# Patient Record
Sex: MALE | Race: OTHER | NOT HISPANIC OR LATINO | URBAN - METROPOLITAN AREA
[De-identification: names, ages, dates, MRNs, and addresses within clinical notes are randomized per-mention and may not be internally consistent; named-entity substitution may affect disease eponyms.]

---

## 2022-09-06 ENCOUNTER — INPATIENT (INPATIENT)
Facility: HOSPITAL | Age: 22
LOS: 295 days | Discharge: NURSING, REHAB OR LONG-TERM CARE | DRG: 753 | End: 2023-06-29
Attending: HOSPITALIST | Admitting: HOSPITALIST
Payer: MEDICAID

## 2022-09-06 VITALS
RESPIRATION RATE: 18 BRPM | OXYGEN SATURATION: 99 % | HEART RATE: 115 BPM | DIASTOLIC BLOOD PRESSURE: 78 MMHG | TEMPERATURE: 98 F | WEIGHT: 160.06 LBS | SYSTOLIC BLOOD PRESSURE: 142 MMHG | HEIGHT: 68 IN

## 2022-09-06 LAB
ANION GAP SERPL CALC-SCNC: 11 MMOL/L — SIGNIFICANT CHANGE UP (ref 7–14)
APAP SERPL-MCNC: <5 UG/ML — LOW (ref 10–30)
BASOPHILS # BLD AUTO: 0.03 K/UL — SIGNIFICANT CHANGE UP (ref 0–0.2)
BASOPHILS NFR BLD AUTO: 0.3 % — SIGNIFICANT CHANGE UP (ref 0–1)
BUN SERPL-MCNC: 19 MG/DL — SIGNIFICANT CHANGE UP (ref 10–20)
CALCIUM SERPL-MCNC: 9.8 MG/DL — SIGNIFICANT CHANGE UP (ref 8.5–10.1)
CHLORIDE SERPL-SCNC: 104 MMOL/L — SIGNIFICANT CHANGE UP (ref 98–110)
CO2 SERPL-SCNC: 25 MMOL/L — SIGNIFICANT CHANGE UP (ref 17–32)
CREAT SERPL-MCNC: 0.9 MG/DL — SIGNIFICANT CHANGE UP (ref 0.3–1)
EGFR: 126 ML/MIN/1.73M2 — SIGNIFICANT CHANGE UP
EOSINOPHIL # BLD AUTO: 0.05 K/UL — SIGNIFICANT CHANGE UP (ref 0–0.7)
EOSINOPHIL NFR BLD AUTO: 0.5 % — SIGNIFICANT CHANGE UP (ref 0–8)
ETHANOL SERPL-MCNC: <10 MG/DL — SIGNIFICANT CHANGE UP
GLUCOSE SERPL-MCNC: 116 MG/DL — HIGH (ref 70–99)
HCT VFR BLD CALC: 40.7 % — LOW (ref 42–52)
HGB BLD-MCNC: 14 G/DL — SIGNIFICANT CHANGE UP (ref 14–18)
IMM GRANULOCYTES NFR BLD AUTO: 0.2 % — SIGNIFICANT CHANGE UP (ref 0.1–0.3)
LITHIUM SERPL-MCNC: 0.3 MMOL/L — LOW (ref 0.6–1.2)
LYMPHOCYTES # BLD AUTO: 1.37 K/UL — SIGNIFICANT CHANGE UP (ref 1.2–3.4)
LYMPHOCYTES # BLD AUTO: 14.3 % — LOW (ref 20.5–51.1)
MCHC RBC-ENTMCNC: 29.1 PG — SIGNIFICANT CHANGE UP (ref 27–31)
MCHC RBC-ENTMCNC: 34.4 G/DL — SIGNIFICANT CHANGE UP (ref 32–37)
MCV RBC AUTO: 84.6 FL — SIGNIFICANT CHANGE UP (ref 80–94)
MONOCYTES # BLD AUTO: 1.09 K/UL — HIGH (ref 0.1–0.6)
MONOCYTES NFR BLD AUTO: 11.4 % — HIGH (ref 1.7–9.3)
NEUTROPHILS # BLD AUTO: 7 K/UL — HIGH (ref 1.4–6.5)
NEUTROPHILS NFR BLD AUTO: 73.3 % — SIGNIFICANT CHANGE UP (ref 42.2–75.2)
NRBC # BLD: 0 /100 WBCS — SIGNIFICANT CHANGE UP (ref 0–0)
PLATELET # BLD AUTO: 260 K/UL — SIGNIFICANT CHANGE UP (ref 130–400)
POTASSIUM SERPL-MCNC: 4.1 MMOL/L — SIGNIFICANT CHANGE UP (ref 3.5–5)
POTASSIUM SERPL-SCNC: 4.1 MMOL/L — SIGNIFICANT CHANGE UP (ref 3.5–5)
RBC # BLD: 4.81 M/UL — SIGNIFICANT CHANGE UP (ref 4.7–6.1)
RBC # FLD: 11.6 % — SIGNIFICANT CHANGE UP (ref 11.5–14.5)
SALICYLATES SERPL-MCNC: <0.3 MG/DL — LOW (ref 4–30)
SODIUM SERPL-SCNC: 140 MMOL/L — SIGNIFICANT CHANGE UP (ref 135–146)
VALPROATE SERPL-MCNC: 36 UG/ML — LOW (ref 50–100)
WBC # BLD: 9.56 K/UL — SIGNIFICANT CHANGE UP (ref 4.8–10.8)
WBC # FLD AUTO: 9.56 K/UL — SIGNIFICANT CHANGE UP (ref 4.8–10.8)

## 2022-09-06 PROCEDURE — 80178 ASSAY OF LITHIUM: CPT

## 2022-09-06 PROCEDURE — 83735 ASSAY OF MAGNESIUM: CPT

## 2022-09-06 PROCEDURE — 99283 EMERGENCY DEPT VISIT LOW MDM: CPT

## 2022-09-06 PROCEDURE — 80354 DRUG SCREENING FENTANYL: CPT

## 2022-09-06 PROCEDURE — 80053 COMPREHEN METABOLIC PANEL: CPT

## 2022-09-06 PROCEDURE — 36415 COLL VENOUS BLD VENIPUNCTURE: CPT

## 2022-09-06 PROCEDURE — 93010 ELECTROCARDIOGRAM REPORT: CPT

## 2022-09-06 PROCEDURE — 84100 ASSAY OF PHOSPHORUS: CPT

## 2022-09-06 PROCEDURE — 85027 COMPLETE CBC AUTOMATED: CPT

## 2022-09-06 PROCEDURE — U0003: CPT

## 2022-09-06 PROCEDURE — 81003 URINALYSIS AUTO W/O SCOPE: CPT

## 2022-09-06 PROCEDURE — 87635 SARS-COV-2 COVID-19 AMP PRB: CPT

## 2022-09-06 PROCEDURE — 80048 BASIC METABOLIC PNL TOTAL CA: CPT

## 2022-09-06 PROCEDURE — 99285 EMERGENCY DEPT VISIT HI MDM: CPT

## 2022-09-06 PROCEDURE — 80164 ASSAY DIPROPYLACETIC ACD TOT: CPT

## 2022-09-06 PROCEDURE — U0005: CPT

## 2022-09-06 PROCEDURE — 80307 DRUG TEST PRSMV CHEM ANLYZR: CPT

## 2022-09-06 PROCEDURE — 93005 ELECTROCARDIOGRAM TRACING: CPT

## 2022-09-06 PROCEDURE — 85025 COMPLETE CBC W/AUTO DIFF WBC: CPT

## 2022-09-07 DIAGNOSIS — F31.9 BIPOLAR DISORDER, UNSPECIFIED: ICD-10-CM

## 2022-09-07 DIAGNOSIS — Z87.898 PERSONAL HISTORY OF OTHER SPECIFIED CONDITIONS: ICD-10-CM

## 2022-09-07 DIAGNOSIS — F39 UNSPECIFIED MOOD [AFFECTIVE] DISORDER: ICD-10-CM

## 2022-09-07 LAB
AMPHET UR-MCNC: NEGATIVE — SIGNIFICANT CHANGE UP
APPEARANCE UR: CLEAR — SIGNIFICANT CHANGE UP
BARBITURATES UR SCN-MCNC: NEGATIVE — SIGNIFICANT CHANGE UP
BENZODIAZ UR-MCNC: NEGATIVE — SIGNIFICANT CHANGE UP
BILIRUB UR-MCNC: NEGATIVE — SIGNIFICANT CHANGE UP
COCAINE METAB.OTHER UR-MCNC: NEGATIVE — SIGNIFICANT CHANGE UP
COLOR SPEC: YELLOW — SIGNIFICANT CHANGE UP
DIFF PNL FLD: NEGATIVE — SIGNIFICANT CHANGE UP
DRUG SCREEN 1, URINE RESULT: SIGNIFICANT CHANGE UP
FENTANYL UR QL: NEGATIVE — SIGNIFICANT CHANGE UP
GLUCOSE UR QL: NEGATIVE MG/DL — SIGNIFICANT CHANGE UP
KETONES UR-MCNC: NEGATIVE — SIGNIFICANT CHANGE UP
LEUKOCYTE ESTERASE UR-ACNC: NEGATIVE — SIGNIFICANT CHANGE UP
METHADONE UR-MCNC: NEGATIVE — SIGNIFICANT CHANGE UP
NITRITE UR-MCNC: NEGATIVE — SIGNIFICANT CHANGE UP
OPIATES UR-MCNC: NEGATIVE — SIGNIFICANT CHANGE UP
OXYCODONE UR-MCNC: NEGATIVE — SIGNIFICANT CHANGE UP
PCP UR-MCNC: NEGATIVE — SIGNIFICANT CHANGE UP
PH UR: 7 — SIGNIFICANT CHANGE UP (ref 5–8)
PROPOXYPHENE QUALITATIVE URINE RESULT: NEGATIVE — SIGNIFICANT CHANGE UP
PROT UR-MCNC: NEGATIVE MG/DL — SIGNIFICANT CHANGE UP
SARS-COV-2 RNA SPEC QL NAA+PROBE: SIGNIFICANT CHANGE UP
SP GR SPEC: 1.02 — SIGNIFICANT CHANGE UP (ref 1.01–1.03)
THC UR QL: NEGATIVE — SIGNIFICANT CHANGE UP
UROBILINOGEN FLD QL: 2 MG/DL

## 2022-09-07 RX ORDER — DIVALPROEX SODIUM 500 MG/1
0 TABLET, DELAYED RELEASE ORAL
Qty: 0 | Refills: 0 | DISCHARGE

## 2022-09-07 RX ORDER — LITHIUM CARBONATE 300 MG/1
450 TABLET, EXTENDED RELEASE ORAL
Refills: 0 | Status: DISCONTINUED | OUTPATIENT
Start: 2022-09-07 | End: 2023-06-29

## 2022-09-07 RX ORDER — DIVALPROEX SODIUM 500 MG/1
500 TABLET, DELAYED RELEASE ORAL DAILY
Refills: 0 | Status: DISCONTINUED | OUTPATIENT
Start: 2022-09-07 | End: 2023-06-29

## 2022-09-07 RX ORDER — DIVALPROEX SODIUM 500 MG/1
500 TABLET, DELAYED RELEASE ORAL ONCE
Refills: 0 | Status: COMPLETED | OUTPATIENT
Start: 2022-09-07 | End: 2022-09-07

## 2022-09-07 RX ORDER — DIVALPROEX SODIUM 500 MG/1
250 TABLET, DELAYED RELEASE ORAL DAILY
Refills: 0 | Status: DISCONTINUED | OUTPATIENT
Start: 2022-09-07 | End: 2023-06-29

## 2022-09-07 RX ORDER — DIVALPROEX SODIUM 500 MG/1
250 TABLET, DELAYED RELEASE ORAL ONCE
Refills: 0 | Status: COMPLETED | OUTPATIENT
Start: 2022-09-07 | End: 2022-09-07

## 2022-09-07 RX ORDER — LITHIUM CARBONATE 300 MG/1
450 TABLET, EXTENDED RELEASE ORAL ONCE
Refills: 0 | Status: COMPLETED | OUTPATIENT
Start: 2022-09-07 | End: 2022-09-07

## 2022-09-07 RX ORDER — DIVALPROEX SODIUM 500 MG/1
250 TABLET, DELAYED RELEASE ORAL AT BEDTIME
Refills: 0 | Status: DISCONTINUED | OUTPATIENT
Start: 2022-09-07 | End: 2023-06-29

## 2022-09-07 RX ORDER — DIVALPROEX SODIUM 500 MG/1
500 TABLET, DELAYED RELEASE ORAL AT BEDTIME
Refills: 0 | Status: DISCONTINUED | OUTPATIENT
Start: 2022-09-07 | End: 2023-06-29

## 2022-09-07 RX ORDER — RISPERIDONE 4 MG/1
2 TABLET ORAL ONCE
Refills: 0 | Status: COMPLETED | OUTPATIENT
Start: 2022-09-07 | End: 2022-09-07

## 2022-09-07 RX ORDER — RISPERIDONE 4 MG/1
2 TABLET ORAL
Refills: 0 | Status: DISCONTINUED | OUTPATIENT
Start: 2022-09-07 | End: 2023-06-29

## 2022-09-07 RX ORDER — LITHIUM CARBONATE 300 MG/1
0 TABLET, EXTENDED RELEASE ORAL
Qty: 0 | Refills: 0 | DISCHARGE

## 2022-09-07 RX ADMIN — DIVALPROEX SODIUM 250 MILLIGRAM(S): 500 TABLET, DELAYED RELEASE ORAL at 22:34

## 2022-09-07 RX ADMIN — LITHIUM CARBONATE 450 MILLIGRAM(S): 300 TABLET, EXTENDED RELEASE ORAL at 18:39

## 2022-09-07 RX ADMIN — DIVALPROEX SODIUM 500 MILLIGRAM(S): 500 TABLET, DELAYED RELEASE ORAL at 08:28

## 2022-09-07 RX ADMIN — RISPERIDONE 2 MILLIGRAM(S): 4 TABLET ORAL at 18:39

## 2022-09-07 RX ADMIN — RISPERIDONE 2 MILLIGRAM(S): 4 TABLET ORAL at 08:28

## 2022-09-07 RX ADMIN — LITHIUM CARBONATE 450 MILLIGRAM(S): 300 TABLET, EXTENDED RELEASE ORAL at 08:28

## 2022-09-07 RX ADMIN — DIVALPROEX SODIUM 250 MILLIGRAM(S): 500 TABLET, DELAYED RELEASE ORAL at 08:28

## 2022-09-07 RX ADMIN — DIVALPROEX SODIUM 500 MILLIGRAM(S): 500 TABLET, DELAYED RELEASE ORAL at 22:34

## 2022-09-07 NOTE — ED PROVIDER NOTE - CLINICAL SUMMARY MEDICAL DECISION MAKING FREE TEXT BOX
Patient presented with aggressive and violent behavior from home as documented. On arrival patient afebrile, HD stable, neurovascularly intact. Medically cleared in ED. Consulted psych who evaluated patient - they do not recommend admission at this time to psych and patient ultimately cleared from their standpoint. However, family is refusing to take patient home as patient is deemed a threat to them and their other children. Consulted case management - unclear when placement could be achieved in group home or other facility and therefore will require admission to medicine for placement.
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
## 2022-09-07 NOTE — ED PROVIDER NOTE - PROGRESS NOTE DETAILS
HANNAH: Case discussed with  Aiyana - psych is clearing patient from their standpoint and they do not see indication for psychiatric admission. Aiyana has discussed case at length with patient's family who is refusing to take patient home as they consider patient a threat to them and their other children. Per Aiyana, patient will need placement at group home or other facility but it is unclear currently how long this will take. Spoke with Dr. Meza (hospitalist) who is refusing admission to medicine pending placement. Spoke directly with Dr. Palacios - in conjunction with case management - will continue attempt currently to find patient placement/find more definitive timeline for placement in order to determine final disposition. HANNAH: Case management spoke with Dr. Meza - patient to be admitted to medicine until placement can be achieved.

## 2022-09-07 NOTE — H&P ADULT - ASSESSMENT
This is a 21 year old male past medical history of bipolar and developmental delay comes to emergency room for alleged aggressive and violent behavior as per step father.  Evaluated by Psych Tele in ED - cleared no need for inpatient evaluation.  Step Father left stating needs placement refusing to take child home.

## 2022-09-07 NOTE — H&P ADULT - NSHPSOCIALHISTORY_GEN_ALL_CORE
Living Condition: lives at home with Step Dad and Biological Mother  Ambulation Status:  independently  Tobacco use:  Denies  EtOH use:  Denies  Illicit drug use: Denies  Marital Status: Denies

## 2022-09-07 NOTE — ED BEHAVIORAL HEALTH ASSESSMENT NOTE - NSSUICPROTFACT_PSY_ALL_CORE
Responsibility to children, family, or others/Identifies reasons for living/Fear of death or the actual act of killing self

## 2022-09-07 NOTE — ED ADULT NURSE REASSESSMENT NOTE - NS ED NURSE REASSESS COMMENT FT1
pt in no acute distress. pt seen by case management, awaiting dispo. pt on 1:1 observation, area kept free from harm. vital signs stable. safety maintained

## 2022-09-07 NOTE — PATIENT PROFILE ADULT - TRANSPORTATION
discharge vital signs obtained. RN woke patient up for oral temperature, patient had 15 second seizure, resolved without intervention. Mother states this happens every time patient is woken up from sleep or nap. patient immediately returned to baseline behavior. no breathing pattern changes, color change, SpO2 remained >100%. Post ichtal /83. Dr Armenta at bedside to assess patient. patient remains cleared for discharge home. PIV removed as per MD, dressing applied, hemostasis achieved. Patient awake, alert, interactive with family, stating "I want to go home".
handoff report received from Zac Chandra RN for change of shift. patient resting comfortably on stretcher, no acute distress noted, family at bedside. PIV intact and patent. additional urine sample obtained and sent to lab. awaiting results and disposition. Will continue to monitor.
no

## 2022-09-07 NOTE — H&P ADULT - NSHPLABSRESULTS_GEN_ALL_CORE
14.0   9.56  )-----------( 260      ( 06 Sep 2022 23:20 )             40.7       09-06    140  |  104  |  19  ----------------------------<  116<H>  4.1   |  25  |  0.9    Ca    9.8      06 Sep 2022 23:20                Urinalysis Basic - ( 07 Sep 2022 11:50 )    Color: Yellow / Appearance: Clear / S.020 / pH: x  Gluc: x / Ketone: Negative  / Bili: Negative / Urobili: 2.0 mg/dL   Blood: x / Protein: Negative mg/dL / Nitrite: Negative   Leuk Esterase: Negative / RBC: x / WBC x   Sq Epi: x / Non Sq Epi: x / Bacteria: x            Lactate Trend            CAPILLARY BLOOD GLUCOSE

## 2022-09-07 NOTE — H&P ADULT - HISTORY OF PRESENT ILLNESS
This is a 21 year old male past medical history of bipolar and developmental delay comes to emergency room for alleged aggressive and violent behavior as per step father. Patient is compliant with meds and as per father has been more aggressive and has become violent which prompted emergency room visit. patient denies complaints denies chest pain, shortness of breath, ab pain and SI/HI.  Patient at bedside is calm and interacting appropriately.

## 2022-09-07 NOTE — ED BEHAVIORAL HEALTH ASSESSMENT NOTE - REFERRAL / APPOINTMENT DETAILS
Please provide pt with resources for OPWDD services/outpatient therapy referrals that will be faxed over

## 2022-09-07 NOTE — ED BEHAVIORAL HEALTH ASSESSMENT NOTE - DESCRIPTION
See HPI None Patient has been in good behavioral control since arriving to the ED. Please see separate SW note for details

## 2022-09-07 NOTE — H&P ADULT - NS ATTEND AMEND GEN_ALL_CORE FT
PHYSICAL EXAM:      Constitutional: NAD    Neck: supple    Respiratory: CTA b/l    Cardiovascular: [id number]    Gastrointestinal: soft, NT, ND    Extremities: no edema    Neurological: AAOx3, NFD    Psychiatric: no suicidal ideations

## 2022-09-07 NOTE — ED BEHAVIORAL HEALTH ASSESSMENT NOTE - SUMMARY
Patient is a 20 yo M, domiciled with parents and 3 younger siblings (8 yo, 13 yo, 17yo) in [place], enrolled as a student in [place] [address] in the [facility] ED, with a psychiatric history of ?Bipolar disorder and likely developmental disorder, no previous psychiatric hospitalizations, no known suicide attempts, +SIB by biting self, +h/o physical aggression toward others, no substance use or legal issues, no significant PMH, in outpatient treatment with a psychiatrist, Dr. Ruby Muir, whom pt has been seeing since he was a young child, on Depakote  mg BID, lithium  mg BID, and Risperidone 2 mg BID, but non-adherent, who was BIB EMS a/b parents for agitation at home.    Patient presents with a longstanding history of poor impulse control, low frustration tolerance, and poor coping skills, likely in the setting of a developmental disorder +/- intellectual disability, with recurrent episodes of agitation at home in response to not having his expectations met. Since arriving to the ED, the patient has denied SI/HI/AH/VH or manic symptoms, has been in good behavioral control, and has expressed remorse for his actions earlier this evening. Given his history, the pt's behavioral outburst tonight is consistent with his baseline and not a result of an acute mood/psychotic episode that would benefit from inpatient hospitalization. He has multiple risk factors that elevate his chronic risk of harm, including previous psychiatric diagnoses, h/o aggression, poor impulse control/low frustration tolerance, and med non-adherence. To mitigate his risk of harm while in the ED, the patient completed a safety plan and he will be provided doses of his home meds. To reduce his risk of future harm, the pt and his family were provided psychoeducation on the importance of the pt taking his meds on a regular basis to target his behavioral dysregulation and this writer recommended that the patient be supervised while taking his meds at home. In addition, the team provided resources on OPWDD services (ie [facility]) so that the pt could be set up with extra support at home, as well as outpatient referrals for therapy services. This writer recommended the father contact the patient's psychiatrist to schedule an appt for the pt this week, which the father agreed to do, but he refused to let the patient come back home at this time given his recent agitation. Given the father's refusal to accept the pt back home, this writer informed the father that the patient will be held in the ED for a SW consult in the AM to discuss dispo options further and provided the father with the phone number to the ED so that he could reach out to SW in the AM. At this time, there is no acute contraindication to discharge from a psychiatric standpoint.     Plan:  -Recommend holding pt in the ED for a SW Consult in the AM to discuss dispo and recommend that they reach out to patient's family ([phone]) to discuss.  -While in the ED, can provide pt with doses of his home meds: Depakote  mg, lithium  mg, and Risperidone 2 mg   -At this time, there is no acute contraindication to discharge from a psychiatric standpoint. Patient is a 22 yo M, domiciled with parents and 3 younger siblings (10 yo, 11 yo, 15yo) in [place], enrolled as a student in [place] [address] in the [facility] ED, with a psychiatric history of ?Bipolar disorder and likely developmental disorder, no previous psychiatric hospitalizations, no known suicide attempts, +SIB by biting self, +h/o physical aggression toward others, no substance use or legal issues, no significant PMH, in outpatient treatment with a psychiatrist, Dr. Ruby Muir, whom pt has been seeing since he was a young child, on Depakote  mg BID, lithium  mg BID, and Risperidone 2 mg BID, but non-adherent, who was BIB EMS a/b parents for agitation at home.    Patient presents with a longstanding history of poor impulse control, low frustration tolerance, and poor coping skills, likely in the setting of a developmental disorder +/- intellectual disability, with recurrent episodes of agitation at home in response to not having his expectations met. Since arriving to the ED, the patient has denied SI/HI/AH/VH or manic symptoms, has been in good behavioral control, and has expressed remorse for his actions earlier this evening. Given his history, the pt's behavioral outburst tonight is consistent with his baseline and not a result of an acute mood/psychotic episode that would benefit from inpatient hospitalization. He has multiple risk factors that elevate his chronic risk of harm, including previous psychiatric diagnoses, h/o aggression, poor impulse control/low frustration tolerance, and med non-adherence. To mitigate his risk of harm while in the ED, the patient completed a safety plan and he will be provided doses of his home meds. To reduce his risk of future harm, the pt and his family were provided psychoeducation on the importance of the pt taking his meds on a regular basis to target his behavioral dysregulation and this writer recommended that the patient be supervised while taking his meds at home. In addition, the team provided resources on OPWDD services (ie [facility]) so that the pt could be set up with extra support at home, as well as outpatient referrals for therapy services. This writer recommended the father contact the patient's psychiatrist to schedule an appt for the pt this week, which the father agreed to do, but he refused to let the patient come back home at this time given his recent agitation. Given the father's refusal to accept the pt back home, this writer informed the father that the patient will be held in the ED for a SW consult in the AM to discuss dispo options further and provided the father with the phone number to the ED so that he could reach out to SW in the AM. At this time, there is no acute contraindication to discharge from a psychiatric standpoint.     Plan:  -Recommend holding pt in the ED for a SW Consult in the AM to discuss dispo options and recommend that they reach out to patient's family ([phone]) to discuss.  -Maintain on 1:1 for elopement precautions  -While in the ED, can provide pt with doses of his home meds: Depakote  mg, lithium  mg, and Risperidone 2 mg   -At this time, there is no acute contraindication to discharge from a psychiatric standpoint. Patient is a 20 yo M, domiciled with parents and 3 younger siblings (8 yo, 11 yo, 15yo) in [place], enrolled as a student in [place] [address] in the hospitals ED, with a psychiatric history of ?Bipolar disorder and likely developmental disorder, no previous psychiatric hospitalizations, no known suicide attempts, +SIB by biting self, +h/o physical aggression toward others, no substance use or legal issues, no significant PMH, in outpatient treatment with a psychiatrist, Dr. Ruby Muir, whom pt has been seeing since he was a young child, on Depakote  mg BID, lithium  mg BID, and Risperidone 2 mg BID, but non-adherent, who was BIB EMS a/b parents for agitation at home.    Patient presents with a longstanding history of poor impulse control, low frustration tolerance, and inadequate coping skills, likely in the setting of a developmental disorder +/- intellectual disability, with recurrent behavioral outbursts in response to not having his expectations met. This evening, he became physically agitated once he learned that he could not be immediately enrolled in specific program and was brought to the ED for an evaluation. Since arriving to the ED, the patient has denied SI/HI/AH/VH/PI or manic symptoms, he has remained in good behavioral control, and has expressed remorse for his earlier actions, which he acknowledges to be an ongoing issue. On MSE, he does not exhibit acute depressive, manic, psychotic, or anxiety symptoms and he was able to contract for safety. Given his current presentation and history, the pt's behavioral outburst tonight seems to be a result of his poor impulse control and problem solving skills(which is consistent with his baseline), rather than an acute mood/psychotic disorder that would benefit from inpatient hospitalization.     The patient has multiple risk factors that elevate his chronic risk of harm, including previous psychiatric diagnoses, h/o aggression, poor impulse control/low frustration tolerance, and medication non-adherence. His risk is mitigated by multiple protective factors, including future-orientation, ability to contract for safety, no previous suicide attempts, in treatment, and no e/o an acute mood or psychotic episode. To mitigate his risk of harm while in the ED, the patient completed a safety plan and he will be provided doses of his home meds. To reduce his risk of future harm, the pt and his family were provided psychoeducation on the importance of taking his meds regularly to target his behavioral dysregulation and advised to supervise pt when he takes his meds to ensure he is adherent. In addition, the team provided resources on OPWDD services (ie [facility]) so that the pt could be set up with extra support at home, as well as outpatient referrals for therapy services. This writer recommended the father contact the patient's psychiatrist to schedule an appt for the pt this week, which the father agreed to do, but he refused to let the patient come back home at this time given his recent agitation. Given the father's refusal to accept the pt back home, this writer informed the father that the patient will be held in the ED for a SW consult in the AM to discuss dispo options and the father was provided with the phone number to the ED so that he could reach out to SW in the AM. At this time, there is no acute contraindication to discharge from a psychiatric standpoint.     Plan:  -Recommend holding pt in the ED for a SW Consult in the AM to discuss dispo options and recommend that they reach out to patient's family ([phone]) to discuss.  -Maintain on 1:1 for elopement precautions  -While in the ED, can provide pt with doses of his home meds: Depakote  mg, lithium  mg, and Risperidone 2 mg   -At this time, there is no acute contraindication to discharge from a psychiatric standpoint.

## 2022-09-07 NOTE — H&P ADULT - PROBLEM SELECTOR PLAN 1
- patient cleared by psych team in ED  - patient calm and interacting appropriately in ED  - Case management eval  - Social Service consult  - Monitor vital signs shift  - regular diet  - supportive care

## 2022-09-07 NOTE — ED PROVIDER NOTE - ATTENDING APP SHARED VISIT CONTRIBUTION OF CARE
I reviewed and verified the documentation and  and independently performed the documented    Patient is a 20 yo M, bi father with a psychiatric history of ?Bipolar disorder  per father and likely developmental  pw aggressive behavior at home . h/o physical aggression toward others. pt no si hi .noncompliant with  meds at home (depakote, lithium, risperidone)   medically cleared for psych eval. evaluated by psych 0 0  cleared from psych perspective  however they   holding pt in the ED for a SW Consult in the AM to discuss dispo and recommend that they reach out to patient's family ([phone]) to discuss.

## 2022-09-07 NOTE — ED PROVIDER NOTE - OBJECTIVE STATEMENT
21 year old male past medical history of bipolar and developmental delay comes to emergency room for aggressive and violent behavior. patient is complaint with meds and as per father has been more aggressive and has become violent which prompted emergency room visit. patient denies complaints denies chest pain, shortness of breath, ab pain and SI/HI.

## 2022-09-07 NOTE — ED BEHAVIORAL HEALTH ASSESSMENT NOTE - DETAILS
See HPI Pt's father informed about plan Patient reviewed coping strategies, people he can reach out for help, and emergency services he can contact for help

## 2022-09-07 NOTE — PATIENT PROFILE ADULT - FALL HARM RISK - UNIVERSAL INTERVENTIONS
Bed in lowest position, wheels locked, appropriate side rails in place/Call bell, personal items and telephone in reach/Instruct patient to call for assistance before getting out of bed or chair/Non-slip footwear when patient is out of bed/[place] to call system/Physically safe environment - no spills, clutter or unnecessary equipment/Purposeful Proactive Rounding/Room/bathroom lighting operational, light cord in reach

## 2022-09-07 NOTE — ED ADULT NURSE REASSESSMENT NOTE - NS ED NURSE REASSESS COMMENT FT1
received pt in no acute distress. pt in er for aggressive behavior. pt on 1:1 observation for safety. pt in hospital gown, belongings secured. frequent rounding on pt to ensure safety. pt awaiting dispo. safety maintained. received pt in no acute distress. pt in er for aggressive behavior. pt on 1:1 observation for safety. pt in hospital gown, belongings secured. frequent rounding on pt to ensure safety. pt denies SI/HI/AVH. pt awaiting dispo. safety maintained.

## 2022-09-08 ENCOUNTER — TRANSCRIPTION ENCOUNTER (OUTPATIENT)
Age: 22
End: 2022-09-08

## 2022-09-08 PROCEDURE — 99232 SBSQ HOSP IP/OBS MODERATE 35: CPT

## 2022-09-08 RX ORDER — INSULIN LISPRO 100/ML
4 VIAL (ML) SUBCUTANEOUS ONCE
Refills: 0 | Status: DISCONTINUED | OUTPATIENT
Start: 2022-09-08 | End: 2022-09-08

## 2022-09-08 RX ADMIN — DIVALPROEX SODIUM 250 MILLIGRAM(S): 500 TABLET, DELAYED RELEASE ORAL at 22:41

## 2022-09-08 RX ADMIN — RISPERIDONE 2 MILLIGRAM(S): 4 TABLET ORAL at 06:00

## 2022-09-08 RX ADMIN — DIVALPROEX SODIUM 500 MILLIGRAM(S): 500 TABLET, DELAYED RELEASE ORAL at 11:53

## 2022-09-08 RX ADMIN — DIVALPROEX SODIUM 250 MILLIGRAM(S): 500 TABLET, DELAYED RELEASE ORAL at 11:52

## 2022-09-08 RX ADMIN — LITHIUM CARBONATE 450 MILLIGRAM(S): 300 TABLET, EXTENDED RELEASE ORAL at 17:34

## 2022-09-08 RX ADMIN — LITHIUM CARBONATE 450 MILLIGRAM(S): 300 TABLET, EXTENDED RELEASE ORAL at 06:00

## 2022-09-08 RX ADMIN — DIVALPROEX SODIUM 500 MILLIGRAM(S): 500 TABLET, DELAYED RELEASE ORAL at 22:41

## 2022-09-08 RX ADMIN — RISPERIDONE 2 MILLIGRAM(S): 4 TABLET ORAL at 17:34

## 2022-09-08 NOTE — PROGRESS NOTE ADULT - ASSESSMENT
This is a 21 year old male past medical history of bipolar and developmental delay comes to emergency room for alleged aggressive and violent behavior as per step father.  Evaluated by Psych Tele in ED - cleared no need for inpatient evaluation.  Step Father left stating needs placement refusing to take child home.   Case management consulted.     Bipolar disorder and likely developmental disorder, CONT CURRENT MEDS, discussed with his psychiatrist.

## 2022-09-08 NOTE — DISCHARGE NOTE PROVIDER - NSDCMRMEDTOKEN_GEN_ALL_CORE_FT
divalproex sodium 250 mg oral tablet, extended release: orally 2 times a day  divalproex sodium 500 mg oral tablet, extended release: orally 2 times a day  lithium carbonate extended release: 450 milligram(s) orally 2 times a day  risperiDONE 2 mg oral tablet: 1 tab(s) orally 2 times a day   divalproex sodium 250 mg oral tablet, extended release: 1 tab(s) orally once a day  divalproex sodium 250 mg oral tablet, extended release: 1 tab(s) orally once a day (at bedtime)  divalproex sodium 500 mg oral tablet, extended release: 1 tab(s) orally once a day (at bedtime)  divalproex sodium 500 mg oral tablet, extended release: 1 tab(s) orally once a day  lithium 450 mg oral tablet, extended release: 1 tab(s) orally 2 times a day  melatonin 3 mg oral tablet: 1 tab(s) orally once a day (at bedtime)  risperiDONE 2 mg oral tablet: 1 tab(s) orally 2 times a day  traZODone 50 mg oral tablet: 25 milligram(s) orally once a day MEDS ORDERED BY PHONE TO PHARMACY  traZODone 50 mg oral tablet: 25 milligram(s) orally once a day (at bedtime) MEDS ORDERED VIA PHONE TO THE PHARMACY

## 2022-09-08 NOTE — DISCHARGE NOTE PROVIDER - HOSPITAL COURSE
This is a 21 year old male past medical history of bipolar and developmental delay comes to emergency room for alleged aggressive and violent behavior as per step father.  Evaluated by Psych Tele in ED - cleared no need for inpatient evaluation.  Step Father left stating needs placement refusing to take child home.      Problem/Plan - 1:  ·  Problem: History of developmental delay.   ·  Plan: - patient cleared by psych team in ED  - patient calm and interacting appropriately in ED  - Case management eval  - Social Service consult  - Monitor vital signs shift  - regular diet  - supportive care  -pt clinically improved and dc to a group home     Problem/Plan - 2:  ·  Problem: Bipolar disorder.   ·  Plan: - continue home medications.   22 years old male past medical history of bipolar and developmental delay came to emergency room for alleged aggressive and violent behavior as per step father. Evaluated by Psych; Step Father left stated pt needs placement refusing to take child home. Patient was admitted for Bipolar disorder and likely developmental disorder- stable. He was continued on his medications: Depakote, risperidone, and lithium. Pt has been stable without aggressive behaviour in the hospital. Family wasn't able to take care of the patient and therefore patient is being placed to a group home. At this time patient is medically stable for a hospital discharge to group home.

## 2022-09-08 NOTE — PROGRESS NOTE ADULT - SUBJECTIVE AND OBJECTIVE BOX
no new complaints    Vital Signs Last 24 Hrs  T(C): 36.1 (08 Sep 2022 13:27), Max: 36.4 (07 Sep 2022 21:00)  T(F): 96.9 (08 Sep 2022 13:27), Max: 97.6 (07 Sep 2022 21:00)  HR: 75 (08 Sep 2022 13:27) (75 - 94)  BP: 121/68 (08 Sep 2022 13:27) (108/56 - 125/59)  BP(mean): --  RR: 16 (08 Sep 2022 13:27) (16 - 18)  SpO2: --      GENERAL: NAD  HEAD:  Atraumatic, Normocephalic  EYES: EOMI, PERRLA, conjunctiva and sclera clear  NECK: Supple, No JVD, no cervical lymphadenopathy, non-tender  CHEST/LUNG: Clear to auscultation bilaterally; No wheeze, rhonchi, or rales  HEART: Regular rate and rhythm; S1&S2  ABDOMEN: Soft, Nontender, Nondistended x 4 quadrants; Bowel sounds present  EXTREMITIES:   Peripheral Pulses Present, No clubbing, no cyanosis, or no edema, no calf tenderness  PSYCH: AAOx3, cooperative, appropriate  NEUROLOGY: WNL                          14.0   9.56  )-----------( 260      ( 06 Sep 2022 23:20 )             40.7   09-06    140  |  104  |  19  ----------------------------<  116<H>  4.1   |  25  |  0.9    Ca    9.8      06 Sep 2022 23:20        MEDICATIONS  (STANDING):  diVALproex  milliGRAM(s) Oral daily  diVALproex  milliGRAM(s) Oral daily  diVALproex  milliGRAM(s) Oral at bedtime  diVALproex  milliGRAM(s) Oral at bedtime  lithium CR (ESKALITH-CR) 450 milliGRAM(s) Oral two times a day  risperiDONE   Tablet 2 milliGRAM(s) Oral two times a day    MEDICATIONS  (PRN):

## 2022-09-08 NOTE — DISCHARGE NOTE PROVIDER - NSDCCPCAREPLAN_GEN_ALL_CORE_FT
PRINCIPAL DISCHARGE DIAGNOSIS  Diagnosis: Encounter for social work intervention  Assessment and Plan of Treatment: group home care.      SECONDARY DISCHARGE DIAGNOSES  Diagnosis: Bipolar disorder  Assessment and Plan of Treatment: Continue medications     PRINCIPAL DISCHARGE DIAGNOSIS  Diagnosis: Encounter for social work intervention  Assessment and Plan of Treatment: 22 years old male past medical history of bipolar and developmental delay came to emergency room for alleged aggressive and violent behavior as per step father. Evaluated by Psych; Step Father left stated pt needs placement refusing to take child home. Patient was admitted for Bipolar disorder and likely developmental disorder- stable. He was continued on his medications: Depakote, risperidone, and lithium. Pt has been stable without aggressive behaviour in the hospital. Family wasn't able to take care of the patient and therefore patient is being placed to a group home. At this time patient is medically stable for a hospital discharge to group home.      SECONDARY DISCHARGE DIAGNOSES  Diagnosis: Bipolar disorder  Assessment and Plan of Treatment: Continue medications

## 2022-09-08 NOTE — DISCHARGE NOTE PROVIDER - CARE PROVIDER_API CALL
primary care doctor,   Phone: (   )    -  Fax: (   )    -  Follow Up Time: 1 week   primary care doctor,   Phone: (   )    -  Fax: (   )    -  Follow Up Time: 1 week    [facility]  [address] [place] [id number]  Phone: [phone]  Fax: [phone]  Follow Up Time: 1 month

## 2022-09-08 NOTE — DISCHARGE NOTE PROVIDER - PROVIDER TOKENS
FREE:[LAST:[primary care doctor],PHONE:[(   )    -],FAX:[(   )    -],FOLLOWUP:[1 week]] FREE:[LAST:[primary care doctor],PHONE:[(   )    -],FAX:[(   )    -],FOLLOWUP:[1 week]],PROVIDER:[TOKEN:[[id number]],FOLLOWUP:[1 month]]

## 2022-09-09 PROCEDURE — 99231 SBSQ HOSP IP/OBS SF/LOW 25: CPT

## 2022-09-09 RX ADMIN — DIVALPROEX SODIUM 500 MILLIGRAM(S): 500 TABLET, DELAYED RELEASE ORAL at 12:23

## 2022-09-09 RX ADMIN — DIVALPROEX SODIUM 500 MILLIGRAM(S): 500 TABLET, DELAYED RELEASE ORAL at 21:28

## 2022-09-09 RX ADMIN — RISPERIDONE 2 MILLIGRAM(S): 4 TABLET ORAL at 17:32

## 2022-09-09 RX ADMIN — LITHIUM CARBONATE 450 MILLIGRAM(S): 300 TABLET, EXTENDED RELEASE ORAL at 05:27

## 2022-09-09 RX ADMIN — LITHIUM CARBONATE 450 MILLIGRAM(S): 300 TABLET, EXTENDED RELEASE ORAL at 17:32

## 2022-09-09 RX ADMIN — DIVALPROEX SODIUM 250 MILLIGRAM(S): 500 TABLET, DELAYED RELEASE ORAL at 21:28

## 2022-09-09 RX ADMIN — RISPERIDONE 2 MILLIGRAM(S): 4 TABLET ORAL at 05:27

## 2022-09-09 RX ADMIN — DIVALPROEX SODIUM 250 MILLIGRAM(S): 500 TABLET, DELAYED RELEASE ORAL at 12:23

## 2022-09-09 NOTE — PROGRESS NOTE ADULT - SUBJECTIVE AND OBJECTIVE BOX
no new complaints    Vital Signs Last 24 Hrs  T(C): 36 (09 Sep 2022 13:02), Max: 36.6 (08 Sep 2022 21:00)  T(F): 96.8 (09 Sep 2022 13:02), Max: 97.8 (08 Sep 2022 21:00)  HR: 79 (09 Sep 2022 13:02) (67 - 79)  BP: 110/62 (09 Sep 2022 13:02) (110/62 - 128/65)  BP(mean): --  RR: 16 (09 Sep 2022 13:02) (16 - 16)  SpO2: --        GENERAL: NAD  HEAD:  Atraumatic, Normocephalic  EYES: EOMI, PERRLA, conjunctiva and sclera clear  NECK: Supple, No JVD  CHEST/LUNG: Clear to auscultation bilaterally; No wheeze  HEART: Regular rate and rhythm; S1&S2  ABDOMEN: Soft, Nontender, Nondistended x 4 quadrants; Bowel sounds present  EXTREMITIES:  no edema  PSYCH: AAOx3, cooperative, appropriate  NEUROLOGY: WNL                          14.0   9.56  )-----------( 260      ( 06 Sep 2022 23:20 )             40.7   09-06    140  |  104  |  19  ----------------------------<  116<H>  4.1   |  25  |  0.9    Ca    9.8      06 Sep 2022 23:20        MEDICATIONS  (STANDING):  diVALproex  milliGRAM(s) Oral daily  diVALproex  milliGRAM(s) Oral daily  diVALproex  milliGRAM(s) Oral at bedtime  diVALproex  milliGRAM(s) Oral at bedtime  lithium CR (ESKALITH-CR) 450 milliGRAM(s) Oral two times a day  risperiDONE   Tablet 2 milliGRAM(s) Oral two times a day    MEDICATIONS  (PRN):

## 2022-09-09 NOTE — PROGRESS NOTE ADULT - ASSESSMENT
This is a 21 year old male past medical history of bipolar and developmental delay comes to emergency room for alleged aggressive and violent behavior as per step father.  Evaluated by Psych Tele in ED - cleared no need for inpatient evaluation.  Step Father left stating needs placement refusing to take child home.   Case management consulted for placement.    Bipolar disorder and likely developmental disorder, CONT CURRENT MEDS, discussed with his psychiatrist.

## 2022-09-10 PROCEDURE — 99231 SBSQ HOSP IP/OBS SF/LOW 25: CPT

## 2022-09-10 RX ORDER — HYDROXYZINE HCL 10 MG
25 TABLET ORAL ONCE
Refills: 0 | Status: COMPLETED | OUTPATIENT
Start: 2022-09-10 | End: 2022-09-10

## 2022-09-10 RX ADMIN — Medication 25 MILLIGRAM(S): at 03:33

## 2022-09-10 RX ADMIN — RISPERIDONE 2 MILLIGRAM(S): 4 TABLET ORAL at 17:22

## 2022-09-10 RX ADMIN — LITHIUM CARBONATE 450 MILLIGRAM(S): 300 TABLET, EXTENDED RELEASE ORAL at 17:22

## 2022-09-10 RX ADMIN — DIVALPROEX SODIUM 500 MILLIGRAM(S): 500 TABLET, DELAYED RELEASE ORAL at 11:10

## 2022-09-10 RX ADMIN — DIVALPROEX SODIUM 500 MILLIGRAM(S): 500 TABLET, DELAYED RELEASE ORAL at 21:22

## 2022-09-10 RX ADMIN — LITHIUM CARBONATE 450 MILLIGRAM(S): 300 TABLET, EXTENDED RELEASE ORAL at 05:46

## 2022-09-10 RX ADMIN — DIVALPROEX SODIUM 250 MILLIGRAM(S): 500 TABLET, DELAYED RELEASE ORAL at 21:22

## 2022-09-10 RX ADMIN — DIVALPROEX SODIUM 250 MILLIGRAM(S): 500 TABLET, DELAYED RELEASE ORAL at 11:09

## 2022-09-10 RX ADMIN — RISPERIDONE 2 MILLIGRAM(S): 4 TABLET ORAL at 05:46

## 2022-09-10 NOTE — PROGRESS NOTE ADULT - SUBJECTIVE AND OBJECTIVE BOX
no new complaints    Vital Signs Last 24 Hrs  T(C): 36.2 (10 Sep 2022 05:02), Max: 36.2 (09 Sep 2022 20:06)  T(F): 97.2 (10 Sep 2022 05:02), Max: 97.2 (09 Sep 2022 20:06)  HR: 65 (10 Sep 2022 05:02) (65 - 92)  BP: 116/61 (10 Sep 2022 05:02) (110/62 - 116/61)  BP(mean): --  RR: 16 (10 Sep 2022 05:02) (16 - 16)  SpO2: --        GENERAL: NAD  HEAD:  Atraumatic, Normocephalic  EYES: EOMI, PERRLA, conjunctiva and sclera clear  NECK: Supple, No JVD  CHEST/LUNG: Clear to auscultation bilaterally; No wheeze  HEART: Regular rate and rhythm; S1&S2  ABDOMEN: Soft, Nontender, Nondistended x 4 quadrants; Bowel sounds present  EXTREMITIES:  no edema  PSYCH: AAOx3, cooperative, appropriate  NEUROLOGY: WNL               MEDICATIONS  (STANDING):  diVALproex  milliGRAM(s) Oral daily  diVALproex  milliGRAM(s) Oral daily  diVALproex  milliGRAM(s) Oral at bedtime  diVALproex  milliGRAM(s) Oral at bedtime  lithium CR (ESKALITH-CR) 450 milliGRAM(s) Oral two times a day  risperiDONE   Tablet 2 milliGRAM(s) Oral two times a day    MEDICATIONS  (PRN):

## 2022-09-11 PROCEDURE — 99231 SBSQ HOSP IP/OBS SF/LOW 25: CPT

## 2022-09-11 RX ORDER — HYDROXYZINE HCL 10 MG
25 TABLET ORAL ONCE
Refills: 0 | Status: COMPLETED | OUTPATIENT
Start: 2022-09-11 | End: 2022-09-11

## 2022-09-11 RX ADMIN — RISPERIDONE 2 MILLIGRAM(S): 4 TABLET ORAL at 17:19

## 2022-09-11 RX ADMIN — LITHIUM CARBONATE 450 MILLIGRAM(S): 300 TABLET, EXTENDED RELEASE ORAL at 17:20

## 2022-09-11 RX ADMIN — DIVALPROEX SODIUM 250 MILLIGRAM(S): 500 TABLET, DELAYED RELEASE ORAL at 21:21

## 2022-09-11 RX ADMIN — DIVALPROEX SODIUM 500 MILLIGRAM(S): 500 TABLET, DELAYED RELEASE ORAL at 13:20

## 2022-09-11 RX ADMIN — LITHIUM CARBONATE 450 MILLIGRAM(S): 300 TABLET, EXTENDED RELEASE ORAL at 05:51

## 2022-09-11 RX ADMIN — RISPERIDONE 2 MILLIGRAM(S): 4 TABLET ORAL at 05:51

## 2022-09-11 RX ADMIN — DIVALPROEX SODIUM 500 MILLIGRAM(S): 500 TABLET, DELAYED RELEASE ORAL at 21:21

## 2022-09-11 RX ADMIN — DIVALPROEX SODIUM 250 MILLIGRAM(S): 500 TABLET, DELAYED RELEASE ORAL at 13:20

## 2022-09-11 RX ADMIN — Medication 25 MILLIGRAM(S): at 22:50

## 2022-09-11 NOTE — PROGRESS NOTE ADULT - SUBJECTIVE AND OBJECTIVE BOX
no new complaints    Vital Signs Last 24 Hrs  T(C): 36.3 (11 Sep 2022 04:58), Max: 36.6 (10 Sep 2022 12:14)  T(F): 97.4 (11 Sep 2022 04:58), Max: 97.8 (10 Sep 2022 12:14)  HR: 62 (11 Sep 2022 04:58) (62 - 81)  BP: 103/65 (11 Sep 2022 04:58) (103/65 - 127/65)  BP(mean): --  RR: 16 (11 Sep 2022 04:58) (16 - 16)  SpO2: --          GENERAL: NAD  HEAD:  Atraumatic, Normocephalic  EYES: EOMI, PERRLA, conjunctiva and sclera clear  NECK: Supple, No JVD  CHEST/LUNG: Clear to auscultation bilaterally; No wheeze  HEART: Regular rate and rhythm; S1&S2  ABDOMEN: Soft, Nontender, Nondistended x 4 quadrants; Bowel sounds present  EXTREMITIES:  no edema  PSYCH: AAOx3, cooperative, appropriate  NEUROLOGY: WNL               MEDICATIONS  (STANDING):  diVALproex  milliGRAM(s) Oral daily  diVALproex  milliGRAM(s) Oral daily  diVALproex  milliGRAM(s) Oral at bedtime  diVALproex  milliGRAM(s) Oral at bedtime  lithium CR (ESKALITH-CR) 450 milliGRAM(s) Oral two times a day  risperiDONE   Tablet 2 milliGRAM(s) Oral two times a day    MEDICATIONS  (PRN):

## 2022-09-12 LAB
ANION GAP SERPL CALC-SCNC: 11 MMOL/L — SIGNIFICANT CHANGE UP (ref 7–14)
BUN SERPL-MCNC: 13 MG/DL — SIGNIFICANT CHANGE UP (ref 10–20)
CALCIUM SERPL-MCNC: 9.9 MG/DL — SIGNIFICANT CHANGE UP (ref 8.4–10.5)
CHLORIDE SERPL-SCNC: 99 MMOL/L — SIGNIFICANT CHANGE UP (ref 98–110)
CO2 SERPL-SCNC: 26 MMOL/L — SIGNIFICANT CHANGE UP (ref 17–32)
CREAT SERPL-MCNC: 0.8 MG/DL — SIGNIFICANT CHANGE UP (ref 0.3–1)
EGFR: 131 ML/MIN/1.73M2 — SIGNIFICANT CHANGE UP
GLUCOSE SERPL-MCNC: 81 MG/DL — SIGNIFICANT CHANGE UP (ref 70–99)
HCT VFR BLD CALC: 41.6 % — LOW (ref 42–52)
HGB BLD-MCNC: 14.4 G/DL — SIGNIFICANT CHANGE UP (ref 14–18)
MCHC RBC-ENTMCNC: 29.4 PG — SIGNIFICANT CHANGE UP (ref 27–31)
MCHC RBC-ENTMCNC: 34.6 G/DL — SIGNIFICANT CHANGE UP (ref 32–37)
MCV RBC AUTO: 84.9 FL — SIGNIFICANT CHANGE UP (ref 80–94)
NRBC # BLD: 0 /100 WBCS — SIGNIFICANT CHANGE UP (ref 0–0)
PLATELET # BLD AUTO: 245 K/UL — SIGNIFICANT CHANGE UP (ref 130–400)
POTASSIUM SERPL-MCNC: 4.3 MMOL/L — SIGNIFICANT CHANGE UP (ref 3.5–5)
POTASSIUM SERPL-SCNC: 4.3 MMOL/L — SIGNIFICANT CHANGE UP (ref 3.5–5)
RBC # BLD: 4.9 M/UL — SIGNIFICANT CHANGE UP (ref 4.7–6.1)
RBC # FLD: 11.5 % — SIGNIFICANT CHANGE UP (ref 11.5–14.5)
SARS-COV-2 RNA SPEC QL NAA+PROBE: SIGNIFICANT CHANGE UP
SODIUM SERPL-SCNC: 136 MMOL/L — SIGNIFICANT CHANGE UP (ref 135–146)
WBC # BLD: 7.37 K/UL — SIGNIFICANT CHANGE UP (ref 4.8–10.8)
WBC # FLD AUTO: 7.37 K/UL — SIGNIFICANT CHANGE UP (ref 4.8–10.8)

## 2022-09-12 PROCEDURE — 99231 SBSQ HOSP IP/OBS SF/LOW 25: CPT

## 2022-09-12 RX ADMIN — DIVALPROEX SODIUM 250 MILLIGRAM(S): 500 TABLET, DELAYED RELEASE ORAL at 21:48

## 2022-09-12 RX ADMIN — DIVALPROEX SODIUM 500 MILLIGRAM(S): 500 TABLET, DELAYED RELEASE ORAL at 21:48

## 2022-09-12 RX ADMIN — DIVALPROEX SODIUM 500 MILLIGRAM(S): 500 TABLET, DELAYED RELEASE ORAL at 11:44

## 2022-09-12 RX ADMIN — LITHIUM CARBONATE 450 MILLIGRAM(S): 300 TABLET, EXTENDED RELEASE ORAL at 05:20

## 2022-09-12 RX ADMIN — DIVALPROEX SODIUM 250 MILLIGRAM(S): 500 TABLET, DELAYED RELEASE ORAL at 11:43

## 2022-09-12 RX ADMIN — RISPERIDONE 2 MILLIGRAM(S): 4 TABLET ORAL at 05:19

## 2022-09-12 RX ADMIN — LITHIUM CARBONATE 450 MILLIGRAM(S): 300 TABLET, EXTENDED RELEASE ORAL at 17:41

## 2022-09-12 RX ADMIN — RISPERIDONE 2 MILLIGRAM(S): 4 TABLET ORAL at 17:41

## 2022-09-12 NOTE — PROGRESS NOTE ADULT - SUBJECTIVE AND OBJECTIVE BOX
no new complaints    Vital Signs Last 24 Hrs  T(C): 36.4 (12 Sep 2022 04:42), Max: 36.8 (11 Sep 2022 20:42)  T(F): 97.6 (12 Sep 2022 04:42), Max: 98.3 (11 Sep 2022 20:42)  HR: 81 (12 Sep 2022 04:42) (81 - 101)  BP: 113/62 (12 Sep 2022 04:42) (113/62 - 129/71)  BP(mean): --  RR: 20 (11 Sep 2022 14:06) (20 - 20)  SpO2: --        GENERAL: NAD  HEAD:  Atraumatic, Normocephalic  EYES: EOMI, PERRLA, conjunctiva and sclera clear  NECK: Supple, No JVD  CHEST/LUNG: Clear to auscultation bilaterally; No wheeze  HEART: Regular rate and rhythm; S1&S2  ABDOMEN: Soft, Nontender, Nondistended x 4 quadrants; Bowel sounds present  EXTREMITIES:  no edema  PSYCH: AAOx3, cooperative, appropriate  NEUROLOGY: WNL               MEDICATIONS  (STANDING):  diVALproex  milliGRAM(s) Oral daily  diVALproex  milliGRAM(s) Oral daily  diVALproex  milliGRAM(s) Oral at bedtime  diVALproex  milliGRAM(s) Oral at bedtime  lithium CR (ESKALITH-CR) 450 milliGRAM(s) Oral two times a day  risperiDONE   Tablet 2 milliGRAM(s) Oral two times a day    MEDICATIONS  (PRN):                            14.4   7.37  )-----------( 245      ( 12 Sep 2022 06:27 )             41.6   09-12    136  |  99  |  13  ----------------------------<  81  4.3   |  26  |  0.8    Ca    9.9      12 Sep 2022 06:27

## 2022-09-13 LAB — SARS-COV-2 RNA SPEC QL NAA+PROBE: SIGNIFICANT CHANGE UP

## 2022-09-13 PROCEDURE — 99231 SBSQ HOSP IP/OBS SF/LOW 25: CPT

## 2022-09-13 RX ADMIN — DIVALPROEX SODIUM 500 MILLIGRAM(S): 500 TABLET, DELAYED RELEASE ORAL at 21:16

## 2022-09-13 RX ADMIN — DIVALPROEX SODIUM 250 MILLIGRAM(S): 500 TABLET, DELAYED RELEASE ORAL at 13:16

## 2022-09-13 RX ADMIN — DIVALPROEX SODIUM 500 MILLIGRAM(S): 500 TABLET, DELAYED RELEASE ORAL at 13:16

## 2022-09-13 RX ADMIN — LITHIUM CARBONATE 450 MILLIGRAM(S): 300 TABLET, EXTENDED RELEASE ORAL at 17:31

## 2022-09-13 RX ADMIN — LITHIUM CARBONATE 450 MILLIGRAM(S): 300 TABLET, EXTENDED RELEASE ORAL at 05:22

## 2022-09-13 RX ADMIN — RISPERIDONE 2 MILLIGRAM(S): 4 TABLET ORAL at 17:32

## 2022-09-13 RX ADMIN — DIVALPROEX SODIUM 250 MILLIGRAM(S): 500 TABLET, DELAYED RELEASE ORAL at 21:16

## 2022-09-13 RX ADMIN — RISPERIDONE 2 MILLIGRAM(S): 4 TABLET ORAL at 05:22

## 2022-09-13 NOTE — PROGRESS NOTE ADULT - SUBJECTIVE AND OBJECTIVE BOX
no new complaints    Vital Signs Last 24 Hrs  T(C): 36.7 (13 Sep 2022 04:54), Max: 36.7 (13 Sep 2022 04:54)  T(F): 98.1 (13 Sep 2022 04:54), Max: 98.1 (13 Sep 2022 04:54)  HR: 73 (13 Sep 2022 04:54) (73 - 78)  BP: 111/62 (13 Sep 2022 04:54) (111/62 - 118/64)  BP(mean): --  RR: 16 (12 Sep 2022 21:45) (16 - 16)  SpO2: --    Parameters below as of 12 Sep 2022 14:05  Patient On (Oxygen Delivery Method): room air        GENERAL: NAD  HEAD:  Atraumatic, Normocephalic  EYES: EOMI, PERRLA, conjunctiva and sclera clear  NECK: Supple, No JVD  CHEST/LUNG: Clear to auscultation bilaterally; No wheeze  HEART: Regular rate and rhythm; S1&S2  ABDOMEN: Soft, Nontender, Nondistended x 4 quadrants; Bowel sounds present  EXTREMITIES:  no edema  PSYCH: AAOx3, cooperative, appropriate  NEUROLOGY: WNL               MEDICATIONS  (STANDING):  diVALproex  milliGRAM(s) Oral daily  diVALproex  milliGRAM(s) Oral daily  diVALproex  milliGRAM(s) Oral at bedtime  diVALproex  milliGRAM(s) Oral at bedtime  lithium CR (ESKALITH-CR) 450 milliGRAM(s) Oral two times a day  risperiDONE   Tablet 2 milliGRAM(s) Oral two times a day    MEDICATIONS  (PRN):                            14.4   7.37  )-----------( 245      ( 12 Sep 2022 06:27 )             41.6   09-12    136  |  99  |  13  ----------------------------<  81  4.3   |  26  |  0.8    Ca    9.9      12 Sep 2022 06:27

## 2022-09-13 NOTE — PROGRESS NOTE ADULT - PROBLEM SELECTOR PROBLEM 1
History of developmental delay

## 2022-09-14 PROCEDURE — 99231 SBSQ HOSP IP/OBS SF/LOW 25: CPT

## 2022-09-14 RX ADMIN — DIVALPROEX SODIUM 500 MILLIGRAM(S): 500 TABLET, DELAYED RELEASE ORAL at 11:15

## 2022-09-14 RX ADMIN — RISPERIDONE 2 MILLIGRAM(S): 4 TABLET ORAL at 18:50

## 2022-09-14 RX ADMIN — DIVALPROEX SODIUM 250 MILLIGRAM(S): 500 TABLET, DELAYED RELEASE ORAL at 11:15

## 2022-09-14 RX ADMIN — LITHIUM CARBONATE 450 MILLIGRAM(S): 300 TABLET, EXTENDED RELEASE ORAL at 06:08

## 2022-09-14 RX ADMIN — DIVALPROEX SODIUM 250 MILLIGRAM(S): 500 TABLET, DELAYED RELEASE ORAL at 21:21

## 2022-09-14 RX ADMIN — DIVALPROEX SODIUM 500 MILLIGRAM(S): 500 TABLET, DELAYED RELEASE ORAL at 21:21

## 2022-09-14 RX ADMIN — RISPERIDONE 2 MILLIGRAM(S): 4 TABLET ORAL at 06:09

## 2022-09-14 RX ADMIN — LITHIUM CARBONATE 450 MILLIGRAM(S): 300 TABLET, EXTENDED RELEASE ORAL at 18:50

## 2022-09-14 NOTE — PROGRESS NOTE ADULT - SUBJECTIVE AND OBJECTIVE BOX
PHILIP MCCLOUD  21y, Male  Allergy: No Known Allergies    Hospital Day: 7d    Patient seen and examined earlier today.    pt is awake, in no distress. doing well, seen with his RN Kinza    PMH/PSH:  PAST MEDICAL & SURGICAL HISTORY:  History of developmental delay      Bipolar disorder      No significant past surgical history          LAST 24-Hr EVENTS:    VITALS:  T(F): 97.8 (09-14-22 @ 04:59), Max: 97.8 (09-13-22 @ 13:00)  HR: 71 (09-14-22 @ 04:59)  BP: 130/72 (09-14-22 @ 04:59) (109/62 - 130/72)  RR: 16 (09-13-22 @ 21:37)  SpO2: --          TESTS & MEASUREMENTS:  Weight/BMI  94.8 (09-13-22 @ 09:01)  33.7 (09-13-22 @ 09:01)                                  COVID-19 PCR: NotDetec (09-13-22 @ 11:52)  COVID-19 PCR: NotDetec (09-11-22 @ 17:00)                RADIOLOGY, ECG, & ADDITIONAL TESTS:  12 Lead ECG:   Ventricular Rate 105 BPM    Atrial Rate 105 BPM    P-R Interval 166 ms    QRS Duration 94 ms    Q-T Interval 314 ms    QTC Calculation(Bazett) 415 ms    P Axis 68 degrees    R Axis 67 degrees    T Axis 67 degrees    Diagnosis Line Sinus tachycardia  Otherwise normal ECG    Confirmed by MICHAEL DUPREE MD ([id number]) on 9/7/2022 11:36:58 AM (09-06-22 @ 22:36)      RECENT DIAGNOSTIC ORDERS:      MEDICATIONS:  MEDICATIONS  (STANDING):  diVALproex  milliGRAM(s) Oral daily  diVALproex  milliGRAM(s) Oral daily  diVALproex  milliGRAM(s) Oral at bedtime  diVALproex  milliGRAM(s) Oral at bedtime  lithium CR (ESKALITH-CR) 450 milliGRAM(s) Oral two times a day  risperiDONE   Tablet 2 milliGRAM(s) Oral two times a day    MEDICATIONS  (PRN):      HOME MEDICATIONS:  divalproex sodium 250 mg oral tablet, extended release (09-07)  divalproex sodium 500 mg oral tablet, extended release (09-07)  lithium carbonate extended release (09-07)  risperiDONE 2 mg oral tablet (09-07)      PHYSICAL EXAM:  Gen: nad  HEAD:  Atraumatic, Normocephalic  NECK:  No JVD  CHEST/LUNG: symmetrical expansion  HEART: Regular rate and rhythm; S1&S2  ABDOMEN: Soft, Nontender   EXTREMITIES:  no edema  PSYCH: george gitation no aggression

## 2022-09-14 NOTE — PROGRESS NOTE ADULT - ASSESSMENT
This is a 21 year old male past medical history of bipolar and developmental delay comes to emergency room for alleged aggressive and violent behavior as per step father.    Evaluated by Psych Tele in ED   - cleared no need for inpatient evaluation.    Step Father left stating needs placement refusing to take child home.       Bipolar disorder and likely developmental disorder  -stable  -continue meds    Problem/Plan - 1:  ·  Problem: History of developmental delay.     Problem/Plan - 2:  ·  Problem: Bipolar disorder.     Disp  awaiting placement

## 2022-09-15 PROCEDURE — 99231 SBSQ HOSP IP/OBS SF/LOW 25: CPT

## 2022-09-15 RX ORDER — HYDROXYZINE HCL 10 MG
25 TABLET ORAL ONCE
Refills: 0 | Status: COMPLETED | OUTPATIENT
Start: 2022-09-15 | End: 2022-09-15

## 2022-09-15 RX ADMIN — LITHIUM CARBONATE 450 MILLIGRAM(S): 300 TABLET, EXTENDED RELEASE ORAL at 17:35

## 2022-09-15 RX ADMIN — LITHIUM CARBONATE 450 MILLIGRAM(S): 300 TABLET, EXTENDED RELEASE ORAL at 06:04

## 2022-09-15 RX ADMIN — RISPERIDONE 2 MILLIGRAM(S): 4 TABLET ORAL at 17:34

## 2022-09-15 RX ADMIN — DIVALPROEX SODIUM 500 MILLIGRAM(S): 500 TABLET, DELAYED RELEASE ORAL at 11:12

## 2022-09-15 RX ADMIN — DIVALPROEX SODIUM 250 MILLIGRAM(S): 500 TABLET, DELAYED RELEASE ORAL at 11:12

## 2022-09-15 RX ADMIN — RISPERIDONE 2 MILLIGRAM(S): 4 TABLET ORAL at 06:04

## 2022-09-15 RX ADMIN — Medication 25 MILLIGRAM(S): at 01:03

## 2022-09-15 NOTE — PROGRESS NOTE ADULT - SUBJECTIVE AND OBJECTIVE BOX
AMEPHILIP  21y, Male  Allergy: No Known Allergies    Hospital Day: 8d    Patient seen and examined earlier today.   pt is resting in bed, no distress, no issues reported     PMH/PSH:  PAST MEDICAL & SURGICAL HISTORY:  History of developmental delay      Bipolar disorder      No significant past surgical history          LAST 24-Hr EVENTS:    VITALS:  T(F): 97.1 (09-15-22 @ 05:26), Max: 98.3 (09-14-22 @ 14:00)  HR: 73 (09-15-22 @ 05:26)  BP: 108/64 (09-15-22 @ 05:26) (108/64 - 119/64)  RR: 16 (09-15-22 @ 05:26)  SpO2: 97% (09-14-22 @ 21:12)          TESTS & MEASUREMENTS:  Weight/BMI  94.8 (09-13-22 @ 09:01)  33.7 (09-13-22 @ 09:01)                                  COVID-19 PCR: NotDetec (09-13-22 @ 11:52)  COVID-19 PCR: NotDetec (09-11-22 @ 17:00)                RADIOLOGY, ECG, & ADDITIONAL TESTS:  12 Lead ECG:   Ventricular Rate 105 BPM    Atrial Rate 105 BPM    P-R Interval 166 ms    QRS Duration 94 ms    Q-T Interval 314 ms    QTC Calculation(Bazett) 415 ms    P Axis 68 degrees    R Axis 67 degrees    T Axis 67 degrees    Diagnosis Line Sinus tachycardia  Otherwise normal ECG    Confirmed by MICHAEL DUPREE MD ([id number]) on 9/7/2022 11:36:58 AM (09-06-22 @ 22:36)      RECENT DIAGNOSTIC ORDERS:      MEDICATIONS:  MEDICATIONS  (STANDING):  diVALproex  milliGRAM(s) Oral daily  diVALproex  milliGRAM(s) Oral daily  diVALproex  milliGRAM(s) Oral at bedtime  diVALproex  milliGRAM(s) Oral at bedtime  lithium CR (ESKALITH-CR) 450 milliGRAM(s) Oral two times a day  risperiDONE   Tablet 2 milliGRAM(s) Oral two times a day    MEDICATIONS  (PRN):      HOME MEDICATIONS:  divalproex sodium 250 mg oral tablet, extended release (09-07)  divalproex sodium 500 mg oral tablet, extended release (09-07)  lithium carbonate extended release (09-07)  risperiDONE 2 mg oral tablet (09-07)      PHYSICAL EXAM:  HEAD:  Atraumatic, Normocephalic  NECK:  No JVD  CHEST/LUNG: symmetrical expansion, no labored breathing  ABDOMEN: Soft, Nontender   PSYCH: no agitation no aggression

## 2022-09-16 PROCEDURE — 99231 SBSQ HOSP IP/OBS SF/LOW 25: CPT

## 2022-09-16 RX ORDER — HYDROXYZINE HCL 10 MG
25 TABLET ORAL ONCE
Refills: 0 | Status: COMPLETED | OUTPATIENT
Start: 2022-09-16 | End: 2022-09-16

## 2022-09-16 RX ADMIN — LITHIUM CARBONATE 450 MILLIGRAM(S): 300 TABLET, EXTENDED RELEASE ORAL at 17:23

## 2022-09-16 RX ADMIN — RISPERIDONE 2 MILLIGRAM(S): 4 TABLET ORAL at 05:55

## 2022-09-16 RX ADMIN — DIVALPROEX SODIUM 500 MILLIGRAM(S): 500 TABLET, DELAYED RELEASE ORAL at 21:19

## 2022-09-16 RX ADMIN — Medication 25 MILLIGRAM(S): at 00:44

## 2022-09-16 RX ADMIN — DIVALPROEX SODIUM 250 MILLIGRAM(S): 500 TABLET, DELAYED RELEASE ORAL at 21:19

## 2022-09-16 RX ADMIN — DIVALPROEX SODIUM 500 MILLIGRAM(S): 500 TABLET, DELAYED RELEASE ORAL at 11:13

## 2022-09-16 RX ADMIN — LITHIUM CARBONATE 450 MILLIGRAM(S): 300 TABLET, EXTENDED RELEASE ORAL at 05:56

## 2022-09-16 RX ADMIN — DIVALPROEX SODIUM 500 MILLIGRAM(S): 500 TABLET, DELAYED RELEASE ORAL at 19:24

## 2022-09-16 RX ADMIN — DIVALPROEX SODIUM 250 MILLIGRAM(S): 500 TABLET, DELAYED RELEASE ORAL at 19:24

## 2022-09-16 RX ADMIN — RISPERIDONE 2 MILLIGRAM(S): 4 TABLET ORAL at 17:23

## 2022-09-16 RX ADMIN — DIVALPROEX SODIUM 250 MILLIGRAM(S): 500 TABLET, DELAYED RELEASE ORAL at 11:13

## 2022-09-16 NOTE — PROGRESS NOTE ADULT - SUBJECTIVE AND OBJECTIVE BOX
AMEPHILIP  21y, Male  Allergy: No Known Allergies    Hospital Day: 9d    Patient seen and examined earlier today.   pt is awake, alert, no acute distress noted, pleasantly confused, no agitation    PMH/PSH:  PAST MEDICAL & SURGICAL HISTORY:  History of developmental delay      Bipolar disorder      No significant past surgical history          LAST 24-Hr EVENTS:    VITALS:  T(F): 97.1 (09-16-22 @ 05:02), Max: 97.4 (09-15-22 @ 13:39)  HR: 68 (09-16-22 @ 05:02)  BP: 108/57 (09-16-22 @ 05:02) (99/56 - 110/66)  RR: 17 (09-16-22 @ 05:02)  SpO2: --          TESTS & MEASUREMENTS:  Weight/BMI  94.8 (09-13-22 @ 09:01)  33.7 (09-13-22 @ 09:01)         COVID-19 PCR: NotDetec (09-13-22 @ 11:52)  COVID-19 PCR: NotDetec (09-11-22 @ 17:00)       RADIOLOGY, ECG, & ADDITIONAL TESTS:  12 Lead ECG:   Ventricular Rate 105 BPM    Atrial Rate 105 BPM    P-R Interval 166 ms    QRS Duration 94 ms    Q-T Interval 314 ms    QTC Calculation(Bazett) 415 ms    P Axis 68 degrees    R Axis 67 degrees    T Axis 67 degrees    Diagnosis Line Sinus tachycardia  Otherwise normal ECG    Confirmed by MICHAEL DUPREE MD ([id number]) on 9/7/2022 11:36:58 AM (09-06-22 @ 22:36)      RECENT DIAGNOSTIC ORDERS:      MEDICATIONS:  MEDICATIONS  (STANDING):  diVALproex  milliGRAM(s) Oral daily  diVALproex  milliGRAM(s) Oral daily  diVALproex  milliGRAM(s) Oral at bedtime  diVALproex  milliGRAM(s) Oral at bedtime  lithium CR (ESKALITH-CR) 450 milliGRAM(s) Oral two times a day  risperiDONE   Tablet 2 milliGRAM(s) Oral two times a day    MEDICATIONS  (PRN):      HOME MEDICATIONS:  divalproex sodium 250 mg oral tablet, extended release (09-07)  divalproex sodium 500 mg oral tablet, extended release (09-07)  lithium carbonate extended release (09-07)  risperiDONE 2 mg oral tablet (09-07)      PHYSICAL EXAM:  HEAD:  Atraumatic, Normocephalic  NECK:  No JVD  CHEST/LUNG: symmetrical expansion, no labored breathing  ABDOMEN: Soft, Nontender   PSYCH: no agitation no aggression

## 2022-09-17 PROCEDURE — 99231 SBSQ HOSP IP/OBS SF/LOW 25: CPT

## 2022-09-17 RX ADMIN — DIVALPROEX SODIUM 500 MILLIGRAM(S): 500 TABLET, DELAYED RELEASE ORAL at 22:32

## 2022-09-17 RX ADMIN — DIVALPROEX SODIUM 250 MILLIGRAM(S): 500 TABLET, DELAYED RELEASE ORAL at 11:57

## 2022-09-17 RX ADMIN — RISPERIDONE 2 MILLIGRAM(S): 4 TABLET ORAL at 05:16

## 2022-09-17 RX ADMIN — LITHIUM CARBONATE 450 MILLIGRAM(S): 300 TABLET, EXTENDED RELEASE ORAL at 05:16

## 2022-09-17 RX ADMIN — DIVALPROEX SODIUM 500 MILLIGRAM(S): 500 TABLET, DELAYED RELEASE ORAL at 11:57

## 2022-09-17 RX ADMIN — DIVALPROEX SODIUM 250 MILLIGRAM(S): 500 TABLET, DELAYED RELEASE ORAL at 22:44

## 2022-09-17 NOTE — PROGRESS NOTE ADULT - ASSESSMENT
This is a 21 year old male past medical history of bipolar and developmental delay comes to emergency room for alleged aggressive and violent behavior as per step father.    - Evaluated by Psych Tele in ED   - cleared no need for inpatient evaluation.  - Step Father left stating needs placement refusing to take child home.       Bipolar disorder and likely developmental disorder  -stable  -continue meds ( on depakote, risperidone, and lithium)     Problem/Plan - 1:  ·  Problem: History of developmental delay.     Problem/Plan - 2:  ·  Problem: Bipolar disorder.     Disp  awaiting placement, no distress noted

## 2022-09-17 NOTE — PROGRESS NOTE ADULT - SUBJECTIVE AND OBJECTIVE BOX
AMEMYRIAMO  21y, Male  Allergy: No Known Allergies    Hospital Day: 10d    Patient seen and examined earlier today.     pt is awake, alert, pleasant. no distress, offers no new c/o, nog aggressive or agitated     PMH/PSH:  PAST MEDICAL & SURGICAL HISTORY:  History of developmental delay      Bipolar disorder      No significant past surgical history          LAST 24-Hr EVENTS:    VITALS:  T(F): 96.8 (09-17-22 @ 04:40), Max: 97.4 (09-16-22 @ 21:52)  HR: 60 (09-17-22 @ 04:40)  BP: 113/58 (09-17-22 @ 04:40) (113/58 - 119/66)  RR: 16 (09-17-22 @ 04:40)  SpO2: --      TESTS & MEASUREMENTS:  Weight/BMI  94.8 (09-13-22 @ 09:01)  33.7 (09-13-22 @ 09:01)    COVID-19 PCR: NotDetec (09-13-22 @ 11:52)  COVID-19 PCR: NotDetec (09-11-22 @ 17:00)      RADIOLOGY, ECG, & ADDITIONAL TESTS:  12 Lead ECG:   Ventricular Rate 105 BPM    Atrial Rate 105 BPM    P-R Interval 166 ms    QRS Duration 94 ms    Q-T Interval 314 ms    QTC Calculation(Bazett) 415 ms    P Axis 68 degrees    R Axis 67 degrees    T Axis 67 degrees    Diagnosis Line Sinus tachycardia  Otherwise normal ECG    Confirmed by MICHAEL DUPREE MD ([id number]) on 9/7/2022 11:36:58 AM (09-06-22 @ 22:36)      RECENT DIAGNOSTIC ORDERS:      MEDICATIONS:  MEDICATIONS  (STANDING):  diVALproex  milliGRAM(s) Oral daily  diVALproex  milliGRAM(s) Oral daily  diVALproex  milliGRAM(s) Oral at bedtime  diVALproex  milliGRAM(s) Oral at bedtime  lithium CR (ESKALITH-CR) 450 milliGRAM(s) Oral two times a day  risperiDONE   Tablet 2 milliGRAM(s) Oral two times a day    MEDICATIONS  (PRN):      HOME MEDICATIONS:  divalproex sodium 250 mg oral tablet, extended release (09-07)  divalproex sodium 500 mg oral tablet, extended release (09-07)  lithium carbonate extended release (09-07)  risperiDONE 2 mg oral tablet (09-07)      PHYSICAL EXAM:  GENERAL: awake, alert, no acute distress  CHEST/LUNG: symmetrical expansion  HEART: rrr s1s2  ABDOMEN: soft non tender  EXTREMITIES:  no cyanosis no edema  SKIN: normal temp

## 2022-09-18 PROCEDURE — 99231 SBSQ HOSP IP/OBS SF/LOW 25: CPT

## 2022-09-18 RX ORDER — LANOLIN ALCOHOL/MO/W.PET/CERES
3 CREAM (GRAM) TOPICAL ONCE
Refills: 0 | Status: COMPLETED | OUTPATIENT
Start: 2022-09-18 | End: 2022-09-18

## 2022-09-18 RX ADMIN — LITHIUM CARBONATE 450 MILLIGRAM(S): 300 TABLET, EXTENDED RELEASE ORAL at 06:03

## 2022-09-18 RX ADMIN — DIVALPROEX SODIUM 500 MILLIGRAM(S): 500 TABLET, DELAYED RELEASE ORAL at 12:08

## 2022-09-18 RX ADMIN — RISPERIDONE 2 MILLIGRAM(S): 4 TABLET ORAL at 06:03

## 2022-09-18 RX ADMIN — RISPERIDONE 2 MILLIGRAM(S): 4 TABLET ORAL at 17:04

## 2022-09-18 RX ADMIN — DIVALPROEX SODIUM 500 MILLIGRAM(S): 500 TABLET, DELAYED RELEASE ORAL at 21:49

## 2022-09-18 RX ADMIN — LITHIUM CARBONATE 450 MILLIGRAM(S): 300 TABLET, EXTENDED RELEASE ORAL at 17:04

## 2022-09-18 RX ADMIN — DIVALPROEX SODIUM 250 MILLIGRAM(S): 500 TABLET, DELAYED RELEASE ORAL at 21:49

## 2022-09-18 RX ADMIN — Medication 3 MILLIGRAM(S): at 00:35

## 2022-09-18 RX ADMIN — DIVALPROEX SODIUM 250 MILLIGRAM(S): 500 TABLET, DELAYED RELEASE ORAL at 12:08

## 2022-09-18 NOTE — PROGRESS NOTE ADULT - ASSESSMENT
This is a 21 year old male past medical history of bipolar and developmental delay comes to emergency room for alleged aggressive and violent behavior as per step father.  - Evaluated by Psych Tele in ED   - cleared no need for inpatient evaluation.  - Step Father left stating needs placement refusing to take child home.     Bipolar disorder and likely developmental disorder  -stable  -continue meds  (on depakote, risperidone, and lithium)     Problem/Plan - 1:  ·  Problem: History of developmental delay.     Problem/Plan - 2:  ·  Problem: Bipolar disorder.     Disp  awaiting placement, no distress noted , he was on facetime with someone today learning a lession. no distress

## 2022-09-18 NOTE — PROGRESS NOTE ADULT - SUBJECTIVE AND OBJECTIVE BOX
AMEPHILIP  21y, Male  Allergy: No Known Allergies    Hospital Day: 11d    Patient seen and examined earlier today.   no new issues reported    PMH/PSH:  PAST MEDICAL & SURGICAL HISTORY:  History of developmental delay      Bipolar disorder      No significant past surgical history          LAST 24-Hr EVENTS:    VITALS:  T(F): 97.8 (09-18-22 @ 05:00), Max: 97.9 (09-17-22 @ 20:46)  HR: 83 (09-18-22 @ 05:00)  BP: 115/72 (09-18-22 @ 05:00) (115/66 - 115/72)  RR: 18 (09-18-22 @ 05:00)  SpO2: --          TESTS & MEASUREMENTS:  Weight/BMI         COVID-19 PCR: NotDetec (09-13-22 @ 11:52)  COVID-19 PCR: NotDetec (09-11-22 @ 17:00)         RADIOLOGY, ECG, & ADDITIONAL TESTS:  12 Lead ECG:   Ventricular Rate 105 BPM    Atrial Rate 105 BPM    P-R Interval 166 ms    QRS Duration 94 ms    Q-T Interval 314 ms    QTC Calculation(Bazett) 415 ms    P Axis 68 degrees    R Axis 67 degrees    T Axis 67 degrees    Diagnosis Line Sinus tachycardia  Otherwise normal ECG    Confirmed by MICHAEL DUPREE MD ([id number]) on 9/7/2022 11:36:58 AM (09-06-22 @ 22:36)      RECENT DIAGNOSTIC ORDERS:      MEDICATIONS:  MEDICATIONS  (STANDING):  diVALproex  milliGRAM(s) Oral daily  diVALproex  milliGRAM(s) Oral daily  diVALproex  milliGRAM(s) Oral at bedtime  diVALproex  milliGRAM(s) Oral at bedtime  lithium CR (ESKALITH-CR) 450 milliGRAM(s) Oral two times a day  risperiDONE   Tablet 2 milliGRAM(s) Oral two times a day    MEDICATIONS  (PRN):      HOME MEDICATIONS:  divalproex sodium 250 mg oral tablet, extended release (09-07)  divalproex sodium 500 mg oral tablet, extended release (09-07)  lithium carbonate extended release (09-07)  risperiDONE 2 mg oral tablet (09-07)      PHYSICAL EXAM:  GENERAL: awake, no distress  CHEST/LUNG: symmetrical expansion  HEART: rrr s1s2  ABDOMEN: soft non distended  EXTREMITIES:  no cyanosis no edema  SKIN: normal temp

## 2022-09-19 LAB — SARS-COV-2 RNA SPEC QL NAA+PROBE: SIGNIFICANT CHANGE UP

## 2022-09-19 PROCEDURE — 99231 SBSQ HOSP IP/OBS SF/LOW 25: CPT

## 2022-09-19 RX ADMIN — DIVALPROEX SODIUM 500 MILLIGRAM(S): 500 TABLET, DELAYED RELEASE ORAL at 11:55

## 2022-09-19 RX ADMIN — LITHIUM CARBONATE 450 MILLIGRAM(S): 300 TABLET, EXTENDED RELEASE ORAL at 17:01

## 2022-09-19 RX ADMIN — LITHIUM CARBONATE 450 MILLIGRAM(S): 300 TABLET, EXTENDED RELEASE ORAL at 06:04

## 2022-09-19 RX ADMIN — RISPERIDONE 2 MILLIGRAM(S): 4 TABLET ORAL at 17:01

## 2022-09-19 RX ADMIN — DIVALPROEX SODIUM 500 MILLIGRAM(S): 500 TABLET, DELAYED RELEASE ORAL at 21:16

## 2022-09-19 RX ADMIN — DIVALPROEX SODIUM 250 MILLIGRAM(S): 500 TABLET, DELAYED RELEASE ORAL at 21:16

## 2022-09-19 RX ADMIN — RISPERIDONE 2 MILLIGRAM(S): 4 TABLET ORAL at 06:04

## 2022-09-19 RX ADMIN — DIVALPROEX SODIUM 250 MILLIGRAM(S): 500 TABLET, DELAYED RELEASE ORAL at 11:54

## 2022-09-19 NOTE — PROGRESS NOTE ADULT - ASSESSMENT
This is a 21 year old male past medical history of bipolar and developmental delay comes to emergency room for alleged aggressive and violent behavior as per step father.  - Evaluated by Psych Tele in ED   - cleared no need for inpatient evaluation.  - Step Father left stating needs placement refusing to take child home.     Bipolar disorder and likely developmental disorder  -stable  -continue meds  (on depakote, risperidone, and lithium)     Problem/Plan - 1:  ·  Problem: History of developmental delay.     Problem/Plan - 2:  ·  Problem: Bipolar disorder.     Disp  awaiting placement, no distress noted , continue current meds

## 2022-09-19 NOTE — PROGRESS NOTE ADULT - SUBJECTIVE AND OBJECTIVE BOX
AMEPHILIP  21y, Male  Allergy: No Known Allergies    Hospital Day: 12d    Patient seen and examined earlier today.   pt is awake, alert, no acute distress noted     PMH/PSH:  PAST MEDICAL & SURGICAL HISTORY:  History of developmental delay      Bipolar disorder      No significant past surgical history          LAST 24-Hr EVENTS:    VITALS:  T(F): 96.2 (09-19-22 @ 04:37), Max: 96.4 (09-18-22 @ 22:05)  HR: 74 (09-19-22 @ 04:37)  BP: 121/76 (09-19-22 @ 04:37) (115/71 - 122/74)  RR: 18 (09-19-22 @ 04:37)  SpO2: --          TESTS & MEASUREMENTS:  Weight/BMI                                    COVID-19 PCR: NotDetec (09-19-22 @ 07:30)  COVID-19 PCR: NotDetec (09-13-22 @ 11:52)                RADIOLOGY, ECG, & ADDITIONAL TESTS:  12 Lead ECG:   Ventricular Rate 105 BPM    Atrial Rate 105 BPM    P-R Interval 166 ms    QRS Duration 94 ms    Q-T Interval 314 ms    QTC Calculation(Bazett) 415 ms    P Axis 68 degrees    R Axis 67 degrees    T Axis 67 degrees    Diagnosis Line Sinus tachycardia  Otherwise normal ECG    Confirmed by MICHAEL DUPREE MD ([id number]) on 9/7/2022 11:36:58 AM (09-06-22 @ 22:36)      RECENT DIAGNOSTIC ORDERS:      MEDICATIONS:  MEDICATIONS  (STANDING):  diVALproex  milliGRAM(s) Oral daily  diVALproex  milliGRAM(s) Oral daily  diVALproex  milliGRAM(s) Oral at bedtime  diVALproex  milliGRAM(s) Oral at bedtime  lithium CR (ESKALITH-CR) 450 milliGRAM(s) Oral two times a day  risperiDONE   Tablet 2 milliGRAM(s) Oral two times a day    MEDICATIONS  (PRN):      HOME MEDICATIONS:  divalproex sodium 250 mg oral tablet, extended release (09-07)  divalproex sodium 500 mg oral tablet, extended release (09-07)  lithium carbonate extended release (09-07)  risperiDONE 2 mg oral tablet (09-07)      PHYSICAL EXAM:  GENERAL: awake, no distress  CHEST/LUNG: symmetrical expansion  HEART: rrr s1s2  ABDOMEN: soft non distended  EXTREMITIES:  no cyanosis no edema  SKIN: normal temp

## 2022-09-20 PROCEDURE — 99231 SBSQ HOSP IP/OBS SF/LOW 25: CPT

## 2022-09-20 RX ADMIN — DIVALPROEX SODIUM 500 MILLIGRAM(S): 500 TABLET, DELAYED RELEASE ORAL at 11:12

## 2022-09-20 RX ADMIN — DIVALPROEX SODIUM 250 MILLIGRAM(S): 500 TABLET, DELAYED RELEASE ORAL at 11:13

## 2022-09-20 RX ADMIN — RISPERIDONE 2 MILLIGRAM(S): 4 TABLET ORAL at 05:51

## 2022-09-20 RX ADMIN — DIVALPROEX SODIUM 500 MILLIGRAM(S): 500 TABLET, DELAYED RELEASE ORAL at 21:49

## 2022-09-20 RX ADMIN — LITHIUM CARBONATE 450 MILLIGRAM(S): 300 TABLET, EXTENDED RELEASE ORAL at 18:17

## 2022-09-20 RX ADMIN — RISPERIDONE 2 MILLIGRAM(S): 4 TABLET ORAL at 18:17

## 2022-09-20 RX ADMIN — LITHIUM CARBONATE 450 MILLIGRAM(S): 300 TABLET, EXTENDED RELEASE ORAL at 05:51

## 2022-09-20 RX ADMIN — DIVALPROEX SODIUM 250 MILLIGRAM(S): 500 TABLET, DELAYED RELEASE ORAL at 21:49

## 2022-09-20 NOTE — PROGRESS NOTE ADULT - ASSESSMENT
This is a 21 year old male past medical history of bipolar and developmental delay comes to emergency room for alleged aggressive and violent behavior as per step father.  - Evaluated by Psych Tele in ED   - cleared no need for inpatient evaluation.  - Step Father left stating needs placement refusing to take child home.     Bipolar disorder and likely developmental disorder  -stable  -continue meds  (on depakote, risperidone, and lithium)     Problem/Plan - 1:  ·  Problem: History of developmental delay.     Problem/Plan - 2:  ·  Problem: Bipolar disorder.     Disp  awaiting placement

## 2022-09-20 NOTE — PROGRESS NOTE ADULT - SUBJECTIVE AND OBJECTIVE BOX
AMEPHILIP  22y, Male  Allergy: No Known Allergies    Hospital Day: 13d    Patient seen and examined earlier today. pt is in no distress, no issues reported     PMH/PSH:  PAST MEDICAL & SURGICAL HISTORY:  History of developmental delay      Bipolar disorder      No significant past surgical history          LAST 24-Hr EVENTS:    VITALS:  T(F): 96.9 (09-20-22 @ 05:00), Max: 98.2 (09-19-22 @ 14:03)  HR: 65 (09-20-22 @ 05:00)  BP: 91/65 (09-20-22 @ 05:00) (91/65 - 112/66)  RR: 16 (09-20-22 @ 05:00)  SpO2: 97% (09-20-22 @ 05:00)          TESTS & MEASUREMENTS:  Weight/BMI                                    COVID-19 PCR: NotDetec (09-19-22 @ 07:30)                RADIOLOGY, ECG, & ADDITIONAL TESTS:  12 Lead ECG:   Ventricular Rate 105 BPM    Atrial Rate 105 BPM    P-R Interval 166 ms    QRS Duration 94 ms    Q-T Interval 314 ms    QTC Calculation(Bazett) 415 ms    P Axis 68 degrees    R Axis 67 degrees    T Axis 67 degrees    Diagnosis Line Sinus tachycardia  Otherwise normal ECG    Confirmed by MICHAEL DUPREE MD ([id number]) on 9/7/2022 11:36:58 AM (09-06-22 @ 22:36)      RECENT DIAGNOSTIC ORDERS:      MEDICATIONS:  MEDICATIONS  (STANDING):  diVALproex  milliGRAM(s) Oral daily  diVALproex  milliGRAM(s) Oral daily  diVALproex  milliGRAM(s) Oral at bedtime  diVALproex  milliGRAM(s) Oral at bedtime  lithium CR (ESKALITH-CR) 450 milliGRAM(s) Oral two times a day  risperiDONE   Tablet 2 milliGRAM(s) Oral two times a day    MEDICATIONS  (PRN):      HOME MEDICATIONS:  divalproex sodium 250 mg oral tablet, extended release (09-07)  divalproex sodium 500 mg oral tablet, extended release (09-07)  lithium carbonate extended release (09-07)  risperiDONE 2 mg oral tablet (09-07)      PHYSICAL EXAM:  GENERAL: awake, no distress  CHEST/LUNG: symmetrical expansion  HEART: rrr s1s2  ABDOMEN: soft non distended  EXTREMITIES:  no cyanosis no edema  SKIN: normal temp

## 2022-09-21 PROCEDURE — 99232 SBSQ HOSP IP/OBS MODERATE 35: CPT

## 2022-09-21 RX ORDER — HYDROXYZINE HCL 10 MG
25 TABLET ORAL AT BEDTIME
Refills: 0 | Status: DISCONTINUED | OUTPATIENT
Start: 2022-09-21 | End: 2023-03-17

## 2022-09-21 RX ADMIN — Medication 25 MILLIGRAM(S): at 01:00

## 2022-09-21 RX ADMIN — DIVALPROEX SODIUM 500 MILLIGRAM(S): 500 TABLET, DELAYED RELEASE ORAL at 21:05

## 2022-09-21 RX ADMIN — DIVALPROEX SODIUM 250 MILLIGRAM(S): 500 TABLET, DELAYED RELEASE ORAL at 11:20

## 2022-09-21 RX ADMIN — LITHIUM CARBONATE 450 MILLIGRAM(S): 300 TABLET, EXTENDED RELEASE ORAL at 05:32

## 2022-09-21 RX ADMIN — LITHIUM CARBONATE 450 MILLIGRAM(S): 300 TABLET, EXTENDED RELEASE ORAL at 17:24

## 2022-09-21 RX ADMIN — RISPERIDONE 2 MILLIGRAM(S): 4 TABLET ORAL at 17:23

## 2022-09-21 RX ADMIN — RISPERIDONE 2 MILLIGRAM(S): 4 TABLET ORAL at 05:32

## 2022-09-21 RX ADMIN — DIVALPROEX SODIUM 250 MILLIGRAM(S): 500 TABLET, DELAYED RELEASE ORAL at 21:05

## 2022-09-21 RX ADMIN — DIVALPROEX SODIUM 500 MILLIGRAM(S): 500 TABLET, DELAYED RELEASE ORAL at 11:20

## 2022-09-21 NOTE — PROGRESS NOTE ADULT - ASSESSMENT
This is a 21 year old male past medical history of bipolar and developmental delay comes to emergency room for alleged aggressive and violent behavior as per step father.  - Evaluated by Psych   - Step Father left stating needs placement refusing to take child home.   Bipolar disorder and likely developmental disorder  -stable  -continue meds  (on depakote, risperidone, and lithium)   ·  Problem: History of developmental delay.   ·  Problem: Bipolar disorder.   awaiting placement   d/c planning

## 2022-09-21 NOTE — PROGRESS NOTE ADULT - SUBJECTIVE AND OBJECTIVE BOX
Patient is a 22y old  Male who presents with a chief complaint of Placement (20 Sep 2022 12:45)      INTERVAL HPI/OVERNIGHT EVENTS:    MEDICATIONS  (STANDING):  diVALproex  milliGRAM(s) Oral at bedtime  diVALproex  milliGRAM(s) Oral at bedtime  diVALproex  milliGRAM(s) Oral daily  diVALproex  milliGRAM(s) Oral daily  lithium CR (ESKALITH-CR) 450 milliGRAM(s) Oral two times a day  risperiDONE   Tablet 2 milliGRAM(s) Oral two times a day    MEDICATIONS  (PRN):  hydrOXYzine hydrochloride 25 milliGRAM(s) Oral at bedtime PRN insomnia      Allergies    No Known Allergies    Intolerances        REVIEW OF SYSTEMS:  CONSTITUTIONAL: No fever, weight loss, or fatigue  EYES: No eye pain, visual disturbances, or discharge  NECK: No pain or stiffness  RESPIRATORY: No cough, wheezing, chills or hemoptysis; No shortness of breath  CARDIOVASCULAR: No chest pain, palpitations, dizziness, or leg swelling  GASTROINTESTINAL: No abdominal or epigastric pain. No nausea, vomiting, or hematemesis; No diarrhea or constipation. No melena or hematochezia.    Vital Signs Last 24 Hrs  T(C): 36 (21 Sep 2022 05:20), Max: 36.7 (20 Sep 2022 21:00)  T(F): 96.8 (21 Sep 2022 05:20), Max: 98 (20 Sep 2022 21:00)  HR: 75 (21 Sep 2022 05:20) (75 - 104)  BP: 107/64 (21 Sep 2022 05:20) (107/64 - 119/72)  BP(mean): --  RR: 16 (21 Sep 2022 05:20) (16 - 16)  SpO2: --        PHYSICAL EXAM:  GENERAL: NAD, well-groomed, well-developed  HEAD:  Atraumatic, Normocephalic  ENMT: No tonsillar erythema, exudates, or enlargement; Moist mucous membranes, Good dentition, No lesions  NECK: Supple, No JVD, Normal thyroid  NERVOUS SYSTEM:  Alert & Oriented X3, Good concentration; Motor Strength 5/5 B/L upper and lower extremities; DTRs 2+ intact and symmetric  CHEST/LUNG: Clear to percussion bilaterally; No rales, rhonchi, wheezing, or rubs  HEART: Regular rate and rhythm; No murmurs, rubs, or gallops  ABDOMEN: Soft, Nontender, Nondistended; Bowel sounds present  EXTREMITIES:  2+ Peripheral Pulses, No clubbing, cyanosis, or edema    SKIN: No rashes or lesions    LABS:              CAPILLARY BLOOD GLUCOSE          RADIOLOGY & ADDITIONAL TESTS:    Imaging Personally Reviewed:  [ ] YES  [ ] NO    Consultant(s) Notes Reviewed:  [ ] YES  [ ] NO    Care Discussed with Consultants/Other Providers [ ] YES  [ ] NO

## 2022-09-22 PROCEDURE — 99232 SBSQ HOSP IP/OBS MODERATE 35: CPT

## 2022-09-22 RX ADMIN — LITHIUM CARBONATE 450 MILLIGRAM(S): 300 TABLET, EXTENDED RELEASE ORAL at 05:15

## 2022-09-22 RX ADMIN — DIVALPROEX SODIUM 250 MILLIGRAM(S): 500 TABLET, DELAYED RELEASE ORAL at 11:29

## 2022-09-22 RX ADMIN — DIVALPROEX SODIUM 500 MILLIGRAM(S): 500 TABLET, DELAYED RELEASE ORAL at 21:56

## 2022-09-22 RX ADMIN — DIVALPROEX SODIUM 250 MILLIGRAM(S): 500 TABLET, DELAYED RELEASE ORAL at 21:56

## 2022-09-22 RX ADMIN — Medication 25 MILLIGRAM(S): at 00:29

## 2022-09-22 RX ADMIN — RISPERIDONE 2 MILLIGRAM(S): 4 TABLET ORAL at 17:50

## 2022-09-22 RX ADMIN — RISPERIDONE 2 MILLIGRAM(S): 4 TABLET ORAL at 05:15

## 2022-09-22 RX ADMIN — DIVALPROEX SODIUM 500 MILLIGRAM(S): 500 TABLET, DELAYED RELEASE ORAL at 11:29

## 2022-09-22 RX ADMIN — LITHIUM CARBONATE 450 MILLIGRAM(S): 300 TABLET, EXTENDED RELEASE ORAL at 17:49

## 2022-09-22 NOTE — PROGRESS NOTE ADULT - SUBJECTIVE AND OBJECTIVE BOX
Patient is a 22y old  Male who presents with a chief complaint of Placement (21 Sep 2022 12:40)      INTERVAL HPI/OVERNIGHT EVENTS:    MEDICATIONS  (STANDING):  diVALproex  milliGRAM(s) Oral at bedtime  diVALproex  milliGRAM(s) Oral at bedtime  diVALproex  milliGRAM(s) Oral daily  diVALproex  milliGRAM(s) Oral daily  lithium CR (ESKALITH-CR) 450 milliGRAM(s) Oral two times a day  risperiDONE   Tablet 2 milliGRAM(s) Oral two times a day    MEDICATIONS  (PRN):  hydrOXYzine hydrochloride 25 milliGRAM(s) Oral at bedtime PRN insomnia      Allergies    No Known Allergies    Intolerances        REVIEW OF SYSTEMS:  CONSTITUTIONAL: No fever, weight loss, or fatigue  EYES: No eye pain, visual disturbances, or discharge  ENMT:  No difficulty hearing, tinnitus, vertigo;  NECK: No pain or stiffness    RESPIRATORY: No cough, wheezing, chills or hemoptysis; No shortness of breath  CARDIOVASCULAR: No chest pain, palpitations, dizziness, or leg swelling  GASTROINTESTINAL: No abdominal or epigastric pain. No nausea, vomiting, or hematemesis; No diarrhea or constipation. No melena or hematochezia.  NEUROLOGICAL: No headaches, memory loss, loss of strength, numbness, or tremors  SKIN: No itching, burning, rashes, or lesions     Vital Signs Last 24 Hrs  T(C): 36.4 (22 Sep 2022 05:08), Max: 36.6 (21 Sep 2022 21:07)  T(F): 97.6 (22 Sep 2022 05:08), Max: 97.9 (21 Sep 2022 21:07)  HR: 71 (22 Sep 2022 05:08) (64 - 94)  BP: 108/60 (22 Sep 2022 05:08) (108/60 - 122/69)  BP(mean): --  RR: 16 (22 Sep 2022 05:08) (16 - 16)  SpO2: --        PHYSICAL EXAM:  GENERAL: NAD, well-groomed, well-developed  HEAD:  Atraumatic, Normocephalic  EYES: EOMI, PERRLA, conjunctiva and sclera clear  ENMT: No tonsillar erythema, exudates, or enlargement; Moist mucous membranes, Good dentition, No lesions  NECK: Supple, No JVD, Normal thyroid  NERVOUS SYSTEM:  Alert & Oriented X3, Good concentration; Motor Strength 5/5 B/L upper and lower extremities; DTRs 2+ intact and symmetric  CHEST/LUNG: Clear to percussion bilaterally; No rales, rhonchi, wheezing, or rubs  HEART: Regular rate and rhythm; No murmurs, rubs, or gallops  ABDOMEN: Soft, Nontender, Nondistended; Bowel sounds present  LABS:              CAPILLARY BLOOD GLUCOSE          RADIOLOGY & ADDITIONAL TESTS:    Imaging Personally Reviewed:  [ ] YES  [ ] NO    Consultant(s) Notes Reviewed:  [ ] YES  [ ] NO    Care Discussed with Consultants/Other Providers [ ] YES  [ ] NO

## 2022-09-23 PROCEDURE — 99232 SBSQ HOSP IP/OBS MODERATE 35: CPT

## 2022-09-23 RX ADMIN — LITHIUM CARBONATE 450 MILLIGRAM(S): 300 TABLET, EXTENDED RELEASE ORAL at 05:35

## 2022-09-23 RX ADMIN — Medication 25 MILLIGRAM(S): at 00:19

## 2022-09-23 RX ADMIN — RISPERIDONE 2 MILLIGRAM(S): 4 TABLET ORAL at 17:08

## 2022-09-23 RX ADMIN — DIVALPROEX SODIUM 500 MILLIGRAM(S): 500 TABLET, DELAYED RELEASE ORAL at 22:28

## 2022-09-23 RX ADMIN — DIVALPROEX SODIUM 250 MILLIGRAM(S): 500 TABLET, DELAYED RELEASE ORAL at 22:27

## 2022-09-23 RX ADMIN — LITHIUM CARBONATE 450 MILLIGRAM(S): 300 TABLET, EXTENDED RELEASE ORAL at 17:08

## 2022-09-23 RX ADMIN — RISPERIDONE 2 MILLIGRAM(S): 4 TABLET ORAL at 05:34

## 2022-09-23 RX ADMIN — DIVALPROEX SODIUM 500 MILLIGRAM(S): 500 TABLET, DELAYED RELEASE ORAL at 11:37

## 2022-09-23 RX ADMIN — DIVALPROEX SODIUM 250 MILLIGRAM(S): 500 TABLET, DELAYED RELEASE ORAL at 11:38

## 2022-09-23 NOTE — PROGRESS NOTE ADULT - SUBJECTIVE AND OBJECTIVE BOX
Patient is a 22y old  Male who presents with a chief complaint of Placement (22 Sep 2022 13:33)      INTERVAL HPI/OVERNIGHT EVENTS:    MEDICATIONS  (STANDING):  diVALproex  milliGRAM(s) Oral at bedtime  diVALproex  milliGRAM(s) Oral at bedtime  diVALproex  milliGRAM(s) Oral daily  diVALproex  milliGRAM(s) Oral daily  lithium CR (ESKALITH-CR) 450 milliGRAM(s) Oral two times a day  risperiDONE   Tablet 2 milliGRAM(s) Oral two times a day    MEDICATIONS  (PRN):  hydrOXYzine hydrochloride 25 milliGRAM(s) Oral at bedtime PRN insomnia      Allergies    No Known Allergies    Intolerances        REVIEW OF SYSTEMS:  ALLERGY AND IMMUNOLOGIC: No hives or eczema    Vital Signs Last 24 Hrs  T(C): 35.6 (23 Sep 2022 13:00), Max: 36.1 (23 Sep 2022 05:08)  T(F): 96 (23 Sep 2022 13:00), Max: 96.9 (23 Sep 2022 05:08)  HR: 73 (23 Sep 2022 13:00) (73 - 109)  BP: 112/64 (23 Sep 2022 13:00) (112/64 - 124/76)  BP(mean): --  RR: 16 (23 Sep 2022 13:00) (16 - 16)  SpO2: --        PHYSICAL EXAM:  GENERAL: NAD, well-groomed, well-developed  SKIN: No rashes or lesions    LABS:              CAPILLARY BLOOD GLUCOSE          RADIOLOGY & ADDITIONAL TESTS:    Imaging Personally Reviewed:  [ ] YES  [ ] NO    Consultant(s) Notes Reviewed:  [ ] YES  [ ] NO    Care Discussed with Consultants/Other Providers [ ] YES  [ ] NO

## 2022-09-24 PROCEDURE — 99232 SBSQ HOSP IP/OBS MODERATE 35: CPT

## 2022-09-24 RX ADMIN — LITHIUM CARBONATE 450 MILLIGRAM(S): 300 TABLET, EXTENDED RELEASE ORAL at 17:47

## 2022-09-24 RX ADMIN — DIVALPROEX SODIUM 250 MILLIGRAM(S): 500 TABLET, DELAYED RELEASE ORAL at 21:36

## 2022-09-24 RX ADMIN — DIVALPROEX SODIUM 250 MILLIGRAM(S): 500 TABLET, DELAYED RELEASE ORAL at 11:24

## 2022-09-24 RX ADMIN — LITHIUM CARBONATE 450 MILLIGRAM(S): 300 TABLET, EXTENDED RELEASE ORAL at 05:20

## 2022-09-24 RX ADMIN — DIVALPROEX SODIUM 500 MILLIGRAM(S): 500 TABLET, DELAYED RELEASE ORAL at 21:36

## 2022-09-24 RX ADMIN — DIVALPROEX SODIUM 500 MILLIGRAM(S): 500 TABLET, DELAYED RELEASE ORAL at 11:24

## 2022-09-24 RX ADMIN — RISPERIDONE 2 MILLIGRAM(S): 4 TABLET ORAL at 17:47

## 2022-09-24 RX ADMIN — RISPERIDONE 2 MILLIGRAM(S): 4 TABLET ORAL at 05:20

## 2022-09-24 NOTE — PROGRESS NOTE ADULT - SUBJECTIVE AND OBJECTIVE BOX
Patient is a 22y old  Male who presents with a chief complaint of Placement (23 Sep 2022 15:28)      INTERVAL HPI/OVERNIGHT EVENTS:    MEDICATIONS  (STANDING):  diVALproex  milliGRAM(s) Oral daily  diVALproex  milliGRAM(s) Oral daily  diVALproex  milliGRAM(s) Oral at bedtime  diVALproex  milliGRAM(s) Oral at bedtime  lithium CR (ESKALITH-CR) 450 milliGRAM(s) Oral two times a day  risperiDONE   Tablet 2 milliGRAM(s) Oral two times a day    MEDICATIONS  (PRN):  hydrOXYzine hydrochloride 25 milliGRAM(s) Oral at bedtime PRN insomnia      Allergies    No Known Allergies    Intolerances        REVIEW OF SYSTEMS:  CONSTITUTIONAL: No fever, opal, back, or extremity pain  ALLERGY AND IMMUNOLOGIC: No hives or eczema    Vital Signs Last 24 Hrs  T(C): 36.7 (23 Sep 2022 21:03), Max: 36.7 (23 Sep 2022 21:03)  T(F): 98.1 (23 Sep 2022 21:03), Max: 98.1 (23 Sep 2022 21:03)  HR: 76 (23 Sep 2022 21:03) (76 - 76)  BP: 120/63 (23 Sep 2022 21:03) (120/63 - 120/63)  BP(mean): --  RR: 16 (23 Sep 2022 21:03) (16 - 16)  SpO2: --        PHYSICAL EXAM:  GENERAL: NAD, HEAD:  Atraumatic, Normocephalic  EYES: EOMI,  conjunctiva and sclera clear  NECK: Supple, No JVD, Normal thyroid              CAPILLARY BLOOD GLUCOSE          RADIOLOGY & ADDITIONAL TESTS:    Imaging Personally Reviewed:  [ ] YES  [ ] NO    Consultant(s) Notes Reviewed:  [ ] YES  [ ] NO    Care Discussed with Consultants/Other Providers [ ] YES  [ ] NO

## 2022-09-25 PROCEDURE — 99232 SBSQ HOSP IP/OBS MODERATE 35: CPT

## 2022-09-25 RX ADMIN — DIVALPROEX SODIUM 250 MILLIGRAM(S): 500 TABLET, DELAYED RELEASE ORAL at 21:25

## 2022-09-25 RX ADMIN — LITHIUM CARBONATE 450 MILLIGRAM(S): 300 TABLET, EXTENDED RELEASE ORAL at 05:51

## 2022-09-25 RX ADMIN — DIVALPROEX SODIUM 250 MILLIGRAM(S): 500 TABLET, DELAYED RELEASE ORAL at 12:10

## 2022-09-25 RX ADMIN — DIVALPROEX SODIUM 500 MILLIGRAM(S): 500 TABLET, DELAYED RELEASE ORAL at 12:10

## 2022-09-25 RX ADMIN — DIVALPROEX SODIUM 500 MILLIGRAM(S): 500 TABLET, DELAYED RELEASE ORAL at 21:25

## 2022-09-25 RX ADMIN — LITHIUM CARBONATE 450 MILLIGRAM(S): 300 TABLET, EXTENDED RELEASE ORAL at 17:39

## 2022-09-25 RX ADMIN — Medication 25 MILLIGRAM(S): at 00:59

## 2022-09-25 RX ADMIN — RISPERIDONE 2 MILLIGRAM(S): 4 TABLET ORAL at 05:51

## 2022-09-25 RX ADMIN — RISPERIDONE 2 MILLIGRAM(S): 4 TABLET ORAL at 17:39

## 2022-09-25 NOTE — PROGRESS NOTE ADULT - SUBJECTIVE AND OBJECTIVE BOX
Patient is a 22y old  Male who presents with a chief complaint of Placement (24 Sep 2022 13:12)      INTERVAL HPI/OVERNIGHT EVENTS:    MEDICATIONS  (STANDING):  diVALproex  milliGRAM(s) Oral daily  diVALproex  milliGRAM(s) Oral daily  diVALproex  milliGRAM(s) Oral at bedtime  diVALproex  milliGRAM(s) Oral at bedtime  lithium CR (ESKALITH-CR) 450 milliGRAM(s) Oral two times a day  risperiDONE   Tablet 2 milliGRAM(s) Oral two times a day    MEDICATIONS  (PRN):  hydrOXYzine hydrochloride 25 milliGRAM(s) Oral at bedtime PRN insomnia      Allergies    No Known Allergies    Intolerances        REVIEW OF SYSTEMS:  CONSTITUTIONAL: No fever,EYES: No eye pain, visual disturbances, or discharge  NECK: No pain or stiffness  RESPIRATORY: No cough, wheezing, chills or hemoptysis; No shortness of breath  CARDIOVASCULAR: No chest pain, palpitations, dizziness, or leg swelling  GASTROINTESTINAL: No abdominal or epigastric pain. No nausea, vomiting, or hematemesis; No diarrhea or constipation. No melena or hematochezia.  ALLERGY AND IMMUNOLOGIC: No hives or eczema    Vital Signs Last 24 Hrs  T(C): 36.4 (25 Sep 2022 13:13), Max: 36.6 (24 Sep 2022 20:05)  T(F): 97.5 (25 Sep 2022 13:13), Max: 97.8 (24 Sep 2022 20:05)  HR: 82 (25 Sep 2022 13:13) (78 - 112)  BP: 108/67 (25 Sep 2022 13:13) (100/63 - 132/75)  BP(mean): --  RR: 18 (25 Sep 2022 13:13) (18 - 18)  SpO2: --        PHYSICAL EXAM:  GENERAL: NAD,  HEAD:  Atraumatic, Normocephalic  EYES: EOMI, PERRLA, conjunctiva and sclera clear  NECK: Supple, No JVD, Normal thyroid  NERVOUS SYSTEM:  Alert & Oriented X3,  CHEST/LUNG: Clear to percussion bilaterally; No rales, rhonchi, wheezing, or rubs  HEART: Regular rate and rhythm; No murmurs, rubs, or gallops  ABDOMEN: Soft, Nontender, Nondistended; Bowel sounds present    LABS:              CAPILLARY BLOOD GLUCOSE          RADIOLOGY & ADDITIONAL TESTS:    Imaging Personally Reviewed:  [ ] YES  [ ] NO    Consultant(s) Notes Reviewed:  [ ] YES  [ ] NO    Care Discussed with Consultants/Other Providers [ ] YES  [ ] NO

## 2022-09-26 RX ADMIN — DIVALPROEX SODIUM 500 MILLIGRAM(S): 500 TABLET, DELAYED RELEASE ORAL at 22:07

## 2022-09-26 RX ADMIN — DIVALPROEX SODIUM 250 MILLIGRAM(S): 500 TABLET, DELAYED RELEASE ORAL at 22:07

## 2022-09-26 RX ADMIN — Medication 25 MILLIGRAM(S): at 00:34

## 2022-09-26 RX ADMIN — LITHIUM CARBONATE 450 MILLIGRAM(S): 300 TABLET, EXTENDED RELEASE ORAL at 16:53

## 2022-09-26 RX ADMIN — LITHIUM CARBONATE 450 MILLIGRAM(S): 300 TABLET, EXTENDED RELEASE ORAL at 06:20

## 2022-09-26 RX ADMIN — RISPERIDONE 2 MILLIGRAM(S): 4 TABLET ORAL at 05:23

## 2022-09-26 RX ADMIN — DIVALPROEX SODIUM 500 MILLIGRAM(S): 500 TABLET, DELAYED RELEASE ORAL at 11:39

## 2022-09-26 RX ADMIN — RISPERIDONE 2 MILLIGRAM(S): 4 TABLET ORAL at 16:53

## 2022-09-26 RX ADMIN — DIVALPROEX SODIUM 250 MILLIGRAM(S): 500 TABLET, DELAYED RELEASE ORAL at 11:39

## 2022-09-27 PROCEDURE — 99232 SBSQ HOSP IP/OBS MODERATE 35: CPT

## 2022-09-27 RX ADMIN — DIVALPROEX SODIUM 250 MILLIGRAM(S): 500 TABLET, DELAYED RELEASE ORAL at 11:22

## 2022-09-27 RX ADMIN — DIVALPROEX SODIUM 500 MILLIGRAM(S): 500 TABLET, DELAYED RELEASE ORAL at 11:22

## 2022-09-27 RX ADMIN — Medication 25 MILLIGRAM(S): at 00:29

## 2022-09-27 RX ADMIN — RISPERIDONE 2 MILLIGRAM(S): 4 TABLET ORAL at 17:03

## 2022-09-27 RX ADMIN — LITHIUM CARBONATE 450 MILLIGRAM(S): 300 TABLET, EXTENDED RELEASE ORAL at 05:20

## 2022-09-27 RX ADMIN — DIVALPROEX SODIUM 250 MILLIGRAM(S): 500 TABLET, DELAYED RELEASE ORAL at 21:54

## 2022-09-27 RX ADMIN — RISPERIDONE 2 MILLIGRAM(S): 4 TABLET ORAL at 05:23

## 2022-09-27 RX ADMIN — LITHIUM CARBONATE 450 MILLIGRAM(S): 300 TABLET, EXTENDED RELEASE ORAL at 17:03

## 2022-09-27 RX ADMIN — DIVALPROEX SODIUM 500 MILLIGRAM(S): 500 TABLET, DELAYED RELEASE ORAL at 21:59

## 2022-09-27 NOTE — PROGRESS NOTE ADULT - SUBJECTIVE AND OBJECTIVE BOX
Patient is a 22y old  Male who presents with a chief complaint of Placement (25 Sep 2022 13:49)      INTERVAL HPI/OVERNIGHT EVENTS:    MEDICATIONS  (STANDING):  diVALproex  milliGRAM(s) Oral daily  diVALproex  milliGRAM(s) Oral daily  diVALproex  milliGRAM(s) Oral at bedtime  diVALproex  milliGRAM(s) Oral at bedtime  lithium CR (ESKALITH-CR) 450 milliGRAM(s) Oral two times a day  risperiDONE   Tablet 2 milliGRAM(s) Oral two times a day    MEDICATIONS  (PRN):  hydrOXYzine hydrochloride 25 milliGRAM(s) Oral at bedtime PRN insomnia      Allergies    No Known Allergies    Intolerances        REVIEW OF SYSTEMS:  CONSTITUTIONAL: No fever,  Vital Signs Last 24 Hrs  T(C): 35.7 (27 Sep 2022 05:39), Max: 35.7 (27 Sep 2022 05:39)  T(F): 96.2 (27 Sep 2022 05:39), Max: 96.2 (27 Sep 2022 05:39)  HR: 77 (27 Sep 2022 05:39) (77 - 80)  BP: 102/62 (27 Sep 2022 05:39) (102/62 - 119/73)  BP(mean): --  RR: 18 (27 Sep 2022 05:39) (18 - 18)  SpO2: --    Parameters below as of 27 Sep 2022 05:39  Patient On (Oxygen Delivery Method): room air        PHYSICAL EXAM:  GENERAL: NAD, HEAD:  Atraumatic, Normocephalic  EYES: EOMI,conjunctiva and sclera clear    NECK: Supple, No JVD, Normal thyroid  SKIN: No rashes or lesions    LABS:              CAPILLARY BLOOD GLUCOSE          RADIOLOGY & ADDITIONAL TESTS:    Imaging Personally Reviewed:  [ ] YES  [ ] NO    Consultant(s) Notes Reviewed:  [ ] YES  [ ] NO    Care Discussed with Consultants/Other Providers [ ] YES  [ ] NO

## 2022-09-28 PROCEDURE — 99231 SBSQ HOSP IP/OBS SF/LOW 25: CPT

## 2022-09-28 RX ADMIN — RISPERIDONE 2 MILLIGRAM(S): 4 TABLET ORAL at 17:52

## 2022-09-28 RX ADMIN — RISPERIDONE 2 MILLIGRAM(S): 4 TABLET ORAL at 05:44

## 2022-09-28 RX ADMIN — LITHIUM CARBONATE 450 MILLIGRAM(S): 300 TABLET, EXTENDED RELEASE ORAL at 05:45

## 2022-09-28 RX ADMIN — DIVALPROEX SODIUM 250 MILLIGRAM(S): 500 TABLET, DELAYED RELEASE ORAL at 11:16

## 2022-09-28 RX ADMIN — DIVALPROEX SODIUM 500 MILLIGRAM(S): 500 TABLET, DELAYED RELEASE ORAL at 21:11

## 2022-09-28 RX ADMIN — LITHIUM CARBONATE 450 MILLIGRAM(S): 300 TABLET, EXTENDED RELEASE ORAL at 17:52

## 2022-09-28 RX ADMIN — DIVALPROEX SODIUM 500 MILLIGRAM(S): 500 TABLET, DELAYED RELEASE ORAL at 11:18

## 2022-09-28 RX ADMIN — DIVALPROEX SODIUM 250 MILLIGRAM(S): 500 TABLET, DELAYED RELEASE ORAL at 21:13

## 2022-09-28 NOTE — PROGRESS NOTE ADULT - SUBJECTIVE AND OBJECTIVE BOX
PHILIP MCCLOUD  22y, Male  Allergy: No Known Allergies    Hospital Day: 21d    Patient seen and examined earlier today.   pt is awake, offers no complaints today    PMH/PSH:  PAST MEDICAL & SURGICAL HISTORY:  History of developmental delay      Bipolar disorder      No significant past surgical history          LAST 24-Hr EVENTS:    VITALS:  T(F): 98.7 (09-28-22 @ 05:17), Max: 98.7 (09-28-22 @ 05:17)  HR: 76 (09-28-22 @ 05:17)  BP: 108/53 (09-28-22 @ 05:17) (108/53 - 133/58)  RR: 18 (09-28-22 @ 05:17)  SpO2: --      TESTS & MEASUREMENTS:  Weight/BMI    RADIOLOGY, ECG, & ADDITIONAL TESTS:  12 Lead ECG:   Ventricular Rate 105 BPM    Atrial Rate 105 BPM    P-R Interval 166 ms    QRS Duration 94 ms    Q-T Interval 314 ms    QTC Calculation(Bazett) 415 ms    P Axis 68 degrees    R Axis 67 degrees    T Axis 67 degrees    Diagnosis Line Sinus tachycardia  Otherwise normal ECG    Confirmed by MICHAEL DUPREE MD ([id number]) on 9/7/2022 11:36:58 AM (09-06-22 @ 22:36)      RECENT DIAGNOSTIC ORDERS:      MEDICATIONS:  MEDICATIONS  (STANDING):  diVALproex  milliGRAM(s) Oral daily  diVALproex  milliGRAM(s) Oral daily  diVALproex  milliGRAM(s) Oral at bedtime  diVALproex  milliGRAM(s) Oral at bedtime  lithium CR (ESKALITH-CR) 450 milliGRAM(s) Oral two times a day  risperiDONE   Tablet 2 milliGRAM(s) Oral two times a day    MEDICATIONS  (PRN):  hydrOXYzine hydrochloride 25 milliGRAM(s) Oral at bedtime PRN insomnia      HOME MEDICATIONS:  divalproex sodium 250 mg oral tablet, extended release (09-07)  divalproex sodium 500 mg oral tablet, extended release (09-07)  lithium carbonate extended release (09-07)  risperiDONE 2 mg oral tablet (09-07)      PHYSICAL EXAM:  GENERAL: awake, alert, no acute distress  CHEST/LUNG: symmetrical expansion no labored breathing  ABDOMEN: soft non distended  EXTREMITIES:  no edema no cyanosis

## 2022-09-28 NOTE — PROGRESS NOTE ADULT - ASSESSMENT
This is a 21 year old male past medical history of bipolar and developmental delay comes to emergency room for alleged aggressive and violent behavior as per step father.  - Evaluated by Psych   - Step Father left stating needs placement refusing to take child home.     Bipolar disorder and likely developmental disorder  -stable  -continue meds  (on depakote, risperidone, and lithium)   ·  Problem: History of developmental delay.   ·  Problem: Bipolar disorder.     awaiting placement , no issues reported

## 2022-09-29 PROCEDURE — 99231 SBSQ HOSP IP/OBS SF/LOW 25: CPT

## 2022-09-29 RX ADMIN — Medication 25 MILLIGRAM(S): at 01:01

## 2022-09-29 RX ADMIN — RISPERIDONE 2 MILLIGRAM(S): 4 TABLET ORAL at 06:01

## 2022-09-29 RX ADMIN — DIVALPROEX SODIUM 250 MILLIGRAM(S): 500 TABLET, DELAYED RELEASE ORAL at 11:08

## 2022-09-29 RX ADMIN — DIVALPROEX SODIUM 250 MILLIGRAM(S): 500 TABLET, DELAYED RELEASE ORAL at 21:40

## 2022-09-29 RX ADMIN — DIVALPROEX SODIUM 500 MILLIGRAM(S): 500 TABLET, DELAYED RELEASE ORAL at 11:08

## 2022-09-29 RX ADMIN — LITHIUM CARBONATE 450 MILLIGRAM(S): 300 TABLET, EXTENDED RELEASE ORAL at 18:06

## 2022-09-29 RX ADMIN — DIVALPROEX SODIUM 500 MILLIGRAM(S): 500 TABLET, DELAYED RELEASE ORAL at 21:40

## 2022-09-29 RX ADMIN — RISPERIDONE 2 MILLIGRAM(S): 4 TABLET ORAL at 18:06

## 2022-09-29 RX ADMIN — LITHIUM CARBONATE 450 MILLIGRAM(S): 300 TABLET, EXTENDED RELEASE ORAL at 06:01

## 2022-09-29 NOTE — PROGRESS NOTE ADULT - ASSESSMENT
This is a 21 year old male past medical history of bipolar and developmental delay came to emergency room for alleged aggressive and violent behavior as per step father.  - Evaluated by Psych   - Step Father left stated pt needs placement refusing to take child home.     Bipolar disorder and likely developmental disorder  -stable  - continue meds  (on depakote, risperidone, and lithium)   - pt has been stable without aggressive behaviour in the hospital     awaiting placement , no issues reported

## 2022-09-29 NOTE — PROGRESS NOTE ADULT - SUBJECTIVE AND OBJECTIVE BOX
AME PHILIP  22y, Male  Allergy: No Known Allergies    Hospital Day: 22d    Patient seen and examined earlier today.   no complaints, resting comfortably     PMH/PSH:  PAST MEDICAL & SURGICAL HISTORY:  History of developmental delay      Bipolar disorder      No significant past surgical history          LAST 24-Hr EVENTS:    VITALS:  T(F): 97.2 (09-29-22 @ 06:10), Max: 97.2 (09-29-22 @ 06:10)  HR: 62 (09-29-22 @ 06:10)  BP: 113/68 (09-29-22 @ 06:10) (113/68 - 122/67)  RR: 18 (09-29-22 @ 06:10)  SpO2: --          TESTS & MEASUREMENTS:  Weight/BMI     RADIOLOGY, ECG, & ADDITIONAL TESTS:  12 Lead ECG:   Ventricular Rate 105 BPM    Atrial Rate 105 BPM    P-R Interval 166 ms    QRS Duration 94 ms    Q-T Interval 314 ms    QTC Calculation(Bazett) 415 ms    P Axis 68 degrees    R Axis 67 degrees    T Axis 67 degrees    Diagnosis Line Sinus tachycardia  Otherwise normal ECG    Confirmed by MICHAEL DUPREE MD ([id number]) on 9/7/2022 11:36:58 AM (09-06-22 @ 22:36)      RECENT DIAGNOSTIC ORDERS:      MEDICATIONS:  MEDICATIONS  (STANDING):  diVALproex  milliGRAM(s) Oral daily  diVALproex  milliGRAM(s) Oral daily  diVALproex  milliGRAM(s) Oral at bedtime  diVALproex  milliGRAM(s) Oral at bedtime  lithium CR (ESKALITH-CR) 450 milliGRAM(s) Oral two times a day  risperiDONE   Tablet 2 milliGRAM(s) Oral two times a day    MEDICATIONS  (PRN):  hydrOXYzine hydrochloride 25 milliGRAM(s) Oral at bedtime PRN insomnia      HOME MEDICATIONS:  divalproex sodium 250 mg oral tablet, extended release (09-07)  divalproex sodium 500 mg oral tablet, extended release (09-07)  lithium carbonate extended release (09-07)  risperiDONE 2 mg oral tablet (09-07)      PHYSICAL EXAM:  GENERAL: awake, alert, no acute distress  CHEST/LUNG: symmetrical expansion no labored breathing  ABDOMEN: soft non distended  EXTREMITIES:  no edema no cyanosis    SKIN: normal temp noted

## 2022-09-30 PROCEDURE — 99231 SBSQ HOSP IP/OBS SF/LOW 25: CPT

## 2022-09-30 RX ADMIN — DIVALPROEX SODIUM 250 MILLIGRAM(S): 500 TABLET, DELAYED RELEASE ORAL at 22:25

## 2022-09-30 RX ADMIN — DIVALPROEX SODIUM 250 MILLIGRAM(S): 500 TABLET, DELAYED RELEASE ORAL at 11:38

## 2022-09-30 RX ADMIN — RISPERIDONE 2 MILLIGRAM(S): 4 TABLET ORAL at 17:09

## 2022-09-30 RX ADMIN — RISPERIDONE 2 MILLIGRAM(S): 4 TABLET ORAL at 05:30

## 2022-09-30 RX ADMIN — Medication 25 MILLIGRAM(S): at 01:04

## 2022-09-30 RX ADMIN — DIVALPROEX SODIUM 500 MILLIGRAM(S): 500 TABLET, DELAYED RELEASE ORAL at 11:39

## 2022-09-30 RX ADMIN — DIVALPROEX SODIUM 500 MILLIGRAM(S): 500 TABLET, DELAYED RELEASE ORAL at 22:25

## 2022-09-30 RX ADMIN — LITHIUM CARBONATE 450 MILLIGRAM(S): 300 TABLET, EXTENDED RELEASE ORAL at 17:09

## 2022-09-30 RX ADMIN — LITHIUM CARBONATE 450 MILLIGRAM(S): 300 TABLET, EXTENDED RELEASE ORAL at 05:30

## 2022-09-30 NOTE — PROGRESS NOTE ADULT - SUBJECTIVE AND OBJECTIVE BOX
AMEPHILIP  22y, Male  Allergy: No Known Allergies    Hospital Day: 23d    Patient seen and examined earlier today.     PMH/PSH:  PAST MEDICAL & SURGICAL HISTORY:  History of developmental delay      Bipolar disorder      No significant past surgical history          LAST 24-Hr EVENTS:    VITALS:  T(F): 97.3 (09-30-22 @ 05:12), Max: 97.3 (09-29-22 @ 13:41)  HR: 71 (09-30-22 @ 05:12)  BP: 121/70 (09-30-22 @ 05:12) (120/76 - 122/69)  RR: 16 (09-30-22 @ 05:12)  SpO2: --          TESTS & MEASUREMENTS:  Weight/BMI       RADIOLOGY, ECG, & ADDITIONAL TESTS:  12 Lead ECG:   Ventricular Rate 105 BPM    Atrial Rate 105 BPM    P-R Interval 166 ms    QRS Duration 94 ms    Q-T Interval 314 ms    QTC Calculation(Bazett) 415 ms    P Axis 68 degrees    R Axis 67 degrees    T Axis 67 degrees    Diagnosis Line Sinus tachycardia  Otherwise normal ECG    Confirmed by MICHAEL DUPREE MD ([id number]) on 9/7/2022 11:36:58 AM (09-06-22 @ 22:36)      RECENT DIAGNOSTIC ORDERS:      MEDICATIONS:  MEDICATIONS  (STANDING):  diVALproex  milliGRAM(s) Oral daily  diVALproex  milliGRAM(s) Oral daily  diVALproex  milliGRAM(s) Oral at bedtime  diVALproex  milliGRAM(s) Oral at bedtime  lithium CR (ESKALITH-CR) 450 milliGRAM(s) Oral two times a day  risperiDONE   Tablet 2 milliGRAM(s) Oral two times a day    MEDICATIONS  (PRN):  hydrOXYzine hydrochloride 25 milliGRAM(s) Oral at bedtime PRN insomnia      HOME MEDICATIONS:  divalproex sodium 250 mg oral tablet, extended release (09-07)  divalproex sodium 500 mg oral tablet, extended release (09-07)  lithium carbonate extended release (09-07)  risperiDONE 2 mg oral tablet (09-07)      PHYSICAL EXAM:  GENERAL: awake, alert, no acute distress  CHEST/LUNG: symmetrical expansion no labored breathing  EXTREMITIES:  no edema no cyanosis    SKIN: normal temp noted

## 2022-10-01 LAB — SARS-COV-2 RNA SPEC QL NAA+PROBE: SIGNIFICANT CHANGE UP

## 2022-10-01 PROCEDURE — 99231 SBSQ HOSP IP/OBS SF/LOW 25: CPT

## 2022-10-01 RX ADMIN — LITHIUM CARBONATE 450 MILLIGRAM(S): 300 TABLET, EXTENDED RELEASE ORAL at 05:56

## 2022-10-01 RX ADMIN — DIVALPROEX SODIUM 250 MILLIGRAM(S): 500 TABLET, DELAYED RELEASE ORAL at 22:15

## 2022-10-01 RX ADMIN — DIVALPROEX SODIUM 500 MILLIGRAM(S): 500 TABLET, DELAYED RELEASE ORAL at 22:16

## 2022-10-01 RX ADMIN — LITHIUM CARBONATE 450 MILLIGRAM(S): 300 TABLET, EXTENDED RELEASE ORAL at 17:24

## 2022-10-01 RX ADMIN — Medication 25 MILLIGRAM(S): at 01:10

## 2022-10-01 RX ADMIN — RISPERIDONE 2 MILLIGRAM(S): 4 TABLET ORAL at 17:24

## 2022-10-01 RX ADMIN — DIVALPROEX SODIUM 500 MILLIGRAM(S): 500 TABLET, DELAYED RELEASE ORAL at 11:58

## 2022-10-01 RX ADMIN — RISPERIDONE 2 MILLIGRAM(S): 4 TABLET ORAL at 05:56

## 2022-10-01 RX ADMIN — DIVALPROEX SODIUM 250 MILLIGRAM(S): 500 TABLET, DELAYED RELEASE ORAL at 11:57

## 2022-10-01 NOTE — PROGRESS NOTE ADULT - ASSESSMENT
This is a 21 year old male past medical history of bipolar and developmental delay came to emergency room for alleged aggressive and violent behavior as per step father.  - Evaluated by Psych   - Step Father left stated pt needs placement refusing to take child home.     Bipolar disorder and likely developmental disorder  -stable  - continue meds (on depakote, risperidone, and lithium)   - pt has been stable without aggressive behaviour in the hospital     Disp  awaiting placement , no issues reported

## 2022-10-01 NOTE — PROGRESS NOTE ADULT - SUBJECTIVE AND OBJECTIVE BOX
PHILIP MCCLOUD  22y, Male  Allergy: No Known Allergies    Hospital Day: 24d  no issues reported     PMH/PSH:  PAST MEDICAL & SURGICAL HISTORY:  History of developmental delay      Bipolar disorder      No significant past surgical history          LAST 24-Hr EVENTS:    VITALS:  T(F): 97.2 (10-01-22 @ 13:30), Max: 98.1 (09-30-22 @ 21:20)  HR: 81 (10-01-22 @ 13:30)  BP: 116/68 (10-01-22 @ 13:30) (108/66 - 117/68)  RR: 16 (10-01-22 @ 13:30)  SpO2: --       TESTS & MEASUREMENTS:  Weight/BMI       RADIOLOGY, ECG, & ADDITIONAL TESTS:  12 Lead ECG:   Ventricular Rate 105 BPM    Atrial Rate 105 BPM    P-R Interval 166 ms    QRS Duration 94 ms    Q-T Interval 314 ms    QTC Calculation(Bazett) 415 ms    P Axis 68 degrees    R Axis 67 degrees    T Axis 67 degrees    Diagnosis Line Sinus tachycardia  Otherwise normal ECG    Confirmed by LEIA GARG, MICHAEL ([id number]) on 9/7/2022 11:36:58 AM (09-06-22 @ 22:36)      RECENT DIAGNOSTIC ORDERS:  COVID-19 PCR: AM Sched. Collection: 01-Oct-2022 04:30  Specimen Source: Nasopharyngeal (09-30-22 @ 22:19)      MEDICATIONS:  MEDICATIONS  (STANDING):  diVALproex  milliGRAM(s) Oral daily  diVALproex  milliGRAM(s) Oral daily  diVALproex  milliGRAM(s) Oral at bedtime  diVALproex  milliGRAM(s) Oral at bedtime  lithium CR (ESKALITH-CR) 450 milliGRAM(s) Oral two times a day  risperiDONE   Tablet 2 milliGRAM(s) Oral two times a day    MEDICATIONS  (PRN):  hydrOXYzine hydrochloride 25 milliGRAM(s) Oral at bedtime PRN insomnia      HOME MEDICATIONS:  divalproex sodium 250 mg oral tablet, extended release (09-07)  divalproex sodium 500 mg oral tablet, extended release (09-07)  lithium carbonate extended release (09-07)  risperiDONE 2 mg oral tablet (09-07)      PHYSICAL EXAM:  GENERAL no acute distress  CHEST/LUNG: symmetrical expansion no labored breathing  SKIN: normal temp noted

## 2022-10-02 PROCEDURE — 99231 SBSQ HOSP IP/OBS SF/LOW 25: CPT

## 2022-10-02 RX ADMIN — DIVALPROEX SODIUM 250 MILLIGRAM(S): 500 TABLET, DELAYED RELEASE ORAL at 21:07

## 2022-10-02 RX ADMIN — RISPERIDONE 2 MILLIGRAM(S): 4 TABLET ORAL at 17:50

## 2022-10-02 RX ADMIN — LITHIUM CARBONATE 450 MILLIGRAM(S): 300 TABLET, EXTENDED RELEASE ORAL at 06:12

## 2022-10-02 RX ADMIN — RISPERIDONE 2 MILLIGRAM(S): 4 TABLET ORAL at 06:12

## 2022-10-02 RX ADMIN — DIVALPROEX SODIUM 500 MILLIGRAM(S): 500 TABLET, DELAYED RELEASE ORAL at 11:59

## 2022-10-02 RX ADMIN — LITHIUM CARBONATE 450 MILLIGRAM(S): 300 TABLET, EXTENDED RELEASE ORAL at 17:51

## 2022-10-02 RX ADMIN — DIVALPROEX SODIUM 250 MILLIGRAM(S): 500 TABLET, DELAYED RELEASE ORAL at 11:54

## 2022-10-02 RX ADMIN — DIVALPROEX SODIUM 500 MILLIGRAM(S): 500 TABLET, DELAYED RELEASE ORAL at 21:07

## 2022-10-02 RX ADMIN — Medication 25 MILLIGRAM(S): at 01:30

## 2022-10-02 NOTE — PROGRESS NOTE ADULT - SUBJECTIVE AND OBJECTIVE BOX
AMEMYRIAMO  22y, Male  Allergy: No Known Allergies    Hospital Day: 25d    Patient seen and examined earlier today.   pt is in no distress, on the phone    PMH/PSH:  PAST MEDICAL & SURGICAL HISTORY:  History of developmental delay      Bipolar disorder      No significant past surgical history          LAST 24-Hr EVENTS:    VITALS:  T(F): 96.9 (10-02-22 @ 05:50), Max: 96.9 (10-02-22 @ 05:50)  HR: 64 (10-02-22 @ 05:50)  BP: 106/55 (10-02-22 @ 05:50) (106/55 - 116/58)  RR: 16 (10-01-22 @ 21:36)  SpO2: --         TESTS & MEASUREMENTS:  Weight/BMI        COVID-19 PCR: NotDetec (10-01-22 @ 12:45)       RADIOLOGY, ECG, & ADDITIONAL TESTS:  12 Lead ECG:   Ventricular Rate 105 BPM    Atrial Rate 105 BPM    P-R Interval 166 ms    QRS Duration 94 ms    Q-T Interval 314 ms    QTC Calculation(Bazett) 415 ms    P Axis 68 degrees    R Axis 67 degrees    T Axis 67 degrees    Diagnosis Line Sinus tachycardia  Otherwise normal ECG    Confirmed by MICHAEL DUPREE MD ([id number]) on 9/7/2022 11:36:58 AM (09-06-22 @ 22:36)      RECENT DIAGNOSTIC ORDERS:      MEDICATIONS:  MEDICATIONS  (STANDING):  diVALproex  milliGRAM(s) Oral daily  diVALproex  milliGRAM(s) Oral daily  diVALproex  milliGRAM(s) Oral at bedtime  diVALproex  milliGRAM(s) Oral at bedtime  lithium CR (ESKALITH-CR) 450 milliGRAM(s) Oral two times a day  risperiDONE   Tablet 2 milliGRAM(s) Oral two times a day    MEDICATIONS  (PRN):  hydrOXYzine hydrochloride 25 milliGRAM(s) Oral at bedtime PRN insomnia      HOME MEDICATIONS:  divalproex sodium 250 mg oral tablet, extended release (09-07)  divalproex sodium 500 mg oral tablet, extended release (09-07)  lithium carbonate extended release (09-07)  risperiDONE 2 mg oral tablet (09-07)      PHYSICAL EXAM:  GENERAL no acute distress  CHEST/LUNG: symmetrical expansion no labored breathing  SKIN: normal temp noted

## 2022-10-02 NOTE — PROGRESS NOTE ADULT - ASSESSMENT
This is a 21 year old male past medical history of bipolar and developmental delay came to emergency room for alleged aggressive and violent behavior as per step father.  - Evaluated by Psych   - Step Father left stated pt needs placement refusing to take child home.     Bipolar disorder and likely developmental disorder  -stable  - continue meds (on depakote, risperidone, and lithium)   - pt has been stable without aggressive behaviour in the hospital     Disp  awaiting placement, no issues reported

## 2022-10-03 PROCEDURE — 99231 SBSQ HOSP IP/OBS SF/LOW 25: CPT

## 2022-10-03 RX ADMIN — DIVALPROEX SODIUM 500 MILLIGRAM(S): 500 TABLET, DELAYED RELEASE ORAL at 22:40

## 2022-10-03 RX ADMIN — DIVALPROEX SODIUM 500 MILLIGRAM(S): 500 TABLET, DELAYED RELEASE ORAL at 11:48

## 2022-10-03 RX ADMIN — DIVALPROEX SODIUM 250 MILLIGRAM(S): 500 TABLET, DELAYED RELEASE ORAL at 11:47

## 2022-10-03 RX ADMIN — LITHIUM CARBONATE 450 MILLIGRAM(S): 300 TABLET, EXTENDED RELEASE ORAL at 17:35

## 2022-10-03 RX ADMIN — RISPERIDONE 2 MILLIGRAM(S): 4 TABLET ORAL at 05:56

## 2022-10-03 RX ADMIN — Medication 25 MILLIGRAM(S): at 03:43

## 2022-10-03 RX ADMIN — RISPERIDONE 2 MILLIGRAM(S): 4 TABLET ORAL at 17:35

## 2022-10-03 RX ADMIN — DIVALPROEX SODIUM 250 MILLIGRAM(S): 500 TABLET, DELAYED RELEASE ORAL at 22:40

## 2022-10-03 RX ADMIN — LITHIUM CARBONATE 450 MILLIGRAM(S): 300 TABLET, EXTENDED RELEASE ORAL at 05:56

## 2022-10-03 NOTE — PROGRESS NOTE ADULT - SUBJECTIVE AND OBJECTIVE BOX
AMEPHILIP  22y, Male  Allergy: No Known Allergies    Hospital Day: 26d    Patient seen and examined earlier today.   pt is in no distress. no medical complaints    PMH/PSH:  PAST MEDICAL & SURGICAL HISTORY:  History of developmental delay      Bipolar disorder      No significant past surgical history          LAST 24-Hr EVENTS:    VITALS:  T(F): 96.9 (10-03-22 @ 13:05), Max: 97.2 (10-02-22 @ 14:39)  HR: 80 (10-03-22 @ 13:05)  BP: 105/81 (10-03-22 @ 13:05) (101/56 - 143/67)  RR: 18 (10-03-22 @ 13:05)  SpO2: --          TESTS & MEASUREMENTS:  Weight/BMI                                    COVID-19 PCR: NotDetec (10-01-22 @ 12:45)                RADIOLOGY, ECG, & ADDITIONAL TESTS:  12 Lead ECG:   Ventricular Rate 105 BPM    Atrial Rate 105 BPM    P-R Interval 166 ms    QRS Duration 94 ms    Q-T Interval 314 ms    QTC Calculation(Bazett) 415 ms    P Axis 68 degrees    R Axis 67 degrees    T Axis 67 degrees    Diagnosis Line Sinus tachycardia  Otherwise normal ECG    Confirmed by MICHAEL DUPREE MD ([id number]) on 9/7/2022 11:36:58 AM (09-06-22 @ 22:36)      RECENT DIAGNOSTIC ORDERS:      MEDICATIONS:  MEDICATIONS  (STANDING):  diVALproex  milliGRAM(s) Oral daily  diVALproex  milliGRAM(s) Oral at bedtime  diVALproex  milliGRAM(s) Oral at bedtime  diVALproex  milliGRAM(s) Oral daily  lithium CR (ESKALITH-CR) 450 milliGRAM(s) Oral two times a day  risperiDONE   Tablet 2 milliGRAM(s) Oral two times a day    MEDICATIONS  (PRN):  hydrOXYzine hydrochloride 25 milliGRAM(s) Oral at bedtime PRN insomnia      HOME MEDICATIONS:  divalproex sodium 250 mg oral tablet, extended release (09-07)  divalproex sodium 500 mg oral tablet, extended release (09-07)  lithium carbonate extended release (09-07)  risperiDONE 2 mg oral tablet (09-07)      PHYSICAL EXAM:  GENERAL no acute distress  CHEST/LUNG: symmetrical expansion no labored breathing  SKIN: normal temp noted

## 2022-10-03 NOTE — PROGRESS NOTE ADULT - ASSESSMENT
This is a 21 year old male past medical history of bipolar and developmental delay came to emergency room for alleged aggressive and violent behavior as per step father.  - Evaluated by Psych   - Step Father left stated pt needs placement refusing to take child home.     Bipolar disorder and likely developmental disorder  -stable  - continue meds (on depakote, risperidone, and lithium)   - pt has been stable without aggressive behaviour in the hospital     Disp  awaiting placement

## 2022-10-04 PROCEDURE — 99231 SBSQ HOSP IP/OBS SF/LOW 25: CPT

## 2022-10-04 RX ADMIN — Medication 25 MILLIGRAM(S): at 01:18

## 2022-10-04 RX ADMIN — DIVALPROEX SODIUM 500 MILLIGRAM(S): 500 TABLET, DELAYED RELEASE ORAL at 23:07

## 2022-10-04 RX ADMIN — RISPERIDONE 2 MILLIGRAM(S): 4 TABLET ORAL at 05:53

## 2022-10-04 RX ADMIN — LITHIUM CARBONATE 450 MILLIGRAM(S): 300 TABLET, EXTENDED RELEASE ORAL at 18:27

## 2022-10-04 RX ADMIN — DIVALPROEX SODIUM 500 MILLIGRAM(S): 500 TABLET, DELAYED RELEASE ORAL at 11:17

## 2022-10-04 RX ADMIN — LITHIUM CARBONATE 450 MILLIGRAM(S): 300 TABLET, EXTENDED RELEASE ORAL at 05:52

## 2022-10-04 RX ADMIN — DIVALPROEX SODIUM 250 MILLIGRAM(S): 500 TABLET, DELAYED RELEASE ORAL at 23:07

## 2022-10-04 RX ADMIN — RISPERIDONE 2 MILLIGRAM(S): 4 TABLET ORAL at 18:27

## 2022-10-04 RX ADMIN — DIVALPROEX SODIUM 250 MILLIGRAM(S): 500 TABLET, DELAYED RELEASE ORAL at 11:17

## 2022-10-04 NOTE — PROGRESS NOTE ADULT - SUBJECTIVE AND OBJECTIVE BOX
MAEMYRIAMO  22y, Male  Allergy: No Known Allergies    Hospital Day: 27d    Patient seen and examined earlier today.   pt is awake, no acute distress noted    PMH/PSH:  PAST MEDICAL & SURGICAL HISTORY:  History of developmental delay      Bipolar disorder      No significant past surgical history          LAST 24-Hr EVENTS:    VITALS:  T(F): 96.9 (10-04-22 @ 05:00), Max: 97.5 (10-03-22 @ 20:29)  HR: 75 (10-04-22 @ 05:00)  BP: 101/63 (10-04-22 @ 05:00) (101/63 - 118/65)  RR: 16 (10-04-22 @ 05:00)  SpO2: --          TESTS & MEASUREMENTS:  Weight/BMI                   COVID-19 PCR: NotDetec (10-01-22 @ 12:45)                RADIOLOGY, ECG, & ADDITIONAL TESTS:  12 Lead ECG:   Ventricular Rate 105 BPM    Atrial Rate 105 BPM    P-R Interval 166 ms    QRS Duration 94 ms    Q-T Interval 314 ms    QTC Calculation(Bazett) 415 ms    P Axis 68 degrees    R Axis 67 degrees    T Axis 67 degrees    Diagnosis Line Sinus tachycardia  Otherwise normal ECG    Confirmed by MICHAEL DUPREE MD ([id number]) on 9/7/2022 11:36:58 AM (09-06-22 @ 22:36)      RECENT DIAGNOSTIC ORDERS:      MEDICATIONS:  MEDICATIONS  (STANDING):  diVALproex  milliGRAM(s) Oral daily  diVALproex  milliGRAM(s) Oral daily  diVALproex  milliGRAM(s) Oral at bedtime  diVALproex  milliGRAM(s) Oral at bedtime  lithium CR (ESKALITH-CR) 450 milliGRAM(s) Oral two times a day  risperiDONE   Tablet 2 milliGRAM(s) Oral two times a day    MEDICATIONS  (PRN):  hydrOXYzine hydrochloride 25 milliGRAM(s) Oral at bedtime PRN insomnia      HOME MEDICATIONS:  divalproex sodium 250 mg oral tablet, extended release (09-07)  divalproex sodium 500 mg oral tablet, extended release (09-07)  lithium carbonate extended release (09-07)  risperiDONE 2 mg oral tablet (09-07)      PHYSICAL EXAM:  GENERAL no acute distress  CHEST/LUNG: symmetrical expansion no labored breathing  SKIN: normal temp noted   Psych: no agitation no aggression, calm  Neuro: moves all ext. non focal

## 2022-10-04 NOTE — PROGRESS NOTE ADULT - ASSESSMENT
This is a 21 year old male past medical history of bipolar and developmental delay came to emergency room for alleged aggressive and violent behavior as per step father.  - Evaluated by Psych   - Step Father left stated pt needs placement refusing to take child home.     Bipolar disorder and likely developmental disorder  - stable  - continue current meds (on depakote, risperidone, and lithium)   - pt has been stable without aggressive behaviour in the hospital     Disp  awaiting placement

## 2022-10-05 PROCEDURE — 99231 SBSQ HOSP IP/OBS SF/LOW 25: CPT

## 2022-10-05 RX ADMIN — Medication 25 MILLIGRAM(S): at 22:57

## 2022-10-05 RX ADMIN — LITHIUM CARBONATE 450 MILLIGRAM(S): 300 TABLET, EXTENDED RELEASE ORAL at 18:28

## 2022-10-05 RX ADMIN — DIVALPROEX SODIUM 250 MILLIGRAM(S): 500 TABLET, DELAYED RELEASE ORAL at 22:56

## 2022-10-05 RX ADMIN — LITHIUM CARBONATE 450 MILLIGRAM(S): 300 TABLET, EXTENDED RELEASE ORAL at 06:13

## 2022-10-05 RX ADMIN — DIVALPROEX SODIUM 500 MILLIGRAM(S): 500 TABLET, DELAYED RELEASE ORAL at 22:56

## 2022-10-05 RX ADMIN — Medication 25 MILLIGRAM(S): at 01:50

## 2022-10-05 RX ADMIN — DIVALPROEX SODIUM 500 MILLIGRAM(S): 500 TABLET, DELAYED RELEASE ORAL at 14:02

## 2022-10-05 RX ADMIN — DIVALPROEX SODIUM 250 MILLIGRAM(S): 500 TABLET, DELAYED RELEASE ORAL at 14:02

## 2022-10-05 RX ADMIN — RISPERIDONE 2 MILLIGRAM(S): 4 TABLET ORAL at 18:28

## 2022-10-05 RX ADMIN — RISPERIDONE 2 MILLIGRAM(S): 4 TABLET ORAL at 06:13

## 2022-10-05 NOTE — PROGRESS NOTE ADULT - SUBJECTIVE AND OBJECTIVE BOX
SUBJECTIVE:    Patient is a 22y old Male who presents with a chief complaint of Placement (04 Oct 2022 11:06)    Currently admitted to medicine with the primary diagnosis of Encounter for social work intervention       Today is hospital day 28d. This morning he is resting comfortably in bed and reports no new issues or overnight events.     ROS:   CONSTITUTIONAL: No weakness, fevers or chills   EYES/ENT: No visual changes; No vertigo or throat pain   NECK: No pain or stiffness   RESPIRATORY: No cough, wheezing, hemoptysis; No shortness of breath   CARDIOVASCULAR: No chest pain or palpitations   GASTROINTESTINAL: No abdominal or epigastric pain. No nausea, vomiting, or hematemesis; No diarrhea or constipation. No melena or hematochezia.  GENITOURINARY: No dysuria, frequency or hematuria  NEUROLOGICAL: No numbness or weakness  SKIN: No itching, rashes      PAST MEDICAL & SURGICAL HISTORY  History of developmental delay    Bipolar disorder    No significant past surgical history      SOCIAL HISTORY:    ALLERGIES:  No Known Allergies    MEDICATIONS:  STANDING MEDICATIONS  diVALproex  milliGRAM(s) Oral daily  diVALproex  milliGRAM(s) Oral daily  diVALproex  milliGRAM(s) Oral at bedtime  diVALproex  milliGRAM(s) Oral at bedtime  lithium CR (ESKALITH-CR) 450 milliGRAM(s) Oral two times a day  risperiDONE   Tablet 2 milliGRAM(s) Oral two times a day    PRN MEDICATIONS  hydrOXYzine hydrochloride 25 milliGRAM(s) Oral at bedtime PRN    VITALS:   T(F): 96.5  HR: 79  BP: 117/68  RR: 18  SpO2: --    LABS:  Negative for smoking/alcohol/drug use.                         RADIOLOGY:    PHYSICAL EXAM:  GEN: No acute distress  HEENT: normocephalic, atraumatic, aniceteric  LUNGS: Clear to auscultation bilaterally, no rales/wheezing/ rhonchi  HEART: S1/S2 present. RRR, no murmurs  ABD: Soft, non-tender, non-distended. Bowel sounds present  EXT: NC/NC/NE/2+PP/DAMIAN  NEURO: AAOX3, normal affect      ASSESSMENT AND PLAN:    21 year old male past medical history of bipolar and developmental delay came to emergency room for alleged aggressive and violent behavior as per step father.  - Evaluated by Psych   - Step Father left stated pt needs placement refusing to take child home.     Bipolar disorder and likely developmental disorder  - stable  - continue current meds (on depakote, risperidone, and lithium)   - pt has been stable without aggressive behaviour in the hospital     Disp  awaiting placement

## 2022-10-06 PROCEDURE — 99231 SBSQ HOSP IP/OBS SF/LOW 25: CPT

## 2022-10-06 RX ADMIN — LITHIUM CARBONATE 450 MILLIGRAM(S): 300 TABLET, EXTENDED RELEASE ORAL at 17:34

## 2022-10-06 RX ADMIN — DIVALPROEX SODIUM 250 MILLIGRAM(S): 500 TABLET, DELAYED RELEASE ORAL at 21:34

## 2022-10-06 RX ADMIN — DIVALPROEX SODIUM 500 MILLIGRAM(S): 500 TABLET, DELAYED RELEASE ORAL at 21:35

## 2022-10-06 RX ADMIN — DIVALPROEX SODIUM 250 MILLIGRAM(S): 500 TABLET, DELAYED RELEASE ORAL at 11:15

## 2022-10-06 RX ADMIN — LITHIUM CARBONATE 450 MILLIGRAM(S): 300 TABLET, EXTENDED RELEASE ORAL at 05:40

## 2022-10-06 RX ADMIN — DIVALPROEX SODIUM 500 MILLIGRAM(S): 500 TABLET, DELAYED RELEASE ORAL at 11:16

## 2022-10-06 RX ADMIN — RISPERIDONE 2 MILLIGRAM(S): 4 TABLET ORAL at 17:34

## 2022-10-06 RX ADMIN — RISPERIDONE 2 MILLIGRAM(S): 4 TABLET ORAL at 05:40

## 2022-10-06 NOTE — PROGRESS NOTE ADULT - SUBJECTIVE AND OBJECTIVE BOX
PHILIP MCCLOUD  22y  Male      Subjective:     no acute overnight events. Denies any chest pain, sob, or n/v     REVIEW OF SYSTEMS:  All 12 ROS negative except ones mentioned in HPI     T(C): 36.7 (10-06-22 @ 05:03), Max: 36.7 (10-05-22 @ 22:00)  HR: 64 (10-06-22 @ 05:03) (64 - 107)  BP: 96/51 (10-06-22 @ 05:03) (96/51 - 138/87)  RR: 20 (10-06-22 @ 05:03) (18 - 20)  SpO2: --    PHYSICAL EXAM:  GENERAL: NAD  HEAD:  Atraumatic, Normocephalic  CHEST/LUNG: Clear to percussion bilaterally; No rales, rhonchi, wheezing, or rubs  HEART: Regular rate and rhythm; No murmurs, rubs, or gallops  ABDOMEN: Soft, Nontender, Nondistended; Bowel sounds present  EXTREMITIES:   No clubbing, cyanosis, or edema  NERVOUS SYSTEM:  Alert & Oriented X3      Consultant(s) Notes Reviewed:  [x ] YES  [ ] NO  Care Discussed with Consultants/Other Providers [ x] YES  [ ] NO    LAB:                      Drug Dosing Weight  Height (cm): 167.6 (13 Sep 2022 09:01)  Weight (kg): 94.8 (13 Sep 2022 09:01)  BMI (kg/m2): 33.7 (13 Sep 2022 09:01)  BSA (m2): 2.04 (13 Sep 2022 09:01)    CAPILLARY BLOOD GLUCOSE        I&O's Summary        RADIOLOGY & ADDITIONAL TESTS:  Imaging Personally Reviewed:  [x] YES  [ ] NO    MEDS:  diVALproex  milliGRAM(s) Oral daily  diVALproex  milliGRAM(s) Oral daily  diVALproex  milliGRAM(s) Oral at bedtime  diVALproex  milliGRAM(s) Oral at bedtime  hydrOXYzine hydrochloride 25 milliGRAM(s) Oral at bedtime PRN  lithium CR (ESKALITH-CR) 450 milliGRAM(s) Oral two times a day  risperiDONE   Tablet 2 milliGRAM(s) Oral two times a day

## 2022-10-06 NOTE — PROGRESS NOTE ADULT - ASSESSMENT
21 year old male past medical history of bipolar and developmental delay came to emergency room for alleged aggressive and violent behavior as per step father.  - Evaluated by Psych   - Step Father left stated pt needs placement refusing to take child home.     Bipolar disorder and likely developmental disorder  - stable  - continue current meds (on depakote, risperidone, and lithium)   - pt has been stable without aggressive behaviour in the hospital     Disp  awaiting placement     independent

## 2022-10-07 LAB — SARS-COV-2 RNA SPEC QL NAA+PROBE: SIGNIFICANT CHANGE UP

## 2022-10-07 PROCEDURE — 99231 SBSQ HOSP IP/OBS SF/LOW 25: CPT

## 2022-10-07 RX ADMIN — RISPERIDONE 2 MILLIGRAM(S): 4 TABLET ORAL at 17:18

## 2022-10-07 RX ADMIN — LITHIUM CARBONATE 450 MILLIGRAM(S): 300 TABLET, EXTENDED RELEASE ORAL at 17:19

## 2022-10-07 RX ADMIN — DIVALPROEX SODIUM 250 MILLIGRAM(S): 500 TABLET, DELAYED RELEASE ORAL at 21:43

## 2022-10-07 RX ADMIN — Medication 25 MILLIGRAM(S): at 01:27

## 2022-10-07 RX ADMIN — RISPERIDONE 2 MILLIGRAM(S): 4 TABLET ORAL at 05:54

## 2022-10-07 RX ADMIN — LITHIUM CARBONATE 450 MILLIGRAM(S): 300 TABLET, EXTENDED RELEASE ORAL at 05:54

## 2022-10-07 RX ADMIN — DIVALPROEX SODIUM 500 MILLIGRAM(S): 500 TABLET, DELAYED RELEASE ORAL at 21:44

## 2022-10-07 RX ADMIN — Medication 25 MILLIGRAM(S): at 23:35

## 2022-10-07 RX ADMIN — DIVALPROEX SODIUM 500 MILLIGRAM(S): 500 TABLET, DELAYED RELEASE ORAL at 12:18

## 2022-10-07 RX ADMIN — DIVALPROEX SODIUM 250 MILLIGRAM(S): 500 TABLET, DELAYED RELEASE ORAL at 12:18

## 2022-10-07 NOTE — PROGRESS NOTE ADULT - ASSESSMENT
21 year old male past medical history of bipolar and developmental delay came to emergency room for alleged aggressive and violent behavior as per step father.  - Evaluated by Psych   - Step Father left stated pt needs placement refusing to take child home.     Bipolar disorder and likely developmental disorder  - stable  - continue current meds (on depakote, risperidone, and lithium)   - pt has been stable without aggressive behaviour in the hospital     Disp  awaiting placement

## 2022-10-08 PROCEDURE — 99231 SBSQ HOSP IP/OBS SF/LOW 25: CPT

## 2022-10-08 RX ADMIN — RISPERIDONE 2 MILLIGRAM(S): 4 TABLET ORAL at 05:28

## 2022-10-08 RX ADMIN — LITHIUM CARBONATE 450 MILLIGRAM(S): 300 TABLET, EXTENDED RELEASE ORAL at 05:28

## 2022-10-08 RX ADMIN — RISPERIDONE 2 MILLIGRAM(S): 4 TABLET ORAL at 17:18

## 2022-10-08 RX ADMIN — DIVALPROEX SODIUM 250 MILLIGRAM(S): 500 TABLET, DELAYED RELEASE ORAL at 11:12

## 2022-10-08 RX ADMIN — DIVALPROEX SODIUM 250 MILLIGRAM(S): 500 TABLET, DELAYED RELEASE ORAL at 21:22

## 2022-10-08 RX ADMIN — LITHIUM CARBONATE 450 MILLIGRAM(S): 300 TABLET, EXTENDED RELEASE ORAL at 17:18

## 2022-10-08 RX ADMIN — DIVALPROEX SODIUM 500 MILLIGRAM(S): 500 TABLET, DELAYED RELEASE ORAL at 11:12

## 2022-10-08 RX ADMIN — DIVALPROEX SODIUM 500 MILLIGRAM(S): 500 TABLET, DELAYED RELEASE ORAL at 21:25

## 2022-10-09 PROCEDURE — 99231 SBSQ HOSP IP/OBS SF/LOW 25: CPT

## 2022-10-09 RX ADMIN — RISPERIDONE 2 MILLIGRAM(S): 4 TABLET ORAL at 17:23

## 2022-10-09 RX ADMIN — RISPERIDONE 2 MILLIGRAM(S): 4 TABLET ORAL at 05:25

## 2022-10-09 RX ADMIN — DIVALPROEX SODIUM 250 MILLIGRAM(S): 500 TABLET, DELAYED RELEASE ORAL at 21:03

## 2022-10-09 RX ADMIN — DIVALPROEX SODIUM 500 MILLIGRAM(S): 500 TABLET, DELAYED RELEASE ORAL at 21:03

## 2022-10-09 RX ADMIN — LITHIUM CARBONATE 450 MILLIGRAM(S): 300 TABLET, EXTENDED RELEASE ORAL at 17:23

## 2022-10-09 RX ADMIN — DIVALPROEX SODIUM 500 MILLIGRAM(S): 500 TABLET, DELAYED RELEASE ORAL at 12:47

## 2022-10-09 RX ADMIN — DIVALPROEX SODIUM 250 MILLIGRAM(S): 500 TABLET, DELAYED RELEASE ORAL at 12:47

## 2022-10-09 RX ADMIN — Medication 25 MILLIGRAM(S): at 21:03

## 2022-10-09 RX ADMIN — LITHIUM CARBONATE 450 MILLIGRAM(S): 300 TABLET, EXTENDED RELEASE ORAL at 05:24

## 2022-10-09 NOTE — PROGRESS NOTE ADULT - SUBJECTIVE AND OBJECTIVE BOX
PHILIP MCCLOUD  22y  Male      Subjective:     no acute overnight events. Denies any chest pain, sob, or n/v     REVIEW OF SYSTEMS:  All 12 ROS negative except ones mentioned in HPI     Vital Signs Last 24 Hrs  T(C): 36.3 (09 Oct 2022 13:29), Max: 36.8 (08 Oct 2022 20:09)  T(F): 97.4 (09 Oct 2022 13:29), Max: 98.2 (08 Oct 2022 20:09)  HR: 73 (09 Oct 2022 13:29) (69 - 82)  BP: 121/76 (09 Oct 2022 13:29) (110/65 - 121/76)  BP(mean): --  RR: 20 (09 Oct 2022 13:29) (18 - 20)  SpO2: --        PHYSICAL EXAM:  GENERAL: NAD  HEAD:  Atraumatic, Normocephalic  CHEST/LUNG: Clear to percussion bilaterally; No rales, rhonchi, wheezing, or rubs  HEART: Regular rate and rhythm; No murmurs, rubs, or gallops  ABDOMEN: Soft, Nontender, Nondistended; Bowel sounds present  EXTREMITIES:   No clubbing, cyanosis, or edema  NERVOUS SYSTEM:  Alert & Oriented X3      Consultant(s) Notes Reviewed:  [x ] YES  [ ] NO  Care Discussed with Consultants/Other Providers [ x] YES  [ ] NO    LAB:                      Drug Dosing Weight  Height (cm): 167.6 (13 Sep 2022 09:01)  Weight (kg): 94.8 (13 Sep 2022 09:01)  BMI (kg/m2): 33.7 (13 Sep 2022 09:01)  BSA (m2): 2.04 (13 Sep 2022 09:01)    CAPILLARY BLOOD GLUCOSE        I&O's Summary        RADIOLOGY & ADDITIONAL TESTS:  Imaging Personally Reviewed:  [x] YES  [ ] NO    MEDS:  diVALproex  milliGRAM(s) Oral daily  diVALproex  milliGRAM(s) Oral daily  diVALproex  milliGRAM(s) Oral at bedtime  diVALproex  milliGRAM(s) Oral at bedtime  hydrOXYzine hydrochloride 25 milliGRAM(s) Oral at bedtime PRN  lithium CR (ESKALITH-CR) 450 milliGRAM(s) Oral two times a day  risperiDONE   Tablet 2 milliGRAM(s) Oral two times a day

## 2022-10-10 PROCEDURE — 99231 SBSQ HOSP IP/OBS SF/LOW 25: CPT

## 2022-10-10 RX ADMIN — DIVALPROEX SODIUM 250 MILLIGRAM(S): 500 TABLET, DELAYED RELEASE ORAL at 21:31

## 2022-10-10 RX ADMIN — Medication 25 MILLIGRAM(S): at 22:38

## 2022-10-10 RX ADMIN — LITHIUM CARBONATE 450 MILLIGRAM(S): 300 TABLET, EXTENDED RELEASE ORAL at 17:59

## 2022-10-10 RX ADMIN — RISPERIDONE 2 MILLIGRAM(S): 4 TABLET ORAL at 05:42

## 2022-10-10 RX ADMIN — DIVALPROEX SODIUM 500 MILLIGRAM(S): 500 TABLET, DELAYED RELEASE ORAL at 21:31

## 2022-10-10 RX ADMIN — DIVALPROEX SODIUM 500 MILLIGRAM(S): 500 TABLET, DELAYED RELEASE ORAL at 11:29

## 2022-10-10 RX ADMIN — DIVALPROEX SODIUM 250 MILLIGRAM(S): 500 TABLET, DELAYED RELEASE ORAL at 11:28

## 2022-10-10 RX ADMIN — LITHIUM CARBONATE 450 MILLIGRAM(S): 300 TABLET, EXTENDED RELEASE ORAL at 05:42

## 2022-10-10 RX ADMIN — RISPERIDONE 2 MILLIGRAM(S): 4 TABLET ORAL at 17:59

## 2022-10-11 PROCEDURE — 99231 SBSQ HOSP IP/OBS SF/LOW 25: CPT

## 2022-10-11 RX ADMIN — LITHIUM CARBONATE 450 MILLIGRAM(S): 300 TABLET, EXTENDED RELEASE ORAL at 05:24

## 2022-10-11 RX ADMIN — Medication 25 MILLIGRAM(S): at 21:47

## 2022-10-11 RX ADMIN — DIVALPROEX SODIUM 250 MILLIGRAM(S): 500 TABLET, DELAYED RELEASE ORAL at 11:37

## 2022-10-11 RX ADMIN — RISPERIDONE 2 MILLIGRAM(S): 4 TABLET ORAL at 05:24

## 2022-10-11 RX ADMIN — LITHIUM CARBONATE 450 MILLIGRAM(S): 300 TABLET, EXTENDED RELEASE ORAL at 17:05

## 2022-10-11 RX ADMIN — DIVALPROEX SODIUM 500 MILLIGRAM(S): 500 TABLET, DELAYED RELEASE ORAL at 11:38

## 2022-10-11 RX ADMIN — DIVALPROEX SODIUM 250 MILLIGRAM(S): 500 TABLET, DELAYED RELEASE ORAL at 21:21

## 2022-10-11 RX ADMIN — RISPERIDONE 2 MILLIGRAM(S): 4 TABLET ORAL at 17:05

## 2022-10-11 RX ADMIN — DIVALPROEX SODIUM 500 MILLIGRAM(S): 500 TABLET, DELAYED RELEASE ORAL at 21:21

## 2022-10-12 PROCEDURE — 99231 SBSQ HOSP IP/OBS SF/LOW 25: CPT

## 2022-10-12 RX ADMIN — DIVALPROEX SODIUM 500 MILLIGRAM(S): 500 TABLET, DELAYED RELEASE ORAL at 22:04

## 2022-10-12 RX ADMIN — DIVALPROEX SODIUM 250 MILLIGRAM(S): 500 TABLET, DELAYED RELEASE ORAL at 22:01

## 2022-10-12 RX ADMIN — Medication 25 MILLIGRAM(S): at 23:47

## 2022-10-12 RX ADMIN — DIVALPROEX SODIUM 500 MILLIGRAM(S): 500 TABLET, DELAYED RELEASE ORAL at 11:04

## 2022-10-12 RX ADMIN — LITHIUM CARBONATE 450 MILLIGRAM(S): 300 TABLET, EXTENDED RELEASE ORAL at 17:23

## 2022-10-12 RX ADMIN — RISPERIDONE 2 MILLIGRAM(S): 4 TABLET ORAL at 17:23

## 2022-10-12 RX ADMIN — DIVALPROEX SODIUM 250 MILLIGRAM(S): 500 TABLET, DELAYED RELEASE ORAL at 11:04

## 2022-10-12 RX ADMIN — LITHIUM CARBONATE 450 MILLIGRAM(S): 300 TABLET, EXTENDED RELEASE ORAL at 05:20

## 2022-10-12 RX ADMIN — RISPERIDONE 2 MILLIGRAM(S): 4 TABLET ORAL at 05:20

## 2022-10-12 NOTE — PROGRESS NOTE ADULT - SUBJECTIVE AND OBJECTIVE BOX
PHILIP [id number]    HPI    Subjective/Interval History       ROS    PHYSICAL EXAM  Vital Signs Last 24 Hrs  T(C): 36.7 (12 Oct 2022 13:03), Max: 37.1 (11 Oct 2022 20:10)  T(F): 98.1 (12 Oct 2022 13:03), Max: 98.8 (11 Oct 2022 20:10)  HR: 108 (12 Oct 2022 13:03) (74 - 109)  BP: 123/80 (12 Oct 2022 13:03) (111/55 - 148/40)  BP(mean): --  RR: 16 (12 Oct 2022 13:03) (16 - 16)  SpO2: --      GA : AAOX3, NAD   HEENT: PERRLA, EOMI  NECK: no JVD, no thyromegaly   CVS: S1 S2 no murmur no rubs no gallop  RESP: CTAB no wheeze, no rhonchi no rales  ABD: Soft, NT, ND, tympanic, no rebound or guarding   : No Prince, No CVA tenderness   EXT; no pedal edema, no cyanosis  MSK: No ML spinal tenderness, normal ROM   NEURO: AAOX3, no new focal deficits     MEDICATIONS  (STANDING):  diVALproex  milliGRAM(s) Oral daily  diVALproex  milliGRAM(s) Oral daily  diVALproex  milliGRAM(s) Oral at bedtime  diVALproex  milliGRAM(s) Oral at bedtime  lithium CR (ESKALITH-CR) 450 milliGRAM(s) Oral two times a day  risperiDONE   Tablet 2 milliGRAM(s) Oral two times a day    MEDICATIONS  (PRN):  hydrOXYzine hydrochloride 25 milliGRAM(s) Oral at bedtime PRN insomnia      LABS/ IMAGING              CAPILLARY BLOOD GLUCOSE                ASSESSMENT AND PLAN   PRINCIPAL PROBLEM    -    ACTIVE PROBLEMS    -     MEDICATIONS  (STANDING):  diVALproex  milliGRAM(s) Oral daily  diVALproex  milliGRAM(s) Oral daily  diVALproex  milliGRAM(s) Oral at bedtime  diVALproex  milliGRAM(s) Oral at bedtime  lithium CR (ESKALITH-CR) 450 milliGRAM(s) Oral two times a day  risperiDONE   Tablet 2 milliGRAM(s) Oral two times a day    MEDICATIONS  (PRN):  hydrOXYzine hydrochloride 25 milliGRAM(s) Oral at bedtime PRN insomnia      CHRONIC PROBLEMS  PAST MEDICAL & SURGICAL HISTORY:  History of developmental delay      Bipolar disorder      No significant past surgical history          DVT/GI px    FULL CODE        PHILIP [id number]        Subjective/Interval History   calm in bed, no hallucinations     ROS  No fever, chest pain     PHYSICAL EXAM  Vital Signs Last 24 Hrs  T(C): 36.7 (12 Oct 2022 13:03), Max: 37.1 (11 Oct 2022 20:10)  T(F): 98.1 (12 Oct 2022 13:03), Max: 98.8 (11 Oct 2022 20:10)  HR: 108 (12 Oct 2022 13:03) (74 - 109)  BP: 123/80 (12 Oct 2022 13:03) (111/55 - 148/40)  BP(mean): --  RR: 16 (12 Oct 2022 13:03) (16 - 16)  SpO2: --      GA : AAOX3, NAD   HEENT: PERRLA, EOMI  NECK: no JVD, no thyromegaly   CVS: S1 S2 no murmur no rubs no gallop  RESP: CTAB no wheeze, no rhonchi no rales  ABD: Soft, NT, ND, tympanic, no rebound or guarding   : No Prince, No CVA tenderness   EXT; no pedal edema, no cyanosis  MSK: No ML spinal tenderness, normal ROM   NEURO: AAOX3, no new focal deficits     MEDICATIONS  (STANDING):  diVALproex  milliGRAM(s) Oral daily  diVALproex  milliGRAM(s) Oral daily  diVALproex  milliGRAM(s) Oral at bedtime  diVALproex  milliGRAM(s) Oral at bedtime  lithium CR (ESKALITH-CR) 450 milliGRAM(s) Oral two times a day  risperiDONE   Tablet 2 milliGRAM(s) Oral two times a day    MEDICATIONS  (PRN):  hydrOXYzine hydrochloride 25 milliGRAM(s) Oral at bedtime PRN insomnia      LABS/ IMAGING              CAPILLARY BLOOD GLUCOSE

## 2022-10-12 NOTE — PROGRESS NOTE ADULT - ASSESSMENT
21 year old male past medical history of bipolar and developmental delay came to emergency room for alleged aggressive and violent behavior as per step father.  - Evaluated by Psych   - Step Father left stated pt needs placement refusing to take child home.     Bipolar disorder and likely developmental disorder  - stable  - continue current meds (on depakote, risperidone, and lithium)   - pt has been stable without aggressive behaviour in the hospital     Disp  awaiting placement    DVT px      21 year old male past medical history of bipolar and developmental delay came to emergency room for alleged aggressive and violent behavior as per step father.  - Evaluated by Psych   - Step Father left stated pt needs placement refusing to take child home.     Bipolar disorder and likely developmental disorder  - stable  - continue current meds (on depakote, risperidone, and lithium)   - pt has been stable without aggressive behaviour in the hospital  - paperwork sent to [employer]   - father states he cant take care of pt     Obesity   - wt loss per PCP     DVT px   - pt ambulatory     DISPO: pending placement

## 2022-10-13 LAB — SARS-COV-2 RNA SPEC QL NAA+PROBE: SIGNIFICANT CHANGE UP

## 2022-10-13 PROCEDURE — 99231 SBSQ HOSP IP/OBS SF/LOW 25: CPT

## 2022-10-13 RX ADMIN — Medication 25 MILLIGRAM(S): at 21:26

## 2022-10-13 RX ADMIN — LITHIUM CARBONATE 450 MILLIGRAM(S): 300 TABLET, EXTENDED RELEASE ORAL at 17:39

## 2022-10-13 RX ADMIN — DIVALPROEX SODIUM 500 MILLIGRAM(S): 500 TABLET, DELAYED RELEASE ORAL at 11:36

## 2022-10-13 RX ADMIN — LITHIUM CARBONATE 450 MILLIGRAM(S): 300 TABLET, EXTENDED RELEASE ORAL at 05:55

## 2022-10-13 RX ADMIN — DIVALPROEX SODIUM 250 MILLIGRAM(S): 500 TABLET, DELAYED RELEASE ORAL at 21:25

## 2022-10-13 RX ADMIN — RISPERIDONE 2 MILLIGRAM(S): 4 TABLET ORAL at 17:39

## 2022-10-13 RX ADMIN — DIVALPROEX SODIUM 250 MILLIGRAM(S): 500 TABLET, DELAYED RELEASE ORAL at 11:36

## 2022-10-13 RX ADMIN — DIVALPROEX SODIUM 500 MILLIGRAM(S): 500 TABLET, DELAYED RELEASE ORAL at 21:25

## 2022-10-13 RX ADMIN — RISPERIDONE 2 MILLIGRAM(S): 4 TABLET ORAL at 05:54

## 2022-10-13 NOTE — PROGRESS NOTE ADULT - SUBJECTIVE AND OBJECTIVE BOX
PHILIP [id number]    Subjective/Interval History       ROS    PHYSICAL EXAM  Vital Signs Last 24 Hrs  T(C): 36.3 (13 Oct 2022 12:24), Max: 36.3 (13 Oct 2022 12:24)  T(F): 97.3 (13 Oct 2022 12:24), Max: 97.3 (13 Oct 2022 12:24)  HR: 71 (13 Oct 2022 12:24) (71 - 87)  BP: 120/68 (13 Oct 2022 12:24) (93/55 - 158/87)  BP(mean): --  RR: 18 (13 Oct 2022 12:24) (16 - 18)  SpO2: --      GA : AAOX3, NAD   HEENT: PERRLA, EOMI  NECK: no JVD, no thyromegaly   CVS: S1 S2 no murmur no rubs no gallop  RESP: CTAB no wheeze, no rhonchi no rales  ABD: Soft, NT, ND, tympanic, no rebound or guarding   : No Prince, No CVA tenderness   EXT; no pedal edema, no cyanosis  MSK: No ML spinal tenderness, normal ROM   NEURO: AAOX3, no new focal deficits     MEDICATIONS  (STANDING):  diVALproex  milliGRAM(s) Oral daily  diVALproex  milliGRAM(s) Oral daily  diVALproex  milliGRAM(s) Oral at bedtime  diVALproex  milliGRAM(s) Oral at bedtime  lithium CR (ESKALITH-CR) 450 milliGRAM(s) Oral two times a day  risperiDONE   Tablet 2 milliGRAM(s) Oral two times a day    MEDICATIONS  (PRN):  hydrOXYzine hydrochloride 25 milliGRAM(s) Oral at bedtime PRN insomnia      LABS/ IMAGING              CAPILLARY BLOOD GLUCOSE

## 2022-10-13 NOTE — PROGRESS NOTE ADULT - ASSESSMENT
21 year old male past medical history of bipolar and developmental delay came to emergency room for alleged aggressive and violent behavior as per step father.  - Evaluated by Psych   - Step Father left stated pt needs placement refusing to take child home.     Bipolar disorder and likely developmental disorder  - stable  - continue current meds (on depakote, risperidone, and lithium)   - pt has been stable without aggressive behaviour in the hospital  - paperwork sent to [employer]   - father states he cant take care of pt     Obesity   - wt loss per PCP     DVT px   - pt ambulatory     DISPO: pending placement

## 2022-10-14 PROCEDURE — 99231 SBSQ HOSP IP/OBS SF/LOW 25: CPT

## 2022-10-14 RX ADMIN — Medication 25 MILLIGRAM(S): at 21:20

## 2022-10-14 RX ADMIN — RISPERIDONE 2 MILLIGRAM(S): 4 TABLET ORAL at 06:11

## 2022-10-14 RX ADMIN — LITHIUM CARBONATE 450 MILLIGRAM(S): 300 TABLET, EXTENDED RELEASE ORAL at 17:31

## 2022-10-14 RX ADMIN — RISPERIDONE 2 MILLIGRAM(S): 4 TABLET ORAL at 17:31

## 2022-10-14 RX ADMIN — DIVALPROEX SODIUM 500 MILLIGRAM(S): 500 TABLET, DELAYED RELEASE ORAL at 21:15

## 2022-10-14 RX ADMIN — LITHIUM CARBONATE 450 MILLIGRAM(S): 300 TABLET, EXTENDED RELEASE ORAL at 06:11

## 2022-10-14 RX ADMIN — DIVALPROEX SODIUM 500 MILLIGRAM(S): 500 TABLET, DELAYED RELEASE ORAL at 11:46

## 2022-10-14 RX ADMIN — DIVALPROEX SODIUM 250 MILLIGRAM(S): 500 TABLET, DELAYED RELEASE ORAL at 11:46

## 2022-10-14 RX ADMIN — DIVALPROEX SODIUM 250 MILLIGRAM(S): 500 TABLET, DELAYED RELEASE ORAL at 21:15

## 2022-10-14 NOTE — PROGRESS NOTE ADULT - ASSESSMENT
21 year old male past medical history of bipolar and developmental delay came to emergency room for alleged aggressive and violent behavior as per step father.  - Evaluated by Psych   - Step Father left stated pt needs placement refusing to take child home.     Bipolar disorder and likely developmental disorder  - stable  - continue current meds (on depakote, risperidone, and lithium)   - pt has been stable without aggressive behaviour in the hospital  - paperwork sent to [employer]   - father states he cant take care of pt     Obesity   - wt loss per PCP     DVT px   - pt ambulatory     DISPO: pending placement

## 2022-10-14 NOTE — PROGRESS NOTE ADULT - SUBJECTIVE AND OBJECTIVE BOX
VITALS:   T(C): 36.4 (10-14-22 @ 14:11), Max: 36.7 (10-13-22 @ 20:50)  HR: 76 (10-14-22 @ 14:11) (76 - 110)  BP: 116/72 (10-14-22 @ 14:11) (108/62 - 135/71)  RR: 18 (10-14-22 @ 14:11) (18 - 18)  SpO2: --    GENERAL: AAOx3   HEAD:  Atraumatic, Normocephalic  EYES: EOMI, PERRLA, no pallor   ENT:  normal oropharynx , no thyromegaly   NECK: Supple, No JVD  CHEST/LUNG: Clear to auscultation bilaterally; No rales, rhonchi, wheezing, or rubs.  Symmetric expansion   HEART:  S1 S2 RRR ; No murmurs or rubs   ABDOMEN: Soft, nontender, nondistended, BS wnl   EXTREMITIES:  No  cyanosis, or edema  NERVOUS SYSTEM:  A&Ox3, no focal deficits    PHILIP [id number]    Subjective/Interval History   No new complaints    ROS  No chest pain, no SOB, no fever     PHYSICAL EXAM  Vital Signs Last 24 Hrs  T(C): 36.4 (14 Oct 2022 14:11), Max: 36.7 (13 Oct 2022 20:50)  T(F): 97.6 (14 Oct 2022 14:11), Max: 98.1 (13 Oct 2022 20:50)  HR: 76 (14 Oct 2022 14:11) (76 - 110)  BP: 116/72 (14 Oct 2022 14:11) (108/62 - 135/71)  BP(mean): --  RR: 18 (14 Oct 2022 14:11) (18 - 18)  SpO2: --      GA : AAOX3, NAD, obese   HEENT: PERRLA, EOMI  NECK: no JVD, no thyromegaly   CVS: S1 S2 no murmur no rubs no gallop  RESP: CTAB no wheeze, no rhonchi no rales  ABD: Soft, NT, ND, tympanic, no rebound or guarding   : No Prince, No CVA tenderness   EXT; no pedal edema, no cyanosis  MSK: No ML spinal tenderness, normal ROM   NEURO: AAOX3, no new focal deficits     MEDICATIONS  (STANDING):  diVALproex  milliGRAM(s) Oral daily  diVALproex  milliGRAM(s) Oral daily  diVALproex  milliGRAM(s) Oral at bedtime  diVALproex  milliGRAM(s) Oral at bedtime  lithium CR (ESKALITH-CR) 450 milliGRAM(s) Oral two times a day  risperiDONE   Tablet 2 milliGRAM(s) Oral two times a day    MEDICATIONS  (PRN):  hydrOXYzine hydrochloride 25 milliGRAM(s) Oral at bedtime PRN insomnia      LABS/ IMAGING              CAPILLARY BLOOD GLUCOSE

## 2022-10-15 PROCEDURE — 99231 SBSQ HOSP IP/OBS SF/LOW 25: CPT

## 2022-10-15 RX ADMIN — DIVALPROEX SODIUM 500 MILLIGRAM(S): 500 TABLET, DELAYED RELEASE ORAL at 11:33

## 2022-10-15 RX ADMIN — RISPERIDONE 2 MILLIGRAM(S): 4 TABLET ORAL at 18:10

## 2022-10-15 RX ADMIN — DIVALPROEX SODIUM 250 MILLIGRAM(S): 500 TABLET, DELAYED RELEASE ORAL at 11:33

## 2022-10-15 RX ADMIN — RISPERIDONE 2 MILLIGRAM(S): 4 TABLET ORAL at 05:19

## 2022-10-15 RX ADMIN — DIVALPROEX SODIUM 250 MILLIGRAM(S): 500 TABLET, DELAYED RELEASE ORAL at 21:44

## 2022-10-15 RX ADMIN — LITHIUM CARBONATE 450 MILLIGRAM(S): 300 TABLET, EXTENDED RELEASE ORAL at 05:19

## 2022-10-15 RX ADMIN — LITHIUM CARBONATE 450 MILLIGRAM(S): 300 TABLET, EXTENDED RELEASE ORAL at 18:10

## 2022-10-15 RX ADMIN — DIVALPROEX SODIUM 500 MILLIGRAM(S): 500 TABLET, DELAYED RELEASE ORAL at 21:42

## 2022-10-15 NOTE — PROGRESS NOTE ADULT - SUBJECTIVE AND OBJECTIVE BOX
PHILIP [id number]    Subjective/Interval History   Patient calmly lying in bed, excited to go to [facility]  no chest pain or fever     PHYSICAL EXAM  Vital Signs Last 24 Hrs  T(C): 36.4 (15 Oct 2022 13:00), Max: 36.8 (15 Oct 2022 05:17)  T(F): 97.5 (15 Oct 2022 13:00), Max: 98.3 (15 Oct 2022 05:17)  HR: 102 (15 Oct 2022 13:00) (76 - 102)  BP: 112/69 (15 Oct 2022 13:00) (102/58 - 127/74)  BP(mean): --  RR: 18 (15 Oct 2022 13:00) (18 - 18)  SpO2: --      GA : AAOX3, NAD   HEENT: PERRLA, EOMI  NECK: no JVD, no thyromegaly   CVS: S1 S2 no murmur no rubs no gallop  RESP: CTAB no wheeze, no rhonchi no rales  ABD: Soft, NT, ND, tympanic, no rebound or guarding   : No Prince, No CVA tenderness   EXT; no pedal edema, no cyanosis  MSK: No ML spinal tenderness, normal ROM   NEURO: AAOX3, no new focal deficits     MEDICATIONS  (STANDING):  diVALproex  milliGRAM(s) Oral daily  diVALproex  milliGRAM(s) Oral daily  diVALproex  milliGRAM(s) Oral at bedtime  diVALproex  milliGRAM(s) Oral at bedtime  lithium CR (ESKALITH-CR) 450 milliGRAM(s) Oral two times a day  risperiDONE   Tablet 2 milliGRAM(s) Oral two times a day    MEDICATIONS  (PRN):  hydrOXYzine hydrochloride 25 milliGRAM(s) Oral at bedtime PRN insomnia      LABS/ IMAGING              CAPILLARY BLOOD GLUCOSE

## 2022-10-15 NOTE — PROGRESS NOTE ADULT - ASSESSMENT
21 year old male past medical history of bipolar and developmental delay came to emergency room for alleged aggressive and violent behavior as per step father.  - Evaluated by Psych   - Step Father left stated pt needs placement refusing to take child home.     Bipolar disorder and likely developmental disorder  - stable  - continue current meds (on depakote, risperidone, and lithium)   - pt has been stable without aggressive behaviour in the hospital  - paperwork sent to [employer]   - father states he cant take care of pt     Obesity   - wt loss per PCP     DVT px   - pt ambulatory     DISPO: pending placement

## 2022-10-16 PROCEDURE — 99231 SBSQ HOSP IP/OBS SF/LOW 25: CPT

## 2022-10-16 RX ADMIN — RISPERIDONE 2 MILLIGRAM(S): 4 TABLET ORAL at 06:38

## 2022-10-16 RX ADMIN — Medication 25 MILLIGRAM(S): at 23:55

## 2022-10-16 RX ADMIN — RISPERIDONE 2 MILLIGRAM(S): 4 TABLET ORAL at 17:32

## 2022-10-16 RX ADMIN — LITHIUM CARBONATE 450 MILLIGRAM(S): 300 TABLET, EXTENDED RELEASE ORAL at 17:32

## 2022-10-16 RX ADMIN — DIVALPROEX SODIUM 250 MILLIGRAM(S): 500 TABLET, DELAYED RELEASE ORAL at 21:55

## 2022-10-16 RX ADMIN — DIVALPROEX SODIUM 500 MILLIGRAM(S): 500 TABLET, DELAYED RELEASE ORAL at 11:59

## 2022-10-16 RX ADMIN — DIVALPROEX SODIUM 250 MILLIGRAM(S): 500 TABLET, DELAYED RELEASE ORAL at 11:58

## 2022-10-16 RX ADMIN — DIVALPROEX SODIUM 500 MILLIGRAM(S): 500 TABLET, DELAYED RELEASE ORAL at 21:48

## 2022-10-16 RX ADMIN — LITHIUM CARBONATE 450 MILLIGRAM(S): 300 TABLET, EXTENDED RELEASE ORAL at 06:38

## 2022-10-16 NOTE — PROGRESS NOTE ADULT - SUBJECTIVE AND OBJECTIVE BOX
PHILIP [id number]    Subjective/Interval History   Patient calmly lying in bed, excited to go to [facility]  no chest pain or fever     PHYSICAL EXAM  Vital Signs Last 24 Hrs  T(C): 36.4 (15 Oct 2022 13:00), Max: 36.8 (15 Oct 2022 05:17)  T(F): 97.5 (15 Oct 2022 13:00), Max: 98.3 (15 Oct 2022 05:17)  HR: 102 (15 Oct 2022 13:00) (76 - 102)  BP: 112/69 (15 Oct 2022 13:00) (102/58 - 127/74)  BP(mean): --  RR: 18 (15 Oct 2022 13:00) (18 - 18)  SpO2: --      GA : AAOX3, NAD   HEENT: PERRLA, EOMI  NECK: no JVD, no thyromegaly   CVS: S1 S2 no murmur no rubs no gallop  RESP: CTAB no wheeze, no rhonchi no rales  ABD: Soft, NT, ND, tympanic, no rebound or guarding   : No Prince, No CVA tenderness   EXT; no pedal edema, no cyanosis  MSK: No ML spinal tenderness, normal ROM   NEURO: AAOX3, no new focal deficits     MEDICATIONS  (STANDING):  diVALproex  milliGRAM(s) Oral daily  diVALproex  milliGRAM(s) Oral daily  diVALproex  milliGRAM(s) Oral at bedtime  diVALproex  milliGRAM(s) Oral at bedtime  lithium CR (ESKALITH-CR) 450 milliGRAM(s) Oral two times a day  risperiDONE   Tablet 2 milliGRAM(s) Oral two times a day    MEDICATIONS  (PRN):  hydrOXYzine hydrochloride 25 milliGRAM(s) Oral at bedtime PRN insomnia      LABS/ IMAGING              CAPILLARY BLOOD GLUCOSE

## 2022-10-16 NOTE — PROGRESS NOTE ADULT - ASSESSMENT
21 year old male past medical history of bipolar and developmental delay came to emergency room for alleged aggressive and violent behavior as per step father.  - Evaluated by Psych   - Step Father left stated pt needs placement refusing to take child home.     Bipolar disorder and likely developmental disorder  - stable  - continue current meds (on depakote, risperidone, and lithium)   - pt has been stable without aggressive behaviour in the hospital  - paperwork sent to [employer]   - father states he cant take care of pt     Obesity   - wt loss per PCP     DVT px   - pt ambulatory     DISPO: pending placement

## 2022-10-17 PROCEDURE — 99231 SBSQ HOSP IP/OBS SF/LOW 25: CPT

## 2022-10-17 RX ADMIN — LITHIUM CARBONATE 450 MILLIGRAM(S): 300 TABLET, EXTENDED RELEASE ORAL at 06:22

## 2022-10-17 RX ADMIN — RISPERIDONE 2 MILLIGRAM(S): 4 TABLET ORAL at 17:10

## 2022-10-17 RX ADMIN — DIVALPROEX SODIUM 250 MILLIGRAM(S): 500 TABLET, DELAYED RELEASE ORAL at 11:20

## 2022-10-17 RX ADMIN — DIVALPROEX SODIUM 500 MILLIGRAM(S): 500 TABLET, DELAYED RELEASE ORAL at 11:20

## 2022-10-17 RX ADMIN — RISPERIDONE 2 MILLIGRAM(S): 4 TABLET ORAL at 06:22

## 2022-10-17 RX ADMIN — DIVALPROEX SODIUM 500 MILLIGRAM(S): 500 TABLET, DELAYED RELEASE ORAL at 21:49

## 2022-10-17 RX ADMIN — LITHIUM CARBONATE 450 MILLIGRAM(S): 300 TABLET, EXTENDED RELEASE ORAL at 17:10

## 2022-10-17 RX ADMIN — DIVALPROEX SODIUM 250 MILLIGRAM(S): 500 TABLET, DELAYED RELEASE ORAL at 21:49

## 2022-10-17 NOTE — PROGRESS NOTE ADULT - SUBJECTIVE AND OBJECTIVE BOX
PHILIP [id number]    Subjective/Interval History   No complaints, not agitated   " I want to get discharged soon"     ROS  NO CHest pain, no flight of ideas.     PHYSICAL EXAM  Vital Signs Last 24 Hrs  T(C): 36.6 (17 Oct 2022 14:26), Max: 36.6 (17 Oct 2022 14:26)  T(F): 97.8 (17 Oct 2022 14:26), Max: 97.8 (17 Oct 2022 14:26)  HR: 80 (17 Oct 2022 14:26) (80 - 101)  BP: 127/87 (17 Oct 2022 14:26) (101/61 - 127/87)  BP(mean): --  RR: 20 (17 Oct 2022 14:26) (18 - 20)  SpO2: --      GA : AAOX3, NAD   HEENT: PERRLA, EOMI  NECK: no JVD, no thyromegaly   CVS: S1 S2 no murmur no rubs no gallop  RESP: CTAB no wheeze, no rhonchi no rales  ABD: Soft, NT, ND, tympanic, no rebound or guarding   : No Prince, No CVA tenderness   EXT; no pedal edema, no cyanosis  MSK: No ML spinal tenderness, normal ROM   NEURO: AAOX3, no new focal deficits     MEDICATIONS  (STANDING):  diVALproex  milliGRAM(s) Oral daily  diVALproex  milliGRAM(s) Oral daily  diVALproex  milliGRAM(s) Oral at bedtime  diVALproex  milliGRAM(s) Oral at bedtime  lithium CR (ESKALITH-CR) 450 milliGRAM(s) Oral two times a day  risperiDONE   Tablet 2 milliGRAM(s) Oral two times a day    MEDICATIONS  (PRN):  hydrOXYzine hydrochloride 25 milliGRAM(s) Oral at bedtime PRN insomnia      LABS/ IMAGING              CAPILLARY BLOOD GLUCOSE

## 2022-10-17 NOTE — PROGRESS NOTE ADULT - ASSESSMENT
21 year old male past medical history of bipolar and developmental delay came to emergency room for alleged aggressive and violent behavior as per step father.  - Evaluated by Psych   - Step Father left stated pt needs placement refusing to take child home.     Bipolar disorder and likely developmental disorder  - stable  - continue current meds (on depakote, risperidone, and lithium)   - pt has been stable without aggressive behaviour in the hospital  - Valente is requestesting JOSS to be sent to [facility] in the [place].  - JOSS completed and sent  - father states he cant take care of pt     Obesity   - wt loss per PCP     DVT px   - pt ambulatory     DISPO: pending placement

## 2022-10-18 PROCEDURE — 99231 SBSQ HOSP IP/OBS SF/LOW 25: CPT

## 2022-10-18 RX ADMIN — RISPERIDONE 2 MILLIGRAM(S): 4 TABLET ORAL at 17:24

## 2022-10-18 RX ADMIN — DIVALPROEX SODIUM 250 MILLIGRAM(S): 500 TABLET, DELAYED RELEASE ORAL at 21:44

## 2022-10-18 RX ADMIN — DIVALPROEX SODIUM 500 MILLIGRAM(S): 500 TABLET, DELAYED RELEASE ORAL at 12:09

## 2022-10-18 RX ADMIN — LITHIUM CARBONATE 450 MILLIGRAM(S): 300 TABLET, EXTENDED RELEASE ORAL at 17:24

## 2022-10-18 RX ADMIN — Medication 25 MILLIGRAM(S): at 23:29

## 2022-10-18 RX ADMIN — Medication 25 MILLIGRAM(S): at 00:12

## 2022-10-18 RX ADMIN — DIVALPROEX SODIUM 250 MILLIGRAM(S): 500 TABLET, DELAYED RELEASE ORAL at 12:09

## 2022-10-18 RX ADMIN — LITHIUM CARBONATE 450 MILLIGRAM(S): 300 TABLET, EXTENDED RELEASE ORAL at 05:29

## 2022-10-18 RX ADMIN — DIVALPROEX SODIUM 500 MILLIGRAM(S): 500 TABLET, DELAYED RELEASE ORAL at 21:43

## 2022-10-18 RX ADMIN — RISPERIDONE 2 MILLIGRAM(S): 4 TABLET ORAL at 05:29

## 2022-10-18 NOTE — PROGRESS NOTE ADULT - SUBJECTIVE AND OBJECTIVE BOX
PHILIP [id number]    Subjective/Interval History   calm "doctor can I be discharged next week"    ROS  No chest pain, fever, or chills     PHYSICAL EXAM  Vital Signs Last 24 Hrs  T(C): 36.9 (18 Oct 2022 13:49), Max: 36.9 (18 Oct 2022 13:49)  T(F): 98.4 (18 Oct 2022 13:49), Max: 98.4 (18 Oct 2022 13:49)  HR: 81 (18 Oct 2022 13:49) (80 - 87)  BP: 117/66 (18 Oct 2022 13:49) (117/66 - 130/60)  BP(mean): --  RR: 18 (18 Oct 2022 13:49) (18 - 20)  SpO2: --      GA : AAOX3, NAD   HEENT: PERRLA, EOMI  NECK: no JVD, no thyromegaly   CVS: S1 S2 no murmur no rubs no gallop  RESP: CTAB no wheeze, no rhonchi no rales  ABD: Soft, NT, ND, tympanic, no rebound or guarding   : No Prince, No CVA tenderness   EXT; no pedal edema, no cyanosis  MSK: No ML spinal tenderness, normal ROM   NEURO: AAOX3, no new focal deficits     MEDICATIONS  (STANDING):  diVALproex  milliGRAM(s) Oral daily  diVALproex  milliGRAM(s) Oral daily  diVALproex  milliGRAM(s) Oral at bedtime  diVALproex  milliGRAM(s) Oral at bedtime  lithium CR (ESKALITH-CR) 450 milliGRAM(s) Oral two times a day  risperiDONE   Tablet 2 milliGRAM(s) Oral two times a day    MEDICATIONS  (PRN):  hydrOXYzine hydrochloride 25 milliGRAM(s) Oral at bedtime PRN insomnia      LABS/ IMAGING              CAPILLARY BLOOD GLUCOSE

## 2022-10-18 NOTE — PROGRESS NOTE ADULT - ASSESSMENT
21 year old male past medical history of bipolar and developmental delay came to emergency room for alleged aggressive and violent behavior as per step father.  - Evaluated by Psych   - Step Father left stated pt needs placement refusing to take child home.     Bipolar disorder and likely developmental disorder  - stable  - continue current meds (on depakote, risperidone, and lithium)   - pt has been stable without aggressive behaviour in the hospital  - Valente is requestesting JOSS to be sent to [facility] in the [place].  - JOSS completed and sent  - father states he cant take care of pt     Obesity   - wt loss per PCP     DVT px   - pt ambulatory     DISPO: pending placement

## 2022-10-19 LAB
SARS-COV-2 RNA SPEC QL NAA+PROBE: SIGNIFICANT CHANGE UP
SARS-COV-2 RNA SPEC QL NAA+PROBE: SIGNIFICANT CHANGE UP

## 2022-10-19 PROCEDURE — 99231 SBSQ HOSP IP/OBS SF/LOW 25: CPT

## 2022-10-19 RX ADMIN — LITHIUM CARBONATE 450 MILLIGRAM(S): 300 TABLET, EXTENDED RELEASE ORAL at 17:34

## 2022-10-19 RX ADMIN — LITHIUM CARBONATE 450 MILLIGRAM(S): 300 TABLET, EXTENDED RELEASE ORAL at 05:28

## 2022-10-19 RX ADMIN — DIVALPROEX SODIUM 250 MILLIGRAM(S): 500 TABLET, DELAYED RELEASE ORAL at 12:32

## 2022-10-19 RX ADMIN — RISPERIDONE 2 MILLIGRAM(S): 4 TABLET ORAL at 05:27

## 2022-10-19 RX ADMIN — RISPERIDONE 2 MILLIGRAM(S): 4 TABLET ORAL at 17:35

## 2022-10-19 RX ADMIN — Medication 25 MILLIGRAM(S): at 23:40

## 2022-10-19 RX ADMIN — DIVALPROEX SODIUM 250 MILLIGRAM(S): 500 TABLET, DELAYED RELEASE ORAL at 21:35

## 2022-10-19 RX ADMIN — DIVALPROEX SODIUM 500 MILLIGRAM(S): 500 TABLET, DELAYED RELEASE ORAL at 12:32

## 2022-10-19 RX ADMIN — DIVALPROEX SODIUM 500 MILLIGRAM(S): 500 TABLET, DELAYED RELEASE ORAL at 21:35

## 2022-10-19 NOTE — PROGRESS NOTE ADULT - SUBJECTIVE AND OBJECTIVE BOX
PHILIP [id number]    Subjective/Interval History   Patient seen and examined at bedside; he has no complaints. He asks "are you the new doctor?" Denies F/C, CP, palpitations, SOB.    ROS  No chest pain, fever, or chills     PHYSICAL EXAM  Vital Signs Last 24 Hrs  T(C): 36.1 (19 Oct 2022 04:18), Max: 36.9 (18 Oct 2022 13:49)  T(F): 97 (19 Oct 2022 04:18), Max: 98.4 (18 Oct 2022 13:49)  HR: 66 (19 Oct 2022 04:18) (66 - 81)  BP: 104/58 (19 Oct 2022 04:18) (104/58 - 120/70)  BP(mean): --  RR: 18 (19 Oct 2022 04:18) (18 - 18)  SpO2: --    Gen: NAD, comfortable  HEENT: PERRLA, EOMI  NECK: no JVD, no thyromegaly   CVS: S1 S2 no murmur no rubs no gallop  RESP: CTAB no wheeze, no rhonchi no rales  ABD: Soft, NT, ND, tympanic, no rebound or guarding   : No Prince, No CVA tenderness   EXT; no pedal edema, no cyanosis  MSK: No ML spinal tenderness, normal ROM   NEURO: AAOX3, no new focal deficits     MEDICATIONS  (STANDING):  diVALproex  milliGRAM(s) Oral daily  diVALproex  milliGRAM(s) Oral daily  diVALproex  milliGRAM(s) Oral at bedtime  diVALproex  milliGRAM(s) Oral at bedtime  lithium CR (ESKALITH-CR) 450 milliGRAM(s) Oral two times a day  risperiDONE   Tablet 2 milliGRAM(s) Oral two times a day    MEDICATIONS  (PRN):  hydrOXYzine hydrochloride 25 milliGRAM(s) Oral at bedtime PRN insomnia        LABS/ IMAGING              CAPILLARY BLOOD GLUCOSE

## 2022-10-19 NOTE — PROGRESS NOTE ADULT - ASSESSMENT
21 year old male past medical history of bipolar and developmental delay came to emergency room for alleged aggressive and violent behavior as per step father. Evaluated by Psych; Step Father left stated pt needs placement refusing to take child home.     # Bipolar disorder and likely developmental disorder  - stable  - continue current meds (on depakote, risperidone, and lithium)   - pt has been stable without aggressive behaviour in the hospital  - Valente is requesting JOSS to be sent to [facility] in the [place].  - JOSS completed and sent  - father states he cant take care of pt    # Obesity   - wt loss per PCP     DVT px - pt ambulatory     DISPO: pending placement

## 2022-10-20 PROCEDURE — 99231 SBSQ HOSP IP/OBS SF/LOW 25: CPT

## 2022-10-20 RX ADMIN — LITHIUM CARBONATE 450 MILLIGRAM(S): 300 TABLET, EXTENDED RELEASE ORAL at 05:01

## 2022-10-20 RX ADMIN — DIVALPROEX SODIUM 500 MILLIGRAM(S): 500 TABLET, DELAYED RELEASE ORAL at 11:42

## 2022-10-20 RX ADMIN — LITHIUM CARBONATE 450 MILLIGRAM(S): 300 TABLET, EXTENDED RELEASE ORAL at 18:05

## 2022-10-20 RX ADMIN — RISPERIDONE 2 MILLIGRAM(S): 4 TABLET ORAL at 05:01

## 2022-10-20 RX ADMIN — DIVALPROEX SODIUM 250 MILLIGRAM(S): 500 TABLET, DELAYED RELEASE ORAL at 11:42

## 2022-10-20 RX ADMIN — RISPERIDONE 2 MILLIGRAM(S): 4 TABLET ORAL at 18:05

## 2022-10-20 RX ADMIN — DIVALPROEX SODIUM 250 MILLIGRAM(S): 500 TABLET, DELAYED RELEASE ORAL at 21:13

## 2022-10-20 RX ADMIN — DIVALPROEX SODIUM 500 MILLIGRAM(S): 500 TABLET, DELAYED RELEASE ORAL at 21:13

## 2022-10-20 NOTE — PROGRESS NOTE ADULT - ASSESSMENT
21 year old male past medical history of bipolar and developmental delay came to emergency room for alleged aggressive and violent behavior as per step father. Evaluated by Psych; Step Father left stated pt needs placement refusing to take child home.     # Bipolar disorder and likely developmental disorder- stable  - continue current meds (on depakote, risperidone, and lithium)   - pt has been stable without aggressive behaviour in the hospital  - Valente is requesting JOSS to be sent to [facility] in the [place].  - JOSS completed and sent  - father states he cant take care of pt    # Obesity   - wt loss per PCP     DVT px - pt ambulatory     DISPO: pending placement

## 2022-10-20 NOTE — PROGRESS NOTE ADULT - SUBJECTIVE AND OBJECTIVE BOX
PHILIP [id number]    Subjective/Interval History   Patient seen and examined at bedside; he has no complaints. Denies F/C, CP, palpitations, SOB. He was listening to music.    ROS  No chest pain, fever, or chills; all systems were reviewed and are negative    PHYSICAL EXAM  Vital Signs Last 24 Hrs  T(C): 36.8 (20 Oct 2022 05:00), Max: 36.8 (20 Oct 2022 05:00)  T(F): 98.3 (20 Oct 2022 05:00), Max: 98.3 (20 Oct 2022 05:00)  HR: 59 (20 Oct 2022 05:00) (59 - 88)  BP: 111/59 (20 Oct 2022 05:00) (106/55 - 112/64)  BP(mean): --  RR: 18 (20 Oct 2022 05:00) (18 - 18)  SpO2: --    Gen: NAD, comfortable  HEENT: PERRLA, EOMI  NECK: no JVD, no thyromegaly   CVS: S1 S2 no murmur no rubs no gallop  RESP: CTAB no wheeze, no rhonchi no rales  ABD: Soft, NT, ND, tympanic, no rebound or guarding   : No Prince, No CVA tenderness   EXT; no pedal edema, no cyanosis  MSK: No ML spinal tenderness, normal ROM   NEURO: AAOX3, no new focal deficits     MEDICATIONS  (STANDING):  diVALproex  milliGRAM(s) Oral daily  diVALproex  milliGRAM(s) Oral daily  diVALproex  milliGRAM(s) Oral at bedtime  diVALproex  milliGRAM(s) Oral at bedtime  lithium CR (ESKALITH-CR) 450 milliGRAM(s) Oral two times a day  risperiDONE   Tablet 2 milliGRAM(s) Oral two times a day    MEDICATIONS  (PRN):  hydrOXYzine hydrochloride 25 milliGRAM(s) Oral at bedtime PRN insomnia          LABS/ IMAGING              CAPILLARY BLOOD GLUCOSE

## 2022-10-21 PROCEDURE — 99231 SBSQ HOSP IP/OBS SF/LOW 25: CPT

## 2022-10-21 RX ADMIN — RISPERIDONE 2 MILLIGRAM(S): 4 TABLET ORAL at 05:16

## 2022-10-21 RX ADMIN — DIVALPROEX SODIUM 250 MILLIGRAM(S): 500 TABLET, DELAYED RELEASE ORAL at 11:32

## 2022-10-21 RX ADMIN — LITHIUM CARBONATE 450 MILLIGRAM(S): 300 TABLET, EXTENDED RELEASE ORAL at 17:45

## 2022-10-21 RX ADMIN — Medication 25 MILLIGRAM(S): at 00:34

## 2022-10-21 RX ADMIN — DIVALPROEX SODIUM 500 MILLIGRAM(S): 500 TABLET, DELAYED RELEASE ORAL at 11:33

## 2022-10-21 RX ADMIN — RISPERIDONE 2 MILLIGRAM(S): 4 TABLET ORAL at 17:45

## 2022-10-21 RX ADMIN — DIVALPROEX SODIUM 500 MILLIGRAM(S): 500 TABLET, DELAYED RELEASE ORAL at 22:03

## 2022-10-21 RX ADMIN — DIVALPROEX SODIUM 250 MILLIGRAM(S): 500 TABLET, DELAYED RELEASE ORAL at 22:03

## 2022-10-21 RX ADMIN — LITHIUM CARBONATE 450 MILLIGRAM(S): 300 TABLET, EXTENDED RELEASE ORAL at 05:16

## 2022-10-21 NOTE — PROGRESS NOTE ADULT - ASSESSMENT
21 year old male past medical history of bipolar and developmental delay came to emergency room for alleged aggressive and violent behavior as per step father. Evaluated by Psych; Step Father left stated pt needs placement refusing to take child home.     # Bipolar disorder and likely developmental disorder- stable  - continue current meds (on depakote, risperidone, and lithium)   - pt has been stable without aggressive behaviour in the hospital  - Valente is requesting JOSS to be sent to [facility] in the [place].  - JOSS completed and sent  - father states he cant take care of pt    # Obesity   - wt loss per PCP     DVT px - pt ambulatory     DISPO: pending placement

## 2022-10-21 NOTE — PROGRESS NOTE ADULT - SUBJECTIVE AND OBJECTIVE BOX
PHILIP [id number]    Subjective/Interval History   Patient seen and examined at bedside; he has no complaints. Denies F/C, CP, palpitations, SOB. He was resting comfortable.     ROS  No chest pain, fever, or chills; all systems were reviewed and are negative    PHYSICAL EXAM  Vital Signs Last 24 Hrs  T(C): 36.6 (21 Oct 2022 04:42), Max: 36.7 (20 Oct 2022 21:44)  T(F): 97.8 (21 Oct 2022 04:42), Max: 98.1 (20 Oct 2022 21:44)  HR: 71 (21 Oct 2022 04:42) (71 - 93)  BP: 97/56 (21 Oct 2022 04:42) (97/56 - 127/70)  BP(mean): --  RR: 18 (21 Oct 2022 04:42) (18 - 18)  SpO2: --      Gen: NAD, comfortable  HEENT: PERRLA, EOMI  NECK: no JVD, no thyromegaly   CVS: S1 S2 no murmur no rubs no gallop  RESP: CTAB no wheeze, no rhonchi no rales  ABD: Soft, NT, ND, tympanic, no rebound or guarding   : No Prince, No CVA tenderness   EXT; no pedal edema, no cyanosis  MSK: No ML spinal tenderness, normal ROM   NEURO: AAOX3, no new focal deficits     MEDICATIONS  (STANDING):  diVALproex  milliGRAM(s) Oral daily  diVALproex  milliGRAM(s) Oral daily  diVALproex  milliGRAM(s) Oral at bedtime  diVALproex  milliGRAM(s) Oral at bedtime  lithium CR (ESKALITH-CR) 450 milliGRAM(s) Oral two times a day  risperiDONE   Tablet 2 milliGRAM(s) Oral two times a day    MEDICATIONS  (PRN):  hydrOXYzine hydrochloride 25 milliGRAM(s) Oral at bedtime PRN insomnia        LABS/ IMAGING              CAPILLARY BLOOD GLUCOSE

## 2022-10-22 PROCEDURE — 99231 SBSQ HOSP IP/OBS SF/LOW 25: CPT

## 2022-10-22 RX ADMIN — LITHIUM CARBONATE 450 MILLIGRAM(S): 300 TABLET, EXTENDED RELEASE ORAL at 17:25

## 2022-10-22 RX ADMIN — DIVALPROEX SODIUM 250 MILLIGRAM(S): 500 TABLET, DELAYED RELEASE ORAL at 21:34

## 2022-10-22 RX ADMIN — DIVALPROEX SODIUM 500 MILLIGRAM(S): 500 TABLET, DELAYED RELEASE ORAL at 21:34

## 2022-10-22 RX ADMIN — DIVALPROEX SODIUM 250 MILLIGRAM(S): 500 TABLET, DELAYED RELEASE ORAL at 11:14

## 2022-10-22 RX ADMIN — DIVALPROEX SODIUM 500 MILLIGRAM(S): 500 TABLET, DELAYED RELEASE ORAL at 11:15

## 2022-10-22 RX ADMIN — LITHIUM CARBONATE 450 MILLIGRAM(S): 300 TABLET, EXTENDED RELEASE ORAL at 06:10

## 2022-10-22 RX ADMIN — Medication 25 MILLIGRAM(S): at 00:42

## 2022-10-22 RX ADMIN — RISPERIDONE 2 MILLIGRAM(S): 4 TABLET ORAL at 17:25

## 2022-10-22 RX ADMIN — RISPERIDONE 2 MILLIGRAM(S): 4 TABLET ORAL at 06:10

## 2022-10-22 NOTE — PROGRESS NOTE ADULT - ASSESSMENT
21 year old male past medical history of bipolar and developmental delay came to emergency room for alleged aggressive and violent behavior as per step father. Evaluated by Psych; Step Father left stated pt needs placement refusing to take child home.     # Bipolar disorder and likely developmental disorder- stable  - continue current meds (on depakote, risperidone, and lithium)   - pt has been stable without aggressive behaviour in the hospital  - Valente is requesting JOSS to be sent to [facility] in the [place].  - JOSS completed and sent  - father states he cant take care of pt    # Obesity   - wt loss per PCP     DVT px - pt ambulatory     DISPO: pending placement

## 2022-10-22 NOTE — PROGRESS NOTE ADULT - SUBJECTIVE AND OBJECTIVE BOX
PHILIP [id number]    Subjective/Interval History   Patient seen and examined at bedside; he has no complaints. Denies F/C, CP, palpitations, SOB. He was resting comfortable.     ROS  No chest pain, fever, or chills; all systems were reviewed and are negative    Vital Signs Last 24 Hrs  T(C): 35.8 (22 Oct 2022 05:06), Max: 36.5 (21 Oct 2022 13:08)  T(F): 96.4 (22 Oct 2022 05:06), Max: 97.7 (21 Oct 2022 13:08)  HR: 64 (22 Oct 2022 05:06) (64 - 107)  BP: 104/58 (22 Oct 2022 05:06) (104/58 - 140/84)  BP(mean): --  RR: 18 (22 Oct 2022 05:06) (18 - 18)  SpO2: --    Gen: NAD, comfortable  HEENT: PERRLA, EOMI  NECK: no JVD, no thyromegaly   CVS: S1 S2 no murmur no rubs no gallop  RESP: CTAB no wheeze, no rhonchi no rales  ABD: Soft, NT, ND, tympanic, no rebound or guarding   : No Prince, No CVA tenderness   EXT; no pedal edema, no cyanosis  MSK: No ML spinal tenderness, normal ROM   NEURO: AAOX3, no new focal deficits     MEDICATIONS  (STANDING):  diVALproex  milliGRAM(s) Oral daily  diVALproex  milliGRAM(s) Oral daily  diVALproex  milliGRAM(s) Oral at bedtime  diVALproex  milliGRAM(s) Oral at bedtime  lithium CR (ESKALITH-CR) 450 milliGRAM(s) Oral two times a day  risperiDONE   Tablet 2 milliGRAM(s) Oral two times a day    MEDICATIONS  (PRN):  hydrOXYzine hydrochloride 25 milliGRAM(s) Oral at bedtime PRN insomnia        LABS/ IMAGING              CAPILLARY BLOOD GLUCOSE

## 2022-10-23 PROCEDURE — 99231 SBSQ HOSP IP/OBS SF/LOW 25: CPT

## 2022-10-23 RX ADMIN — DIVALPROEX SODIUM 250 MILLIGRAM(S): 500 TABLET, DELAYED RELEASE ORAL at 21:51

## 2022-10-23 RX ADMIN — DIVALPROEX SODIUM 250 MILLIGRAM(S): 500 TABLET, DELAYED RELEASE ORAL at 12:25

## 2022-10-23 RX ADMIN — DIVALPROEX SODIUM 500 MILLIGRAM(S): 500 TABLET, DELAYED RELEASE ORAL at 12:26

## 2022-10-23 RX ADMIN — RISPERIDONE 2 MILLIGRAM(S): 4 TABLET ORAL at 17:01

## 2022-10-23 RX ADMIN — LITHIUM CARBONATE 450 MILLIGRAM(S): 300 TABLET, EXTENDED RELEASE ORAL at 17:01

## 2022-10-23 RX ADMIN — RISPERIDONE 2 MILLIGRAM(S): 4 TABLET ORAL at 05:22

## 2022-10-23 RX ADMIN — LITHIUM CARBONATE 450 MILLIGRAM(S): 300 TABLET, EXTENDED RELEASE ORAL at 05:21

## 2022-10-23 RX ADMIN — DIVALPROEX SODIUM 500 MILLIGRAM(S): 500 TABLET, DELAYED RELEASE ORAL at 21:50

## 2022-10-23 NOTE — PROGRESS NOTE ADULT - SUBJECTIVE AND OBJECTIVE BOX
PHILIP [id number]    Subjective/Interval History   Patient seen and examined at bedside; he has no complaints. Denies F/C, CP, palpitations, SOB. He was resting comfortable.     ROS  No chest pain, fever, or chills; all systems were reviewed and are negative    Vital Signs Last 24 Hrs  T(C): 35.9 (23 Oct 2022 05:00), Max: 36.3 (22 Oct 2022 20:13)  T(F): 96.6 (23 Oct 2022 05:00), Max: 97.3 (22 Oct 2022 20:13)  HR: 70 (23 Oct 2022 05:00) (70 - 86)  BP: 120/68 (23 Oct 2022 05:00) (109/65 - 120/68)  BP(mean): --  RR: 18 (23 Oct 2022 05:00) (18 - 18)  SpO2: --    Gen: NAD, comfortable  HEENT: PERRLA, EOMI  NECK: no JVD, no thyromegaly   CVS: S1 S2 no murmur no rubs no gallop  RESP: CTAB no wheeze, no rhonchi no rales  ABD: Soft, NT, ND, tympanic, no rebound or guarding   : No Prince, No CVA tenderness   EXT; no pedal edema, no cyanosis  MSK: No ML spinal tenderness, normal ROM   NEURO: AAOX3, no new focal deficits     MEDICATIONS  (STANDING):  diVALproex  milliGRAM(s) Oral at bedtime  diVALproex  milliGRAM(s) Oral at bedtime  diVALproex  milliGRAM(s) Oral daily  diVALproex  milliGRAM(s) Oral daily  lithium CR (ESKALITH-CR) 450 milliGRAM(s) Oral two times a day  risperiDONE   Tablet 2 milliGRAM(s) Oral two times a day    MEDICATIONS  (PRN):  hydrOXYzine hydrochloride 25 milliGRAM(s) Oral at bedtime PRN insomnia          LABS/ IMAGING              CAPILLARY BLOOD GLUCOSE

## 2022-10-23 NOTE — PROGRESS NOTE ADULT - ASSESSMENT
21 year old male past medical history of bipolar and developmental delay came to emergency room for alleged aggressive and violent behavior as per step father. Evaluated by Psych; Step Father left stated pt needs placement refusing to take child home.     # Bipolar disorder and likely developmental disorder- stable  - continue current meds (on depakote, risperidone, and lithium)   - pt has been stable without aggressive behaviour in the hospital  - Valente is requesting JOSS to be sent to [facility] in the [place].  - JOSS completed and sent  - father states he cant take care of pt    # Obesity   - wt loss per PCP     DVT px - pt ambulatory     DISPO: pending placement

## 2022-10-24 PROCEDURE — 99231 SBSQ HOSP IP/OBS SF/LOW 25: CPT

## 2022-10-24 RX ADMIN — LITHIUM CARBONATE 450 MILLIGRAM(S): 300 TABLET, EXTENDED RELEASE ORAL at 18:13

## 2022-10-24 RX ADMIN — RISPERIDONE 2 MILLIGRAM(S): 4 TABLET ORAL at 05:28

## 2022-10-24 RX ADMIN — Medication 25 MILLIGRAM(S): at 00:52

## 2022-10-24 RX ADMIN — DIVALPROEX SODIUM 250 MILLIGRAM(S): 500 TABLET, DELAYED RELEASE ORAL at 11:31

## 2022-10-24 RX ADMIN — DIVALPROEX SODIUM 500 MILLIGRAM(S): 500 TABLET, DELAYED RELEASE ORAL at 11:31

## 2022-10-24 RX ADMIN — LITHIUM CARBONATE 450 MILLIGRAM(S): 300 TABLET, EXTENDED RELEASE ORAL at 05:28

## 2022-10-24 RX ADMIN — RISPERIDONE 2 MILLIGRAM(S): 4 TABLET ORAL at 18:14

## 2022-10-24 RX ADMIN — DIVALPROEX SODIUM 250 MILLIGRAM(S): 500 TABLET, DELAYED RELEASE ORAL at 21:35

## 2022-10-24 RX ADMIN — DIVALPROEX SODIUM 500 MILLIGRAM(S): 500 TABLET, DELAYED RELEASE ORAL at 21:34

## 2022-10-24 NOTE — PROGRESS NOTE ADULT - SUBJECTIVE AND OBJECTIVE BOX
PHILIP [id number]    Subjective/Interval History   Patient seen and examined at bedside; he has no complaints. He was playing game on his iPAD.  Denies F/C, CP, palpitations, SOB. He was resting comfortable.     ROS  No chest pain, fever, or chills; all systems were reviewed and are negative    Vital Signs Last 24 Hrs  T(C): 36.2 (24 Oct 2022 04:03), Max: 36.4 (23 Oct 2022 14:54)  T(F): 97.1 (24 Oct 2022 04:03), Max: 97.5 (23 Oct 2022 14:54)  HR: 69 (24 Oct 2022 04:03) (69 - 70)  BP: 125/70 (24 Oct 2022 04:03) (110/65 - 132/70)  BP(mean): --  RR: 18 (24 Oct 2022 04:03) (18 - 18)  SpO2: --  Gen: NAD, comfortable  HEENT: PERRLA, EOMI  NECK: no JVD, no thyromegaly   CVS: S1 S2 no murmur no rubs no gallop  RESP: CTAB no wheeze, no rhonchi no rales  ABD: Soft, NT, ND, tympanic, no rebound or guarding   : No Prince, No CVA tenderness   EXT; no pedal edema, no cyanosis  MSK: No ML spinal tenderness, normal ROM   NEURO: AAOX3, no new focal deficits     MEDICATIONS  (STANDING):  diVALproex  milliGRAM(s) Oral at bedtime  diVALproex  milliGRAM(s) Oral at bedtime  diVALproex  milliGRAM(s) Oral daily  diVALproex  milliGRAM(s) Oral daily  lithium CR (ESKALITH-CR) 450 milliGRAM(s) Oral two times a day  risperiDONE   Tablet 2 milliGRAM(s) Oral two times a day    MEDICATIONS  (PRN):  hydrOXYzine hydrochloride 25 milliGRAM(s) Oral at bedtime PRN insomnia          LABS/ IMAGING              CAPILLARY BLOOD GLUCOSE

## 2022-10-24 NOTE — PROGRESS NOTE ADULT - ASSESSMENT
21 year old male past medical history of bipolar and developmental delay came to emergency room for alleged aggressive and violent behavior as per step father. Evaluated by Psych; Step Father left stated pt needs placement refusing to take child home.     # Bipolar disorder and likely developmental disorder- stable  - continue current meds (on depakote, risperidone, and lithium)   - pt has been stable without aggressive behaviour in the hospital  - Valente is requesting JOSS to be sent to [facility] in the [place].  - JOSS completed and sent  - father states he cant take care of pt    # Obesity   - wt loss per PCP     DVT px - pt ambulatory     DISPO: pending placement

## 2022-10-25 PROCEDURE — 99231 SBSQ HOSP IP/OBS SF/LOW 25: CPT

## 2022-10-25 RX ADMIN — RISPERIDONE 2 MILLIGRAM(S): 4 TABLET ORAL at 05:19

## 2022-10-25 RX ADMIN — DIVALPROEX SODIUM 500 MILLIGRAM(S): 500 TABLET, DELAYED RELEASE ORAL at 12:07

## 2022-10-25 RX ADMIN — RISPERIDONE 2 MILLIGRAM(S): 4 TABLET ORAL at 17:42

## 2022-10-25 RX ADMIN — DIVALPROEX SODIUM 250 MILLIGRAM(S): 500 TABLET, DELAYED RELEASE ORAL at 12:07

## 2022-10-25 RX ADMIN — DIVALPROEX SODIUM 500 MILLIGRAM(S): 500 TABLET, DELAYED RELEASE ORAL at 21:56

## 2022-10-25 RX ADMIN — Medication 25 MILLIGRAM(S): at 21:30

## 2022-10-25 RX ADMIN — DIVALPROEX SODIUM 250 MILLIGRAM(S): 500 TABLET, DELAYED RELEASE ORAL at 21:56

## 2022-10-25 RX ADMIN — LITHIUM CARBONATE 450 MILLIGRAM(S): 300 TABLET, EXTENDED RELEASE ORAL at 05:19

## 2022-10-25 RX ADMIN — LITHIUM CARBONATE 450 MILLIGRAM(S): 300 TABLET, EXTENDED RELEASE ORAL at 17:43

## 2022-10-25 NOTE — PROGRESS NOTE ADULT - SUBJECTIVE AND OBJECTIVE BOX
PHILIP [id number]    Subjective/Interval History   Patient seen and examined at bedside; he has no complaints. He was sitting up on the bed and playing video game on his iPAD.  Denies F/C, CP, palpitations, SOB. He was resting comfortable.     ROS  No chest pain, fever, or chills; all systems were reviewed and are negative    Vital Signs Last 24 Hrs  T(C): 36.2 (25 Oct 2022 05:00), Max: 36.2 (24 Oct 2022 20:35)  T(F): 97.1 (25 Oct 2022 05:00), Max: 97.2 (24 Oct 2022 20:35)  HR: 70 (25 Oct 2022 05:00) (70 - 95)  BP: 121/64 (25 Oct 2022 05:00) (121/64 - 134/77)  BP(mean): --  RR: 18 (25 Oct 2022 05:00) (18 - 18)  SpO2: 97% (24 Oct 2022 13:52) (97% - 97%)    Gen: NAD, comfortable  HEENT: PERRLA, EOMI  NECK: no JVD, no thyromegaly   CVS: S1 S2 no murmur no rubs no gallop  RESP: CTAB no wheeze, no rhonchi no rales  ABD: Soft, NT, ND, tympanic, no rebound or guarding   : No Prince, No CVA tenderness   EXT; no pedal edema, no cyanosis  MSK: No ML spinal tenderness, normal ROM   NEURO: AAOX3, no new focal deficits     MEDICATIONS  (STANDING):  diVALproex  milliGRAM(s) Oral at bedtime  diVALproex  milliGRAM(s) Oral at bedtime  diVALproex  milliGRAM(s) Oral daily  diVALproex  milliGRAM(s) Oral daily  lithium CR (ESKALITH-CR) 450 milliGRAM(s) Oral two times a day  risperiDONE   Tablet 2 milliGRAM(s) Oral two times a day    MEDICATIONS  (PRN):  hydrOXYzine hydrochloride 25 milliGRAM(s) Oral at bedtime PRN insomnia          LABS/ IMAGING              CAPILLARY BLOOD GLUCOSE

## 2022-10-25 NOTE — PROGRESS NOTE ADULT - ASSESSMENT
21 year old male past medical history of bipolar and developmental delay came to emergency room for alleged aggressive and violent behavior as per step father. Evaluated by Psych; Step Father left stated pt needs placement refusing to take child home.     # Bipolar disorder and likely developmental disorder- stable  - continue current meds (on depakote, risperidone, and lithium)   - pt has been stable without aggressive behaviour in the hospital  - Valente is requesting JOSS to be sent to [facility] in the [place].  - JOSS completed and sent  - father states he cant take care of pt    # Obesity   - wt loss per PCP     DVT px - pt ambulatory     DISPO: pending placement

## 2022-10-26 PROCEDURE — 99231 SBSQ HOSP IP/OBS SF/LOW 25: CPT

## 2022-10-26 RX ADMIN — LITHIUM CARBONATE 450 MILLIGRAM(S): 300 TABLET, EXTENDED RELEASE ORAL at 17:07

## 2022-10-26 RX ADMIN — DIVALPROEX SODIUM 250 MILLIGRAM(S): 500 TABLET, DELAYED RELEASE ORAL at 12:19

## 2022-10-26 RX ADMIN — DIVALPROEX SODIUM 500 MILLIGRAM(S): 500 TABLET, DELAYED RELEASE ORAL at 21:36

## 2022-10-26 RX ADMIN — RISPERIDONE 2 MILLIGRAM(S): 4 TABLET ORAL at 17:07

## 2022-10-26 RX ADMIN — DIVALPROEX SODIUM 250 MILLIGRAM(S): 500 TABLET, DELAYED RELEASE ORAL at 21:36

## 2022-10-26 RX ADMIN — DIVALPROEX SODIUM 500 MILLIGRAM(S): 500 TABLET, DELAYED RELEASE ORAL at 12:19

## 2022-10-26 RX ADMIN — RISPERIDONE 2 MILLIGRAM(S): 4 TABLET ORAL at 05:30

## 2022-10-26 RX ADMIN — LITHIUM CARBONATE 450 MILLIGRAM(S): 300 TABLET, EXTENDED RELEASE ORAL at 05:30

## 2022-10-26 NOTE — PROGRESS NOTE ADULT - ASSESSMENT
21 year old male past medical history of bipolar and developmental delay came to emergency room for   alleged aggressive and violent behavior as per step father. Evaluated by Psych; Step Father left stated pt needs placement refusing to take child home.     # Bipolar disorder and likely developmental disorder- stable  - continue current meds (on depakote, risperidone, and lithium)   - pt has been stable without aggressive behaviour in the hospital  - father states he cant take care of pt    # Obesity   - wt loss per PCP     DVT px - pt ambulatory     DISPO: pending placement

## 2022-10-26 NOTE — PROGRESS NOTE ADULT - SUBJECTIVE AND OBJECTIVE BOX
Patient is a 22y old  Male who presents with a chief complaint of Placement (25 Oct 2022 09:18)      OVERNIGHT EVENTS: remained stable,     SUBJECTIVE / INTERVAL HPI: Patient seen and examined at bedside.     VITAL SIGNS:  Vital Signs Last 24 Hrs  T(C): 35.3 (26 Oct 2022 14:00), Max: 36.2 (25 Oct 2022 21:16)  T(F): 95.6 (26 Oct 2022 14:00), Max: 97.2 (25 Oct 2022 21:16)  HR: 86 (26 Oct 2022 14:00) (59 - 86)  BP: 113/69 (26 Oct 2022 14:00) (112/64 - 115/70)  BP(mean): --  RR: 18 (26 Oct 2022 04:55) (18 - 18)  SpO2: --    Parameters below as of 26 Oct 2022 04:55  Patient On (Oxygen Delivery Method): room air    REVIEW OF SYSTEMS:    CONSTITUTIONAL: No weakness, fevers or chills  EYES/ENT: No visual changes;  No vertigo or throat pain   NECK: No pain or stiffness  RESPIRATORY: No cough, wheezing, hemoptysis; No shortness of breath  CARDIOVASCULAR: No chest pain or palpitations  GASTROINTESTINAL: No abdominal or epigastric pain. No nausea, vomiting, or hematemesis; No diarrhea or constipation. No melena or hematochezia.  GENITOURINARY: No dysuria, frequency or hematuria  NEUROLOGICAL: No numbness or weakness  SKIN: No itching, rashes      PHYSICAL EXAM:    General: WDWN  HEENT: NC/AT; PERRL, clear conjunctiva  Neck: supple  Cardiovascular: +S1/S2; RRR  Respiratory: CTA b/l; no W/R/R  Gastrointestinal: soft, NT/ND; +BSx4  Extremities: WWP; 2+ peripheral pulses; no edema   Neurological: AAOx3; no focal deficits    MEDICATIONS:  MEDICATIONS  (STANDING):  diVALproex  milliGRAM(s) Oral daily  diVALproex  milliGRAM(s) Oral daily  diVALproex  milliGRAM(s) Oral at bedtime  diVALproex  milliGRAM(s) Oral at bedtime  lithium CR (ESKALITH-CR) 450 milliGRAM(s) Oral two times a day  risperiDONE   Tablet 2 milliGRAM(s) Oral two times a day    MEDICATIONS  (PRN):  hydrOXYzine hydrochloride 25 milliGRAM(s) Oral at bedtime PRN insomnia      ALLERGIES:  Allergies    No Known Allergies    Intolerances        LABS:                        RADIOLOGY & ADDITIONAL TESTS: Reviewed.        PLAN:

## 2022-10-27 PROCEDURE — 99231 SBSQ HOSP IP/OBS SF/LOW 25: CPT

## 2022-10-27 RX ADMIN — RISPERIDONE 2 MILLIGRAM(S): 4 TABLET ORAL at 05:21

## 2022-10-27 RX ADMIN — Medication 25 MILLIGRAM(S): at 01:01

## 2022-10-27 RX ADMIN — DIVALPROEX SODIUM 500 MILLIGRAM(S): 500 TABLET, DELAYED RELEASE ORAL at 11:34

## 2022-10-27 RX ADMIN — DIVALPROEX SODIUM 250 MILLIGRAM(S): 500 TABLET, DELAYED RELEASE ORAL at 22:06

## 2022-10-27 RX ADMIN — LITHIUM CARBONATE 450 MILLIGRAM(S): 300 TABLET, EXTENDED RELEASE ORAL at 05:21

## 2022-10-27 RX ADMIN — DIVALPROEX SODIUM 250 MILLIGRAM(S): 500 TABLET, DELAYED RELEASE ORAL at 11:34

## 2022-10-27 RX ADMIN — LITHIUM CARBONATE 450 MILLIGRAM(S): 300 TABLET, EXTENDED RELEASE ORAL at 17:54

## 2022-10-27 RX ADMIN — DIVALPROEX SODIUM 500 MILLIGRAM(S): 500 TABLET, DELAYED RELEASE ORAL at 22:06

## 2022-10-27 RX ADMIN — RISPERIDONE 2 MILLIGRAM(S): 4 TABLET ORAL at 17:54

## 2022-10-27 NOTE — PROGRESS NOTE ADULT - SUBJECTIVE AND OBJECTIVE BOX
Patient is a 22y old  Male who presents with a chief complaint of Placement (26 Oct 2022 15:28)      OVERNIGHT EVENTS: no new complaints     SUBJECTIVE / INTERVAL HPI: Patient seen and examined at bedside.     VITAL SIGNS:  Vital Signs Last 24 Hrs  T(C): 36.6 (27 Oct 2022 14:31), Max: 36.6 (27 Oct 2022 14:31)  T(F): 97.8 (27 Oct 2022 14:31), Max: 97.8 (27 Oct 2022 14:31)  HR: 66 (27 Oct 2022 14:31) (61 - 84)  BP: 125/75 (27 Oct 2022 14:31) (112/68 - 125/75)  BP(mean): --  RR: 16 (27 Oct 2022 14:31) (16 - 18)  SpO2: 99% (27 Oct 2022 05:40) (99% - 99%)    Parameters below as of 27 Oct 2022 05:40  Patient On (Oxygen Delivery Method): room air    REVIEW OF SYSTEMS:    CONSTITUTIONAL: No weakness, fevers or chills  EYES/ENT: No visual changes;  No vertigo or throat pain   NECK: No pain or stiffness  RESPIRATORY: No cough, wheezing, hemoptysis; No shortness of breath  CARDIOVASCULAR: No chest pain or palpitations  GASTROINTESTINAL: No abdominal or epigastric pain. No nausea, vomiting, or hematemesis; No diarrhea or constipation. No melena or hematochezia.  GENITOURINARY: No dysuria, frequency or hematuria  NEUROLOGICAL: No numbness or weakness  SKIN: No itching, rashes      PHYSICAL EXAM:    General: WDWN  HEENT: NC/AT; PERRL, clear conjunctiva  Neck: supple  Cardiovascular: +S1/S2; RRR  Respiratory: CTA b/l; no W/R/R  Gastrointestinal: soft, NT/ND; +BSx4  Extremities: WWP; 2+ peripheral pulses; no edema   Neurological: AAOx3; no focal deficits    MEDICATIONS:  MEDICATIONS  (STANDING):  diVALproex  milliGRAM(s) Oral daily  diVALproex  milliGRAM(s) Oral daily  diVALproex  milliGRAM(s) Oral at bedtime  diVALproex  milliGRAM(s) Oral at bedtime  lithium CR (ESKALITH-CR) 450 milliGRAM(s) Oral two times a day  risperiDONE   Tablet 2 milliGRAM(s) Oral two times a day    MEDICATIONS  (PRN):  hydrOXYzine hydrochloride 25 milliGRAM(s) Oral at bedtime PRN insomnia      ALLERGIES:  Allergies    No Known Allergies    Intolerances        LABS:              CAPILLARY BLOOD GLUCOSE          RADIOLOGY & ADDITIONAL TESTS: Reviewed.

## 2022-10-28 PROCEDURE — 99231 SBSQ HOSP IP/OBS SF/LOW 25: CPT

## 2022-10-28 RX ADMIN — DIVALPROEX SODIUM 250 MILLIGRAM(S): 500 TABLET, DELAYED RELEASE ORAL at 12:21

## 2022-10-28 RX ADMIN — RISPERIDONE 2 MILLIGRAM(S): 4 TABLET ORAL at 06:10

## 2022-10-28 RX ADMIN — DIVALPROEX SODIUM 250 MILLIGRAM(S): 500 TABLET, DELAYED RELEASE ORAL at 21:17

## 2022-10-28 RX ADMIN — DIVALPROEX SODIUM 500 MILLIGRAM(S): 500 TABLET, DELAYED RELEASE ORAL at 12:21

## 2022-10-28 RX ADMIN — RISPERIDONE 2 MILLIGRAM(S): 4 TABLET ORAL at 18:07

## 2022-10-28 RX ADMIN — Medication 25 MILLIGRAM(S): at 23:43

## 2022-10-28 RX ADMIN — Medication 25 MILLIGRAM(S): at 00:08

## 2022-10-28 RX ADMIN — LITHIUM CARBONATE 450 MILLIGRAM(S): 300 TABLET, EXTENDED RELEASE ORAL at 06:10

## 2022-10-28 RX ADMIN — LITHIUM CARBONATE 450 MILLIGRAM(S): 300 TABLET, EXTENDED RELEASE ORAL at 18:08

## 2022-10-28 RX ADMIN — DIVALPROEX SODIUM 500 MILLIGRAM(S): 500 TABLET, DELAYED RELEASE ORAL at 21:17

## 2022-10-28 NOTE — PROGRESS NOTE ADULT - SUBJECTIVE AND OBJECTIVE BOX
Patient is a 22y old  Male who presents with a chief complaint of Placement (27 Oct 2022 16:14)      OVERNIGHT EVENTS: remained stable,    REVIEW OF SYSTEMS:    CONSTITUTIONAL: No weakness, fevers or chills  EYES/ENT: No visual changes;  No vertigo or throat pain   NECK: No pain or stiffness  RESPIRATORY: No cough, wheezing, hemoptysis; No shortness of breath  CARDIOVASCULAR: No chest pain or palpitations  GASTROINTESTINAL: No abdominal or epigastric pain. No nausea, vomiting, or hematemesis; No diarrhea or constipation. No melena or hematochezia.  GENITOURINARY: No dysuria, frequency or hematuria  NEUROLOGICAL: No numbness or weakness  SKIN: No itching, rashes      SUBJECTIVE / INTERVAL HPI: Patient seen and examined at bedside.     VITAL SIGNS:  Vital Signs Last 24 Hrs  T(C): 36.7 (28 Oct 2022 05:00), Max: 36.7 (28 Oct 2022 05:00)  T(F): 98.1 (28 Oct 2022 05:00), Max: 98.1 (28 Oct 2022 05:00)  HR: 70 (28 Oct 2022 05:00) (66 - 88)  BP: 99/58 (28 Oct 2022 05:00) (99/58 - 125/75)  BP(mean): --  RR: 18 (28 Oct 2022 05:00) (16 - 18)  SpO2: --        PHYSICAL EXAM:    General: WDWN  HEENT: NC/AT; PERRL, clear conjunctiva  Neck: supple  Cardiovascular: +S1/S2; RRR  Respiratory: CTA b/l; no W/R/R  Gastrointestinal: soft, NT/ND; +BSx4  Extremities: WWP; 2+ peripheral pulses; no edema   Neurological: AAOx3; no focal deficits    MEDICATIONS:  MEDICATIONS  (STANDING):  diVALproex  milliGRAM(s) Oral daily  diVALproex  milliGRAM(s) Oral daily  diVALproex  milliGRAM(s) Oral at bedtime  diVALproex  milliGRAM(s) Oral at bedtime  lithium CR (ESKALITH-CR) 450 milliGRAM(s) Oral two times a day  risperiDONE   Tablet 2 milliGRAM(s) Oral two times a day    MEDICATIONS  (PRN):  hydrOXYzine hydrochloride 25 milliGRAM(s) Oral at bedtime PRN insomnia      ALLERGIES:  Allergies    No Known Allergies    Intolerances        LABS:              CAPILLARY BLOOD GLUCOSE          RADIOLOGY & ADDITIONAL TESTS: Reviewed.

## 2022-10-29 PROCEDURE — 99231 SBSQ HOSP IP/OBS SF/LOW 25: CPT

## 2022-10-29 RX ADMIN — DIVALPROEX SODIUM 250 MILLIGRAM(S): 500 TABLET, DELAYED RELEASE ORAL at 21:34

## 2022-10-29 RX ADMIN — RISPERIDONE 2 MILLIGRAM(S): 4 TABLET ORAL at 05:32

## 2022-10-29 RX ADMIN — LITHIUM CARBONATE 450 MILLIGRAM(S): 300 TABLET, EXTENDED RELEASE ORAL at 05:32

## 2022-10-29 RX ADMIN — DIVALPROEX SODIUM 500 MILLIGRAM(S): 500 TABLET, DELAYED RELEASE ORAL at 11:42

## 2022-10-29 RX ADMIN — Medication 25 MILLIGRAM(S): at 23:33

## 2022-10-29 RX ADMIN — LITHIUM CARBONATE 450 MILLIGRAM(S): 300 TABLET, EXTENDED RELEASE ORAL at 17:04

## 2022-10-29 RX ADMIN — RISPERIDONE 2 MILLIGRAM(S): 4 TABLET ORAL at 17:03

## 2022-10-29 RX ADMIN — DIVALPROEX SODIUM 500 MILLIGRAM(S): 500 TABLET, DELAYED RELEASE ORAL at 21:34

## 2022-10-29 RX ADMIN — DIVALPROEX SODIUM 250 MILLIGRAM(S): 500 TABLET, DELAYED RELEASE ORAL at 11:41

## 2022-10-29 NOTE — PROGRESS NOTE ADULT - SUBJECTIVE AND OBJECTIVE BOX
21 year old male past medical history of bipolar and developmental delay came to emergency room for   alleged aggressive and violent behavior as per step father. Evaluated by Psych; Step Father left stated pt needs placement refusing to take child home.     Today:  Seen at bedside, no complaints.              REVIEW OF SYSTEMS:  Not a reliable historian    MEDICATIONS  (STANDING):  diVALproex  milliGRAM(s) Oral daily  diVALproex  milliGRAM(s) Oral daily  diVALproex  milliGRAM(s) Oral at bedtime  diVALproex  milliGRAM(s) Oral at bedtime  lithium CR (ESKALITH-CR) 450 milliGRAM(s) Oral two times a day  risperiDONE   Tablet 2 milliGRAM(s) Oral two times a day    MEDICATIONS  (PRN):  hydrOXYzine hydrochloride 25 milliGRAM(s) Oral at bedtime PRN insomnia      Allergies  No Known Allergies        Vital Signs Last 24 Hrs  T(C): 36.4 (29 Oct 2022 13:30), Max: 36.6 (29 Oct 2022 05:37)  T(F): 97.5 (29 Oct 2022 13:30), Max: 97.9 (29 Oct 2022 05:37)  HR: 80 (29 Oct 2022 13:30) (64 - 80)  BP: 121/79 (29 Oct 2022 13:30) (104/56 - 121/79)  RR: 18 (29 Oct 2022 13:30) (17 - 18)          PHYSICAL EXAM:  General: WDWN  HEENT: NC/AT; PERRL, clear conjunctiva  Neck: supple  Cardiovascular: +S1/S2; RRR  Respiratory: CTA b/l; no W/R/R  Gastrointestinal: soft, NT/ND; +BSx4  Extremities: WWP; 2+ peripheral pulses; no edema   Neurological: AAOx3; no focal deficits

## 2022-10-30 LAB — SARS-COV-2 RNA SPEC QL NAA+PROBE: SIGNIFICANT CHANGE UP

## 2022-10-30 PROCEDURE — 99231 SBSQ HOSP IP/OBS SF/LOW 25: CPT

## 2022-10-30 RX ADMIN — DIVALPROEX SODIUM 250 MILLIGRAM(S): 500 TABLET, DELAYED RELEASE ORAL at 11:36

## 2022-10-30 RX ADMIN — DIVALPROEX SODIUM 500 MILLIGRAM(S): 500 TABLET, DELAYED RELEASE ORAL at 21:21

## 2022-10-30 RX ADMIN — RISPERIDONE 2 MILLIGRAM(S): 4 TABLET ORAL at 17:06

## 2022-10-30 RX ADMIN — LITHIUM CARBONATE 450 MILLIGRAM(S): 300 TABLET, EXTENDED RELEASE ORAL at 05:42

## 2022-10-30 RX ADMIN — DIVALPROEX SODIUM 500 MILLIGRAM(S): 500 TABLET, DELAYED RELEASE ORAL at 11:36

## 2022-10-30 RX ADMIN — LITHIUM CARBONATE 450 MILLIGRAM(S): 300 TABLET, EXTENDED RELEASE ORAL at 17:06

## 2022-10-30 RX ADMIN — DIVALPROEX SODIUM 250 MILLIGRAM(S): 500 TABLET, DELAYED RELEASE ORAL at 21:21

## 2022-10-30 RX ADMIN — RISPERIDONE 2 MILLIGRAM(S): 4 TABLET ORAL at 05:42

## 2022-10-30 NOTE — PROGRESS NOTE ADULT - SUBJECTIVE AND OBJECTIVE BOX
21 year old male past medical history of bipolar and developmental delay came to emergency room for   alleged aggressive and violent behavior as per step father. Evaluated by Psych; Step Father left stated pt needs placement refusing to take child home.     Today:  Seen at bedside, no new complaints.        REVIEW OF SYSTEMS:  No complaints      MEDICATIONS  (STANDING):  diVALproex  milliGRAM(s) Oral daily  diVALproex  milliGRAM(s) Oral daily  diVALproex  milliGRAM(s) Oral at bedtime  diVALproex  milliGRAM(s) Oral at bedtime  lithium CR (ESKALITH-CR) 450 milliGRAM(s) Oral two times a day  risperiDONE   Tablet 2 milliGRAM(s) Oral two times a day    MEDICATIONS  (PRN):  hydrOXYzine hydrochloride 25 milliGRAM(s) Oral at bedtime PRN insomnia      Allergies  No Known Allergies          Vital Signs Last 24 Hrs  T(C): 35.8 (30 Oct 2022 05:43), Max: 36.4 (29 Oct 2022 13:30)  T(F): 96.5 (30 Oct 2022 05:43), Max: 97.5 (29 Oct 2022 13:30)  HR: 64 (30 Oct 2022 05:43) (64 - 80)  BP: 110/71 (30 Oct 2022 05:43) (108/55 - 121/79)  RR: 17 (30 Oct 2022 05:43) (17 - 18)          PHYSICAL EXAM:  General: WDWN  HEENT: NC/AT; PERRL, clear conjunctiva  Neck: supple  Cardiovascular: +S1/S2; RRR  Respiratory: CTA b/l; no W/R/R  Gastrointestinal: soft, NT/ND; +BSx4  Extremities: WWP; 2+ peripheral pulses; no edema   Neurological: AAOx3; no focal deficits

## 2022-10-31 PROCEDURE — 99231 SBSQ HOSP IP/OBS SF/LOW 25: CPT

## 2022-10-31 RX ADMIN — RISPERIDONE 2 MILLIGRAM(S): 4 TABLET ORAL at 17:18

## 2022-10-31 RX ADMIN — DIVALPROEX SODIUM 250 MILLIGRAM(S): 500 TABLET, DELAYED RELEASE ORAL at 21:53

## 2022-10-31 RX ADMIN — DIVALPROEX SODIUM 500 MILLIGRAM(S): 500 TABLET, DELAYED RELEASE ORAL at 21:54

## 2022-10-31 RX ADMIN — LITHIUM CARBONATE 450 MILLIGRAM(S): 300 TABLET, EXTENDED RELEASE ORAL at 05:12

## 2022-10-31 RX ADMIN — LITHIUM CARBONATE 450 MILLIGRAM(S): 300 TABLET, EXTENDED RELEASE ORAL at 17:19

## 2022-10-31 RX ADMIN — DIVALPROEX SODIUM 250 MILLIGRAM(S): 500 TABLET, DELAYED RELEASE ORAL at 10:49

## 2022-10-31 RX ADMIN — Medication 25 MILLIGRAM(S): at 00:43

## 2022-10-31 RX ADMIN — DIVALPROEX SODIUM 500 MILLIGRAM(S): 500 TABLET, DELAYED RELEASE ORAL at 10:49

## 2022-10-31 RX ADMIN — RISPERIDONE 2 MILLIGRAM(S): 4 TABLET ORAL at 05:11

## 2022-10-31 NOTE — PROGRESS NOTE ADULT - SUBJECTIVE AND OBJECTIVE BOX
Patient is a 22y old  Male who presents with a chief complaint of Placement (30 Oct 2022 07:19)      OVERNIGHT EVENTS: remained stable, no major events     REVIEW OF SYSTEMS:    CONSTITUTIONAL: No weakness, fevers or chills  EYES/ENT: No visual changes;  No vertigo or throat pain   NECK: No pain or stiffness  RESPIRATORY: No cough, wheezing, hemoptysis; No shortness of breath  CARDIOVASCULAR: No chest pain or palpitations  GASTROINTESTINAL: No abdominal or epigastric pain. No nausea, vomiting, or hematemesis; No diarrhea or constipation. No melena or hematochezia.  GENITOURINARY: No dysuria, frequency or hematuria  NEUROLOGICAL: No numbness or weakness  SKIN: No itching, rashes      SUBJECTIVE / INTERVAL HPI: Patient seen and examined at bedside.     VITAL SIGNS:  Vital Signs Last 24 Hrs  T(C): 35.8 (31 Oct 2022 13:44), Max: 35.8 (31 Oct 2022 13:44)  T(F): 96.4 (31 Oct 2022 13:44), Max: 96.4 (31 Oct 2022 13:44)  HR: 73 (31 Oct 2022 13:44) (73 - 79)  BP: 118/66 (31 Oct 2022 13:44) (112/63 - 131/74)  BP(mean): --  RR: 18 (31 Oct 2022 05:19) (18 - 18)  SpO2: --        PHYSICAL EXAM:    General: WDWN  HEENT: NC/AT; PERRL, clear conjunctiva  Neck: supple  Cardiovascular: +S1/S2; RRR  Respiratory: CTA b/l; no W/R/R  Gastrointestinal: soft, NT/ND; +BSx4  Extremities: WWP; 2+ peripheral pulses; no edema   Neurological: AAOx3; no focal deficits    MEDICATIONS:  MEDICATIONS  (STANDING):  diVALproex  milliGRAM(s) Oral daily  diVALproex  milliGRAM(s) Oral daily  diVALproex  milliGRAM(s) Oral at bedtime  diVALproex  milliGRAM(s) Oral at bedtime  lithium CR (ESKALITH-CR) 450 milliGRAM(s) Oral two times a day  risperiDONE   Tablet 2 milliGRAM(s) Oral two times a day    MEDICATIONS  (PRN):  hydrOXYzine hydrochloride 25 milliGRAM(s) Oral at bedtime PRN insomnia      ALLERGIES:  Allergies    No Known Allergies    Intolerances        LABS:              CAPILLARY BLOOD GLUCOSE          RADIOLOGY & ADDITIONAL TESTS: Reviewed.

## 2022-11-01 PROCEDURE — 99231 SBSQ HOSP IP/OBS SF/LOW 25: CPT

## 2022-11-01 RX ADMIN — DIVALPROEX SODIUM 250 MILLIGRAM(S): 500 TABLET, DELAYED RELEASE ORAL at 21:31

## 2022-11-01 RX ADMIN — DIVALPROEX SODIUM 250 MILLIGRAM(S): 500 TABLET, DELAYED RELEASE ORAL at 11:40

## 2022-11-01 RX ADMIN — RISPERIDONE 2 MILLIGRAM(S): 4 TABLET ORAL at 17:13

## 2022-11-01 RX ADMIN — LITHIUM CARBONATE 450 MILLIGRAM(S): 300 TABLET, EXTENDED RELEASE ORAL at 17:13

## 2022-11-01 RX ADMIN — DIVALPROEX SODIUM 500 MILLIGRAM(S): 500 TABLET, DELAYED RELEASE ORAL at 11:40

## 2022-11-01 RX ADMIN — LITHIUM CARBONATE 450 MILLIGRAM(S): 300 TABLET, EXTENDED RELEASE ORAL at 05:32

## 2022-11-01 RX ADMIN — DIVALPROEX SODIUM 500 MILLIGRAM(S): 500 TABLET, DELAYED RELEASE ORAL at 21:31

## 2022-11-01 RX ADMIN — RISPERIDONE 2 MILLIGRAM(S): 4 TABLET ORAL at 05:33

## 2022-11-01 RX ADMIN — Medication 25 MILLIGRAM(S): at 00:02

## 2022-11-01 NOTE — PROGRESS NOTE ADULT - SUBJECTIVE AND OBJECTIVE BOX
Patient is a 22y old  Male who presents with a chief complaint of Placement (31 Oct 2022 14:59)      OVERNIGHT EVENTS: remained stable,     REVIEW OF SYSTEMS:    CONSTITUTIONAL: No weakness, fevers or chills  EYES/ENT: No visual changes;  No vertigo or throat pain   NECK: No pain or stiffness  RESPIRATORY: No cough, wheezing, hemoptysis; No shortness of breath  CARDIOVASCULAR: No chest pain or palpitations  GASTROINTESTINAL: No abdominal or epigastric pain. No nausea, vomiting, or hematemesis; No diarrhea or constipation. No melena or hematochezia.  GENITOURINARY: No dysuria, frequency or hematuria  NEUROLOGICAL: No numbness or weakness  SKIN: No itching, rashes      SUBJECTIVE / INTERVAL HPI: Patient seen and examined at bedside.     VITAL SIGNS:  Vital Signs Last 24 Hrs  T(C): 35.8 (01 Nov 2022 05:00), Max: 35.9 (31 Oct 2022 20:35)  T(F): 96.5 (01 Nov 2022 05:00), Max: 96.6 (31 Oct 2022 20:35)  HR: 68 (01 Nov 2022 05:00) (68 - 75)  BP: 104/62 (01 Nov 2022 05:00) (104/55 - 118/66)  BP(mean): --  RR: 16 (01 Nov 2022 05:00) (16 - 16)  SpO2: --        PHYSICAL EXAM:    General: WDWN  HEENT: NC/AT; PERRL, clear conjunctiva  Neck: supple  Cardiovascular: +S1/S2; RRR  Respiratory: CTA b/l; no W/R/R  Gastrointestinal: soft, NT/ND; +BSx4  Extremities: WWP; 2+ peripheral pulses; no edema   Neurological: AAOx3; no focal deficits    MEDICATIONS:  MEDICATIONS  (STANDING):  diVALproex  milliGRAM(s) Oral daily  diVALproex  milliGRAM(s) Oral daily  diVALproex  milliGRAM(s) Oral at bedtime  diVALproex  milliGRAM(s) Oral at bedtime  lithium CR (ESKALITH-CR) 450 milliGRAM(s) Oral two times a day  risperiDONE   Tablet 2 milliGRAM(s) Oral two times a day    MEDICATIONS  (PRN):  hydrOXYzine hydrochloride 25 milliGRAM(s) Oral at bedtime PRN insomnia      ALLERGIES:  Allergies    No Known Allergies    Intolerances        LABS:              CAPILLARY BLOOD GLUCOSE          RADIOLOGY & ADDITIONAL TESTS: Reviewed.

## 2022-11-02 PROCEDURE — 99232 SBSQ HOSP IP/OBS MODERATE 35: CPT

## 2022-11-02 RX ADMIN — LITHIUM CARBONATE 450 MILLIGRAM(S): 300 TABLET, EXTENDED RELEASE ORAL at 06:00

## 2022-11-02 RX ADMIN — DIVALPROEX SODIUM 500 MILLIGRAM(S): 500 TABLET, DELAYED RELEASE ORAL at 11:59

## 2022-11-02 RX ADMIN — DIVALPROEX SODIUM 250 MILLIGRAM(S): 500 TABLET, DELAYED RELEASE ORAL at 11:59

## 2022-11-02 RX ADMIN — Medication 25 MILLIGRAM(S): at 00:04

## 2022-11-02 RX ADMIN — RISPERIDONE 2 MILLIGRAM(S): 4 TABLET ORAL at 06:00

## 2022-11-02 RX ADMIN — DIVALPROEX SODIUM 500 MILLIGRAM(S): 500 TABLET, DELAYED RELEASE ORAL at 21:20

## 2022-11-02 RX ADMIN — LITHIUM CARBONATE 450 MILLIGRAM(S): 300 TABLET, EXTENDED RELEASE ORAL at 17:35

## 2022-11-02 RX ADMIN — DIVALPROEX SODIUM 250 MILLIGRAM(S): 500 TABLET, DELAYED RELEASE ORAL at 21:20

## 2022-11-02 RX ADMIN — RISPERIDONE 2 MILLIGRAM(S): 4 TABLET ORAL at 17:35

## 2022-11-02 NOTE — PROGRESS NOTE ADULT - SUBJECTIVE AND OBJECTIVE BOX
CC.  Placement  HPI.  Patient reports that he feels ok  offers no other complaints               Constitutional: No fever, fatigue or weight loss.  Skin: No rash.  Eyes: No recent vision problems or eye pain.  ENT: No congestion, ear pain, or sore throat.  Endocrine: No thyroid problems.  Cardiovascular: No chest pain or palpation.  Respiratory: No cough, shortness of breath, congestion, or wheezing.  Gastrointestinal: No abdominal pain, nausea, vomiting, or diarrhea.  Genitourinary: No dysuria.  Musculoskeletal: No joint swelling.  Neurologic: No headache.      Vital Signs Last 24 Hrs  T(C): 35.7 (11-02-22 @ 05:19), Max: 36.3 (11-01-22 @ 20:30)  T(F): 96.3 (11-02-22 @ 05:19), Max: 97.3 (11-01-22 @ 20:30)  HR: 56 (11-02-22 @ 05:19) (56 - 76)  BP: 105/63 (11-02-22 @ 05:19) (105/63 - 121/61)  BP(mean): --  RR: 18 (11-02-22 @ 05:19) (18 - 18)  SpO2: --        PHYSICAL EXAM-  GENERAL: NAD,    HEAD:  Atraumatic, Normocephalic  EYES: EOMI, PERRLA, conjunctiva and sclera clear  NECK: Supple, No JVD, Normal thyroid  NERVOUS SYSTEM:  Alert & Oriented X3, Moving all extremities  CHEST/LUNG: Clear to percussion bilaterally; No rales, rhonchi, wheezing, or rubs  HEART: Regular rate and rhythm; No murmurs, rubs, or gallops  ABDOMEN: Soft, Nontender, Nondistended; Bowel sounds present  EXTREMITIES:    No clubbing, cyanosis, or edema  SKIN: No rashes or lesions                                    MEDICATIONS  (STANDING):  diVALproex  milliGRAM(s) Oral daily  diVALproex  milliGRAM(s) Oral daily  diVALproex  milliGRAM(s) Oral at bedtime  diVALproex  milliGRAM(s) Oral at bedtime  lithium CR (ESKALITH-CR) 450 milliGRAM(s) Oral two times a day  risperiDONE   Tablet 2 milliGRAM(s) Oral two times a day    MEDICATIONS  (PRN):  hydrOXYzine hydrochloride 25 milliGRAM(s) Oral at bedtime PRN insomnia     Imaging Personally Reviewed:     [x ] YES  [ ] NO    Consultant(s) Notes Reviewed:  [x ] YES  [ ] NO    Care Discussed with Consultants/Other Providers [x ] YES  [ ] No medical contraindication for discharge

## 2022-11-03 PROCEDURE — 99232 SBSQ HOSP IP/OBS MODERATE 35: CPT

## 2022-11-03 RX ADMIN — RISPERIDONE 2 MILLIGRAM(S): 4 TABLET ORAL at 17:05

## 2022-11-03 RX ADMIN — RISPERIDONE 2 MILLIGRAM(S): 4 TABLET ORAL at 06:02

## 2022-11-03 RX ADMIN — DIVALPROEX SODIUM 250 MILLIGRAM(S): 500 TABLET, DELAYED RELEASE ORAL at 21:22

## 2022-11-03 RX ADMIN — LITHIUM CARBONATE 450 MILLIGRAM(S): 300 TABLET, EXTENDED RELEASE ORAL at 17:05

## 2022-11-03 RX ADMIN — LITHIUM CARBONATE 450 MILLIGRAM(S): 300 TABLET, EXTENDED RELEASE ORAL at 06:02

## 2022-11-03 RX ADMIN — DIVALPROEX SODIUM 500 MILLIGRAM(S): 500 TABLET, DELAYED RELEASE ORAL at 21:22

## 2022-11-03 NOTE — PROGRESS NOTE ADULT - SUBJECTIVE AND OBJECTIVE BOX
CC.  Placement  HPI.  Patient reports that he feels ok  offers no other complaints               Constitutional: No fever, fatigue or weight loss.  Skin: No rash.  Eyes: No recent vision problems or eye pain.  ENT: No congestion, ear pain, or sore throat.  Endocrine: No thyroid problems.  Cardiovascular: No chest pain or palpation.  Respiratory: No cough, shortness of breath, congestion, or wheezing.  Gastrointestinal: No abdominal pain, nausea, vomiting, or diarrhea.  Genitourinary: No dysuria.  Musculoskeletal: No joint swelling.  Neurologic: No headache.      Vital Signs Last 24 Hrs  T(C): 35.6 (03 Nov 2022 05:47), Max: 36.1 (02 Nov 2022 20:30)  T(F): 96 (03 Nov 2022 05:47), Max: 96.9 (02 Nov 2022 20:30)  HR: 78 (03 Nov 2022 05:47) (71 - 79)  BP: 110/70 (03 Nov 2022 05:47) (110/70 - 132/59)  BP(mean): --  RR: 18 (03 Nov 2022 05:47) (18 - 18)  SpO2: --            PHYSICAL EXAM-  GENERAL: NAD,    HEAD:  Atraumatic, Normocephalic  EYES: EOMI, PERRLA, conjunctiva and sclera clear  NECK: Supple, No JVD, Normal thyroid  NERVOUS SYSTEM:  Alert & Oriented X3, Moving all extremities  CHEST/LUNG: Clear to percussion bilaterally; No rales, rhonchi, wheezing, or rubs  HEART: Regular rate and rhythm; No murmurs, rubs, or gallops  ABDOMEN: Soft, Nontender, Nondistended; Bowel sounds present  EXTREMITIES:    No clubbing, cyanosis, or edema  SKIN: No rashes or lesions                                    MEDICATIONS  (STANDING):  diVALproex  milliGRAM(s) Oral daily  diVALproex  milliGRAM(s) Oral daily  diVALproex  milliGRAM(s) Oral at bedtime  diVALproex  milliGRAM(s) Oral at bedtime  lithium CR (ESKALITH-CR) 450 milliGRAM(s) Oral two times a day  risperiDONE   Tablet 2 milliGRAM(s) Oral two times a day    MEDICATIONS  (PRN):  hydrOXYzine hydrochloride 25 milliGRAM(s) Oral at bedtime PRN insomnia     Imaging Personally Reviewed:     [x ] YES  [ ] NO    Consultant(s) Notes Reviewed:  [x ] YES  [ ] NO    Care Discussed with Consultants/Other Providers [x ] YES  [ ] No medical contraindication for discharge

## 2022-11-04 PROCEDURE — 99232 SBSQ HOSP IP/OBS MODERATE 35: CPT

## 2022-11-04 RX ADMIN — DIVALPROEX SODIUM 500 MILLIGRAM(S): 500 TABLET, DELAYED RELEASE ORAL at 21:41

## 2022-11-04 RX ADMIN — DIVALPROEX SODIUM 250 MILLIGRAM(S): 500 TABLET, DELAYED RELEASE ORAL at 21:41

## 2022-11-04 RX ADMIN — LITHIUM CARBONATE 450 MILLIGRAM(S): 300 TABLET, EXTENDED RELEASE ORAL at 05:32

## 2022-11-04 RX ADMIN — Medication 25 MILLIGRAM(S): at 23:57

## 2022-11-04 RX ADMIN — LITHIUM CARBONATE 450 MILLIGRAM(S): 300 TABLET, EXTENDED RELEASE ORAL at 17:01

## 2022-11-04 RX ADMIN — DIVALPROEX SODIUM 250 MILLIGRAM(S): 500 TABLET, DELAYED RELEASE ORAL at 11:37

## 2022-11-04 RX ADMIN — RISPERIDONE 2 MILLIGRAM(S): 4 TABLET ORAL at 05:32

## 2022-11-04 RX ADMIN — DIVALPROEX SODIUM 500 MILLIGRAM(S): 500 TABLET, DELAYED RELEASE ORAL at 11:37

## 2022-11-04 RX ADMIN — Medication 25 MILLIGRAM(S): at 00:11

## 2022-11-04 RX ADMIN — RISPERIDONE 2 MILLIGRAM(S): 4 TABLET ORAL at 17:02

## 2022-11-04 NOTE — PROGRESS NOTE ADULT - SUBJECTIVE AND OBJECTIVE BOX
CC.  Placement  HPI.  Patient reports that he feels ok  offers no other complaints               Constitutional: No fever, fatigue or weight loss.  Skin: No rash.  Eyes: No recent vision problems or eye pain.  ENT: No congestion, ear pain, or sore throat.  Endocrine: No thyroid problems.  Cardiovascular: No chest pain or palpation.  Respiratory: No cough, shortness of breath, congestion, or wheezing.  Gastrointestinal: No abdominal pain, nausea, vomiting, or diarrhea.  Genitourinary: No dysuria.  Musculoskeletal: No joint swelling.  Neurologic: No headache.      Vital Signs Last 24 Hrs  T(C): 36.4 (04 Nov 2022 05:00), Max: 36.4 (04 Nov 2022 05:00)  T(F): 97.6 (04 Nov 2022 05:00), Max: 97.6 (04 Nov 2022 05:00)  HR: 93 (04 Nov 2022 05:00) (79 - 97)  BP: 110/70 (04 Nov 2022 05:00) (110/70 - 116/75)  BP(mean): --  RR: 18 (04 Nov 2022 05:00) (18 - 18)  SpO2: --                PHYSICAL EXAM-  GENERAL: NAD,    HEAD:  Atraumatic, Normocephalic  EYES: EOMI, PERRLA, conjunctiva and sclera clear  NECK: Supple, No JVD, Normal thyroid  NERVOUS SYSTEM:  Alert & Oriented X3, Moving all extremities  CHEST/LUNG: Clear to percussion bilaterally; No rales, rhonchi, wheezing, or rubs  HEART: Regular rate and rhythm; No murmurs, rubs, or gallops  ABDOMEN: Soft, Nontender, Nondistended; Bowel sounds present  EXTREMITIES:    No clubbing, cyanosis, or edema  SKIN: No rashes or lesions                                    MEDICATIONS  (STANDING):  diVALproex  milliGRAM(s) Oral daily  diVALproex  milliGRAM(s) Oral daily  diVALproex  milliGRAM(s) Oral at bedtime  diVALproex  milliGRAM(s) Oral at bedtime  lithium CR (ESKALITH-CR) 450 milliGRAM(s) Oral two times a day  risperiDONE   Tablet 2 milliGRAM(s) Oral two times a day    MEDICATIONS  (PRN):  hydrOXYzine hydrochloride 25 milliGRAM(s) Oral at bedtime PRN insomnia     Imaging Personally Reviewed:     [x ] YES  [ ] NO    Consultant(s) Notes Reviewed:  [x ] YES  [ ] NO    Care Discussed with Consultants/Other Providers [x ] YES  [ ] No medical contraindication for discharge

## 2022-11-05 PROCEDURE — 99232 SBSQ HOSP IP/OBS MODERATE 35: CPT

## 2022-11-05 RX ADMIN — RISPERIDONE 2 MILLIGRAM(S): 4 TABLET ORAL at 05:55

## 2022-11-05 RX ADMIN — LITHIUM CARBONATE 450 MILLIGRAM(S): 300 TABLET, EXTENDED RELEASE ORAL at 05:55

## 2022-11-05 RX ADMIN — RISPERIDONE 2 MILLIGRAM(S): 4 TABLET ORAL at 17:23

## 2022-11-05 RX ADMIN — DIVALPROEX SODIUM 250 MILLIGRAM(S): 500 TABLET, DELAYED RELEASE ORAL at 21:18

## 2022-11-05 RX ADMIN — DIVALPROEX SODIUM 500 MILLIGRAM(S): 500 TABLET, DELAYED RELEASE ORAL at 21:18

## 2022-11-05 RX ADMIN — DIVALPROEX SODIUM 250 MILLIGRAM(S): 500 TABLET, DELAYED RELEASE ORAL at 12:38

## 2022-11-05 RX ADMIN — LITHIUM CARBONATE 450 MILLIGRAM(S): 300 TABLET, EXTENDED RELEASE ORAL at 17:23

## 2022-11-05 RX ADMIN — DIVALPROEX SODIUM 500 MILLIGRAM(S): 500 TABLET, DELAYED RELEASE ORAL at 12:39

## 2022-11-05 NOTE — PROGRESS NOTE ADULT - SUBJECTIVE AND OBJECTIVE BOX
CC.  Placement  HPI.  Patient reports that he feels ok  offers no other complaints               Constitutional: No fever, fatigue or weight loss.  Skin: No rash.  Eyes: No recent vision problems or eye pain.  ENT: No congestion, ear pain, or sore throat.  Endocrine: No thyroid problems.  Cardiovascular: No chest pain or palpation.  Respiratory: No cough, shortness of breath, congestion, or wheezing.  Gastrointestinal: No abdominal pain, nausea, vomiting, or diarrhea.  Genitourinary: No dysuria.  Musculoskeletal: No joint swelling.  Neurologic: No headache.      Vital Signs Last 24 Hrs  T(C): 36.1 (05 Nov 2022 04:05), Max: 36.7 (04 Nov 2022 14:11)  T(F): 96.9 (05 Nov 2022 04:05), Max: 98 (04 Nov 2022 14:11)  HR: 70 (05 Nov 2022 04:05) (70 - 75)  BP: 99/54 (05 Nov 2022 04:05) (99/54 - 115/80)  BP(mean): --  RR: 18 (05 Nov 2022 04:05) (18 - 19)  SpO2: 98% (05 Nov 2022 09:55) (98% - 98%)    Parameters below as of 05 Nov 2022 09:55  Patient On (Oxygen Delivery Method): room air                    PHYSICAL EXAM-  GENERAL: NAD,    HEAD:  Atraumatic, Normocephalic  EYES: EOMI, PERRLA, conjunctiva and sclera clear  NECK: Supple, No JVD, Normal thyroid  NERVOUS SYSTEM:  Alert & Oriented X3, Moving all extremities  CHEST/LUNG: Clear to percussion bilaterally; No rales, rhonchi, wheezing, or rubs  HEART: Regular rate and rhythm; No murmurs, rubs, or gallops  ABDOMEN: Soft, Nontender, Nondistended; Bowel sounds present  EXTREMITIES:    No clubbing, cyanosis, or edema  SKIN: No rashes or lesions                                    MEDICATIONS  (STANDING):  diVALproex  milliGRAM(s) Oral daily  diVALproex  milliGRAM(s) Oral daily  diVALproex  milliGRAM(s) Oral at bedtime  diVALproex  milliGRAM(s) Oral at bedtime  lithium CR (ESKALITH-CR) 450 milliGRAM(s) Oral two times a day  risperiDONE   Tablet 2 milliGRAM(s) Oral two times a day    MEDICATIONS  (PRN):  hydrOXYzine hydrochloride 25 milliGRAM(s) Oral at bedtime PRN insomnia     Imaging Personally Reviewed:     [x ] YES  [ ] NO    Consultant(s) Notes Reviewed:  [x ] YES  [ ] NO    Care Discussed with Consultants/Other Providers [x ] YES  [ ] No medical contraindication for discharge

## 2022-11-06 LAB — SARS-COV-2 RNA SPEC QL NAA+PROBE: SIGNIFICANT CHANGE UP

## 2022-11-06 PROCEDURE — 99232 SBSQ HOSP IP/OBS MODERATE 35: CPT

## 2022-11-06 RX ADMIN — RISPERIDONE 2 MILLIGRAM(S): 4 TABLET ORAL at 05:22

## 2022-11-06 RX ADMIN — LITHIUM CARBONATE 450 MILLIGRAM(S): 300 TABLET, EXTENDED RELEASE ORAL at 05:23

## 2022-11-06 RX ADMIN — LITHIUM CARBONATE 450 MILLIGRAM(S): 300 TABLET, EXTENDED RELEASE ORAL at 17:27

## 2022-11-06 RX ADMIN — RISPERIDONE 2 MILLIGRAM(S): 4 TABLET ORAL at 17:25

## 2022-11-06 RX ADMIN — DIVALPROEX SODIUM 250 MILLIGRAM(S): 500 TABLET, DELAYED RELEASE ORAL at 22:03

## 2022-11-06 RX ADMIN — DIVALPROEX SODIUM 500 MILLIGRAM(S): 500 TABLET, DELAYED RELEASE ORAL at 22:03

## 2022-11-06 RX ADMIN — Medication 25 MILLIGRAM(S): at 00:12

## 2022-11-06 RX ADMIN — DIVALPROEX SODIUM 500 MILLIGRAM(S): 500 TABLET, DELAYED RELEASE ORAL at 11:50

## 2022-11-06 RX ADMIN — DIVALPROEX SODIUM 250 MILLIGRAM(S): 500 TABLET, DELAYED RELEASE ORAL at 11:17

## 2022-11-06 NOTE — PROGRESS NOTE ADULT - SUBJECTIVE AND OBJECTIVE BOX
CC.  Placement  HPI.  Patient reports that he feels ok  offers no other complaints               Constitutional: No fever, fatigue or weight loss.  Skin: No rash.  Eyes: No recent vision problems or eye pain.  ENT: No congestion, ear pain, or sore throat.  Endocrine: No thyroid problems.  Cardiovascular: No chest pain or palpation.  Respiratory: No cough, shortness of breath, congestion, or wheezing.  Gastrointestinal: No abdominal pain, nausea, vomiting, or diarrhea.  Genitourinary: No dysuria.  Musculoskeletal: No joint swelling.  Neurologic: No headache.    Vital Signs Last 24 Hrs  T(C): 36.2 (06 Nov 2022 04:02), Max: 36.2 (06 Nov 2022 04:02)  T(F): 97.1 (06 Nov 2022 04:02), Max: 97.1 (06 Nov 2022 04:02)  HR: 72 (06 Nov 2022 04:02) (72 - 82)  BP: 121/68 (06 Nov 2022 04:02) (118/69 - 121/68)  BP(mean): --  RR: 18 (06 Nov 2022 04:02) (18 - 18)  SpO2: --                      PHYSICAL EXAM-  GENERAL: NAD,    HEAD:  Atraumatic, Normocephalic  EYES: EOMI, PERRLA, conjunctiva and sclera clear  NECK: Supple, No JVD, Normal thyroid  NERVOUS SYSTEM:  Alert & Oriented X3, Moving all extremities  CHEST/LUNG: Clear to percussion bilaterally; No rales, rhonchi, wheezing, or rubs  HEART: Regular rate and rhythm; No murmurs, rubs, or gallops  ABDOMEN: Soft, Nontender, Nondistended; Bowel sounds present  EXTREMITIES:    No clubbing, cyanosis, or edema  SKIN: No rashes or lesions                                    MEDICATIONS  (STANDING):  diVALproex  milliGRAM(s) Oral daily  diVALproex  milliGRAM(s) Oral daily  diVALproex  milliGRAM(s) Oral at bedtime  diVALproex  milliGRAM(s) Oral at bedtime  lithium CR (ESKALITH-CR) 450 milliGRAM(s) Oral two times a day  risperiDONE   Tablet 2 milliGRAM(s) Oral two times a day    MEDICATIONS  (PRN):  hydrOXYzine hydrochloride 25 milliGRAM(s) Oral at bedtime PRN insomnia     Imaging Personally Reviewed:     [x ] YES  [ ] NO    Consultant(s) Notes Reviewed:  [x ] YES  [ ] NO    Care Discussed with Consultants/Other Providers [x ] YES  [ ] No medical contraindication for discharge

## 2022-11-07 PROCEDURE — 99232 SBSQ HOSP IP/OBS MODERATE 35: CPT

## 2022-11-07 RX ADMIN — DIVALPROEX SODIUM 250 MILLIGRAM(S): 500 TABLET, DELAYED RELEASE ORAL at 11:22

## 2022-11-07 RX ADMIN — RISPERIDONE 2 MILLIGRAM(S): 4 TABLET ORAL at 17:27

## 2022-11-07 RX ADMIN — DIVALPROEX SODIUM 500 MILLIGRAM(S): 500 TABLET, DELAYED RELEASE ORAL at 11:23

## 2022-11-07 RX ADMIN — DIVALPROEX SODIUM 250 MILLIGRAM(S): 500 TABLET, DELAYED RELEASE ORAL at 21:34

## 2022-11-07 RX ADMIN — LITHIUM CARBONATE 450 MILLIGRAM(S): 300 TABLET, EXTENDED RELEASE ORAL at 17:27

## 2022-11-07 RX ADMIN — Medication 25 MILLIGRAM(S): at 00:08

## 2022-11-07 RX ADMIN — LITHIUM CARBONATE 450 MILLIGRAM(S): 300 TABLET, EXTENDED RELEASE ORAL at 05:50

## 2022-11-07 RX ADMIN — RISPERIDONE 2 MILLIGRAM(S): 4 TABLET ORAL at 05:49

## 2022-11-07 RX ADMIN — DIVALPROEX SODIUM 500 MILLIGRAM(S): 500 TABLET, DELAYED RELEASE ORAL at 21:34

## 2022-11-07 NOTE — PROGRESS NOTE ADULT - SUBJECTIVE AND OBJECTIVE BOX
CC.  Placement  HPI.  Patient reports that he feels ok  offers no other complaints               Constitutional: No fever, fatigue or weight loss.  Skin: No rash.  Eyes: No recent vision problems or eye pain.  ENT: No congestion, ear pain, or sore throat.  Endocrine: No thyroid problems.  Cardiovascular: No chest pain or palpation.  Respiratory: No cough, shortness of breath, congestion, or wheezing.  Gastrointestinal: No abdominal pain, nausea, vomiting, or diarrhea.  Genitourinary: No dysuria.  Musculoskeletal: No joint swelling.  Neurologic: No headache.    Vital Signs Last 24 Hrs  T(C): 36.2 (07 Nov 2022 04:57), Max: 36.6 (06 Nov 2022 13:10)  T(F): 97.2 (07 Nov 2022 04:57), Max: 97.9 (06 Nov 2022 13:10)  HR: 63 (07 Nov 2022 04:57) (63 - 71)  BP: 92/53 (07 Nov 2022 04:57) (92/53 - 108/67)  BP(mean): --  RR: 20 (07 Nov 2022 04:57) (18 - 20)  SpO2: --                        PHYSICAL EXAM-  GENERAL: NAD,    HEAD:  Atraumatic, Normocephalic  EYES: EOMI, PERRLA, conjunctiva and sclera clear  NECK: Supple, No JVD, Normal thyroid  NERVOUS SYSTEM:  Alert & Oriented X3, Moving all extremities  CHEST/LUNG: Clear to percussion bilaterally; No rales, rhonchi, wheezing, or rubs  HEART: Regular rate and rhythm; No murmurs, rubs, or gallops  ABDOMEN: Soft, Nontender, Nondistended; Bowel sounds present  EXTREMITIES:    No clubbing, cyanosis, or edema  SKIN: No rashes or lesions                                    MEDICATIONS  (STANDING):  diVALproex  milliGRAM(s) Oral daily  diVALproex  milliGRAM(s) Oral daily  diVALproex  milliGRAM(s) Oral at bedtime  diVALproex  milliGRAM(s) Oral at bedtime  lithium CR (ESKALITH-CR) 450 milliGRAM(s) Oral two times a day  risperiDONE   Tablet 2 milliGRAM(s) Oral two times a day    MEDICATIONS  (PRN):  hydrOXYzine hydrochloride 25 milliGRAM(s) Oral at bedtime PRN insomnia     Imaging Personally Reviewed:     [x ] YES  [ ] NO    Consultant(s) Notes Reviewed:  [x ] YES  [ ] NO    Care Discussed with Consultants/Other Providers [x ] YES  [ ] No medical contraindication for discharge

## 2022-11-08 PROCEDURE — 99232 SBSQ HOSP IP/OBS MODERATE 35: CPT

## 2022-11-08 RX ADMIN — RISPERIDONE 2 MILLIGRAM(S): 4 TABLET ORAL at 05:36

## 2022-11-08 RX ADMIN — LITHIUM CARBONATE 450 MILLIGRAM(S): 300 TABLET, EXTENDED RELEASE ORAL at 17:31

## 2022-11-08 RX ADMIN — LITHIUM CARBONATE 450 MILLIGRAM(S): 300 TABLET, EXTENDED RELEASE ORAL at 05:37

## 2022-11-08 RX ADMIN — RISPERIDONE 2 MILLIGRAM(S): 4 TABLET ORAL at 17:31

## 2022-11-08 RX ADMIN — DIVALPROEX SODIUM 500 MILLIGRAM(S): 500 TABLET, DELAYED RELEASE ORAL at 21:24

## 2022-11-08 RX ADMIN — DIVALPROEX SODIUM 250 MILLIGRAM(S): 500 TABLET, DELAYED RELEASE ORAL at 21:24

## 2022-11-08 NOTE — PROGRESS NOTE ADULT - SUBJECTIVE AND OBJECTIVE BOX
CC.  Placement  HPI.  Patient reports that he feels ok  offers no other complaints               Constitutional: No fever, fatigue or weight loss.  Skin: No rash.  Eyes: No recent vision problems or eye pain.  ENT: No congestion, ear pain, or sore throat.  Endocrine: No thyroid problems.  Cardiovascular: No chest pain or palpation.  Respiratory: No cough, shortness of breath, congestion, or wheezing.  Gastrointestinal: No abdominal pain, nausea, vomiting, or diarrhea.  Genitourinary: No dysuria.  Musculoskeletal: No joint swelling.  Neurologic: No headache.    Vital Signs Last 24 Hrs  T(C): 36.7 (08 Nov 2022 05:19), Max: 36.7 (07 Nov 2022 21:00)  T(F): 98 (08 Nov 2022 05:19), Max: 98.1 (07 Nov 2022 21:00)  HR: 67 (08 Nov 2022 05:19) (67 - 96)  BP: 102/58 (08 Nov 2022 05:19) (102/58 - 121/69)  BP(mean): --  RR: 18 (08 Nov 2022 05:19) (18 - 18)  SpO2: --                            PHYSICAL EXAM-  GENERAL: NAD,    HEAD:  Atraumatic, Normocephalic  EYES: EOMI, PERRLA, conjunctiva and sclera clear  NECK: Supple, No JVD, Normal thyroid  NERVOUS SYSTEM:  Alert & Oriented X3, Moving all extremities  CHEST/LUNG: Clear to percussion bilaterally; No rales, rhonchi, wheezing, or rubs  HEART: Regular rate and rhythm; No murmurs, rubs, or gallops  ABDOMEN: Soft, Nontender, Nondistended; Bowel sounds present  EXTREMITIES:    No clubbing, cyanosis, or edema  SKIN: No rashes or lesions                                    MEDICATIONS  (STANDING):  diVALproex  milliGRAM(s) Oral daily  diVALproex  milliGRAM(s) Oral daily  diVALproex  milliGRAM(s) Oral at bedtime  diVALproex  milliGRAM(s) Oral at bedtime  lithium CR (ESKALITH-CR) 450 milliGRAM(s) Oral two times a day  risperiDONE   Tablet 2 milliGRAM(s) Oral two times a day    MEDICATIONS  (PRN):  hydrOXYzine hydrochloride 25 milliGRAM(s) Oral at bedtime PRN insomnia     Imaging Personally Reviewed:     [x ] YES  [ ] NO    Consultant(s) Notes Reviewed:  [x ] YES  [ ] NO    Care Discussed with Consultants/Other Providers [x ] YES  [ ] No medical contraindication for discharge

## 2022-11-09 PROCEDURE — 99231 SBSQ HOSP IP/OBS SF/LOW 25: CPT

## 2022-11-09 RX ADMIN — DIVALPROEX SODIUM 250 MILLIGRAM(S): 500 TABLET, DELAYED RELEASE ORAL at 12:18

## 2022-11-09 RX ADMIN — Medication 25 MILLIGRAM(S): at 23:56

## 2022-11-09 RX ADMIN — RISPERIDONE 2 MILLIGRAM(S): 4 TABLET ORAL at 06:17

## 2022-11-09 RX ADMIN — RISPERIDONE 2 MILLIGRAM(S): 4 TABLET ORAL at 17:04

## 2022-11-09 RX ADMIN — DIVALPROEX SODIUM 500 MILLIGRAM(S): 500 TABLET, DELAYED RELEASE ORAL at 21:46

## 2022-11-09 RX ADMIN — LITHIUM CARBONATE 450 MILLIGRAM(S): 300 TABLET, EXTENDED RELEASE ORAL at 06:17

## 2022-11-09 RX ADMIN — DIVALPROEX SODIUM 500 MILLIGRAM(S): 500 TABLET, DELAYED RELEASE ORAL at 12:18

## 2022-11-09 RX ADMIN — DIVALPROEX SODIUM 250 MILLIGRAM(S): 500 TABLET, DELAYED RELEASE ORAL at 21:47

## 2022-11-09 RX ADMIN — LITHIUM CARBONATE 450 MILLIGRAM(S): 300 TABLET, EXTENDED RELEASE ORAL at 17:04

## 2022-11-09 NOTE — PROGRESS NOTE ADULT - SUBJECTIVE AND OBJECTIVE BOX
Patient is a 22y old  Male who presents with a chief complaint of Placement (08 Nov 2022 09:11)      OVERNIGHT EVENTS: remained stable    REVIEW OF SYSTEMS:    CONSTITUTIONAL: No weakness, fevers or chills  EYES/ENT: No visual changes;  No vertigo or throat pain   NECK: No pain or stiffness  RESPIRATORY: No cough, wheezing, hemoptysis; No shortness of breath  CARDIOVASCULAR: No chest pain or palpitations  GASTROINTESTINAL: No abdominal or epigastric pain. No nausea, vomiting, or hematemesis; No diarrhea or constipation. No melena or hematochezia.  GENITOURINARY: No dysuria, frequency or hematuria  NEUROLOGICAL: No numbness or weakness  SKIN: No itching, rashes      SUBJECTIVE / INTERVAL HPI: Patient seen and examined at bedside.     VITAL SIGNS:  Vital Signs Last 24 Hrs  T(C): 36.2 (09 Nov 2022 13:53), Max: 36.4 (08 Nov 2022 21:00)  T(F): 97.2 (09 Nov 2022 13:53), Max: 97.5 (08 Nov 2022 21:00)  HR: 71 (09 Nov 2022 13:53) (67 - 71)  BP: 103/63 (09 Nov 2022 13:53) (103/63 - 133/71)  BP(mean): --  RR: 18 (09 Nov 2022 04:51) (18 - 20)  SpO2: --        PHYSICAL EXAM:    General: WDWN  HEENT: NC/AT; PERRL, clear conjunctiva  Neck: supple  Cardiovascular: +S1/S2; RRR  Respiratory: CTA b/l; no W/R/R  Gastrointestinal: soft, NT/ND; +BSx4  Extremities: WWP; 2+ peripheral pulses; no edema   Neurological: AAOx3; no focal deficits    MEDICATIONS:  MEDICATIONS  (STANDING):  diVALproex  milliGRAM(s) Oral daily  diVALproex  milliGRAM(s) Oral daily  diVALproex  milliGRAM(s) Oral at bedtime  diVALproex  milliGRAM(s) Oral at bedtime  lithium CR (ESKALITH-CR) 450 milliGRAM(s) Oral two times a day  risperiDONE   Tablet 2 milliGRAM(s) Oral two times a day    MEDICATIONS  (PRN):  hydrOXYzine hydrochloride 25 milliGRAM(s) Oral at bedtime PRN insomnia      ALLERGIES:  Allergies    No Known Allergies    Intolerances        LABS:              CAPILLARY BLOOD GLUCOSE          RADIOLOGY & ADDITIONAL TESTS: Reviewed.

## 2022-11-10 PROCEDURE — 99231 SBSQ HOSP IP/OBS SF/LOW 25: CPT

## 2022-11-10 RX ADMIN — LITHIUM CARBONATE 450 MILLIGRAM(S): 300 TABLET, EXTENDED RELEASE ORAL at 05:11

## 2022-11-10 RX ADMIN — DIVALPROEX SODIUM 500 MILLIGRAM(S): 500 TABLET, DELAYED RELEASE ORAL at 12:27

## 2022-11-10 RX ADMIN — DIVALPROEX SODIUM 250 MILLIGRAM(S): 500 TABLET, DELAYED RELEASE ORAL at 12:27

## 2022-11-10 RX ADMIN — RISPERIDONE 2 MILLIGRAM(S): 4 TABLET ORAL at 17:38

## 2022-11-10 RX ADMIN — DIVALPROEX SODIUM 250 MILLIGRAM(S): 500 TABLET, DELAYED RELEASE ORAL at 21:19

## 2022-11-10 RX ADMIN — LITHIUM CARBONATE 450 MILLIGRAM(S): 300 TABLET, EXTENDED RELEASE ORAL at 17:38

## 2022-11-10 RX ADMIN — RISPERIDONE 2 MILLIGRAM(S): 4 TABLET ORAL at 05:11

## 2022-11-10 RX ADMIN — DIVALPROEX SODIUM 500 MILLIGRAM(S): 500 TABLET, DELAYED RELEASE ORAL at 21:19

## 2022-11-10 NOTE — PROGRESS NOTE ADULT - ASSESSMENT
21 year old male past medical history of bipolar and developmental delay came to emergency room for   alleged aggressive and violent behavior as per step father. Evaluated by Psych; Step Father left stated pt needs placement refusing to take child home.     # Bipolar disorder and likely developmental disorder- stable  - continue current meds (on depakote, risperidone, and lithium)   - pt has been stable without aggressive behaviour in the hospital  - father states he cant take care of pt    # Obesity       DVT px - pt ambulatory     DISPO: pending placement

## 2022-11-10 NOTE — PROGRESS NOTE ADULT - SUBJECTIVE AND OBJECTIVE BOX
Patient is a 22y old  Male who presents with a chief complaint of Placement (09 Nov 2022 16:43)      OVERNIGHT EVENTS: remained stable,    REVIEW OF SYSTEMS:    CONSTITUTIONAL: No weakness, fevers or chills  EYES/ENT: No visual changes;  No vertigo or throat pain   NECK: No pain or stiffness  RESPIRATORY: No cough, wheezing, hemoptysis; No shortness of breath  CARDIOVASCULAR: No chest pain or palpitations  GASTROINTESTINAL: No abdominal or epigastric pain. No nausea, vomiting, or hematemesis; No diarrhea or constipation. No melena or hematochezia.  GENITOURINARY: No dysuria, frequency or hematuria  NEUROLOGICAL: No numbness or weakness  SKIN: No itching, rashes      SUBJECTIVE / INTERVAL HPI: Patient seen and examined at bedside.     VITAL SIGNS:  Vital Signs Last 24 Hrs  T(C): 36.4 (10 Nov 2022 04:55), Max: 36.4 (10 Nov 2022 04:55)  T(F): 97.5 (10 Nov 2022 04:55), Max: 97.5 (10 Nov 2022 04:55)  HR: 84 (10 Nov 2022 04:55) (71 - 88)  BP: 132/70 (10 Nov 2022 04:55) (103/63 - 132/70)  BP(mean): --  RR: 18 (09 Nov 2022 21:00) (18 - 18)  SpO2: --        PHYSICAL EXAM:    General: WDWN  HEENT: NC/AT; PERRL, clear conjunctiva  Neck: supple  Cardiovascular: +S1/S2; RRR  Respiratory: CTA b/l; no W/R/R  Gastrointestinal: soft, NT/ND; +BSx4  Extremities: WWP; 2+ peripheral pulses; no edema   Neurological: AAOx3; no focal deficits    MEDICATIONS:  MEDICATIONS  (STANDING):  diVALproex  milliGRAM(s) Oral daily  diVALproex  milliGRAM(s) Oral daily  diVALproex  milliGRAM(s) Oral at bedtime  diVALproex  milliGRAM(s) Oral at bedtime  lithium CR (ESKALITH-CR) 450 milliGRAM(s) Oral two times a day  risperiDONE   Tablet 2 milliGRAM(s) Oral two times a day    MEDICATIONS  (PRN):  hydrOXYzine hydrochloride 25 milliGRAM(s) Oral at bedtime PRN insomnia      ALLERGIES:  Allergies    No Known Allergies    Intolerances        LABS:              CAPILLARY BLOOD GLUCOSE          RADIOLOGY & ADDITIONAL TESTS: Reviewed.

## 2022-11-11 PROCEDURE — 99231 SBSQ HOSP IP/OBS SF/LOW 25: CPT

## 2022-11-11 RX ADMIN — RISPERIDONE 2 MILLIGRAM(S): 4 TABLET ORAL at 17:48

## 2022-11-11 RX ADMIN — DIVALPROEX SODIUM 250 MILLIGRAM(S): 500 TABLET, DELAYED RELEASE ORAL at 11:54

## 2022-11-11 RX ADMIN — DIVALPROEX SODIUM 250 MILLIGRAM(S): 500 TABLET, DELAYED RELEASE ORAL at 21:09

## 2022-11-11 RX ADMIN — DIVALPROEX SODIUM 500 MILLIGRAM(S): 500 TABLET, DELAYED RELEASE ORAL at 11:55

## 2022-11-11 RX ADMIN — LITHIUM CARBONATE 450 MILLIGRAM(S): 300 TABLET, EXTENDED RELEASE ORAL at 17:48

## 2022-11-11 RX ADMIN — DIVALPROEX SODIUM 500 MILLIGRAM(S): 500 TABLET, DELAYED RELEASE ORAL at 21:09

## 2022-11-11 RX ADMIN — LITHIUM CARBONATE 450 MILLIGRAM(S): 300 TABLET, EXTENDED RELEASE ORAL at 05:11

## 2022-11-11 RX ADMIN — RISPERIDONE 2 MILLIGRAM(S): 4 TABLET ORAL at 05:11

## 2022-11-11 NOTE — PROGRESS NOTE ADULT - SUBJECTIVE AND OBJECTIVE BOX
Patient is a 22y old  Male who presents with a chief complaint of Placement (10 Nov 2022 08:27)      OVERNIGHT EVENTS: remained stable,     REVIEW OF SYSTEMS:    CONSTITUTIONAL: No weakness, fevers or chills  EYES/ENT: No visual changes;  No vertigo or throat pain   NECK: No pain or stiffness  RESPIRATORY: No cough, wheezing, hemoptysis; No shortness of breath  CARDIOVASCULAR: No chest pain or palpitations  GASTROINTESTINAL: No abdominal or epigastric pain. No nausea, vomiting, or hematemesis; No diarrhea or constipation. No melena or hematochezia.  GENITOURINARY: No dysuria, frequency or hematuria  NEUROLOGICAL: No numbness or weakness  SKIN: No itching, rashes      SUBJECTIVE / INTERVAL HPI: Patient seen and examined at bedside.     VITAL SIGNS:  Vital Signs Last 24 Hrs  T(C): 36.2 (11 Nov 2022 05:00), Max: 36.3 (10 Nov 2022 22:17)  T(F): 97.2 (11 Nov 2022 05:00), Max: 97.3 (10 Nov 2022 22:17)  HR: 71 (11 Nov 2022 05:00) (71 - 86)  BP: 111/60 (11 Nov 2022 05:00) (108/62 - 113/70)  BP(mean): --  RR: 18 (11 Nov 2022 05:00) (18 - 18)  SpO2: --        PHYSICAL EXAM:    General: WDWN  HEENT: NC/AT; PERRL, clear conjunctiva  Neck: supple  Cardiovascular: +S1/S2; RRR  Respiratory: CTA b/l; no W/R/R  Gastrointestinal: soft, NT/ND; +BSx4  Extremities: WWP; 2+ peripheral pulses; no edema   Neurological: AAOx3; no focal deficits    MEDICATIONS:  MEDICATIONS  (STANDING):  diVALproex  milliGRAM(s) Oral daily  diVALproex  milliGRAM(s) Oral daily  diVALproex  milliGRAM(s) Oral at bedtime  diVALproex  milliGRAM(s) Oral at bedtime  lithium CR (ESKALITH-CR) 450 milliGRAM(s) Oral two times a day  risperiDONE   Tablet 2 milliGRAM(s) Oral two times a day    MEDICATIONS  (PRN):  hydrOXYzine hydrochloride 25 milliGRAM(s) Oral at bedtime PRN insomnia      ALLERGIES:  Allergies    No Known Allergies    Intolerances        LABS:              CAPILLARY BLOOD GLUCOSE          RADIOLOGY & ADDITIONAL TESTS: Reviewed.

## 2022-11-12 LAB — SARS-COV-2 RNA SPEC QL NAA+PROBE: SIGNIFICANT CHANGE UP

## 2022-11-12 PROCEDURE — 99231 SBSQ HOSP IP/OBS SF/LOW 25: CPT

## 2022-11-12 RX ADMIN — Medication 25 MILLIGRAM(S): at 01:40

## 2022-11-12 RX ADMIN — RISPERIDONE 2 MILLIGRAM(S): 4 TABLET ORAL at 06:01

## 2022-11-12 RX ADMIN — DIVALPROEX SODIUM 500 MILLIGRAM(S): 500 TABLET, DELAYED RELEASE ORAL at 11:34

## 2022-11-12 RX ADMIN — RISPERIDONE 2 MILLIGRAM(S): 4 TABLET ORAL at 17:59

## 2022-11-12 RX ADMIN — LITHIUM CARBONATE 450 MILLIGRAM(S): 300 TABLET, EXTENDED RELEASE ORAL at 17:59

## 2022-11-12 RX ADMIN — DIVALPROEX SODIUM 250 MILLIGRAM(S): 500 TABLET, DELAYED RELEASE ORAL at 11:30

## 2022-11-12 RX ADMIN — LITHIUM CARBONATE 450 MILLIGRAM(S): 300 TABLET, EXTENDED RELEASE ORAL at 06:02

## 2022-11-12 RX ADMIN — DIVALPROEX SODIUM 250 MILLIGRAM(S): 500 TABLET, DELAYED RELEASE ORAL at 21:00

## 2022-11-12 RX ADMIN — DIVALPROEX SODIUM 500 MILLIGRAM(S): 500 TABLET, DELAYED RELEASE ORAL at 21:00

## 2022-11-12 NOTE — PROGRESS NOTE ADULT - SUBJECTIVE AND OBJECTIVE BOX
Patient is a 22y old  Male who presents with a chief complaint of Placement (11 Nov 2022 08:36)      OVERNIGHT EVENTS: remained stable, laying on bed comfortably     SUBJECTIVE / INTERVAL HPI: Patient seen and examined at bedside.     VITAL SIGNS:  Vital Signs Last 24 Hrs  T(C): 36.2 (12 Nov 2022 05:08), Max: 36.2 (12 Nov 2022 05:08)  T(F): 97.1 (12 Nov 2022 05:08), Max: 97.1 (12 Nov 2022 05:08)  HR: 94 (12 Nov 2022 05:08) (94 - 95)  BP: 119/60 (12 Nov 2022 05:08) (119/57 - 122/71)  BP(mean): --  RR: 18 (12 Nov 2022 05:08) (18 - 18)  SpO2: --        PHYSICAL EXAM:    General: WDWN  HEENT: NC/AT; PERRL, clear conjunctiva  Neck: supple  Cardiovascular: +S1/S2; RRR  Respiratory: CTA b/l; no W/R/R  Gastrointestinal: soft, NT/ND; +BSx4  Extremities: WWP; 2+ peripheral pulses; no edema   Neurological: AAOx3; no focal deficits    MEDICATIONS:  MEDICATIONS  (STANDING):  diVALproex  milliGRAM(s) Oral daily  diVALproex  milliGRAM(s) Oral daily  diVALproex  milliGRAM(s) Oral at bedtime  diVALproex  milliGRAM(s) Oral at bedtime  lithium CR (ESKALITH-CR) 450 milliGRAM(s) Oral two times a day  risperiDONE   Tablet 2 milliGRAM(s) Oral two times a day    MEDICATIONS  (PRN):  hydrOXYzine hydrochloride 25 milliGRAM(s) Oral at bedtime PRN insomnia      ALLERGIES:  Allergies    No Known Allergies    Intolerances        LABS:              CAPILLARY BLOOD GLUCOSE          RADIOLOGY & ADDITIONAL TESTS: Reviewed.

## 2022-11-13 PROCEDURE — 99231 SBSQ HOSP IP/OBS SF/LOW 25: CPT

## 2022-11-13 RX ADMIN — RISPERIDONE 2 MILLIGRAM(S): 4 TABLET ORAL at 19:34

## 2022-11-13 RX ADMIN — RISPERIDONE 2 MILLIGRAM(S): 4 TABLET ORAL at 05:09

## 2022-11-13 RX ADMIN — DIVALPROEX SODIUM 250 MILLIGRAM(S): 500 TABLET, DELAYED RELEASE ORAL at 12:16

## 2022-11-13 RX ADMIN — DIVALPROEX SODIUM 250 MILLIGRAM(S): 500 TABLET, DELAYED RELEASE ORAL at 21:32

## 2022-11-13 RX ADMIN — DIVALPROEX SODIUM 500 MILLIGRAM(S): 500 TABLET, DELAYED RELEASE ORAL at 12:16

## 2022-11-13 RX ADMIN — LITHIUM CARBONATE 450 MILLIGRAM(S): 300 TABLET, EXTENDED RELEASE ORAL at 19:34

## 2022-11-13 RX ADMIN — DIVALPROEX SODIUM 500 MILLIGRAM(S): 500 TABLET, DELAYED RELEASE ORAL at 21:31

## 2022-11-13 RX ADMIN — LITHIUM CARBONATE 450 MILLIGRAM(S): 300 TABLET, EXTENDED RELEASE ORAL at 05:09

## 2022-11-13 NOTE — PROGRESS NOTE ADULT - SUBJECTIVE AND OBJECTIVE BOX
Patient is a 22y old  Male who presents with a chief complaint of Placement (12 Nov 2022 07:42)      OVERNIGHT EVENTS: remained stable    REVIEW OF SYSTEMS:    CONSTITUTIONAL: No weakness, fevers or chills  EYES/ENT: No visual changes;  No vertigo or throat pain   NECK: No pain or stiffness  RESPIRATORY: No cough, wheezing, hemoptysis; No shortness of breath  CARDIOVASCULAR: No chest pain or palpitations  GASTROINTESTINAL: No abdominal or epigastric pain. No nausea, vomiting, or hematemesis; No diarrhea or constipation. No melena or hematochezia.  GENITOURINARY: No dysuria, frequency or hematuria  NEUROLOGICAL: No numbness or weakness  SKIN: No itching, rashes      SUBJECTIVE / INTERVAL HPI: Patient seen and examined at bedside.     VITAL SIGNS:  Vital Signs Last 24 Hrs  T(C): 36.1 (13 Nov 2022 05:20), Max: 36.8 (12 Nov 2022 20:40)  T(F): 97 (13 Nov 2022 05:20), Max: 98.2 (12 Nov 2022 20:40)  HR: 91 (13 Nov 2022 05:20) (69 - 91)  BP: 119/57 (13 Nov 2022 05:20) (117/67 - 119/57)  BP(mean): --  RR: 18 (13 Nov 2022 05:20) (18 - 18)  SpO2: --        PHYSICAL EXAM:    General: WDWN  HEENT: NC/AT; PERRL, clear conjunctiva  Neck: supple  Cardiovascular: +S1/S2; RRR  Respiratory: CTA b/l; no W/R/R  Gastrointestinal: soft, NT/ND; +BSx4  Extremities: WWP; 2+ peripheral pulses; no edema   Neurological: AAOx3; no focal deficits    MEDICATIONS:  MEDICATIONS  (STANDING):  diVALproex  milliGRAM(s) Oral daily  diVALproex  milliGRAM(s) Oral daily  diVALproex  milliGRAM(s) Oral at bedtime  diVALproex  milliGRAM(s) Oral at bedtime  lithium CR (ESKALITH-CR) 450 milliGRAM(s) Oral two times a day  risperiDONE   Tablet 2 milliGRAM(s) Oral two times a day    MEDICATIONS  (PRN):  hydrOXYzine hydrochloride 25 milliGRAM(s) Oral at bedtime PRN insomnia      ALLERGIES:  Allergies    No Known Allergies    Intolerances        LABS:              CAPILLARY BLOOD GLUCOSE          RADIOLOGY & ADDITIONAL TESTS: Reviewed.

## 2022-11-14 LAB — SARS-COV-2 RNA SPEC QL NAA+PROBE: SIGNIFICANT CHANGE UP

## 2022-11-14 PROCEDURE — 99231 SBSQ HOSP IP/OBS SF/LOW 25: CPT

## 2022-11-14 RX ADMIN — DIVALPROEX SODIUM 500 MILLIGRAM(S): 500 TABLET, DELAYED RELEASE ORAL at 11:13

## 2022-11-14 RX ADMIN — LITHIUM CARBONATE 450 MILLIGRAM(S): 300 TABLET, EXTENDED RELEASE ORAL at 05:36

## 2022-11-14 RX ADMIN — RISPERIDONE 2 MILLIGRAM(S): 4 TABLET ORAL at 05:36

## 2022-11-14 RX ADMIN — LITHIUM CARBONATE 450 MILLIGRAM(S): 300 TABLET, EXTENDED RELEASE ORAL at 17:41

## 2022-11-14 RX ADMIN — RISPERIDONE 2 MILLIGRAM(S): 4 TABLET ORAL at 17:41

## 2022-11-14 RX ADMIN — DIVALPROEX SODIUM 250 MILLIGRAM(S): 500 TABLET, DELAYED RELEASE ORAL at 11:13

## 2022-11-14 RX ADMIN — DIVALPROEX SODIUM 500 MILLIGRAM(S): 500 TABLET, DELAYED RELEASE ORAL at 21:20

## 2022-11-14 NOTE — PROGRESS NOTE ADULT - SUBJECTIVE AND OBJECTIVE BOX
Patient is a 22y old  Male who presents with a chief complaint of Placement (13 Nov 2022 12:50)      OVERNIGHT EVENTS: remained stable    REVIEW OF SYSTEMS:    CONSTITUTIONAL: No weakness, fevers or chills  EYES/ENT: No visual changes;  No vertigo or throat pain   NECK: No pain or stiffness  RESPIRATORY: No cough, wheezing, hemoptysis; No shortness of breath  CARDIOVASCULAR: No chest pain or palpitations  GASTROINTESTINAL: No abdominal or epigastric pain. No nausea, vomiting, or hematemesis; No diarrhea or constipation. No melena or hematochezia.  GENITOURINARY: No dysuria, frequency or hematuria  NEUROLOGICAL: No numbness or weakness  SKIN: No itching, rashes      SUBJECTIVE / INTERVAL HPI: Patient seen and examined at bedside.     VITAL SIGNS:  Vital Signs Last 24 Hrs  T(C): 36.4 (14 Nov 2022 05:08), Max: 36.4 (14 Nov 2022 05:08)  T(F): 97.6 (14 Nov 2022 05:08), Max: 97.6 (14 Nov 2022 05:08)  HR: 90 (14 Nov 2022 05:08) (90 - 99)  BP: 98/65 (14 Nov 2022 05:08) (98/65 - 135/65)  BP(mean): --  RR: 18 (14 Nov 2022 05:08) (18 - 18)  SpO2: 97% (13 Nov 2022 14:30) (97% - 97%)    Parameters below as of 13 Nov 2022 14:30  Patient On (Oxygen Delivery Method): room air        PHYSICAL EXAM:    General: WDWN  HEENT: NC/AT; PERRL, clear conjunctiva  Neck: supple  Cardiovascular: +S1/S2; RRR  Respiratory: CTA b/l; no W/R/R  Gastrointestinal: soft, NT/ND; +BSx4  Extremities: WWP; 2+ peripheral pulses; no edema   Neurological: AAOx3; no focal deficits    MEDICATIONS:  MEDICATIONS  (STANDING):  diVALproex  milliGRAM(s) Oral daily  diVALproex  milliGRAM(s) Oral daily  diVALproex  milliGRAM(s) Oral at bedtime  diVALproex  milliGRAM(s) Oral at bedtime  lithium CR (ESKALITH-CR) 450 milliGRAM(s) Oral two times a day  risperiDONE   Tablet 2 milliGRAM(s) Oral two times a day    MEDICATIONS  (PRN):  hydrOXYzine hydrochloride 25 milliGRAM(s) Oral at bedtime PRN insomnia      ALLERGIES:  Allergies    No Known Allergies    Intolerances        LABS:              CAPILLARY BLOOD GLUCOSE          RADIOLOGY & ADDITIONAL TESTS: Reviewed.

## 2022-11-15 PROCEDURE — 99231 SBSQ HOSP IP/OBS SF/LOW 25: CPT

## 2022-11-15 RX ADMIN — DIVALPROEX SODIUM 500 MILLIGRAM(S): 500 TABLET, DELAYED RELEASE ORAL at 11:04

## 2022-11-15 RX ADMIN — LITHIUM CARBONATE 450 MILLIGRAM(S): 300 TABLET, EXTENDED RELEASE ORAL at 17:34

## 2022-11-15 RX ADMIN — RISPERIDONE 2 MILLIGRAM(S): 4 TABLET ORAL at 05:38

## 2022-11-15 RX ADMIN — Medication 25 MILLIGRAM(S): at 01:10

## 2022-11-15 RX ADMIN — DIVALPROEX SODIUM 250 MILLIGRAM(S): 500 TABLET, DELAYED RELEASE ORAL at 11:04

## 2022-11-15 RX ADMIN — LITHIUM CARBONATE 450 MILLIGRAM(S): 300 TABLET, EXTENDED RELEASE ORAL at 05:38

## 2022-11-15 RX ADMIN — DIVALPROEX SODIUM 500 MILLIGRAM(S): 500 TABLET, DELAYED RELEASE ORAL at 21:41

## 2022-11-15 RX ADMIN — RISPERIDONE 2 MILLIGRAM(S): 4 TABLET ORAL at 17:34

## 2022-11-15 RX ADMIN — DIVALPROEX SODIUM 250 MILLIGRAM(S): 500 TABLET, DELAYED RELEASE ORAL at 21:41

## 2022-11-15 NOTE — PROGRESS NOTE ADULT - SUBJECTIVE AND OBJECTIVE BOX
Patient is a 22y old  Male who presents with a chief complaint of Placement (14 Nov 2022 08:47)      OVERNIGHT EVENTS: remained stable    REVIEW OF SYSTEMS:    CONSTITUTIONAL: No weakness, fevers or chills  EYES/ENT: No visual changes;  No vertigo or throat pain   NECK: No pain or stiffness  RESPIRATORY: No cough, wheezing, hemoptysis; No shortness of breath  CARDIOVASCULAR: No chest pain or palpitations  GASTROINTESTINAL: No abdominal or epigastric pain. No nausea, vomiting, or hematemesis; No diarrhea or constipation. No melena or hematochezia.  GENITOURINARY: No dysuria, frequency or hematuria  NEUROLOGICAL: No numbness or weakness  SKIN: No itching, rashes      SUBJECTIVE / INTERVAL HPI: Patient seen and examined at bedside.     VITAL SIGNS:  Vital Signs Last 24 Hrs  T(C): 36.5 (15 Nov 2022 04:46), Max: 36.6 (14 Nov 2022 14:00)  T(F): 97.7 (15 Nov 2022 04:46), Max: 97.8 (14 Nov 2022 14:00)  HR: 78 (15 Nov 2022 04:46) (74 - 85)  BP: 116/64 (15 Nov 2022 04:46) (114/58 - 117/70)  BP(mean): --  RR: 18 (15 Nov 2022 04:46) (18 - 18)  SpO2: --        PHYSICAL EXAM:    General: WDWN  HEENT: NC/AT; PERRL, clear conjunctiva  Neck: supple  Cardiovascular: +S1/S2; RRR  Respiratory: CTA b/l; no W/R/R  Gastrointestinal: soft, NT/ND; +BSx4  Extremities: WWP; 2+ peripheral pulses; no edema   Neurological: AAOx3; no focal deficits    MEDICATIONS:  MEDICATIONS  (STANDING):  diVALproex  milliGRAM(s) Oral daily  diVALproex  milliGRAM(s) Oral daily  diVALproex  milliGRAM(s) Oral at bedtime  diVALproex  milliGRAM(s) Oral at bedtime  lithium CR (ESKALITH-CR) 450 milliGRAM(s) Oral two times a day  risperiDONE   Tablet 2 milliGRAM(s) Oral two times a day    MEDICATIONS  (PRN):  hydrOXYzine hydrochloride 25 milliGRAM(s) Oral at bedtime PRN insomnia      ALLERGIES:  Allergies    No Known Allergies    Intolerances        LABS:              CAPILLARY BLOOD GLUCOSE          RADIOLOGY & ADDITIONAL TESTS: Reviewed.

## 2022-11-16 PROCEDURE — 99231 SBSQ HOSP IP/OBS SF/LOW 25: CPT

## 2022-11-16 RX ADMIN — LITHIUM CARBONATE 450 MILLIGRAM(S): 300 TABLET, EXTENDED RELEASE ORAL at 17:37

## 2022-11-16 RX ADMIN — RISPERIDONE 2 MILLIGRAM(S): 4 TABLET ORAL at 06:01

## 2022-11-16 RX ADMIN — DIVALPROEX SODIUM 250 MILLIGRAM(S): 500 TABLET, DELAYED RELEASE ORAL at 11:24

## 2022-11-16 RX ADMIN — RISPERIDONE 2 MILLIGRAM(S): 4 TABLET ORAL at 17:37

## 2022-11-16 RX ADMIN — LITHIUM CARBONATE 450 MILLIGRAM(S): 300 TABLET, EXTENDED RELEASE ORAL at 06:01

## 2022-11-16 RX ADMIN — Medication 25 MILLIGRAM(S): at 00:01

## 2022-11-16 RX ADMIN — DIVALPROEX SODIUM 500 MILLIGRAM(S): 500 TABLET, DELAYED RELEASE ORAL at 11:25

## 2022-11-16 RX ADMIN — DIVALPROEX SODIUM 500 MILLIGRAM(S): 500 TABLET, DELAYED RELEASE ORAL at 21:23

## 2022-11-16 RX ADMIN — DIVALPROEX SODIUM 250 MILLIGRAM(S): 500 TABLET, DELAYED RELEASE ORAL at 21:23

## 2022-11-16 NOTE — PROGRESS NOTE ADULT - SUBJECTIVE AND OBJECTIVE BOX
Patient is a 22y old  Male who presents with a chief complaint of Placement (14 Nov 2022 08:47)    Subjective: patient seen and examined at bedside; no complaints; patient was resting on the bed.    REVIEW OF SYSTEMS:    CONSTITUTIONAL: No weakness, fevers or chills  EYES/ENT: No visual changes;  No vertigo or throat pain   NECK: No pain or stiffness  RESPIRATORY: No cough, wheezing, hemoptysis; No shortness of breath  CARDIOVASCULAR: No chest pain or palpitations  GASTROINTESTINAL: No abdominal or epigastric pain. No nausea, vomiting, or hematemesis; No diarrhea or constipation. No melena or hematochezia.  GENITOURINARY: No dysuria, frequency or hematuria  NEUROLOGICAL: No numbness or weakness  SKIN: No itching, rashes      SUBJECTIVE / INTERVAL HPI: Patient seen and examined at bedside.   Vital Signs Last 24 Hrs  T(C): 36.6 (16 Nov 2022 05:42), Max: 36.9 (15 Nov 2022 13:23)  T(F): 97.9 (16 Nov 2022 05:42), Max: 98.4 (15 Nov 2022 13:23)  HR: 80 (16 Nov 2022 05:42) (75 - 80)  BP: 100/55 (16 Nov 2022 05:42) (100/55 - 123/69)  BP(mean): --  RR: 18 (16 Nov 2022 05:42) (16 - 18)  SpO2: --    PHYSICAL EXAM:    General: NAD, comfortable, pleasant   HEENT: NC/AT; PERRL, clear conjunctiva  Neck: supple  Cardiovascular: +S1/S2; RRR  Respiratory: CTA b/l; no W/R/R  Gastrointestinal: soft, NT/ND; +BSx4  Extremities: WWP; 2+ peripheral pulses; no edema   Neurological: AAOx3; no focal deficits    MEDICATIONS  (STANDING):  diVALproex  milliGRAM(s) Oral daily  diVALproex  milliGRAM(s) Oral daily  diVALproex  milliGRAM(s) Oral at bedtime  diVALproex  milliGRAM(s) Oral at bedtime  lithium CR (ESKALITH-CR) 450 milliGRAM(s) Oral two times a day  risperiDONE   Tablet 2 milliGRAM(s) Oral two times a day    MEDICATIONS  (PRN):  hydrOXYzine hydrochloride 25 milliGRAM(s) Oral at bedtime PRN insomnia        ALLERGIES:  Allergies    No Known Allergies    Intolerances        LABS:              CAPILLARY BLOOD GLUCOSE          RADIOLOGY & ADDITIONAL TESTS: Reviewed.

## 2022-11-16 NOTE — PROGRESS NOTE ADULT - ASSESSMENT
22 years old male past medical history of bipolar and developmental delay came to emergency room for   alleged aggressive and violent behavior as per step father. Evaluated by Psych; Step Father left stated pt needs placement refusing to take child home.     # Bipolar disorder and likely developmental disorder- stable  - continue current meds (on depakote, risperidone, and lithium)   - pt has been stable without aggressive behaviour in the hospital  - father states he cant take care of pt    # Obesity     DVT px - pt ambulatory     DISPO: pending placement

## 2022-11-17 PROCEDURE — 99231 SBSQ HOSP IP/OBS SF/LOW 25: CPT

## 2022-11-17 RX ADMIN — LITHIUM CARBONATE 450 MILLIGRAM(S): 300 TABLET, EXTENDED RELEASE ORAL at 05:26

## 2022-11-17 RX ADMIN — DIVALPROEX SODIUM 500 MILLIGRAM(S): 500 TABLET, DELAYED RELEASE ORAL at 11:53

## 2022-11-17 RX ADMIN — DIVALPROEX SODIUM 250 MILLIGRAM(S): 500 TABLET, DELAYED RELEASE ORAL at 11:53

## 2022-11-17 RX ADMIN — DIVALPROEX SODIUM 500 MILLIGRAM(S): 500 TABLET, DELAYED RELEASE ORAL at 21:31

## 2022-11-17 RX ADMIN — LITHIUM CARBONATE 450 MILLIGRAM(S): 300 TABLET, EXTENDED RELEASE ORAL at 17:43

## 2022-11-17 RX ADMIN — RISPERIDONE 2 MILLIGRAM(S): 4 TABLET ORAL at 05:26

## 2022-11-17 RX ADMIN — DIVALPROEX SODIUM 250 MILLIGRAM(S): 500 TABLET, DELAYED RELEASE ORAL at 21:30

## 2022-11-17 RX ADMIN — Medication 25 MILLIGRAM(S): at 00:02

## 2022-11-17 RX ADMIN — RISPERIDONE 2 MILLIGRAM(S): 4 TABLET ORAL at 17:43

## 2022-11-17 NOTE — PROGRESS NOTE ADULT - SUBJECTIVE AND OBJECTIVE BOX
Patient is a 22y old  Male who presents with a chief complaint of Placement (14 Nov 2022 08:47)    Subjective: patient seen and examined at bedside; no complaints; patient was resting on the bed and listening to music    REVIEW OF SYSTEMS:    CONSTITUTIONAL: No weakness, fevers or chills  EYES/ENT: No visual changes;  No vertigo or throat pain   NECK: No pain or stiffness  RESPIRATORY: No cough, wheezing, hemoptysis; No shortness of breath  CARDIOVASCULAR: No chest pain or palpitations  GASTROINTESTINAL: No abdominal or epigastric pain. No nausea, vomiting, or hematemesis; No diarrhea or constipation. No melena or hematochezia.  GENITOURINARY: No dysuria, frequency or hematuria  NEUROLOGICAL: No numbness or weakness  SKIN: No itching, rashes      Vital Signs Last 24 Hrs  T(C): 36.4 (17 Nov 2022 04:24), Max: 36.4 (16 Nov 2022 20:55)  T(F): 97.6 (17 Nov 2022 04:24), Max: 97.6 (17 Nov 2022 04:24)  HR: 73 (17 Nov 2022 04:24) (73 - 94)  BP: 95/56 (17 Nov 2022 04:24) (95/56 - 134/69)  BP(mean): --  RR: 16 (17 Nov 2022 04:24) (16 - 16)  SpO2: --  PHYSICAL EXAM:    General: NAD, comfortable, pleasant   HEENT: NC/AT; PERRL, clear conjunctiva  Neck: supple  Cardiovascular: +S1/S2; RRR  Respiratory: CTA b/l; no W/R/R  Gastrointestinal: soft, NT/ND; +BSx4  Extremities: WWP; 2+ peripheral pulses; no edema   Neurological: AAOx3; no focal deficits      MEDICATIONS  (STANDING):  diVALproex  milliGRAM(s) Oral daily  diVALproex  milliGRAM(s) Oral daily  diVALproex  milliGRAM(s) Oral at bedtime  diVALproex  milliGRAM(s) Oral at bedtime  lithium CR (ESKALITH-CR) 450 milliGRAM(s) Oral two times a day  risperiDONE   Tablet 2 milliGRAM(s) Oral two times a day    MEDICATIONS  (PRN):  hydrOXYzine hydrochloride 25 milliGRAM(s) Oral at bedtime PRN insomnia      ALLERGIES:  Allergies    No Known Allergies    Intolerances        LABS:              CAPILLARY BLOOD GLUCOSE          RADIOLOGY & ADDITIONAL TESTS: Reviewed.

## 2022-11-18 PROCEDURE — 99231 SBSQ HOSP IP/OBS SF/LOW 25: CPT

## 2022-11-18 RX ADMIN — RISPERIDONE 2 MILLIGRAM(S): 4 TABLET ORAL at 17:35

## 2022-11-18 RX ADMIN — LITHIUM CARBONATE 450 MILLIGRAM(S): 300 TABLET, EXTENDED RELEASE ORAL at 05:28

## 2022-11-18 RX ADMIN — Medication 25 MILLIGRAM(S): at 01:00

## 2022-11-18 RX ADMIN — DIVALPROEX SODIUM 500 MILLIGRAM(S): 500 TABLET, DELAYED RELEASE ORAL at 21:45

## 2022-11-18 RX ADMIN — DIVALPROEX SODIUM 250 MILLIGRAM(S): 500 TABLET, DELAYED RELEASE ORAL at 12:58

## 2022-11-18 RX ADMIN — RISPERIDONE 2 MILLIGRAM(S): 4 TABLET ORAL at 05:29

## 2022-11-18 RX ADMIN — DIVALPROEX SODIUM 500 MILLIGRAM(S): 500 TABLET, DELAYED RELEASE ORAL at 12:59

## 2022-11-18 RX ADMIN — LITHIUM CARBONATE 450 MILLIGRAM(S): 300 TABLET, EXTENDED RELEASE ORAL at 17:35

## 2022-11-18 RX ADMIN — DIVALPROEX SODIUM 250 MILLIGRAM(S): 500 TABLET, DELAYED RELEASE ORAL at 21:45

## 2022-11-18 NOTE — PROGRESS NOTE ADULT - SUBJECTIVE AND OBJECTIVE BOX
Patient is a 22y old  Male who presents with a chief complaint of Placement (14 Nov 2022 08:47)    Subjective: patient seen and examined at bedside; no complaints; patient was resting on the bed and listening to music    REVIEW OF SYSTEMS:    CONSTITUTIONAL: No weakness, fevers or chills  EYES/ENT: No visual changes;  No vertigo or throat pain   NECK: No pain or stiffness  RESPIRATORY: No cough, wheezing, hemoptysis; No shortness of breath  CARDIOVASCULAR: No chest pain or palpitations  GASTROINTESTINAL: No abdominal or epigastric pain. No nausea, vomiting, or hematemesis; No diarrhea or constipation. No melena or hematochezia.  GENITOURINARY: No dysuria, frequency or hematuria  NEUROLOGICAL: No numbness or weakness  SKIN: No itching, rashes      Vital Signs Last 24 Hrs  T(C): 35.9 (18 Nov 2022 04:53), Max: 36.7 (17 Nov 2022 13:10)  T(F): 96.7 (18 Nov 2022 04:53), Max: 98 (17 Nov 2022 13:10)  HR: 69 (18 Nov 2022 04:53) (69 - 93)  BP: 100/59 (18 Nov 2022 04:53) (100/59 - 124/69)  BP(mean): --  RR: 16 (18 Nov 2022 04:53) (16 - 16)  SpO2: --  General: NAD, comfortable, pleasant   HEENT: NC/AT; PERRL, clear conjunctiva  Neck: supple  Cardiovascular: +S1/S2; RRR  Respiratory: CTA b/l; no W/R/R  Gastrointestinal: soft, NT/ND; +BSx4  Extremities: WWP; 2+ peripheral pulses; no edema   Neurological: AAOx3; no focal deficits      MEDICATIONS  (STANDING):  diVALproex  milliGRAM(s) Oral daily  diVALproex  milliGRAM(s) Oral daily  diVALproex  milliGRAM(s) Oral at bedtime  diVALproex  milliGRAM(s) Oral at bedtime  lithium CR (ESKALITH-CR) 450 milliGRAM(s) Oral two times a day  risperiDONE   Tablet 2 milliGRAM(s) Oral two times a day    MEDICATIONS  (PRN):  hydrOXYzine hydrochloride 25 milliGRAM(s) Oral at bedtime PRN insomnia      ALLERGIES:  Allergies    No Known Allergies    Intolerances        LABS:              CAPILLARY BLOOD GLUCOSE          RADIOLOGY & ADDITIONAL TESTS: Reviewed.

## 2022-11-19 PROCEDURE — 99231 SBSQ HOSP IP/OBS SF/LOW 25: CPT

## 2022-11-19 RX ADMIN — RISPERIDONE 2 MILLIGRAM(S): 4 TABLET ORAL at 17:48

## 2022-11-19 RX ADMIN — DIVALPROEX SODIUM 250 MILLIGRAM(S): 500 TABLET, DELAYED RELEASE ORAL at 12:32

## 2022-11-19 RX ADMIN — LITHIUM CARBONATE 450 MILLIGRAM(S): 300 TABLET, EXTENDED RELEASE ORAL at 05:16

## 2022-11-19 RX ADMIN — DIVALPROEX SODIUM 500 MILLIGRAM(S): 500 TABLET, DELAYED RELEASE ORAL at 12:32

## 2022-11-19 RX ADMIN — LITHIUM CARBONATE 450 MILLIGRAM(S): 300 TABLET, EXTENDED RELEASE ORAL at 17:48

## 2022-11-19 RX ADMIN — RISPERIDONE 2 MILLIGRAM(S): 4 TABLET ORAL at 05:16

## 2022-11-19 RX ADMIN — DIVALPROEX SODIUM 500 MILLIGRAM(S): 500 TABLET, DELAYED RELEASE ORAL at 21:53

## 2022-11-19 RX ADMIN — DIVALPROEX SODIUM 250 MILLIGRAM(S): 500 TABLET, DELAYED RELEASE ORAL at 21:54

## 2022-11-19 NOTE — PROGRESS NOTE ADULT - SUBJECTIVE AND OBJECTIVE BOX
Patient is a 22y old  Male who presents with a chief complaint of Placement (14 Nov 2022 08:47)    Subjective: patient seen and examined at bedside; no complaints; patient reports that his mood is down. Per RN, patient has been expressive negative thoughts this AM and showing some signs of jealousy.     REVIEW OF SYSTEMS:    CONSTITUTIONAL: No weakness, fevers or chills  EYES/ENT: No visual changes;  No vertigo or throat pain   NECK: No pain or stiffness  RESPIRATORY: No cough, wheezing, hemoptysis; No shortness of breath  CARDIOVASCULAR: No chest pain or palpitations  GASTROINTESTINAL: No abdominal or epigastric pain. No nausea, vomiting, or hematemesis; No diarrhea or constipation. No melena or hematochezia.  GENITOURINARY: No dysuria, frequency or hematuria  NEUROLOGICAL: No numbness or weakness  SKIN: No itching, rashes      Vital Signs Last 24 Hrs  T(C): 36.3 (19 Nov 2022 04:43), Max: 36.4 (18 Nov 2022 21:11)  T(F): 97.3 (19 Nov 2022 04:43), Max: 97.5 (18 Nov 2022 21:11)  HR: 79 (19 Nov 2022 04:43) (77 - 88)  BP: 96/56 (19 Nov 2022 04:43) (96/56 - 134/82)  BP(mean): --  RR: 16 (19 Nov 2022 04:43) (16 - 16)  SpO2: --  General: NAD, comfortable, pleasant   HEENT: NC/AT; PERRL, clear conjunctiva  Neck: supple  Cardiovascular: +S1/S2; RRR  Respiratory: CTA b/l; no W/R/R  Gastrointestinal: soft, NT/ND; +BSx4  Extremities: WWP; 2+ peripheral pulses; no edema   Neurological: AAOx3; no focal deficits      MEDICATIONS  (STANDING):  diVALproex  milliGRAM(s) Oral daily  diVALproex  milliGRAM(s) Oral daily  diVALproex  milliGRAM(s) Oral at bedtime  diVALproex  milliGRAM(s) Oral at bedtime  lithium CR (ESKALITH-CR) 450 milliGRAM(s) Oral two times a day  risperiDONE   Tablet 2 milliGRAM(s) Oral two times a day    MEDICATIONS  (PRN):  hydrOXYzine hydrochloride 25 milliGRAM(s) Oral at bedtime PRN insomnia      ALLERGIES:  Allergies    No Known Allergies    Intolerances        LABS:              CAPILLARY BLOOD GLUCOSE          RADIOLOGY & ADDITIONAL TESTS: Reviewed.
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
## 2022-11-20 PROCEDURE — 99231 SBSQ HOSP IP/OBS SF/LOW 25: CPT

## 2022-11-20 RX ADMIN — DIVALPROEX SODIUM 500 MILLIGRAM(S): 500 TABLET, DELAYED RELEASE ORAL at 22:19

## 2022-11-20 RX ADMIN — DIVALPROEX SODIUM 250 MILLIGRAM(S): 500 TABLET, DELAYED RELEASE ORAL at 13:32

## 2022-11-20 RX ADMIN — LITHIUM CARBONATE 450 MILLIGRAM(S): 300 TABLET, EXTENDED RELEASE ORAL at 05:48

## 2022-11-20 RX ADMIN — LITHIUM CARBONATE 450 MILLIGRAM(S): 300 TABLET, EXTENDED RELEASE ORAL at 17:13

## 2022-11-20 RX ADMIN — RISPERIDONE 2 MILLIGRAM(S): 4 TABLET ORAL at 05:49

## 2022-11-20 RX ADMIN — Medication 25 MILLIGRAM(S): at 00:53

## 2022-11-20 RX ADMIN — RISPERIDONE 2 MILLIGRAM(S): 4 TABLET ORAL at 17:13

## 2022-11-20 RX ADMIN — DIVALPROEX SODIUM 500 MILLIGRAM(S): 500 TABLET, DELAYED RELEASE ORAL at 13:33

## 2022-11-20 RX ADMIN — DIVALPROEX SODIUM 250 MILLIGRAM(S): 500 TABLET, DELAYED RELEASE ORAL at 22:20
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
## 2022-11-20 NOTE — PROGRESS NOTE ADULT - SUBJECTIVE AND OBJECTIVE BOX
Patient is a 22y old  Male who presents with a chief complaint of Placement (14 Nov 2022 08:47)    Subjective: patient seen and examined at bedside; no complaints; patient reports that his mood is still down but slightly better than yesterday.     REVIEW OF SYSTEMS:    CONSTITUTIONAL: No weakness, fevers or chills  EYES/ENT: No visual changes;  No vertigo or throat pain   NECK: No pain or stiffness  RESPIRATORY: No cough, wheezing, hemoptysis; No shortness of breath  CARDIOVASCULAR: No chest pain or palpitations  GASTROINTESTINAL: No abdominal or epigastric pain. No nausea, vomiting, or hematemesis; No diarrhea or constipation. No melena or hematochezia.  GENITOURINARY: No dysuria, frequency or hematuria  NEUROLOGICAL: No numbness or weakness  SKIN: No itching, rashes      Vital Signs Last 24 Hrs  T(C): 36.3 (20 Nov 2022 05:31), Max: 36.3 (20 Nov 2022 05:31)  T(F): 97.4 (20 Nov 2022 05:31), Max: 97.4 (20 Nov 2022 05:31)  HR: 83 (20 Nov 2022 05:31) (77 - 83)  BP: 105/58 (20 Nov 2022 05:31) (99/57 - 105/58)  BP(mean): --  RR: 18 (20 Nov 2022 05:31) (18 - 18)  SpO2: --  General: NAD, comfortable, pleasant   HEENT: NC/AT; PERRL, clear conjunctiva  Neck: supple  Cardiovascular: +S1/S2; RRR  Respiratory: CTA b/l; no W/R/R  Gastrointestinal: soft, NT/ND; +BSx4  Extremities: WWP; 2+ peripheral pulses; no edema   Neurological: AAOx3; no focal deficits      MEDICATIONS  (STANDING):  diVALproex  milliGRAM(s) Oral daily  diVALproex  milliGRAM(s) Oral daily  diVALproex  milliGRAM(s) Oral at bedtime  diVALproex  milliGRAM(s) Oral at bedtime  lithium CR (ESKALITH-CR) 450 milliGRAM(s) Oral two times a day  risperiDONE   Tablet 2 milliGRAM(s) Oral two times a day    MEDICATIONS  (PRN):  hydrOXYzine hydrochloride 25 milliGRAM(s) Oral at bedtime PRN insomnia      ALLERGIES:  Allergies    No Known Allergies    Intolerances        LABS:              CAPILLARY BLOOD GLUCOSE          RADIOLOGY & ADDITIONAL TESTS: Reviewed.
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
## 2022-11-21 LAB
ALBUMIN SERPL ELPH-MCNC: 4.2 G/DL — SIGNIFICANT CHANGE UP (ref 3.5–5.2)
ALP SERPL-CCNC: 70 U/L — SIGNIFICANT CHANGE UP (ref 30–115)
ALT FLD-CCNC: 21 U/L — SIGNIFICANT CHANGE UP (ref 13–38)
ANION GAP SERPL CALC-SCNC: 13 MMOL/L — SIGNIFICANT CHANGE UP (ref 7–14)
AST SERPL-CCNC: 18 U/L — SIGNIFICANT CHANGE UP (ref 13–38)
BILIRUB SERPL-MCNC: 0.2 MG/DL — SIGNIFICANT CHANGE UP (ref 0.2–1.2)
BUN SERPL-MCNC: 12 MG/DL — SIGNIFICANT CHANGE UP (ref 10–20)
CALCIUM SERPL-MCNC: 9.4 MG/DL — SIGNIFICANT CHANGE UP (ref 8.4–10.5)
CHLORIDE SERPL-SCNC: 101 MMOL/L — SIGNIFICANT CHANGE UP (ref 98–110)
CO2 SERPL-SCNC: 25 MMOL/L — SIGNIFICANT CHANGE UP (ref 17–32)
CREAT SERPL-MCNC: 0.8 MG/DL — SIGNIFICANT CHANGE UP (ref 0.7–1.5)
EGFR: 130 ML/MIN/1.73M2 — SIGNIFICANT CHANGE UP
GLUCOSE SERPL-MCNC: 87 MG/DL — SIGNIFICANT CHANGE UP (ref 70–99)
HCT VFR BLD CALC: 38.9 % — LOW (ref 42–52)
HGB BLD-MCNC: 13.6 G/DL — LOW (ref 14–18)
LITHIUM SERPL-MCNC: 0.8 MMOL/L — SIGNIFICANT CHANGE UP (ref 0.6–1.2)
MCHC RBC-ENTMCNC: 29.6 PG — SIGNIFICANT CHANGE UP (ref 27–31)
MCHC RBC-ENTMCNC: 35 G/DL — SIGNIFICANT CHANGE UP (ref 32–37)
MCV RBC AUTO: 84.7 FL — SIGNIFICANT CHANGE UP (ref 80–94)
NRBC # BLD: 0 /100 WBCS — SIGNIFICANT CHANGE UP (ref 0–0)
PLATELET # BLD AUTO: 244 K/UL — SIGNIFICANT CHANGE UP (ref 130–400)
POTASSIUM SERPL-MCNC: 4.4 MMOL/L — SIGNIFICANT CHANGE UP (ref 3.5–5)
POTASSIUM SERPL-SCNC: 4.4 MMOL/L — SIGNIFICANT CHANGE UP (ref 3.5–5)
PROT SERPL-MCNC: 6.5 G/DL — SIGNIFICANT CHANGE UP (ref 6–8)
RBC # BLD: 4.59 M/UL — LOW (ref 4.7–6.1)
RBC # FLD: 11.9 % — SIGNIFICANT CHANGE UP (ref 11.5–14.5)
SODIUM SERPL-SCNC: 139 MMOL/L — SIGNIFICANT CHANGE UP (ref 135–146)
VALPROATE SERPL-MCNC: 76 UG/ML — SIGNIFICANT CHANGE UP (ref 50–100)
WBC # BLD: 9.12 K/UL — SIGNIFICANT CHANGE UP (ref 4.8–10.8)
WBC # FLD AUTO: 9.12 K/UL — SIGNIFICANT CHANGE UP (ref 4.8–10.8)

## 2022-11-21 PROCEDURE — 99231 SBSQ HOSP IP/OBS SF/LOW 25: CPT

## 2022-11-21 RX ADMIN — RISPERIDONE 2 MILLIGRAM(S): 4 TABLET ORAL at 17:19

## 2022-11-21 RX ADMIN — DIVALPROEX SODIUM 250 MILLIGRAM(S): 500 TABLET, DELAYED RELEASE ORAL at 11:40

## 2022-11-21 RX ADMIN — DIVALPROEX SODIUM 500 MILLIGRAM(S): 500 TABLET, DELAYED RELEASE ORAL at 11:40

## 2022-11-21 RX ADMIN — LITHIUM CARBONATE 450 MILLIGRAM(S): 300 TABLET, EXTENDED RELEASE ORAL at 05:08

## 2022-11-21 RX ADMIN — LITHIUM CARBONATE 450 MILLIGRAM(S): 300 TABLET, EXTENDED RELEASE ORAL at 17:19

## 2022-11-21 RX ADMIN — DIVALPROEX SODIUM 500 MILLIGRAM(S): 500 TABLET, DELAYED RELEASE ORAL at 21:02

## 2022-11-21 RX ADMIN — Medication 25 MILLIGRAM(S): at 21:02

## 2022-11-21 RX ADMIN — DIVALPROEX SODIUM 250 MILLIGRAM(S): 500 TABLET, DELAYED RELEASE ORAL at 21:02

## 2022-11-21 RX ADMIN — RISPERIDONE 2 MILLIGRAM(S): 4 TABLET ORAL at 05:08

## 2022-11-21 NOTE — PROGRESS NOTE ADULT - SUBJECTIVE AND OBJECTIVE BOX
Patient is a 22y old  Male who presents with a chief complaint of Placement (14 Nov 2022 08:47)    Subjective: patient seen and examined at bedside; no complaints; patient reports that his mood is still down but slightly better than yesterday. He was watching something on his iPAD.    REVIEW OF SYSTEMS:    CONSTITUTIONAL: No weakness, fevers or chills  EYES/ENT: No visual changes;  No vertigo or throat pain   NECK: No pain or stiffness  RESPIRATORY: No cough, wheezing, hemoptysis; No shortness of breath  CARDIOVASCULAR: No chest pain or palpitations  GASTROINTESTINAL: No abdominal or epigastric pain. No nausea, vomiting, or hematemesis; No diarrhea or constipation. No melena or hematochezia.  GENITOURINARY: No dysuria, frequency or hematuria  NEUROLOGICAL: No numbness or weakness  SKIN: No itching, rashes      Vital Signs Last 24 Hrs  T(C): 36 (21 Nov 2022 06:03), Max: 36.8 (20 Nov 2022 14:00)  T(F): 96.8 (21 Nov 2022 06:03), Max: 98.2 (20 Nov 2022 14:00)  HR: 88 (21 Nov 2022 06:03) (79 - 88)  BP: 115/74 (21 Nov 2022 06:03) (115/74 - 123/79)  BP(mean): --  RR: 16 (21 Nov 2022 06:03) (16 - 18)  SpO2: --  General: NAD, comfortable, pleasant   HEENT: NC/AT; PERRL, clear conjunctiva  Neck: supple  Cardiovascular: +S1/S2; RRR  Respiratory: CTA b/l; no W/R/R  Gastrointestinal: soft, NT/ND; +BSx4  Extremities: WWP; 2+ peripheral pulses; no edema   Neurological: AAOx3; no focal deficits      MEDICATIONS  (STANDING):  diVALproex  milliGRAM(s) Oral daily  diVALproex  milliGRAM(s) Oral daily  diVALproex  milliGRAM(s) Oral at bedtime  diVALproex  milliGRAM(s) Oral at bedtime  lithium CR (ESKALITH-CR) 450 milliGRAM(s) Oral two times a day  risperiDONE   Tablet 2 milliGRAM(s) Oral two times a day    MEDICATIONS  (PRN):  hydrOXYzine hydrochloride 25 milliGRAM(s) Oral at bedtime PRN insomnia      ALLERGIES:  Allergies    No Known Allergies    Intolerances        LABS:                        13.6   9.12  )-----------( 244      ( 21 Nov 2022 06:19 )             38.9   11-21    139  |  101  |  12  ----------------------------<  87  4.4   |  25  |  0.8    Ca    9.4      21 Nov 2022 06:19    TPro  6.5  /  Alb  4.2  /  TBili  0.2  /  DBili  x   /  AST  18  /  ALT  21  /  AlkPhos  70  11-21              CAPILLARY BLOOD GLUCOSE          RADIOLOGY & ADDITIONAL TESTS: Reviewed.
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
## 2022-11-22 PROCEDURE — 99231 SBSQ HOSP IP/OBS SF/LOW 25: CPT

## 2022-11-22 RX ADMIN — Medication 25 MILLIGRAM(S): at 21:40

## 2022-11-22 RX ADMIN — DIVALPROEX SODIUM 500 MILLIGRAM(S): 500 TABLET, DELAYED RELEASE ORAL at 12:15

## 2022-11-22 RX ADMIN — RISPERIDONE 2 MILLIGRAM(S): 4 TABLET ORAL at 17:38

## 2022-11-22 RX ADMIN — DIVALPROEX SODIUM 250 MILLIGRAM(S): 500 TABLET, DELAYED RELEASE ORAL at 12:15

## 2022-11-22 RX ADMIN — LITHIUM CARBONATE 450 MILLIGRAM(S): 300 TABLET, EXTENDED RELEASE ORAL at 17:38

## 2022-11-22 RX ADMIN — DIVALPROEX SODIUM 250 MILLIGRAM(S): 500 TABLET, DELAYED RELEASE ORAL at 21:41

## 2022-11-22 RX ADMIN — LITHIUM CARBONATE 450 MILLIGRAM(S): 300 TABLET, EXTENDED RELEASE ORAL at 05:59

## 2022-11-22 RX ADMIN — DIVALPROEX SODIUM 500 MILLIGRAM(S): 500 TABLET, DELAYED RELEASE ORAL at 21:41

## 2022-11-22 RX ADMIN — RISPERIDONE 2 MILLIGRAM(S): 4 TABLET ORAL at 05:59

## 2022-11-22 NOTE — PROGRESS NOTE ADULT - SUBJECTIVE AND OBJECTIVE BOX
Patient is a 22y old  Male who presents with a chief complaint of Placement (14 Nov 2022 08:47)    Subjective: patient seen and examined at bedside; no complaints; reports feeling okay. He was watching something on his iPAD.    REVIEW OF SYSTEMS:    CONSTITUTIONAL: No weakness, fevers or chills  EYES/ENT: No visual changes;  No vertigo or throat pain   NECK: No pain or stiffness  RESPIRATORY: No cough, wheezing, hemoptysis; No shortness of breath  CARDIOVASCULAR: No chest pain or palpitations  GASTROINTESTINAL: No abdominal or epigastric pain. No nausea, vomiting, or hematemesis; No diarrhea or constipation. No melena or hematochezia.  GENITOURINARY: No dysuria, frequency or hematuria  NEUROLOGICAL: No numbness or weakness  SKIN: No itching, rashes      Vital Signs Last 24 Hrs  T(C): 36.2 (22 Nov 2022 05:04), Max: 36.6 (21 Nov 2022 20:37)  T(F): 97.1 (22 Nov 2022 05:04), Max: 97.9 (21 Nov 2022 20:37)  HR: 76 (22 Nov 2022 05:04) (76 - 84)  BP: 110/71 (22 Nov 2022 05:04) (110/71 - 121/68)  BP(mean): --  RR: 16 (21 Nov 2022 20:37) (16 - 18)  SpO2: --    General: NAD, comfortable, pleasant   HEENT: NC/AT; PERRL, clear conjunctiva  Neck: supple  Cardiovascular: +S1/S2; RRR  Respiratory: CTA b/l; no W/R/R  Gastrointestinal: soft, NT/ND; +BSx4  Extremities: WWP; 2+ peripheral pulses; no edema   Neurological: AAOx3; no focal deficits      MEDICATIONS  (STANDING):  diVALproex  milliGRAM(s) Oral daily  diVALproex  milliGRAM(s) Oral daily  diVALproex  milliGRAM(s) Oral at bedtime  diVALproex  milliGRAM(s) Oral at bedtime  lithium CR (ESKALITH-CR) 450 milliGRAM(s) Oral two times a day  risperiDONE   Tablet 2 milliGRAM(s) Oral two times a day    MEDICATIONS  (PRN):  hydrOXYzine hydrochloride 25 milliGRAM(s) Oral at bedtime PRN insomnia        ALLERGIES:  Allergies    No Known Allergies    Intolerances        LABS:                        13.6   9.12  )-----------( 244      ( 21 Nov 2022 06:19 )             38.9   11-21    139  |  101  |  12  ----------------------------<  87  4.4   |  25  |  0.8    Ca    9.4      21 Nov 2022 06:19    TPro  6.5  /  Alb  4.2  /  TBili  0.2  /  DBili  x   /  AST  18  /  ALT  21  /  AlkPhos  70  11-21              CAPILLARY BLOOD GLUCOSE          RADIOLOGY & ADDITIONAL TESTS: Reviewed.

## 2022-11-23 PROCEDURE — 99231 SBSQ HOSP IP/OBS SF/LOW 25: CPT

## 2022-11-23 RX ADMIN — LITHIUM CARBONATE 450 MILLIGRAM(S): 300 TABLET, EXTENDED RELEASE ORAL at 17:20

## 2022-11-23 RX ADMIN — DIVALPROEX SODIUM 250 MILLIGRAM(S): 500 TABLET, DELAYED RELEASE ORAL at 13:28

## 2022-11-23 RX ADMIN — Medication 25 MILLIGRAM(S): at 21:35

## 2022-11-23 RX ADMIN — RISPERIDONE 2 MILLIGRAM(S): 4 TABLET ORAL at 06:02

## 2022-11-23 RX ADMIN — DIVALPROEX SODIUM 500 MILLIGRAM(S): 500 TABLET, DELAYED RELEASE ORAL at 13:28

## 2022-11-23 RX ADMIN — LITHIUM CARBONATE 450 MILLIGRAM(S): 300 TABLET, EXTENDED RELEASE ORAL at 06:02

## 2022-11-23 RX ADMIN — DIVALPROEX SODIUM 500 MILLIGRAM(S): 500 TABLET, DELAYED RELEASE ORAL at 21:33

## 2022-11-23 RX ADMIN — DIVALPROEX SODIUM 250 MILLIGRAM(S): 500 TABLET, DELAYED RELEASE ORAL at 21:33

## 2022-11-23 RX ADMIN — RISPERIDONE 2 MILLIGRAM(S): 4 TABLET ORAL at 17:20
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
## 2022-11-23 NOTE — PROGRESS NOTE ADULT - SUBJECTIVE AND OBJECTIVE BOX
Patient is a 22y old  Male who presents with a chief complaint of Placement     MEDICATIONS  (STANDING):  diVALproex  milliGRAM(s) Oral daily  diVALproex  milliGRAM(s) Oral daily  diVALproex  milliGRAM(s) Oral at bedtime  diVALproex  milliGRAM(s) Oral at bedtime  lithium CR (ESKALITH-CR) 450 milliGRAM(s) Oral two times a day  risperiDONE   Tablet 2 milliGRAM(s) Oral two times a day    MEDICATIONS  (PRN):  hydrOXYzine hydrochloride 25 milliGRAM(s) Oral at bedtime PRN insomnia          PHYSICAL EXAM:  Vital Signs Last 24 Hrs  T(C): 36.8 (23 Nov 2022 14:00), Max: 36.8 (23 Nov 2022 14:00)  T(F): 98.2 (23 Nov 2022 14:00), Max: 98.2 (23 Nov 2022 14:00)  HR: 77 (23 Nov 2022 14:00) (73 - 88)  BP: 116/64 (23 Nov 2022 14:00) (96/54 - 131/82)  BP(mean): --  RR: 18 (23 Nov 2022 14:00) (18 - 18)  SpO2: --        GENERAL: No acute distress, well-developed  HEAD:  Atraumatic, Normocephalic  EYES: EOMI, PERRLA, conjunctiva and sclera clear  NECK: Supple, no lymphadenopathy, no JVD  CHEST/LUNG: CTAB; No wheezes, rales, or rhonchi  HEART: Regular rate and rhythm; No murmurs, rubs, or gallops  ABDOMEN: Soft, non-tender, non-distended; normal bowel sounds, no organomegaly  EXTREMITIES:  2+ peripheral pulses b/l, No clubbing, cyanosis, or edema  NEUROLOGY: A&O x 3, no focal deficits  SKIN: No rashes or lesions    LABS:

## 2022-11-24 PROCEDURE — 99231 SBSQ HOSP IP/OBS SF/LOW 25: CPT

## 2022-11-24 RX ADMIN — RISPERIDONE 2 MILLIGRAM(S): 4 TABLET ORAL at 17:33

## 2022-11-24 RX ADMIN — LITHIUM CARBONATE 450 MILLIGRAM(S): 300 TABLET, EXTENDED RELEASE ORAL at 05:52

## 2022-11-24 RX ADMIN — DIVALPROEX SODIUM 250 MILLIGRAM(S): 500 TABLET, DELAYED RELEASE ORAL at 12:01

## 2022-11-24 RX ADMIN — DIVALPROEX SODIUM 500 MILLIGRAM(S): 500 TABLET, DELAYED RELEASE ORAL at 21:36

## 2022-11-24 RX ADMIN — RISPERIDONE 2 MILLIGRAM(S): 4 TABLET ORAL at 05:52

## 2022-11-24 RX ADMIN — DIVALPROEX SODIUM 500 MILLIGRAM(S): 500 TABLET, DELAYED RELEASE ORAL at 12:01

## 2022-11-24 RX ADMIN — LITHIUM CARBONATE 450 MILLIGRAM(S): 300 TABLET, EXTENDED RELEASE ORAL at 17:33

## 2022-11-24 RX ADMIN — DIVALPROEX SODIUM 250 MILLIGRAM(S): 500 TABLET, DELAYED RELEASE ORAL at 21:36

## 2022-11-24 NOTE — PROGRESS NOTE ADULT - SUBJECTIVE AND OBJECTIVE BOX
Patient is a 22y old  Male who presents with a chief complaint of Placement       MEDICATIONS  (STANDING):  diVALproex  milliGRAM(s) Oral daily  diVALproex  milliGRAM(s) Oral daily  diVALproex  milliGRAM(s) Oral at bedtime  diVALproex  milliGRAM(s) Oral at bedtime  lithium CR (ESKALITH-CR) 450 milliGRAM(s) Oral two times a day  risperiDONE   Tablet 2 milliGRAM(s) Oral two times a day    MEDICATIONS  (PRN):  hydrOXYzine hydrochloride 25 milliGRAM(s) Oral at bedtime PRN insomnia      CAPILLARY BLOOD GLUCOSE        I&O's Summary      PHYSICAL EXAM:  Vital Signs Last 24 Hrs  T(C): 36.1 (24 Nov 2022 05:12), Max: 36.8 (23 Nov 2022 14:00)  T(F): 96.9 (24 Nov 2022 05:12), Max: 98.2 (23 Nov 2022 14:00)  HR: 57 (24 Nov 2022 05:12) (57 - 97)  BP: 98/54 (24 Nov 2022 05:12) (98/54 - 116/64)  BP(mean): --  RR: 18 (24 Nov 2022 05:12) (18 - 18)  SpO2: --        GENERAL: No acute distress, well-developed  HEAD:  Atraumatic, Normocephalic  EYES: EOMI, PERRLA, conjunctiva and sclera clear  NECK: Supple, no lymphadenopathy, no JVD  CHEST/LUNG: CTAB; No wheezes, rales, or rhonchi  HEART: Regular rate and rhythm; No murmurs, rubs, or gallops  ABDOMEN: Soft, non-tender, non-distended; normal bowel sounds, no organomegaly  EXTREMITIES:  2+ peripheral pulses b/l, No clubbing, cyanosis, or edema  NEUROLOGY: A&O x 3, no focal deficits  SKIN: No rashes or lesions    LABS:

## 2022-11-25 PROCEDURE — 99231 SBSQ HOSP IP/OBS SF/LOW 25: CPT

## 2022-11-25 RX ADMIN — DIVALPROEX SODIUM 250 MILLIGRAM(S): 500 TABLET, DELAYED RELEASE ORAL at 21:06

## 2022-11-25 RX ADMIN — RISPERIDONE 2 MILLIGRAM(S): 4 TABLET ORAL at 05:01

## 2022-11-25 RX ADMIN — RISPERIDONE 2 MILLIGRAM(S): 4 TABLET ORAL at 17:05

## 2022-11-25 RX ADMIN — LITHIUM CARBONATE 450 MILLIGRAM(S): 300 TABLET, EXTENDED RELEASE ORAL at 17:05

## 2022-11-25 RX ADMIN — DIVALPROEX SODIUM 250 MILLIGRAM(S): 500 TABLET, DELAYED RELEASE ORAL at 11:38

## 2022-11-25 RX ADMIN — LITHIUM CARBONATE 450 MILLIGRAM(S): 300 TABLET, EXTENDED RELEASE ORAL at 05:01

## 2022-11-25 RX ADMIN — DIVALPROEX SODIUM 500 MILLIGRAM(S): 500 TABLET, DELAYED RELEASE ORAL at 21:05

## 2022-11-25 RX ADMIN — DIVALPROEX SODIUM 500 MILLIGRAM(S): 500 TABLET, DELAYED RELEASE ORAL at 11:38
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
## 2022-11-25 NOTE — PROGRESS NOTE ADULT - SUBJECTIVE AND OBJECTIVE BOX
Patient is a 22y old  Male who presents with a chief complaint of Placement     MEDICATIONS  (STANDING):  diVALproex  milliGRAM(s) Oral daily  diVALproex  milliGRAM(s) Oral daily  diVALproex  milliGRAM(s) Oral at bedtime  diVALproex  milliGRAM(s) Oral at bedtime  lithium CR (ESKALITH-CR) 450 milliGRAM(s) Oral two times a day  risperiDONE   Tablet 2 milliGRAM(s) Oral two times a day    MEDICATIONS  (PRN):  hydrOXYzine hydrochloride 25 milliGRAM(s) Oral at bedtime PRN insomnia      CAPILLARY BLOOD GLUCOSE    I&O's Summary      PHYSICAL EXAM:  Vital Signs Last 24 Hrs  T(C): 36.4 (25 Nov 2022 04:57), Max: 36.4 (25 Nov 2022 04:57)  T(F): 97.6 (25 Nov 2022 04:57), Max: 97.6 (25 Nov 2022 04:57)  HR: 75 (25 Nov 2022 04:57) (75 - 95)  BP: 126/58 (25 Nov 2022 04:57) (122/83 - 130/77)  BP(mean): --  RR: 18 (25 Nov 2022 04:57) (16 - 18)  SpO2: --        GENERAL: No acute distress, well-developed  HEAD:  Atraumatic, Normocephalic  EYES: EOMI, PERRLA, conjunctiva and sclera clear  NECK: Supple, no lymphadenopathy, no JVD  CHEST/LUNG: CTAB; No wheezes, rales, or rhonchi  HEART: Regular rate and rhythm; No murmurs, rubs, or gallops  ABDOMEN: Soft, non-tender, non-distended; normal bowel sounds, no organomegaly  EXTREMITIES:  2+ peripheral pulses b/l, No clubbing, cyanosis, or edema  NEUROLOGY: A&O x 3, no focal deficits  SKIN: No rashes or lesions    LABS:

## 2022-11-26 RX ADMIN — LITHIUM CARBONATE 450 MILLIGRAM(S): 300 TABLET, EXTENDED RELEASE ORAL at 05:45

## 2022-11-26 RX ADMIN — DIVALPROEX SODIUM 500 MILLIGRAM(S): 500 TABLET, DELAYED RELEASE ORAL at 22:01

## 2022-11-26 RX ADMIN — DIVALPROEX SODIUM 500 MILLIGRAM(S): 500 TABLET, DELAYED RELEASE ORAL at 12:05

## 2022-11-26 RX ADMIN — DIVALPROEX SODIUM 250 MILLIGRAM(S): 500 TABLET, DELAYED RELEASE ORAL at 22:00

## 2022-11-26 RX ADMIN — DIVALPROEX SODIUM 250 MILLIGRAM(S): 500 TABLET, DELAYED RELEASE ORAL at 12:05

## 2022-11-26 RX ADMIN — RISPERIDONE 2 MILLIGRAM(S): 4 TABLET ORAL at 17:33

## 2022-11-26 RX ADMIN — RISPERIDONE 2 MILLIGRAM(S): 4 TABLET ORAL at 05:45

## 2022-11-26 RX ADMIN — LITHIUM CARBONATE 450 MILLIGRAM(S): 300 TABLET, EXTENDED RELEASE ORAL at 17:33

## 2022-11-27 PROCEDURE — 99231 SBSQ HOSP IP/OBS SF/LOW 25: CPT

## 2022-11-27 RX ADMIN — LITHIUM CARBONATE 450 MILLIGRAM(S): 300 TABLET, EXTENDED RELEASE ORAL at 05:31

## 2022-11-27 RX ADMIN — DIVALPROEX SODIUM 250 MILLIGRAM(S): 500 TABLET, DELAYED RELEASE ORAL at 11:05

## 2022-11-27 RX ADMIN — RISPERIDONE 2 MILLIGRAM(S): 4 TABLET ORAL at 05:31

## 2022-11-27 RX ADMIN — DIVALPROEX SODIUM 500 MILLIGRAM(S): 500 TABLET, DELAYED RELEASE ORAL at 22:08

## 2022-11-27 RX ADMIN — RISPERIDONE 2 MILLIGRAM(S): 4 TABLET ORAL at 17:58

## 2022-11-27 RX ADMIN — Medication 25 MILLIGRAM(S): at 00:20

## 2022-11-27 RX ADMIN — LITHIUM CARBONATE 450 MILLIGRAM(S): 300 TABLET, EXTENDED RELEASE ORAL at 17:58

## 2022-11-27 RX ADMIN — DIVALPROEX SODIUM 250 MILLIGRAM(S): 500 TABLET, DELAYED RELEASE ORAL at 22:08

## 2022-11-27 RX ADMIN — DIVALPROEX SODIUM 500 MILLIGRAM(S): 500 TABLET, DELAYED RELEASE ORAL at 11:05

## 2022-11-27 NOTE — PROGRESS NOTE ADULT - SUBJECTIVE AND OBJECTIVE BOX
Patient is a 22y old  Male who presents with a chief complaint of Placement       MEDICATIONS  (STANDING):  diVALproex  milliGRAM(s) Oral at bedtime  diVALproex  milliGRAM(s) Oral at bedtime  diVALproex  milliGRAM(s) Oral daily  diVALproex  milliGRAM(s) Oral daily  lithium CR (ESKALITH-CR) 450 milliGRAM(s) Oral two times a day  risperiDONE   Tablet 2 milliGRAM(s) Oral two times a day    MEDICATIONS  (PRN):  hydrOXYzine hydrochloride 25 milliGRAM(s) Oral at bedtime PRN insomnia          PHYSICAL EXAM:  Vital Signs Last 24 Hrs  T(C): 36.1 (27 Nov 2022 05:31), Max: 36.6 (26 Nov 2022 13:49)  T(F): 96.9 (27 Nov 2022 05:31), Max: 97.8 (26 Nov 2022 13:49)  HR: 76 (27 Nov 2022 05:31) (65 - 76)  BP: 83/52 (27 Nov 2022 05:31) (83/52 - 124/65)  BP(mean): --  RR: 18 (27 Nov 2022 05:31) (18 - 18)  SpO2: --        GENERAL: No acute distress, well-developed  HEAD:  Atraumatic, Normocephalic  EYES:  conjunctiva and sclera clear  NECK: Supple, no lymphadenopathy, no JVD  CHEST/LUNG: CTAB; No wheezes, rales, or rhonchi  HEART: Regular rate and rhythm; No murmurs, rubs, or gallops  ABDOMEN: Soft, non-tender, non-distended; normal bowel sounds,  EXTREMITIES:  2+ peripheral pulses b/l, No clubbing, cyanosis, or edema  NEUROLOGY: A&O x 3, no focal deficits  SKIN: No rashes or lesions    LABS:

## 2022-11-28 PROCEDURE — 99231 SBSQ HOSP IP/OBS SF/LOW 25: CPT

## 2022-11-28 RX ADMIN — DIVALPROEX SODIUM 250 MILLIGRAM(S): 500 TABLET, DELAYED RELEASE ORAL at 21:28

## 2022-11-28 RX ADMIN — RISPERIDONE 2 MILLIGRAM(S): 4 TABLET ORAL at 05:38

## 2022-11-28 RX ADMIN — DIVALPROEX SODIUM 500 MILLIGRAM(S): 500 TABLET, DELAYED RELEASE ORAL at 21:27

## 2022-11-28 RX ADMIN — Medication 25 MILLIGRAM(S): at 00:35

## 2022-11-28 RX ADMIN — RISPERIDONE 2 MILLIGRAM(S): 4 TABLET ORAL at 18:16

## 2022-11-28 RX ADMIN — LITHIUM CARBONATE 450 MILLIGRAM(S): 300 TABLET, EXTENDED RELEASE ORAL at 18:16

## 2022-11-28 RX ADMIN — DIVALPROEX SODIUM 500 MILLIGRAM(S): 500 TABLET, DELAYED RELEASE ORAL at 13:01

## 2022-11-28 RX ADMIN — LITHIUM CARBONATE 450 MILLIGRAM(S): 300 TABLET, EXTENDED RELEASE ORAL at 05:37

## 2022-11-28 RX ADMIN — DIVALPROEX SODIUM 250 MILLIGRAM(S): 500 TABLET, DELAYED RELEASE ORAL at 13:01

## 2022-11-28 NOTE — PROGRESS NOTE ADULT - SUBJECTIVE AND OBJECTIVE BOX
Patient is a 22y old  Male who presents with a chief complaint of Placement (27 Nov 2022 10:28)        MEDICATIONS  (STANDING):  diVALproex  milliGRAM(s) Oral daily  diVALproex  milliGRAM(s) Oral daily  diVALproex  milliGRAM(s) Oral at bedtime  diVALproex  milliGRAM(s) Oral at bedtime  lithium CR (ESKALITH-CR) 450 milliGRAM(s) Oral two times a day  risperiDONE   Tablet 2 milliGRAM(s) Oral two times a day    MEDICATIONS  (PRN):  hydrOXYzine hydrochloride 25 milliGRAM(s) Oral at bedtime PRN insomnia      CAPILLARY BLOOD GLUCOSE      I&O's Summary    PHYSICAL EXAM:  Vital Signs Last 24 Hrs  T(C): 35.8 (28 Nov 2022 05:00), Max: 36.2 (27 Nov 2022 21:14)  T(F): 96.5 (28 Nov 2022 05:00), Max: 97.1 (27 Nov 2022 21:14)  HR: 101 (28 Nov 2022 05:00) (73 - 101)  BP: 107/62 (28 Nov 2022 05:00) (107/62 - 117/65)  BP(mean): --  RR: 18 (28 Nov 2022 05:00) (18 - 18)  SpO2: --        GENERAL: No acute distress, well-developed  HEAD:  Atraumatic, Normocephalic  EYES: EOMI, PERRLA, conjunctiva and sclera clear  NECK: Supple, no lymphadenopathy, no JVD  CHEST/LUNG: CTAB; No wheezes, rales, or rhonchi  HEART: Regular rate and rhythm; No murmurs, rubs, or gallops  ABDOMEN: Soft, non-tender, non-distended; normal bowel sounds, no organomegaly  EXTREMITIES:  2+ peripheral pulses b/l, No clubbing, cyanosis, or edema  NEUROLOGY: A&O x 3, no focal deficits  SKIN: No rashes or lesions    LABS:

## 2022-11-29 PROCEDURE — 99231 SBSQ HOSP IP/OBS SF/LOW 25: CPT

## 2022-11-29 RX ADMIN — LITHIUM CARBONATE 450 MILLIGRAM(S): 300 TABLET, EXTENDED RELEASE ORAL at 17:43

## 2022-11-29 RX ADMIN — DIVALPROEX SODIUM 250 MILLIGRAM(S): 500 TABLET, DELAYED RELEASE ORAL at 11:00

## 2022-11-29 RX ADMIN — RISPERIDONE 2 MILLIGRAM(S): 4 TABLET ORAL at 05:04

## 2022-11-29 RX ADMIN — DIVALPROEX SODIUM 500 MILLIGRAM(S): 500 TABLET, DELAYED RELEASE ORAL at 21:50

## 2022-11-29 RX ADMIN — DIVALPROEX SODIUM 250 MILLIGRAM(S): 500 TABLET, DELAYED RELEASE ORAL at 21:50

## 2022-11-29 RX ADMIN — RISPERIDONE 2 MILLIGRAM(S): 4 TABLET ORAL at 17:43

## 2022-11-29 RX ADMIN — DIVALPROEX SODIUM 500 MILLIGRAM(S): 500 TABLET, DELAYED RELEASE ORAL at 11:01

## 2022-11-29 RX ADMIN — LITHIUM CARBONATE 450 MILLIGRAM(S): 300 TABLET, EXTENDED RELEASE ORAL at 05:04

## 2022-11-29 NOTE — PROGRESS NOTE ADULT - SUBJECTIVE AND OBJECTIVE BOX
Patient is a 22y old  Male who presents with a chief complaint of Placement      MEDICATIONS  (STANDING):  diVALproex  milliGRAM(s) Oral daily  diVALproex  milliGRAM(s) Oral daily  diVALproex  milliGRAM(s) Oral at bedtime  diVALproex  milliGRAM(s) Oral at bedtime  lithium CR (ESKALITH-CR) 450 milliGRAM(s) Oral two times a day  risperiDONE   Tablet 2 milliGRAM(s) Oral two times a day    MEDICATIONS  (PRN):  hydrOXYzine hydrochloride 25 milliGRAM(s) Oral at bedtime PRN insomnia        PHYSICAL EXAM:  Vital Signs Last 24 Hrs  T(C): 35.8 (29 Nov 2022 05:06), Max: 36.3 (28 Nov 2022 14:16)  T(F): 96.5 (29 Nov 2022 05:06), Max: 97.3 (28 Nov 2022 14:16)  HR: 53 (29 Nov 2022 05:06) (53 - 75)  BP: 100/59 (29 Nov 2022 05:06) (92/55 - 122/71)  BP(mean): --  RR: 18 (29 Nov 2022 05:06) (18 - 18)  SpO2: --        GENERAL: No acute distress, well-developed  HEAD:  Atraumatic, Normocephalic  EYES: EOMI, PERRLA, conjunctiva and sclera clear  NECK: Supple, no lymphadenopathy, no JVD  CHEST/LUNG: CTAB; No wheezes, rales, or rhonchi  HEART: Regular rate and rhythm; No murmurs, rubs, or gallops  ABDOMEN: Soft, non-tender, non-distended; normal bowel sounds, no organomegaly  EXTREMITIES:  2+ peripheral pulses b/l, No clubbing, cyanosis, or edema  NEUROLOGY: A&O x 3, no focal deficits  SKIN: No rashes or lesions    LABS:

## 2022-11-30 PROCEDURE — 99231 SBSQ HOSP IP/OBS SF/LOW 25: CPT

## 2022-11-30 RX ADMIN — DIVALPROEX SODIUM 250 MILLIGRAM(S): 500 TABLET, DELAYED RELEASE ORAL at 11:03

## 2022-11-30 RX ADMIN — DIVALPROEX SODIUM 250 MILLIGRAM(S): 500 TABLET, DELAYED RELEASE ORAL at 21:11

## 2022-11-30 RX ADMIN — DIVALPROEX SODIUM 500 MILLIGRAM(S): 500 TABLET, DELAYED RELEASE ORAL at 11:03

## 2022-11-30 RX ADMIN — RISPERIDONE 2 MILLIGRAM(S): 4 TABLET ORAL at 17:03

## 2022-11-30 RX ADMIN — DIVALPROEX SODIUM 500 MILLIGRAM(S): 500 TABLET, DELAYED RELEASE ORAL at 21:11

## 2022-11-30 RX ADMIN — RISPERIDONE 2 MILLIGRAM(S): 4 TABLET ORAL at 06:07

## 2022-11-30 RX ADMIN — LITHIUM CARBONATE 450 MILLIGRAM(S): 300 TABLET, EXTENDED RELEASE ORAL at 06:07

## 2022-11-30 RX ADMIN — LITHIUM CARBONATE 450 MILLIGRAM(S): 300 TABLET, EXTENDED RELEASE ORAL at 17:03

## 2022-11-30 RX ADMIN — Medication 25 MILLIGRAM(S): at 00:23

## 2022-11-30 NOTE — PROGRESS NOTE ADULT - SUBJECTIVE AND OBJECTIVE BOX
Patient is a 22y old  Male who presents with a chief complaint of Placement (14 Nov 2022 08:47)    Subjective: patient seen and examined at bedside; no complaints; reports feeling okay.    REVIEW OF SYSTEMS:    CONSTITUTIONAL: No weakness, fevers or chills  EYES/ENT: No visual changes;  No vertigo or throat pain   NECK: No pain or stiffness  RESPIRATORY: No cough, wheezing, hemoptysis; No shortness of breath  CARDIOVASCULAR: No chest pain or palpitations  GASTROINTESTINAL: No abdominal or epigastric pain. No nausea, vomiting, or hematemesis; No diarrhea or constipation. No melena or hematochezia.  GENITOURINARY: No dysuria, frequency or hematuria  NEUROLOGICAL: No numbness or weakness  SKIN: No itching, rashes      Vital Signs Last 24 Hrs  T(C): 36.3 (30 Nov 2022 06:44), Max: 36.3 (29 Nov 2022 20:30)  T(F): 97.3 (30 Nov 2022 06:44), Max: 97.3 (29 Nov 2022 20:30)  HR: 63 (30 Nov 2022 06:44) (63 - 94)  BP: 101/56 (30 Nov 2022 06:44) (101/56 - 129/72)  BP(mean): --  RR: 18 (30 Nov 2022 06:44) (18 - 18)  SpO2: --    General: NAD, comfortable, pleasant   HEENT: NC/AT; PERRL, clear conjunctiva  Neck: supple  Cardiovascular: +S1/S2; RRR  Respiratory: CTA b/l; no W/R/R  Gastrointestinal: soft, NT/ND; +BSx4  Extremities: WWP; 2+ peripheral pulses; no edema   Neurological: AAOx3; no focal deficits      MEDICATIONS  (STANDING):  diVALproex  milliGRAM(s) Oral daily  diVALproex  milliGRAM(s) Oral daily  diVALproex  milliGRAM(s) Oral at bedtime  diVALproex  milliGRAM(s) Oral at bedtime  lithium CR (ESKALITH-CR) 450 milliGRAM(s) Oral two times a day  risperiDONE   Tablet 2 milliGRAM(s) Oral two times a day    MEDICATIONS  (PRN):  hydrOXYzine hydrochloride 25 milliGRAM(s) Oral at bedtime PRN insomnia    ALLERGIES:  Allergies    No Known Allergies    Intolerances        LABS:              CAPILLARY BLOOD GLUCOSE          RADIOLOGY & ADDITIONAL TESTS: Reviewed.

## 2022-11-30 NOTE — PROGRESS NOTE ADULT - ASSESSMENT
22 years old male past medical history of bipolar and developmental delay came to emergency room for alleged aggressive and violent behavior as per step father. Evaluated by Psych; Step Father left stated pt needs placement refusing to take child home.     # Bipolar disorder and likely developmental disorder- stable  - continue current meds (on depakote, risperidone, and lithium)   - pt has been stable without aggressive behaviour in the hospital  - father states he cant take care of pt    # Depressed mood, negative thoughts  - lithium and valproic acid level wnl  - psych consult appreciated    # Obesity: Likely multifactorial: excess calories and Psych medication induced     DVT px - pt ambulatory     DISPO: pending placement

## 2022-12-01 PROCEDURE — 99231 SBSQ HOSP IP/OBS SF/LOW 25: CPT

## 2022-12-01 RX ADMIN — DIVALPROEX SODIUM 500 MILLIGRAM(S): 500 TABLET, DELAYED RELEASE ORAL at 11:38

## 2022-12-01 RX ADMIN — RISPERIDONE 2 MILLIGRAM(S): 4 TABLET ORAL at 18:50

## 2022-12-01 RX ADMIN — DIVALPROEX SODIUM 250 MILLIGRAM(S): 500 TABLET, DELAYED RELEASE ORAL at 11:37

## 2022-12-01 RX ADMIN — LITHIUM CARBONATE 450 MILLIGRAM(S): 300 TABLET, EXTENDED RELEASE ORAL at 05:28

## 2022-12-01 RX ADMIN — LITHIUM CARBONATE 450 MILLIGRAM(S): 300 TABLET, EXTENDED RELEASE ORAL at 18:50

## 2022-12-01 RX ADMIN — Medication 25 MILLIGRAM(S): at 00:31

## 2022-12-01 RX ADMIN — DIVALPROEX SODIUM 500 MILLIGRAM(S): 500 TABLET, DELAYED RELEASE ORAL at 21:46

## 2022-12-01 RX ADMIN — DIVALPROEX SODIUM 250 MILLIGRAM(S): 500 TABLET, DELAYED RELEASE ORAL at 21:46

## 2022-12-01 RX ADMIN — RISPERIDONE 2 MILLIGRAM(S): 4 TABLET ORAL at 05:27

## 2022-12-01 NOTE — PROGRESS NOTE ADULT - SUBJECTIVE AND OBJECTIVE BOX
Patient is a 22y old  Male who presents with a chief complaint of Placement (14 Nov 2022 08:47)    Subjective: patient seen and examined at bedside; no complaints; reports feeling okay. HE was looking at guitars on his iPAD.    REVIEW OF SYSTEMS:    CONSTITUTIONAL: No weakness, fevers or chills  EYES/ENT: No visual changes;  No vertigo or throat pain   NECK: No pain or stiffness  RESPIRATORY: No cough, wheezing, hemoptysis; No shortness of breath  CARDIOVASCULAR: No chest pain or palpitations  GASTROINTESTINAL: No abdominal or epigastric pain. No nausea, vomiting, or hematemesis; No diarrhea or constipation. No melena or hematochezia.  GENITOURINARY: No dysuria, frequency or hematuria  NEUROLOGICAL: No numbness or weakness  SKIN: No itching, rashes      Vital Signs Last 24 Hrs  T(C): 37 (01 Dec 2022 05:04), Max: 37 (01 Dec 2022 05:04)  T(F): 98.6 (01 Dec 2022 05:04), Max: 98.6 (01 Dec 2022 05:04)  HR: 64 (01 Dec 2022 05:04) (64 - 77)  BP: 103/60 (01 Dec 2022 05:04) (103/60 - 121/70)  BP(mean): --  RR: 18 (01 Dec 2022 05:04) (18 - 18)  SpO2: --  General: NAD, comfortable, pleasant   HEENT: NC/AT; PERRL, clear conjunctiva  Neck: supple  Cardiovascular: +S1/S2; RRR  Respiratory: CTA b/l; no W/R/R  Gastrointestinal: soft, NT/ND; +BSx4  Extremities: WWP; 2+ peripheral pulses; no edema   Neurological: AAOx3; no focal deficits      MEDICATIONS  (STANDING):  diVALproex  milliGRAM(s) Oral daily  diVALproex  milliGRAM(s) Oral daily  diVALproex  milliGRAM(s) Oral at bedtime  diVALproex  milliGRAM(s) Oral at bedtime  lithium CR (ESKALITH-CR) 450 milliGRAM(s) Oral two times a day  risperiDONE   Tablet 2 milliGRAM(s) Oral two times a day    MEDICATIONS  (PRN):  hydrOXYzine hydrochloride 25 milliGRAM(s) Oral at bedtime PRN insomnia      ALLERGIES:  Allergies    No Known Allergies    Intolerances        LABS:              CAPILLARY BLOOD GLUCOSE          RADIOLOGY & ADDITIONAL TESTS: Reviewed.

## 2022-12-02 PROCEDURE — 99231 SBSQ HOSP IP/OBS SF/LOW 25: CPT

## 2022-12-02 RX ADMIN — RISPERIDONE 2 MILLIGRAM(S): 4 TABLET ORAL at 05:26

## 2022-12-02 RX ADMIN — LITHIUM CARBONATE 450 MILLIGRAM(S): 300 TABLET, EXTENDED RELEASE ORAL at 17:14

## 2022-12-02 RX ADMIN — LITHIUM CARBONATE 450 MILLIGRAM(S): 300 TABLET, EXTENDED RELEASE ORAL at 05:26

## 2022-12-02 RX ADMIN — Medication 25 MILLIGRAM(S): at 23:49

## 2022-12-02 RX ADMIN — DIVALPROEX SODIUM 500 MILLIGRAM(S): 500 TABLET, DELAYED RELEASE ORAL at 21:27

## 2022-12-02 RX ADMIN — RISPERIDONE 2 MILLIGRAM(S): 4 TABLET ORAL at 17:14

## 2022-12-02 RX ADMIN — DIVALPROEX SODIUM 250 MILLIGRAM(S): 500 TABLET, DELAYED RELEASE ORAL at 21:28

## 2022-12-02 RX ADMIN — DIVALPROEX SODIUM 500 MILLIGRAM(S): 500 TABLET, DELAYED RELEASE ORAL at 12:28

## 2022-12-02 RX ADMIN — DIVALPROEX SODIUM 250 MILLIGRAM(S): 500 TABLET, DELAYED RELEASE ORAL at 12:27

## 2022-12-02 NOTE — PROGRESS NOTE ADULT - SUBJECTIVE AND OBJECTIVE BOX
Patient is a 22y old  Male who presents with a chief complaint of Placement (14 Nov 2022 08:47)    Subjective: patient seen and examined at bedside; no complaints; reports feeling okay.     REVIEW OF SYSTEMS:    CONSTITUTIONAL: No weakness, fevers or chills  EYES/ENT: No visual changes;  No vertigo or throat pain   NECK: No pain or stiffness  RESPIRATORY: No cough, wheezing, hemoptysis; No shortness of breath  CARDIOVASCULAR: No chest pain or palpitations  GASTROINTESTINAL: No abdominal or epigastric pain. No nausea, vomiting, or hematemesis; No diarrhea or constipation. No melena or hematochezia.  GENITOURINARY: No dysuria, frequency or hematuria  NEUROLOGICAL: No numbness or weakness  SKIN: No itching, rashes      Vital Signs Last 24 Hrs  T(C): 36.6 (02 Dec 2022 05:16), Max: 36.6 (02 Dec 2022 05:16)  T(F): 97.8 (02 Dec 2022 05:16), Max: 97.8 (02 Dec 2022 05:16)  HR: 66 (02 Dec 2022 05:16) (66 - 98)  BP: 113/76 (02 Dec 2022 05:16) (113/76 - 121/78)  BP(mean): --  RR: 18 (02 Dec 2022 05:16) (18 - 18)  SpO2: --  General: NAD, comfortable, pleasant   HEENT: NC/AT; PERRL, clear conjunctiva  Neck: supple  Cardiovascular: +S1/S2; RRR  Respiratory: CTA b/l; no W/R/R  Gastrointestinal: soft, NT/ND; +BSx4  Extremities: WWP; 2+ peripheral pulses; no edema   Neurological: AAOx3; no focal deficits      MEDICATIONS  (STANDING):  diVALproex  milliGRAM(s) Oral daily  diVALproex  milliGRAM(s) Oral daily  diVALproex  milliGRAM(s) Oral at bedtime  diVALproex  milliGRAM(s) Oral at bedtime  lithium CR (ESKALITH-CR) 450 milliGRAM(s) Oral two times a day  risperiDONE   Tablet 2 milliGRAM(s) Oral two times a day    MEDICATIONS  (PRN):  hydrOXYzine hydrochloride 25 milliGRAM(s) Oral at bedtime PRN insomnia      ALLERGIES:  Allergies    No Known Allergies    Intolerances        LABS:              CAPILLARY BLOOD GLUCOSE          RADIOLOGY & ADDITIONAL TESTS: Reviewed.

## 2022-12-03 PROCEDURE — 99231 SBSQ HOSP IP/OBS SF/LOW 25: CPT

## 2022-12-03 RX ADMIN — RISPERIDONE 2 MILLIGRAM(S): 4 TABLET ORAL at 05:02

## 2022-12-03 RX ADMIN — DIVALPROEX SODIUM 500 MILLIGRAM(S): 500 TABLET, DELAYED RELEASE ORAL at 21:03

## 2022-12-03 RX ADMIN — DIVALPROEX SODIUM 250 MILLIGRAM(S): 500 TABLET, DELAYED RELEASE ORAL at 21:03

## 2022-12-03 RX ADMIN — DIVALPROEX SODIUM 250 MILLIGRAM(S): 500 TABLET, DELAYED RELEASE ORAL at 11:56

## 2022-12-03 RX ADMIN — RISPERIDONE 2 MILLIGRAM(S): 4 TABLET ORAL at 17:39

## 2022-12-03 RX ADMIN — LITHIUM CARBONATE 450 MILLIGRAM(S): 300 TABLET, EXTENDED RELEASE ORAL at 17:40

## 2022-12-03 RX ADMIN — DIVALPROEX SODIUM 500 MILLIGRAM(S): 500 TABLET, DELAYED RELEASE ORAL at 11:56

## 2022-12-03 RX ADMIN — LITHIUM CARBONATE 450 MILLIGRAM(S): 300 TABLET, EXTENDED RELEASE ORAL at 05:02

## 2022-12-03 NOTE — PROGRESS NOTE ADULT - SUBJECTIVE AND OBJECTIVE BOX
Patient is a 22y old  Male who presents with a chief complaint of Placement (14 Nov 2022 08:47)    Subjective: patient seen and examined at bedside; no complaints; reports feeling okay.     REVIEW OF SYSTEMS:    CONSTITUTIONAL: No weakness, fevers or chills  EYES/ENT: No visual changes;  No vertigo or throat pain   NECK: No pain or stiffness  RESPIRATORY: No cough, wheezing, hemoptysis; No shortness of breath  CARDIOVASCULAR: No chest pain or palpitations  GASTROINTESTINAL: No abdominal or epigastric pain. No nausea, vomiting, or hematemesis; No diarrhea or constipation. No melena or hematochezia.  GENITOURINARY: No dysuria, frequency or hematuria  NEUROLOGICAL: No numbness or weakness  SKIN: No itching, rashes      Vital Signs Last 24 Hrs  T(C): 36.2 (03 Dec 2022 05:33), Max: 36.4 (02 Dec 2022 13:18)  T(F): 97.2 (03 Dec 2022 05:33), Max: 97.5 (02 Dec 2022 13:18)  HR: 64 (03 Dec 2022 05:33) (64 - 102)  BP: 92/55 (03 Dec 2022 05:33) (92/55 - 135/85)  BP(mean): --  RR: 18 (03 Dec 2022 05:33) (18 - 18)  SpO2: --  General: NAD, comfortable, pleasant   HEENT: NC/AT; PERRL, clear conjunctiva  Neck: supple  Cardiovascular: +S1/S2; RRR  Respiratory: CTA b/l; no W/R/R  Gastrointestinal: soft, NT/ND; +BSx4  Extremities: WWP; 2+ peripheral pulses; no edema   Neurological: AAOx3; no focal deficits      MEDICATIONS  (STANDING):  diVALproex  milliGRAM(s) Oral daily  diVALproex  milliGRAM(s) Oral daily  diVALproex  milliGRAM(s) Oral at bedtime  diVALproex  milliGRAM(s) Oral at bedtime  lithium CR (ESKALITH-CR) 450 milliGRAM(s) Oral two times a day  risperiDONE   Tablet 2 milliGRAM(s) Oral two times a day    MEDICATIONS  (PRN):  hydrOXYzine hydrochloride 25 milliGRAM(s) Oral at bedtime PRN insomnia      ALLERGIES:  Allergies    No Known Allergies    Intolerances        LABS:              CAPILLARY BLOOD GLUCOSE          RADIOLOGY & ADDITIONAL TESTS: Reviewed.

## 2022-12-04 PROCEDURE — 99232 SBSQ HOSP IP/OBS MODERATE 35: CPT

## 2022-12-04 RX ADMIN — DIVALPROEX SODIUM 250 MILLIGRAM(S): 500 TABLET, DELAYED RELEASE ORAL at 12:53

## 2022-12-04 RX ADMIN — RISPERIDONE 2 MILLIGRAM(S): 4 TABLET ORAL at 05:37

## 2022-12-04 RX ADMIN — LITHIUM CARBONATE 450 MILLIGRAM(S): 300 TABLET, EXTENDED RELEASE ORAL at 17:20

## 2022-12-04 RX ADMIN — LITHIUM CARBONATE 450 MILLIGRAM(S): 300 TABLET, EXTENDED RELEASE ORAL at 05:37

## 2022-12-04 RX ADMIN — RISPERIDONE 2 MILLIGRAM(S): 4 TABLET ORAL at 17:19

## 2022-12-04 RX ADMIN — DIVALPROEX SODIUM 500 MILLIGRAM(S): 500 TABLET, DELAYED RELEASE ORAL at 12:53

## 2022-12-04 RX ADMIN — DIVALPROEX SODIUM 500 MILLIGRAM(S): 500 TABLET, DELAYED RELEASE ORAL at 21:15

## 2022-12-04 RX ADMIN — Medication 25 MILLIGRAM(S): at 00:34

## 2022-12-04 NOTE — PROGRESS NOTE ADULT - SUBJECTIVE AND OBJECTIVE BOX
SUBJECTIVE:    Patient is a 22y old Male who presents with a chief complaint of Placement (03 Dec 2022 11:05)    Currently admitted to medicine with the primary diagnosis of Encounter for social work intervention       Today is hospital day 88d. This morning he is resting comfortably in bed and reports no new issues or overnight events.     ROS:   CONSTITUTIONAL: No weakness, fevers or chills   EYES/ENT: No visual changes; No vertigo or throat pain   NECK: No pain or stiffness   RESPIRATORY: No cough, wheezing, hemoptysis; No shortness of breath   CARDIOVASCULAR: No chest pain or palpitations   GASTROINTESTINAL: No abdominal or epigastric pain. No nausea, vomiting, or hematemesis; No diarrhea or constipation. No melena or hematochezia.  GENITOURINARY: No dysuria, frequency or hematuria  NEUROLOGICAL: No numbness or weakness  SKIN: No itching, rashes      PAST MEDICAL & SURGICAL HISTORY  History of developmental delay    Bipolar disorder    No significant past surgical history      SOCIAL HISTORY:    ALLERGIES:  No Known Allergies    MEDICATIONS:  STANDING MEDICATIONS  diVALproex  milliGRAM(s) Oral daily  diVALproex  milliGRAM(s) Oral daily  diVALproex  milliGRAM(s) Oral at bedtime  diVALproex  milliGRAM(s) Oral at bedtime  lithium CR (ESKALITH-CR) 450 milliGRAM(s) Oral two times a day  risperiDONE   Tablet 2 milliGRAM(s) Oral two times a day    PRN MEDICATIONS  hydrOXYzine hydrochloride 25 milliGRAM(s) Oral at bedtime PRN    VITALS:   T(F): 96.1  HR: 73  BP: 107/56  RR: 18  SpO2: --    LABS:    RADIOLOGY:    PHYSICAL EXAM:  GEN: No acute distress  HEENT: normocephalic, atraumatic, aniceteric  LUNGS: Clear to auscultation bilaterally, no rales/wheezing/ rhonchi  HEART: S1/S2 present. RRR, no murmurs  ABD: Soft, non-tender, non-distended. Bowel sounds present  EXT: well perfused   NEURO: no focal deficits       ASSESSMENT AND PLAN:    22 years old male past medical history of bipolar and developmental delay came to emergency room for alleged aggressive and violent behavior as per step father. Evaluated by Psych; Step Father left stated pt needs placement refusing to take child home.     # Bipolar disorder and likely developmental disorder- stable  - continue current meds (on depakote, risperidone, and lithium)   - pt has been stable without aggressive behaviour in the hospital  - father states he cant take care of pt    # Depressed mood, negative thoughts  - lithium and valproic acid level wnl  - psych consult appreciated    # Obesity: Likely multifactorial: excess calories and Psych medication induced     DVT px - pt ambulatory     DISPO: pending placement to group [place]

## 2022-12-05 PROCEDURE — 99231 SBSQ HOSP IP/OBS SF/LOW 25: CPT

## 2022-12-05 RX ADMIN — DIVALPROEX SODIUM 250 MILLIGRAM(S): 500 TABLET, DELAYED RELEASE ORAL at 13:10

## 2022-12-05 RX ADMIN — LITHIUM CARBONATE 450 MILLIGRAM(S): 300 TABLET, EXTENDED RELEASE ORAL at 05:17

## 2022-12-05 RX ADMIN — RISPERIDONE 2 MILLIGRAM(S): 4 TABLET ORAL at 18:49

## 2022-12-05 RX ADMIN — Medication 25 MILLIGRAM(S): at 00:21

## 2022-12-05 RX ADMIN — DIVALPROEX SODIUM 500 MILLIGRAM(S): 500 TABLET, DELAYED RELEASE ORAL at 13:10

## 2022-12-05 RX ADMIN — LITHIUM CARBONATE 450 MILLIGRAM(S): 300 TABLET, EXTENDED RELEASE ORAL at 18:49

## 2022-12-05 RX ADMIN — DIVALPROEX SODIUM 250 MILLIGRAM(S): 500 TABLET, DELAYED RELEASE ORAL at 21:01

## 2022-12-05 RX ADMIN — RISPERIDONE 2 MILLIGRAM(S): 4 TABLET ORAL at 05:17

## 2022-12-05 RX ADMIN — DIVALPROEX SODIUM 500 MILLIGRAM(S): 500 TABLET, DELAYED RELEASE ORAL at 21:01

## 2022-12-05 NOTE — PROGRESS NOTE ADULT - SUBJECTIVE AND OBJECTIVE BOX
Patient is a 22y old  Male who presents with a chief complaint of Placement (04 Dec 2022 14:07)      OVERNIGHT EVENTS: remained stable     SUBJECTIVE / INTERVAL HPI: Patient seen and examined at bedside.     VITAL SIGNS:  Vital Signs Last 24 Hrs  T(C): 37.1 (05 Dec 2022 13:43), Max: 37.1 (05 Dec 2022 13:43)  T(F): 98.7 (05 Dec 2022 13:43), Max: 98.7 (05 Dec 2022 13:43)  HR: 89 (05 Dec 2022 13:43) (81 - 89)  BP: 103/64 (05 Dec 2022 13:43) (91/53 - 107/58)  BP(mean): --  RR: 18 (05 Dec 2022 13:43) (18 - 18)  SpO2: --        PHYSICAL EXAM:    General: WDWN  HEENT: NC/AT; PERRL, clear conjunctiva  Neck: supple  Cardiovascular: +S1/S2; RRR  Respiratory: CTA b/l; no W/R/R  Gastrointestinal: soft, NT/ND; +BSx4  Extremities: WWP; 2+ peripheral pulses; no edema   Neurological: AAOx3; no focal deficits    MEDICATIONS:  MEDICATIONS  (STANDING):  diVALproex  milliGRAM(s) Oral daily  diVALproex  milliGRAM(s) Oral daily  diVALproex  milliGRAM(s) Oral at bedtime  diVALproex  milliGRAM(s) Oral at bedtime  lithium CR (ESKALITH-CR) 450 milliGRAM(s) Oral two times a day  risperiDONE   Tablet 2 milliGRAM(s) Oral two times a day    MEDICATIONS  (PRN):  hydrOXYzine hydrochloride 25 milliGRAM(s) Oral at bedtime PRN insomnia      ALLERGIES:  Allergies    No Known Allergies    Intolerances        LABS:              CAPILLARY BLOOD GLUCOSE          RADIOLOGY & ADDITIONAL TESTS: Reviewed.

## 2022-12-05 NOTE — PROGRESS NOTE ADULT - ASSESSMENT
22 years old male past medical history of bipolar and developmental delay came to emergency room for alleged aggressive and violent behavior as per step father. Evaluated by Psych; Step Father left stated pt needs placement refusing to take child home.     # Bipolar disorder and likely developmental disorder- stable  - continue current meds (on depakote, risperidone, and lithium)   - pt has been stable without aggressive behaviour in the hospital  - father states he cant take care of pt    # Depressed mood, negative thoughts  - lithium and valproic acid level wnl  - psych consult appreciated    # Obesity: Likely multifactorial: excess calories and Psych medication induced     DVT px - pt ambulatory     DISPO: pending placement to group home

## 2022-12-06 PROCEDURE — 99231 SBSQ HOSP IP/OBS SF/LOW 25: CPT

## 2022-12-06 RX ADMIN — DIVALPROEX SODIUM 250 MILLIGRAM(S): 500 TABLET, DELAYED RELEASE ORAL at 21:25

## 2022-12-06 RX ADMIN — LITHIUM CARBONATE 450 MILLIGRAM(S): 300 TABLET, EXTENDED RELEASE ORAL at 05:12

## 2022-12-06 RX ADMIN — Medication 25 MILLIGRAM(S): at 00:10

## 2022-12-06 RX ADMIN — DIVALPROEX SODIUM 500 MILLIGRAM(S): 500 TABLET, DELAYED RELEASE ORAL at 21:25

## 2022-12-06 RX ADMIN — RISPERIDONE 2 MILLIGRAM(S): 4 TABLET ORAL at 18:06

## 2022-12-06 RX ADMIN — LITHIUM CARBONATE 450 MILLIGRAM(S): 300 TABLET, EXTENDED RELEASE ORAL at 18:05

## 2022-12-06 RX ADMIN — DIVALPROEX SODIUM 500 MILLIGRAM(S): 500 TABLET, DELAYED RELEASE ORAL at 12:57

## 2022-12-06 RX ADMIN — DIVALPROEX SODIUM 250 MILLIGRAM(S): 500 TABLET, DELAYED RELEASE ORAL at 12:57

## 2022-12-06 RX ADMIN — RISPERIDONE 2 MILLIGRAM(S): 4 TABLET ORAL at 05:12

## 2022-12-06 NOTE — PROGRESS NOTE ADULT - SUBJECTIVE AND OBJECTIVE BOX
Patient is a 22y old  Male who presents with a chief complaint of Placement (05 Dec 2022 17:51)      OVERNIGHT EVENTS: remained stable  ROS: pt denies fever, chills, syncope, seizure, headache, cough, SOB, abd pain, melena, N/V/D, urinary symptoms or legs swelling,   SUBJECTIVE / INTERVAL HPI: Patient seen and examined at bedside.     VITAL SIGNS:  Vital Signs Last 24 Hrs  T(C): 36.5 (06 Dec 2022 04:36), Max: 37.1 (05 Dec 2022 13:43)  T(F): 97.7 (06 Dec 2022 04:36), Max: 98.7 (05 Dec 2022 13:43)  HR: 79 (06 Dec 2022 04:36) (79 - 89)  BP: 107/60 (06 Dec 2022 04:36) (103/64 - 114/60)  BP(mean): --  RR: 18 (06 Dec 2022 04:36) (18 - 18)  SpO2: --        PHYSICAL EXAM:    General: WDWN  HEENT: NC/AT; PERRL, clear conjunctiva  Neck: supple  Cardiovascular: +S1/S2; RRR  Respiratory: CTA b/l; no W/R/R  Gastrointestinal: soft, NT/ND; +BSx4  Extremities: WWP; 2+ peripheral pulses; no edema   Neurological: AAOx3; no focal deficits    MEDICATIONS:  MEDICATIONS  (STANDING):  diVALproex  milliGRAM(s) Oral daily  diVALproex  milliGRAM(s) Oral daily  diVALproex  milliGRAM(s) Oral at bedtime  diVALproex  milliGRAM(s) Oral at bedtime  lithium CR (ESKALITH-CR) 450 milliGRAM(s) Oral two times a day  risperiDONE   Tablet 2 milliGRAM(s) Oral two times a day    MEDICATIONS  (PRN):  hydrOXYzine hydrochloride 25 milliGRAM(s) Oral at bedtime PRN insomnia      ALLERGIES:  Allergies    No Known Allergies    Intolerances        LABS:              CAPILLARY BLOOD GLUCOSE          RADIOLOGY & ADDITIONAL TESTS: Reviewed.

## 2022-12-07 PROCEDURE — 99231 SBSQ HOSP IP/OBS SF/LOW 25: CPT

## 2022-12-07 RX ADMIN — DIVALPROEX SODIUM 500 MILLIGRAM(S): 500 TABLET, DELAYED RELEASE ORAL at 21:32

## 2022-12-07 RX ADMIN — DIVALPROEX SODIUM 250 MILLIGRAM(S): 500 TABLET, DELAYED RELEASE ORAL at 21:32

## 2022-12-07 RX ADMIN — LITHIUM CARBONATE 450 MILLIGRAM(S): 300 TABLET, EXTENDED RELEASE ORAL at 05:31

## 2022-12-07 RX ADMIN — LITHIUM CARBONATE 450 MILLIGRAM(S): 300 TABLET, EXTENDED RELEASE ORAL at 17:50

## 2022-12-07 RX ADMIN — DIVALPROEX SODIUM 250 MILLIGRAM(S): 500 TABLET, DELAYED RELEASE ORAL at 11:09

## 2022-12-07 RX ADMIN — RISPERIDONE 2 MILLIGRAM(S): 4 TABLET ORAL at 17:49

## 2022-12-07 RX ADMIN — DIVALPROEX SODIUM 500 MILLIGRAM(S): 500 TABLET, DELAYED RELEASE ORAL at 11:09

## 2022-12-07 RX ADMIN — RISPERIDONE 2 MILLIGRAM(S): 4 TABLET ORAL at 05:31

## 2022-12-07 NOTE — PROGRESS NOTE ADULT - SUBJECTIVE AND OBJECTIVE BOX
Patient is a 22y old  Male who presents with a chief complaint of Placement (06 Dec 2022 08:55)      OVERNIGHT EVENTS: remained stable  ROS: pt denies fever, chills, syncope, seizure, headache, cough, SOB, abd pain, N/V/D, melena, urinary symptoms or legs swelling,    SUBJECTIVE / INTERVAL HPI: Patient seen and examined at bedside.     VITAL SIGNS:  Vital Signs Last 24 Hrs  T(C): 37.3 (07 Dec 2022 14:00), Max: 37.3 (07 Dec 2022 14:00)  T(F): 99.2 (07 Dec 2022 14:00), Max: 99.2 (07 Dec 2022 14:00)  HR: 65 (07 Dec 2022 14:00) (61 - 65)  BP: 131/60 (07 Dec 2022 14:00) (120/75 - 139/86)  BP(mean): --  RR: 18 (06 Dec 2022 21:23) (18 - 18)  SpO2: --        PHYSICAL EXAM:    General: WDWN  HEENT: NC/AT; PERRL, clear conjunctiva  Neck: supple  Cardiovascular: +S1/S2; RRR  Respiratory: CTA b/l; no W/R/R  Gastrointestinal: soft, NT/ND; +BSx4  Extremities: WWP; 2+ peripheral pulses; no edema   Neurological: AAOx3; no focal deficits    MEDICATIONS:  MEDICATIONS  (STANDING):  diVALproex  milliGRAM(s) Oral daily  diVALproex  milliGRAM(s) Oral daily  diVALproex  milliGRAM(s) Oral at bedtime  diVALproex  milliGRAM(s) Oral at bedtime  lithium CR (ESKALITH-CR) 450 milliGRAM(s) Oral two times a day  risperiDONE   Tablet 2 milliGRAM(s) Oral two times a day    MEDICATIONS  (PRN):  hydrOXYzine hydrochloride 25 milliGRAM(s) Oral at bedtime PRN insomnia      ALLERGIES:  Allergies    No Known Allergies    Intolerances        LABS:              CAPILLARY BLOOD GLUCOSE

## 2022-12-08 PROCEDURE — 99231 SBSQ HOSP IP/OBS SF/LOW 25: CPT

## 2022-12-08 RX ADMIN — DIVALPROEX SODIUM 250 MILLIGRAM(S): 500 TABLET, DELAYED RELEASE ORAL at 21:36

## 2022-12-08 RX ADMIN — Medication 25 MILLIGRAM(S): at 02:45

## 2022-12-08 RX ADMIN — RISPERIDONE 2 MILLIGRAM(S): 4 TABLET ORAL at 05:19

## 2022-12-08 RX ADMIN — LITHIUM CARBONATE 450 MILLIGRAM(S): 300 TABLET, EXTENDED RELEASE ORAL at 05:19

## 2022-12-08 RX ADMIN — DIVALPROEX SODIUM 500 MILLIGRAM(S): 500 TABLET, DELAYED RELEASE ORAL at 21:36

## 2022-12-08 RX ADMIN — DIVALPROEX SODIUM 500 MILLIGRAM(S): 500 TABLET, DELAYED RELEASE ORAL at 11:35

## 2022-12-08 RX ADMIN — LITHIUM CARBONATE 450 MILLIGRAM(S): 300 TABLET, EXTENDED RELEASE ORAL at 17:36

## 2022-12-08 RX ADMIN — RISPERIDONE 2 MILLIGRAM(S): 4 TABLET ORAL at 17:37

## 2022-12-08 RX ADMIN — DIVALPROEX SODIUM 250 MILLIGRAM(S): 500 TABLET, DELAYED RELEASE ORAL at 11:34

## 2022-12-08 NOTE — PROGRESS NOTE ADULT - SUBJECTIVE AND OBJECTIVE BOX
Patient is a 22y old  Male who presents with a chief complaint of Placement (07 Dec 2022 13:55)      OVERNIGHT EVENTS: remained stable  ROS; pt denies fever, chills, syncope, seizure, headache, cough, SOB, abd pain, N/V/D, urinary symptoms or legs swelling,    SUBJECTIVE / INTERVAL HPI: Patient seen and examined at bedside.     VITAL SIGNS:  Vital Signs Last 24 Hrs  T(C): 36.1 (08 Dec 2022 05:01), Max: 37.3 (07 Dec 2022 14:00)  T(F): 97 (08 Dec 2022 05:01), Max: 99.2 (07 Dec 2022 14:00)  HR: 77 (08 Dec 2022 05:01) (65 - 104)  BP: 123/73 (08 Dec 2022 05:01) (119/71 - 131/60)  BP(mean): --  RR: 16 (08 Dec 2022 05:01) (16 - 16)  SpO2: --        PHYSICAL EXAM:    General: WDWN  HEENT: NC/AT; PERRL, clear conjunctiva  Neck: supple  Cardiovascular: +S1/S2; RRR  Respiratory: CTA b/l; no W/R/R  Gastrointestinal: soft, NT/ND; +BSx4  Extremities: WWP; 2+ peripheral pulses; no edema   Neurological: AAOx3; no focal deficits    MEDICATIONS:  MEDICATIONS  (STANDING):  diVALproex  milliGRAM(s) Oral daily  diVALproex  milliGRAM(s) Oral daily  diVALproex  milliGRAM(s) Oral at bedtime  diVALproex  milliGRAM(s) Oral at bedtime  lithium CR (ESKALITH-CR) 450 milliGRAM(s) Oral two times a day  risperiDONE   Tablet 2 milliGRAM(s) Oral two times a day    MEDICATIONS  (PRN):  hydrOXYzine hydrochloride 25 milliGRAM(s) Oral at bedtime PRN insomnia      ALLERGIES:  Allergies    No Known Allergies    Intolerances        LABS:              CAPILLARY BLOOD GLUCOSE            ASSESSMENT:    PLAN:

## 2022-12-09 PROCEDURE — 99231 SBSQ HOSP IP/OBS SF/LOW 25: CPT

## 2022-12-09 RX ADMIN — DIVALPROEX SODIUM 500 MILLIGRAM(S): 500 TABLET, DELAYED RELEASE ORAL at 21:44

## 2022-12-09 RX ADMIN — DIVALPROEX SODIUM 250 MILLIGRAM(S): 500 TABLET, DELAYED RELEASE ORAL at 12:13

## 2022-12-09 RX ADMIN — DIVALPROEX SODIUM 250 MILLIGRAM(S): 500 TABLET, DELAYED RELEASE ORAL at 21:44

## 2022-12-09 RX ADMIN — DIVALPROEX SODIUM 500 MILLIGRAM(S): 500 TABLET, DELAYED RELEASE ORAL at 12:13

## 2022-12-09 RX ADMIN — LITHIUM CARBONATE 450 MILLIGRAM(S): 300 TABLET, EXTENDED RELEASE ORAL at 18:42

## 2022-12-09 RX ADMIN — RISPERIDONE 2 MILLIGRAM(S): 4 TABLET ORAL at 05:10

## 2022-12-09 RX ADMIN — Medication 25 MILLIGRAM(S): at 00:09

## 2022-12-09 RX ADMIN — RISPERIDONE 2 MILLIGRAM(S): 4 TABLET ORAL at 18:41

## 2022-12-09 RX ADMIN — LITHIUM CARBONATE 450 MILLIGRAM(S): 300 TABLET, EXTENDED RELEASE ORAL at 05:10

## 2022-12-09 NOTE — PROGRESS NOTE ADULT - SUBJECTIVE AND OBJECTIVE BOX
Patient is a 22y old  Male who presents with a chief complaint of Placement (14 Nov 2022 08:47)    Subjective: patient seen and examined at bedside; no complaints; reports feeling okay. He reports he feels well.    REVIEW OF SYSTEMS:    CONSTITUTIONAL: No weakness, fevers or chills  EYES/ENT: No visual changes;  No vertigo or throat pain   NECK: No pain or stiffness  RESPIRATORY: No cough, wheezing, hemoptysis; No shortness of breath  CARDIOVASCULAR: No chest pain or palpitations  GASTROINTESTINAL: No abdominal or epigastric pain. No nausea, vomiting, or hematemesis; No diarrhea or constipation. No melena or hematochezia.  GENITOURINARY: No dysuria, frequency or hematuria  NEUROLOGICAL: No numbness or weakness  SKIN: No itching, rashes      Vital Signs Last 24 Hrs  T(C): 36.2 (09 Dec 2022 05:07), Max: 36.8 (08 Dec 2022 21:26)  T(F): 97.1 (09 Dec 2022 05:07), Max: 98.3 (08 Dec 2022 21:26)  HR: 74 (09 Dec 2022 05:07) (74 - 94)  BP: 118/80 (09 Dec 2022 05:07) (118/80 - 132/70)  BP(mean): --  RR: 16 (08 Dec 2022 21:26) (16 - 16)  SpO2: --  General: NAD, comfortable, pleasant   HEENT: NC/AT; PERRL, clear conjunctiva  Neck: supple  Cardiovascular: +S1/S2; RRR  Respiratory: CTA b/l; no W/R/R  Gastrointestinal: soft, NT/ND; +BSx4  Extremities: WWP; 2+ peripheral pulses; no edema   Neurological: AAOx3; no focal deficits      MEDICATIONS  (STANDING):  diVALproex  milliGRAM(s) Oral daily  diVALproex  milliGRAM(s) Oral daily  diVALproex  milliGRAM(s) Oral at bedtime  diVALproex  milliGRAM(s) Oral at bedtime  lithium CR (ESKALITH-CR) 450 milliGRAM(s) Oral two times a day  risperiDONE   Tablet 2 milliGRAM(s) Oral two times a day    MEDICATIONS  (PRN):  hydrOXYzine hydrochloride 25 milliGRAM(s) Oral at bedtime PRN insomnia      ALLERGIES:  Allergies    No Known Allergies    Intolerances        LABS:              CAPILLARY BLOOD GLUCOSE          RADIOLOGY & ADDITIONAL TESTS: Reviewed.

## 2022-12-10 PROCEDURE — 99231 SBSQ HOSP IP/OBS SF/LOW 25: CPT

## 2022-12-10 RX ADMIN — DIVALPROEX SODIUM 250 MILLIGRAM(S): 500 TABLET, DELAYED RELEASE ORAL at 11:30

## 2022-12-10 RX ADMIN — RISPERIDONE 2 MILLIGRAM(S): 4 TABLET ORAL at 17:54

## 2022-12-10 RX ADMIN — DIVALPROEX SODIUM 250 MILLIGRAM(S): 500 TABLET, DELAYED RELEASE ORAL at 21:57

## 2022-12-10 RX ADMIN — Medication 25 MILLIGRAM(S): at 00:27

## 2022-12-10 RX ADMIN — DIVALPROEX SODIUM 500 MILLIGRAM(S): 500 TABLET, DELAYED RELEASE ORAL at 11:30

## 2022-12-10 RX ADMIN — DIVALPROEX SODIUM 500 MILLIGRAM(S): 500 TABLET, DELAYED RELEASE ORAL at 21:57

## 2022-12-10 RX ADMIN — LITHIUM CARBONATE 450 MILLIGRAM(S): 300 TABLET, EXTENDED RELEASE ORAL at 05:40

## 2022-12-10 RX ADMIN — RISPERIDONE 2 MILLIGRAM(S): 4 TABLET ORAL at 05:40

## 2022-12-10 RX ADMIN — LITHIUM CARBONATE 450 MILLIGRAM(S): 300 TABLET, EXTENDED RELEASE ORAL at 17:54

## 2022-12-10 NOTE — PROGRESS NOTE ADULT - SUBJECTIVE AND OBJECTIVE BOX
Patient is a 22y old  Male who presents with a chief complaint of Placement (14 Nov 2022 08:47)    Subjective: patient seen and examined at bedside; no complaints; reports feeling okay. He reports he feels well. No events overnight.    REVIEW OF SYSTEMS:    CONSTITUTIONAL: No weakness, fevers or chills  EYES/ENT: No visual changes;  No vertigo or throat pain   NECK: No pain or stiffness  RESPIRATORY: No cough, wheezing, hemoptysis; No shortness of breath  CARDIOVASCULAR: No chest pain or palpitations  GASTROINTESTINAL: No abdominal or epigastric pain. No nausea, vomiting, or hematemesis; No diarrhea or constipation. No melena or hematochezia.  GENITOURINARY: No dysuria, frequency or hematuria  NEUROLOGICAL: No numbness or weakness  SKIN: No itching, rashes      Vital Signs Last 24 Hrs  T(C): 36.7 (10 Dec 2022 04:51), Max: 36.7 (10 Dec 2022 04:51)  T(F): 98.1 (10 Dec 2022 04:51), Max: 98.1 (10 Dec 2022 04:51)  HR: 92 (10 Dec 2022 04:51) (87 - 102)  BP: 112/55 (10 Dec 2022 04:51) (112/55 - 126/75)  BP(mean): --  RR: 16 (10 Dec 2022 04:51) (16 - 16)  SpO2: --    General: NAD, comfortable, pleasant   HEENT: NC/AT; PERRL, clear conjunctiva  Neck: supple  Cardiovascular: +S1/S2; RRR  Respiratory: CTA b/l; no W/R/R  Gastrointestinal: soft, NT/ND; +BSx4  Extremities: WWP; 2+ peripheral pulses; no edema   Neurological: AAOx3; no focal deficits      MEDICATIONS  (STANDING):  diVALproex  milliGRAM(s) Oral daily  diVALproex  milliGRAM(s) Oral daily  diVALproex  milliGRAM(s) Oral at bedtime  diVALproex  milliGRAM(s) Oral at bedtime  lithium CR (ESKALITH-CR) 450 milliGRAM(s) Oral two times a day  risperiDONE   Tablet 2 milliGRAM(s) Oral two times a day    MEDICATIONS  (PRN):  hydrOXYzine hydrochloride 25 milliGRAM(s) Oral at bedtime PRN insomnia      ALLERGIES:  Allergies    No Known Allergies    Intolerances        LABS:              CAPILLARY BLOOD GLUCOSE          RADIOLOGY & ADDITIONAL TESTS: Reviewed.

## 2022-12-11 PROCEDURE — 99231 SBSQ HOSP IP/OBS SF/LOW 25: CPT

## 2022-12-11 RX ADMIN — DIVALPROEX SODIUM 500 MILLIGRAM(S): 500 TABLET, DELAYED RELEASE ORAL at 21:44

## 2022-12-11 RX ADMIN — DIVALPROEX SODIUM 250 MILLIGRAM(S): 500 TABLET, DELAYED RELEASE ORAL at 21:44

## 2022-12-11 RX ADMIN — DIVALPROEX SODIUM 250 MILLIGRAM(S): 500 TABLET, DELAYED RELEASE ORAL at 12:59

## 2022-12-11 RX ADMIN — DIVALPROEX SODIUM 500 MILLIGRAM(S): 500 TABLET, DELAYED RELEASE ORAL at 12:59

## 2022-12-11 RX ADMIN — RISPERIDONE 2 MILLIGRAM(S): 4 TABLET ORAL at 06:24

## 2022-12-11 RX ADMIN — LITHIUM CARBONATE 450 MILLIGRAM(S): 300 TABLET, EXTENDED RELEASE ORAL at 18:30

## 2022-12-11 RX ADMIN — LITHIUM CARBONATE 450 MILLIGRAM(S): 300 TABLET, EXTENDED RELEASE ORAL at 06:24

## 2022-12-11 RX ADMIN — RISPERIDONE 2 MILLIGRAM(S): 4 TABLET ORAL at 18:30

## 2022-12-11 NOTE — PROGRESS NOTE ADULT - SUBJECTIVE AND OBJECTIVE BOX
Patient is a 22y old  Male who presents with a chief complaint of Placement (10 Dec 2022 09:50)      OVERNIGHT EVENTS: remained stable  ROS; pt denies fever, chills, syncope, seizure, headache, cough, SOB, abd pain, N/V/D, urinary symptoms, melena or legs swelling,  SUBJECTIVE / INTERVAL HPI: Patient seen and examined at bedside.     VITAL SIGNS:  Vital Signs Last 24 Hrs  T(C): 36.7 (11 Dec 2022 04:51), Max: 36.7 (11 Dec 2022 04:51)  T(F): 98 (11 Dec 2022 04:51), Max: 98 (11 Dec 2022 04:51)  HR: 73 (11 Dec 2022 04:51) (73 - 98)  BP: 114/56 (11 Dec 2022 04:51) (114/56 - 125/73)  BP(mean): --  RR: 16 (11 Dec 2022 04:51) (16 - 16)  SpO2: --        PHYSICAL EXAM:    General: WDWN  HEENT: NC/AT; PERRL, clear conjunctiva  Neck: supple  Cardiovascular: +S1/S2; RRR  Respiratory: CTA b/l; no W/R/R  Gastrointestinal: soft, NT/ND; +BSx4  Extremities: WWP; 2+ peripheral pulses; no edema   Neurological: AAOx3; no focal deficits    MEDICATIONS:  MEDICATIONS  (STANDING):  diVALproex  milliGRAM(s) Oral daily  diVALproex  milliGRAM(s) Oral daily  diVALproex  milliGRAM(s) Oral at bedtime  diVALproex  milliGRAM(s) Oral at bedtime  lithium CR (ESKALITH-CR) 450 milliGRAM(s) Oral two times a day  risperiDONE   Tablet 2 milliGRAM(s) Oral two times a day    MEDICATIONS  (PRN):  hydrOXYzine hydrochloride 25 milliGRAM(s) Oral at bedtime PRN insomnia      ALLERGIES:  Allergies    No Known Allergies    Intolerances        LABS:              CAPILLARY BLOOD GLUCOSE          RADIOLOGY & ADDITIONAL TESTS: Reviewed.

## 2022-12-12 PROCEDURE — 99231 SBSQ HOSP IP/OBS SF/LOW 25: CPT

## 2022-12-12 RX ADMIN — DIVALPROEX SODIUM 500 MILLIGRAM(S): 500 TABLET, DELAYED RELEASE ORAL at 12:04

## 2022-12-12 RX ADMIN — RISPERIDONE 2 MILLIGRAM(S): 4 TABLET ORAL at 17:53

## 2022-12-12 RX ADMIN — DIVALPROEX SODIUM 500 MILLIGRAM(S): 500 TABLET, DELAYED RELEASE ORAL at 21:09

## 2022-12-12 RX ADMIN — LITHIUM CARBONATE 450 MILLIGRAM(S): 300 TABLET, EXTENDED RELEASE ORAL at 05:02

## 2022-12-12 RX ADMIN — DIVALPROEX SODIUM 250 MILLIGRAM(S): 500 TABLET, DELAYED RELEASE ORAL at 21:09

## 2022-12-12 RX ADMIN — RISPERIDONE 2 MILLIGRAM(S): 4 TABLET ORAL at 05:02

## 2022-12-12 RX ADMIN — DIVALPROEX SODIUM 250 MILLIGRAM(S): 500 TABLET, DELAYED RELEASE ORAL at 12:04

## 2022-12-12 RX ADMIN — LITHIUM CARBONATE 450 MILLIGRAM(S): 300 TABLET, EXTENDED RELEASE ORAL at 17:54

## 2022-12-12 NOTE — PROGRESS NOTE ADULT - SUBJECTIVE AND OBJECTIVE BOX
Patient is a 22y old  Male who presents with a chief complaint of Placement (11 Dec 2022 13:23)      OVERNIGHT EVENTS: remained stable  REVIEW OF SYSTEMS:    CONSTITUTIONAL: No weakness, fevers or chills  EYES/ENT: No visual changes;  No vertigo or throat pain   NECK: No pain or stiffness  RESPIRATORY: No cough, wheezing, hemoptysis; No shortness of breath  CARDIOVASCULAR: No chest pain or palpitations  GASTROINTESTINAL: No abdominal or epigastric pain. No nausea, vomiting, or hematemesis; No diarrhea or constipation. No melena or hematochezia.  GENITOURINARY: No dysuria, frequency or hematuria  NEUROLOGICAL: No numbness or weakness  SKIN: No itching, rashes      SUBJECTIVE / INTERVAL HPI: Patient seen and examined at bedside.     VITAL SIGNS:  Vital Signs Last 24 Hrs  T(C): 36.8 (12 Dec 2022 05:39), Max: 36.9 (11 Dec 2022 14:31)  T(F): 98.2 (12 Dec 2022 05:39), Max: 98.4 (11 Dec 2022 14:31)  HR: 78 (12 Dec 2022 05:39) (77 - 87)  BP: 101/67 (12 Dec 2022 05:39) (95/55 - 116/82)  BP(mean): --  RR: 16 (11 Dec 2022 21:18) (16 - 16)  SpO2: --        PHYSICAL EXAM:    General: WDWN  HEENT: NC/AT; PERRL, clear conjunctiva  Neck: supple  Cardiovascular: +S1/S2; RRR  Respiratory: CTA b/l; no W/R/R  Gastrointestinal: soft, NT/ND; +BSx4  Extremities: WWP; 2+ peripheral pulses; no edema   Neurological: AAOx3; no focal deficits    MEDICATIONS:  MEDICATIONS  (STANDING):  diVALproex  milliGRAM(s) Oral daily  diVALproex  milliGRAM(s) Oral daily  diVALproex  milliGRAM(s) Oral at bedtime  diVALproex  milliGRAM(s) Oral at bedtime  lithium CR (ESKALITH-CR) 450 milliGRAM(s) Oral two times a day  risperiDONE   Tablet 2 milliGRAM(s) Oral two times a day    MEDICATIONS  (PRN):  hydrOXYzine hydrochloride 25 milliGRAM(s) Oral at bedtime PRN insomnia      ALLERGIES:  Allergies    No Known Allergies    Intolerances        LABS:              CAPILLARY BLOOD GLUCOSE          RADIOLOGY & ADDITIONAL TESTS: Reviewed.

## 2022-12-13 PROCEDURE — 99231 SBSQ HOSP IP/OBS SF/LOW 25: CPT

## 2022-12-13 RX ADMIN — RISPERIDONE 2 MILLIGRAM(S): 4 TABLET ORAL at 17:59

## 2022-12-13 RX ADMIN — RISPERIDONE 2 MILLIGRAM(S): 4 TABLET ORAL at 06:19

## 2022-12-13 RX ADMIN — LITHIUM CARBONATE 450 MILLIGRAM(S): 300 TABLET, EXTENDED RELEASE ORAL at 06:19

## 2022-12-13 RX ADMIN — DIVALPROEX SODIUM 500 MILLIGRAM(S): 500 TABLET, DELAYED RELEASE ORAL at 21:24

## 2022-12-13 RX ADMIN — Medication 25 MILLIGRAM(S): at 00:10

## 2022-12-13 RX ADMIN — DIVALPROEX SODIUM 250 MILLIGRAM(S): 500 TABLET, DELAYED RELEASE ORAL at 12:00

## 2022-12-13 RX ADMIN — DIVALPROEX SODIUM 500 MILLIGRAM(S): 500 TABLET, DELAYED RELEASE ORAL at 12:00

## 2022-12-13 RX ADMIN — DIVALPROEX SODIUM 250 MILLIGRAM(S): 500 TABLET, DELAYED RELEASE ORAL at 21:24

## 2022-12-13 RX ADMIN — LITHIUM CARBONATE 450 MILLIGRAM(S): 300 TABLET, EXTENDED RELEASE ORAL at 17:59

## 2022-12-13 NOTE — PROGRESS NOTE ADULT - SUBJECTIVE AND OBJECTIVE BOX
Patient is a 22y old  Male who presents with a chief complaint of Placement (12 Dec 2022 09:13)      OVERNIGHT EVENTS: remained stable    REVIEW OF SYSTEMS:    CONSTITUTIONAL: No weakness, fevers or chills  EYES/ENT: No visual changes;  No vertigo or throat pain   NECK: No pain or stiffness  RESPIRATORY: No cough, wheezing, hemoptysis; No shortness of breath  CARDIOVASCULAR: No chest pain or palpitations  GASTROINTESTINAL: No abdominal or epigastric pain. No nausea, vomiting, or hematemesis; No diarrhea or constipation. No melena or hematochezia.  GENITOURINARY: No dysuria, frequency or hematuria  NEUROLOGICAL: No numbness or weakness  SKIN: No itching, rashes      SUBJECTIVE / INTERVAL HPI: Patient seen and examined at bedside.     VITAL SIGNS:  Vital Signs Last 24 Hrs  T(C): 36.2 (13 Dec 2022 05:24), Max: 36.9 (12 Dec 2022 13:36)  T(F): 97.1 (13 Dec 2022 05:24), Max: 98.4 (12 Dec 2022 13:36)  HR: 89 (13 Dec 2022 05:24) (89 - 89)  BP: 120/80 (13 Dec 2022 05:24) (118/76 - 130/82)  BP(mean): --  RR: 16 (13 Dec 2022 05:24) (16 - 16)  SpO2: --        PHYSICAL EXAM:    General: WDWN  HEENT: NC/AT; PERRL, clear conjunctiva  Neck: supple  Cardiovascular: +S1/S2; RRR  Respiratory: CTA b/l; no W/R/R  Gastrointestinal: soft, NT/ND; +BSx4  Extremities: WWP; 2+ peripheral pulses; no edema   Neurological: AAOx3; no focal deficits    MEDICATIONS:  MEDICATIONS  (STANDING):  diVALproex  milliGRAM(s) Oral daily  diVALproex  milliGRAM(s) Oral daily  diVALproex  milliGRAM(s) Oral at bedtime  diVALproex  milliGRAM(s) Oral at bedtime  lithium CR (ESKALITH-CR) 450 milliGRAM(s) Oral two times a day  risperiDONE   Tablet 2 milliGRAM(s) Oral two times a day    MEDICATIONS  (PRN):  hydrOXYzine hydrochloride 25 milliGRAM(s) Oral at bedtime PRN insomnia      ALLERGIES:  Allergies    No Known Allergies    Intolerances        LABS:              CAPILLARY BLOOD GLUCOSE          RADIOLOGY & ADDITIONAL TESTS: Reviewed.

## 2022-12-14 PROCEDURE — 99232 SBSQ HOSP IP/OBS MODERATE 35: CPT

## 2022-12-14 RX ADMIN — DIVALPROEX SODIUM 250 MILLIGRAM(S): 500 TABLET, DELAYED RELEASE ORAL at 11:53

## 2022-12-14 RX ADMIN — DIVALPROEX SODIUM 250 MILLIGRAM(S): 500 TABLET, DELAYED RELEASE ORAL at 21:51

## 2022-12-14 RX ADMIN — Medication 25 MILLIGRAM(S): at 00:53

## 2022-12-14 RX ADMIN — RISPERIDONE 2 MILLIGRAM(S): 4 TABLET ORAL at 06:36

## 2022-12-14 RX ADMIN — RISPERIDONE 2 MILLIGRAM(S): 4 TABLET ORAL at 18:04

## 2022-12-14 RX ADMIN — DIVALPROEX SODIUM 500 MILLIGRAM(S): 500 TABLET, DELAYED RELEASE ORAL at 21:52

## 2022-12-14 RX ADMIN — DIVALPROEX SODIUM 500 MILLIGRAM(S): 500 TABLET, DELAYED RELEASE ORAL at 11:53

## 2022-12-14 RX ADMIN — LITHIUM CARBONATE 450 MILLIGRAM(S): 300 TABLET, EXTENDED RELEASE ORAL at 06:36

## 2022-12-14 RX ADMIN — LITHIUM CARBONATE 450 MILLIGRAM(S): 300 TABLET, EXTENDED RELEASE ORAL at 18:04

## 2022-12-14 NOTE — PROGRESS NOTE ADULT - SUBJECTIVE AND OBJECTIVE BOX
Progress note    Patient seen and examined in [facility] in no acute distress.    INTERVAL HPI/OVERNIGHT EVENTS:    REVIEW OF SYSTEMS:  CONSTITUTIONAL: No fever, weight loss, or fatigue  EYES: No eye pain, visual disturbances, or discharge  ENMT:  No difficulty hearing, tinnitus, vertigo; No sinus or throat pain  NECK: No pain or stiffness  BREASTS: No pain, masses, or nipple discharge  RESPIRATORY: No cough, wheezing, chills or hemoptysis; No shortness of breath  CARDIOVASCULAR: No chest pain, palpitations, dizziness, or leg swelling  GASTROINTESTINAL: No abdominal or epigastric pain. No nausea, vomiting, or hematemesis; No diarrhea or constipation. No melena or hematochezia.  GENITOURINARY: No dysuria, frequency, hematuria, or incontinence  NEUROLOGICAL: No headaches, memory loss, loss of strength, numbness, or tremors  SKIN: No itching, burning, rashes, or lesions   LYMPH NODES: No enlarged glands  ENDOCRINE: No heat or cold intolerance; No hair loss  MUSCULOSKELETAL: No joint pain or swelling; No muscle, back, or extremity pain  PSYCHIATRIC: No depression, anxiety, mood swings, or difficulty sleeping  HEME/LYMPH: No easy bruising, or bleeding gums  ALLERY AND IMMUNOLOGIC: No hives or eczema  FAMILY HISTORY:    T(C): 36.1 (12-14-22 @ 13:35), Max: 36.4 (12-13-22 @ 20:25)  HR: 87 (12-14-22 @ 13:35) (66 - 87)  BP: 113/56 (12-14-22 @ 13:35) (104/54 - 113/56)  RR: 16 (12-14-22 @ 13:35) (16 - 16)  SpO2: --  Wt(kg): --Vital Signs Last 24 Hrs  T(C): 36.1 (14 Dec 2022 13:35), Max: 36.4 (13 Dec 2022 20:25)  T(F): 97 (14 Dec 2022 13:35), Max: 97.5 (13 Dec 2022 20:25)  HR: 87 (14 Dec 2022 13:35) (66 - 87)  BP: 113/56 (14 Dec 2022 13:35) (104/54 - 113/56)  BP(mean): --  RR: 16 (14 Dec 2022 13:35) (16 - 16)  SpO2: --      No Known Allergies      PHYSICAL EXAM:  GENERAL: NAD, well-groomed, well-developed  HEAD:  Atraumatic, Normocephalic  EYES: EOMI, PERRLA, conjunctiva and sclera clear  ENMT: No tonsillar erythema, exudates, or enlargement; Moist mucous membranes, Good dentition, No lesions  NECK: Supple, No JVD, Normal thyroid  NERVOUS SYSTEM:  Alert & Oriented X3, Good concentration; Motor Strength 5/5 B/L upper and lower extremities; DTRs 2+ intact and symmetric  CHEST/LUNG: Clear to percussion bilaterally; No rales, rhonchi, wheezing, or rubs  HEART: Regular rate and rhythm; No murmurs, rubs, or gallops  ABDOMEN: Soft, Nontender, Nondistended; Bowel sounds present  EXTREMITIES:  2+ Peripheral Pulses, No clubbing, cyanosis, or edema  LYMPH: No lymphadenopathy noted  SKIN: No rashes or lesions    Consultant(s) Notes Reviewed:  [x ] YES  [ ] NO  Care Discussed with Consultants/Other Providers [ x] YES  [ ] NO    LABS:      RADIOLOGY & ADDITIONAL TESTS:    Imaging Personally Reviewed:  [ ] YES  [ ] NO  diVALproex  milliGRAM(s) Oral daily  diVALproex  milliGRAM(s) Oral daily  diVALproex  milliGRAM(s) Oral at bedtime  diVALproex  milliGRAM(s) Oral at bedtime  hydrOXYzine hydrochloride 25 milliGRAM(s) Oral at bedtime PRN  lithium CR (ESKALITH-CR) 450 milliGRAM(s) Oral two times a day  risperiDONE   Tablet 2 milliGRAM(s) Oral two times a day      HEALTH ISSUES - PROBLEM Dx:  History of developmental delay    Bipolar disorder    22 years old male past medical history of bipolar and developmental delay came to emergency room for alleged aggressive and violent behavior as per step father. Evaluated by Psych; Step Father left stated pt needs placement refusing to take child home.     # Bipolar disorder and likely developmental disorder- stable  - continue current meds (on depakote, risperidone, and lithium)   - pt has been stable without aggressive behaviour in the hospital  - father states he cant take care of pt    # Depressed mood, negative thoughts  - lithium and valproic acid level wnl  - psych consult appreciated    # Obesity: Likely multifactorial: excess calories and Psych medication induced     DVT px - pt ambulatory     DISPO: pending placement      Total time spent to complete patient's bedside assessment, review medical chart, discuss medical plan of care with covering medical team was ____25____ with > 50% of time spent face to face w/ patient, discussion with patient/family and/or coordination of care

## 2022-12-14 NOTE — PROGRESS NOTE ADULT - REASON FOR ADMISSION
Occupational Therapy    Visit Type: treatment  Co-treat with: Physical therapist  Precautions:  Medical precautions:  fall risk; standard precautions, contact precautions and droplet precautions.  12/11: Admitted s/p fall in shower, weakness  COVID +    PMH: A fib, CHF, HTN, HLD    Lines:       Lines in chart and on patient reviewed, precautions maintained throughout session.  Vision:     Current vision: wears glasses all the time  Safety Measures: bed alarm and bed rails  SUBJECTIVE  Patient agreed to participate in therapy this date.  Patient verbally agrees to allow the following to be present during session: daughter  Are we going to get up?  Patient / Family Goal: return to previous functional status    Pain     Location: back; did not rate  RN informed on pain level     OBJECTIVE     Cognitive Status   Level of Consciousness   - alert  Arousal Alertness   - appropriate responses to stimuli  Affect/Behavior    - calm and cooperative  Orientation    - Oriented to: person, place, time and situation  Functional Communication   - Overall Status: within functional limits   - Forms of Communication: verbal  Attention Span    - Attention: intact  Following Direction   - follows one step commands consistently, follows one step commands with repetition and follows one step commands with increased time (occasional repetition of commands due to hearing)  Memory   - appears intact    Posture:  - Seated: poor forward head (rounded shoulders)  - Standing: poor forward head (rounded shoulders)       Sitting Balance  (SOPHIA = base of support)  Static      - Trial 1 details: stand by assist, with double LE support and with back unsupported    Standing Balance  (SOPHIA = base of support)  Firm Surface: Double Leg      - Static, Eyes Open       - Trial 1 (sec): 60       - Trial 1 details: total assist (mod x2; cues for upright posture; x2 reps)       Bed Mobility  - Rolling right: modified independent  - Repositioning in bed: total  assist - non-dependent, 2 persons  - Side-lying to sit: maximal assist, with verbal cues, with tactile cues  - Sit to side-lying: total assist - non-dependent, 2 persons (max x1 for trunk, min x1 for BLE)  Transfers  Assistive devices: 2 person (BUE support via therapist)  - Sit to stand: total assist - non-dependent, 2 persons (mod x2 from standard bed height)       - Second Trial: maximal assist (w/ bed elevated)  - Stand to sit: maximal assist      Activities of Daily Living (ADLs)  Eating:   - Assist: supervision and set up  - Position: upright in bed  Lower Body Dressing:   - Footwear:       - Assistance: total assist - non-dependent       - Position: supine/bed       - Type: socks  Toileting:   - Assist: total assist - non-dependent  - Assist needed for: steadying and perineal hygiene (x2 person assist to complete hygiene at EOB. )     Interventions    Training provided: activity tolerance, ADL training, balance retraining, bed mobility training, compensatory techniques, energy conservation, transfer training and safety trainingPatient engaged in UE/LE AROM exercises w/ bed in chair position due to hypotension - hypotension noted unchanged following sitting in upright position w/ and w/out exercise. Upon sitting at EOB, patient endorses dizziness but hypotension remains unchanged/stable w/ and w/out exercise. Patient returned to supine due to persistent hypotension and OOB mobility deferred until hemodynamic stability maintained. Upon return to supine and after rolling, patient noted w/ incr SOB and decr SpO2 to 82% on RA - improved w/ elevation of HOB to 30 degrees and cues for PLB technique.    Skilled input: verbal instruction/cues, tactile instruction/cues and posture correction  Verbal Consent: Writer verbally educated and received verbal consent for hand placement, positioning of patient, and techniques to be performed today from patient for therapist position for techniques, clothing adjustments for  techniques and hand placement and palpation for techniques as described above and how they are pertinent to the patient's plan of care.         ASSESSMENT  Impairments: activity tolerance, balance, bed mobility, aerobic capacity, cardiovascular endurance and strength  Functional Limitations: functional mobility, bed mobility, toileting, dressing, bathing, functional transfers and participating in meaningful/purposeful activities       Discharge Recommendations:  Recommendations for Discharge: OT IL: Patient is appropriate for daily Occupational Therapy  PT/OT Mobility Equipment for Discharge: TBD  PT/OT ADL Equipment for Discharge: TBD at [facility] Cognition Screen:    1. Following/understanding a 10 to 15 minute speech or presentation (e.g., lesson at a place of [employer]ucian[employer], guest lecturer at a senior center?  A little (3)    2. Understanding familiar people during ordinary conversations?  None (4)    3. Remembering to take medications at the appropriate time?  A little (3)    4. Remembering where things were placed or put away (e.g., keys)?  A little (3)    5. Remembering a list of 4 or 5 errands without writing it down?  A little (3)    6. Taking care of complicated tasks like managing a checking account or getting appliances fixed?  A little (3)    AM-PAC Cognition Screen:  Cognition Score: 19/24  Cognition Interpretation: suspected impairment, recommend PASS - pending resolution of hemodynamics and BP stabilization as deemed necessary.           Progress: improving as expected    • Skilled therapy is required to address these limitations in attempt to maximize the patient's independence.    Education:   - Present and ready to learn: patient  Education provided during session:  - Results of above outlined education: Verbalizes understanding and Needs reinforcement  Pain at End of Session: RN informed on pain level   Pain: 0/10    Patient at End of Session:   Location: in bed (chair position)  Safety measures:  Placement alarm system in place/re-engaged, call light within reach, equipment intact, lines intact and bed rails x4  Handoff to: nurse    PLAN  Suggestions for next session as indicated: Functional transfers (sit > stand, commode)  Standing tolerance/balance  UE strengthening HEP  PASS    Interventions: activity tolerance training, bed mobility training, ADL retraining, balance, compensatory technique education, compensatory techniques, cognitive retraining, energy conservation, functional transfer training, HEP training, patient education, transfer training, therapeutic exercise, therapeutic activity, upper extremity strengthening/ROM, safety training and use of adaptive equipment  Agreement to plan and goals: patient agrees with goals and treatment plan      GOALS  Review Date: 12/14/2022  Long Term Goals: (to be met by time of discharge from hospital)  Grooming: Patient will complete grooming tasks in sitting and at bedside supervision.  Status: progressing/ongoing  Lower body dressing: Patient will complete lower body dressing in sitting and at bedside supervision.  Status: progressing/ongoing  Sit (edge of bed): Patient will sit at edge of bed for 10 mins, modified independent.   Status: progressing/ongoing        Documented in the chart in the following areas: Assessment. Plan.      Therapy procedure time and total treatment time can be found documented on the Time Entry flowsheet

## 2022-12-15 PROCEDURE — 99231 SBSQ HOSP IP/OBS SF/LOW 25: CPT

## 2022-12-15 RX ADMIN — RISPERIDONE 2 MILLIGRAM(S): 4 TABLET ORAL at 06:37

## 2022-12-15 RX ADMIN — DIVALPROEX SODIUM 500 MILLIGRAM(S): 500 TABLET, DELAYED RELEASE ORAL at 22:28

## 2022-12-15 RX ADMIN — LITHIUM CARBONATE 450 MILLIGRAM(S): 300 TABLET, EXTENDED RELEASE ORAL at 17:24

## 2022-12-15 RX ADMIN — DIVALPROEX SODIUM 250 MILLIGRAM(S): 500 TABLET, DELAYED RELEASE ORAL at 22:28

## 2022-12-15 RX ADMIN — DIVALPROEX SODIUM 250 MILLIGRAM(S): 500 TABLET, DELAYED RELEASE ORAL at 12:17

## 2022-12-15 RX ADMIN — LITHIUM CARBONATE 450 MILLIGRAM(S): 300 TABLET, EXTENDED RELEASE ORAL at 06:37

## 2022-12-15 RX ADMIN — DIVALPROEX SODIUM 500 MILLIGRAM(S): 500 TABLET, DELAYED RELEASE ORAL at 12:18

## 2022-12-15 RX ADMIN — RISPERIDONE 2 MILLIGRAM(S): 4 TABLET ORAL at 17:24

## 2022-12-15 NOTE — PROGRESS NOTE ADULT - SUBJECTIVE AND OBJECTIVE BOX
Progress note    Patient seen and examined in hallway, He has no acute distress.    INTERVAL HPI/OVERNIGHT EVENTS:    REVIEW OF SYSTEMS:  CONSTITUTIONAL: No fever, weight loss, or fatigue  EYES: No eye pain, visual disturbances, or discharge  ENMT:  No difficulty hearing, tinnitus, vertigo; No sinus or throat pain  NECK: No pain or stiffness  BREASTS: No pain, masses, or nipple discharge  RESPIRATORY: No cough, wheezing, chills or hemoptysis; No shortness of breath  CARDIOVASCULAR: No chest pain, palpitations, dizziness, or leg swelling  GASTROINTESTINAL: No abdominal or epigastric pain. No nausea, vomiting, or hematemesis; No diarrhea or constipation. No melena or hematochezia.  GENITOURINARY: No dysuria, frequency, hematuria, or incontinence  NEUROLOGICAL: No headaches, memory loss, loss of strength, numbness, or tremors  SKIN: No itching, burning, rashes, or lesions   LYMPH NODES: No enlarged glands  ENDOCRINE: No heat or cold intolerance; No hair loss  MUSCULOSKELETAL: No joint pain or swelling; No muscle, back, or extremity pain  PSYCHIATRIC: No depression, anxiety, mood swings, or difficulty sleeping  HEME/LYMPH: No easy bruising, or bleeding gums  ALLERY AND IMMUNOLOGIC: No hives or eczema  FAMILY HISTORY:    T(C): 36 (12-15-22 @ 05:05), Max: 36.7 (12-14-22 @ 20:56)  HR: 74 (12-15-22 @ 05:05) (61 - 87)  BP: 107/70 (12-15-22 @ 05:05) (97/59 - 113/56)  RR: 16 (12-15-22 @ 05:05) (16 - 16)  SpO2: --  Wt(kg): --Vital Signs Last 24 Hrs  T(C): 36 (15 Dec 2022 05:05), Max: 36.7 (14 Dec 2022 20:56)  T(F): 96.8 (15 Dec 2022 05:05), Max: 98 (14 Dec 2022 20:56)  HR: 74 (15 Dec 2022 05:05) (61 - 87)  BP: 107/70 (15 Dec 2022 05:05) (97/59 - 113/56)  BP(mean): --  RR: 16 (15 Dec 2022 05:05) (16 - 16)  SpO2: --      No Known Allergies      PHYSICAL EXAM:  GENERAL: NAD, well-groomed, well-developed  HEAD:  Atraumatic, Normocephalic  EYES: EOMI, PERRLA, conjunctiva and sclera clear  ENMT: No tonsillar erythema, exudates, or enlargement; Moist mucous membranes, Good dentition, No lesions  NECK: Supple, No JVD, Normal thyroid  NERVOUS SYSTEM:  Alert & Oriented X3, Good concentration; Motor Strength 5/5 B/L upper and lower extremities; DTRs 2+ intact and symmetric  CHEST/LUNG: Clear to percussion bilaterally; No rales, rhonchi, wheezing, or rubs  HEART: Regular rate and rhythm; No murmurs, rubs, or gallops  ABDOMEN: Soft, Nontender, Nondistended; Bowel sounds present  EXTREMITIES:  2+ Peripheral Pulses, No clubbing, cyanosis, or edema  LYMPH: No lymphadenopathy noted  SKIN: No rashes or lesions    Consultant(s) Notes Reviewed:  [x ] YES  [ ] NO  Care Discussed with Consultants/Other Providers [ x] YES  [ ] NO    LABS:      RADIOLOGY & ADDITIONAL TESTS:    Imaging Personally Reviewed:  [ ] YES  [ ] NO  diVALproex  milliGRAM(s) Oral daily  diVALproex  milliGRAM(s) Oral daily  diVALproex  milliGRAM(s) Oral at bedtime  diVALproex  milliGRAM(s) Oral at bedtime  hydrOXYzine hydrochloride 25 milliGRAM(s) Oral at bedtime PRN  lithium CR (ESKALITH-CR) 450 milliGRAM(s) Oral two times a day  risperiDONE   Tablet 2 milliGRAM(s) Oral two times a day      HEALTH ISSUES - PROBLEM Dx:  History of developmental delay    Bipolar disorder    22 years old male past medical history of bipolar and developmental delay came to emergency room for alleged aggressive and violent behavior as per step father. Evaluated by Psych; Step Father left stated pt needs placement refusing to take child home.     # Bipolar disorder and likely developmental disorder- stable  - continue current meds (on depakote, risperidone, and lithium)   - pt has been stable without aggressive behaviour in the hospital  - father states he cant take care of pt    # Depressed mood, negative thoughts  - lithium and valproic acid level wnl  - psych consult appreciated    # Obesity: Likely multifactorial: excess calories and Psych medication induced     DVT px - pt ambulatory     DISPO: pending placement      Total time spent to complete patient's bedside assessment, review medical chart, discuss medical plan of care with covering medical team was ____15____ with > 50% of time spent face to face w/ patient, discussion with patient/family and/or coordination of care never used

## 2022-12-16 PROCEDURE — 99231 SBSQ HOSP IP/OBS SF/LOW 25: CPT

## 2022-12-16 RX ADMIN — DIVALPROEX SODIUM 250 MILLIGRAM(S): 500 TABLET, DELAYED RELEASE ORAL at 11:46

## 2022-12-16 RX ADMIN — LITHIUM CARBONATE 450 MILLIGRAM(S): 300 TABLET, EXTENDED RELEASE ORAL at 17:20

## 2022-12-16 RX ADMIN — DIVALPROEX SODIUM 500 MILLIGRAM(S): 500 TABLET, DELAYED RELEASE ORAL at 11:46

## 2022-12-16 RX ADMIN — RISPERIDONE 2 MILLIGRAM(S): 4 TABLET ORAL at 05:49

## 2022-12-16 RX ADMIN — LITHIUM CARBONATE 450 MILLIGRAM(S): 300 TABLET, EXTENDED RELEASE ORAL at 05:50

## 2022-12-16 RX ADMIN — DIVALPROEX SODIUM 250 MILLIGRAM(S): 500 TABLET, DELAYED RELEASE ORAL at 21:25

## 2022-12-16 RX ADMIN — RISPERIDONE 2 MILLIGRAM(S): 4 TABLET ORAL at 17:20

## 2022-12-16 RX ADMIN — Medication 25 MILLIGRAM(S): at 01:24

## 2022-12-16 RX ADMIN — DIVALPROEX SODIUM 500 MILLIGRAM(S): 500 TABLET, DELAYED RELEASE ORAL at 21:24

## 2022-12-16 NOTE — PROGRESS NOTE ADULT - SUBJECTIVE AND OBJECTIVE BOX
Progress note    Patient seen and examined in solarium. No acute distress. Playing on Ipad.    INTERVAL HPI/OVERNIGHT EVENTS:    REVIEW OF SYSTEMS:  CONSTITUTIONAL: No fever, weight loss, or fatigue  EYES: No eye pain, visual disturbances, or discharge  ENMT:  No difficulty hearing, tinnitus, vertigo; No sinus or throat pain  NECK: No pain or stiffness  BREASTS: No pain, masses, or nipple discharge  RESPIRATORY: No cough, wheezing, chills or hemoptysis; No shortness of breath  CARDIOVASCULAR: No chest pain, palpitations, dizziness, or leg swelling  GASTROINTESTINAL: No abdominal or epigastric pain. No nausea, vomiting, or hematemesis; No diarrhea or constipation. No melena or hematochezia.  GENITOURINARY: No dysuria, frequency, hematuria, or incontinence  NEUROLOGICAL: No headaches, memory loss, loss of strength, numbness, or tremors  SKIN: No itching, burning, rashes, or lesions   LYMPH NODES: No enlarged glands  ENDOCRINE: No heat or cold intolerance; No hair loss  MUSCULOSKELETAL: No joint pain or swelling; No muscle, back, or extremity pain  PSYCHIATRIC: No depression, anxiety, mood swings, or difficulty sleeping  HEME/LYMPH: No easy bruising, or bleeding gums  ALLERY AND IMMUNOLOGIC: No hives or eczema  FAMILY HISTORY:    T(C): 36.1 (12-16-22 @ 06:40), Max: 36.1 (12-16-22 @ 06:40)  HR: 69 (12-16-22 @ 06:40) (69 - 69)  BP: 104/63 (12-16-22 @ 06:40) (104/63 - 104/63)  RR: 16 (12-16-22 @ 06:40) (16 - 16)  SpO2: --  Wt(kg): --Vital Signs Last 24 Hrs  T(C): 36.1 (16 Dec 2022 06:40), Max: 36.1 (16 Dec 2022 06:40)  T(F): 97 (16 Dec 2022 06:40), Max: 97 (16 Dec 2022 06:40)  HR: 69 (16 Dec 2022 06:40) (69 - 69)  BP: 104/63 (16 Dec 2022 06:40) (104/63 - 104/63)  BP(mean): --  RR: 16 (16 Dec 2022 06:40) (16 - 16)  SpO2: --      No Known Allergies      PHYSICAL EXAM:  GENERAL: NAD, well-groomed, well-developed  HEAD:  Atraumatic, Normocephalic  EYES: EOMI, PERRLA, conjunctiva and sclera clear  ENMT: No tonsillar erythema, exudates, or enlargement; Moist mucous membranes, Good dentition, No lesions  NECK: Supple, No JVD, Normal thyroid  NERVOUS SYSTEM:  Alert & Oriented X3, Good concentration; Motor Strength 5/5 B/L upper and lower extremities; DTRs 2+ intact and symmetric  CHEST/LUNG: Clear to percussion bilaterally; No rales, rhonchi, wheezing, or rubs  HEART: Regular rate and rhythm; No murmurs, rubs, or gallops  ABDOMEN: Soft, Nontender, Nondistended; Bowel sounds present  EXTREMITIES:  2+ Peripheral Pulses, No clubbing, cyanosis, or edema  LYMPH: No lymphadenopathy noted  SKIN: No rashes or lesions    Consultant(s) Notes Reviewed:  [x ] YES  [ ] NO  Care Discussed with Consultants/Other Providers [ x] YES  [ ] NO    LABS:      RADIOLOGY & ADDITIONAL TESTS:    Imaging Personally Reviewed:  [ ] YES  [ ] NO  diVALproex  milliGRAM(s) Oral daily  diVALproex  milliGRAM(s) Oral daily  diVALproex  milliGRAM(s) Oral at bedtime  diVALproex  milliGRAM(s) Oral at bedtime  hydrOXYzine hydrochloride 25 milliGRAM(s) Oral at bedtime PRN  lithium CR (ESKALITH-CR) 450 milliGRAM(s) Oral two times a day  risperiDONE   Tablet 2 milliGRAM(s) Oral two times a day      HEALTH ISSUES - PROBLEM Dx:  History of developmental delay    Bipolar disorder    22 years old male past medical history of bipolar and developmental delay came to emergency room for alleged aggressive and violent behavior as per step father. Evaluated by Psych; Step Father left stated pt needs placement refusing to take child home.     # Bipolar disorder and likely developmental disorder- stable  - continue current meds (on depakote, risperidone, and lithium)   - pt has been stable without aggressive behaviour in the hospital  - father states he cant take care of pt    # Depressed mood, negative thoughts  - lithium and valproic acid level wnl  - psych consult appreciated    # Obesity: Likely multifactorial: excess calories and Psych medication induced     DVT px - pt ambulatory     DISPO: pending placement          Total time spent to complete patient's bedside assessment, review medical chart, discuss medical plan of care with covering medical team was ____15____ with > 50% of time spent face to face w/ patient, discussion with patient/family and/or coordination of care

## 2022-12-17 PROCEDURE — 99231 SBSQ HOSP IP/OBS SF/LOW 25: CPT

## 2022-12-17 RX ADMIN — LITHIUM CARBONATE 450 MILLIGRAM(S): 300 TABLET, EXTENDED RELEASE ORAL at 06:31

## 2022-12-17 RX ADMIN — DIVALPROEX SODIUM 250 MILLIGRAM(S): 500 TABLET, DELAYED RELEASE ORAL at 21:34

## 2022-12-17 RX ADMIN — DIVALPROEX SODIUM 500 MILLIGRAM(S): 500 TABLET, DELAYED RELEASE ORAL at 11:59

## 2022-12-17 RX ADMIN — RISPERIDONE 2 MILLIGRAM(S): 4 TABLET ORAL at 06:31

## 2022-12-17 RX ADMIN — LITHIUM CARBONATE 450 MILLIGRAM(S): 300 TABLET, EXTENDED RELEASE ORAL at 17:29

## 2022-12-17 RX ADMIN — DIVALPROEX SODIUM 500 MILLIGRAM(S): 500 TABLET, DELAYED RELEASE ORAL at 21:34

## 2022-12-17 RX ADMIN — RISPERIDONE 2 MILLIGRAM(S): 4 TABLET ORAL at 17:29

## 2022-12-17 RX ADMIN — DIVALPROEX SODIUM 250 MILLIGRAM(S): 500 TABLET, DELAYED RELEASE ORAL at 12:00

## 2022-12-17 RX ADMIN — Medication 25 MILLIGRAM(S): at 00:29

## 2022-12-17 NOTE — PROGRESS NOTE ADULT - SUBJECTIVE AND OBJECTIVE BOX
Progress note    Patient seen and examined at bedside. Sleeping comfortably.    INTERVAL HPI/OVERNIGHT EVENTS:    REVIEW OF SYSTEMS:  CONSTITUTIONAL: No fever, weight loss, or fatigue  EYES: No eye pain, visual disturbances, or discharge  ENMT:  No difficulty hearing, tinnitus, vertigo; No sinus or throat pain  NECK: No pain or stiffness  BREASTS: No pain, masses, or nipple discharge  RESPIRATORY: No cough, wheezing, chills or hemoptysis; No shortness of breath  CARDIOVASCULAR: No chest pain, palpitations, dizziness, or leg swelling  GASTROINTESTINAL: No abdominal or epigastric pain. No nausea, vomiting, or hematemesis; No diarrhea or constipation. No melena or hematochezia.  GENITOURINARY: No dysuria, frequency, hematuria, or incontinence  NEUROLOGICAL: No headaches, memory loss, loss of strength, numbness, or tremors  SKIN: No itching, burning, rashes, or lesions   LYMPH NODES: No enlarged glands  ENDOCRINE: No heat or cold intolerance; No hair loss  MUSCULOSKELETAL: No joint pain or swelling; No muscle, back, or extremity pain  PSYCHIATRIC: No depression, anxiety, mood swings, or difficulty sleeping  HEME/LYMPH: No easy bruising, or bleeding gums  ALLERY AND IMMUNOLOGIC: No hives or eczema  FAMILY HISTORY:    T(C): 36.4 (12-17-22 @ 05:29), Max: 36.4 (12-17-22 @ 05:29)  HR: 73 (12-17-22 @ 05:29) (67 - 73)  BP: 94/61 (12-17-22 @ 05:29) (94/61 - 107/65)  RR: 16 (12-17-22 @ 05:29) (16 - 18)  SpO2: --  Wt(kg): --Vital Signs Last 24 Hrs  T(C): 36.4 (17 Dec 2022 05:29), Max: 36.4 (17 Dec 2022 05:29)  T(F): 97.5 (17 Dec 2022 05:29), Max: 97.5 (17 Dec 2022 05:29)  HR: 73 (17 Dec 2022 05:29) (67 - 73)  BP: 94/61 (17 Dec 2022 05:29) (94/61 - 107/65)  BP(mean): --  RR: 16 (17 Dec 2022 05:29) (16 - 18)  SpO2: --      No Known Allergies      PHYSICAL EXAM:  GENERAL: NAD, well-groomed, well-developed  HEAD:  Atraumatic, Normocephalic  EYES: EOMI, PERRLA, conjunctiva and sclera clear  ENMT: No tonsillar erythema, exudates, or enlargement; Moist mucous membranes, Good dentition, No lesions  NECK: Supple, No JVD, Normal thyroid  NERVOUS SYSTEM:  Alert & Oriented X3, Good concentration; Motor Strength 5/5 B/L upper and lower extremities; DTRs 2+ intact and symmetric  CHEST/LUNG: Clear to percussion bilaterally; No rales, rhonchi, wheezing, or rubs  HEART: Regular rate and rhythm; No murmurs, rubs, or gallops  ABDOMEN: Soft, Nontender, Nondistended; Bowel sounds present  EXTREMITIES:  2+ Peripheral Pulses, No clubbing, cyanosis, or edema  LYMPH: No lymphadenopathy noted  SKIN: No rashes or lesions    Consultant(s) Notes Reviewed:  [x ] YES  [ ] NO  Care Discussed with Consultants/Other Providers [ x] YES  [ ] NO    LABS:      RADIOLOGY & ADDITIONAL TESTS:    Imaging Personally Reviewed:  [ ] YES  [ ] NO  diVALproex  milliGRAM(s) Oral daily  diVALproex  milliGRAM(s) Oral daily  diVALproex  milliGRAM(s) Oral at bedtime  diVALproex  milliGRAM(s) Oral at bedtime  hydrOXYzine hydrochloride 25 milliGRAM(s) Oral at bedtime PRN  lithium CR (ESKALITH-CR) 450 milliGRAM(s) Oral two times a day  risperiDONE   Tablet 2 milliGRAM(s) Oral two times a day      HEALTH ISSUES - PROBLEM Dx:  History of developmental delay    Bipolar disorder      22 years old male past medical history of bipolar and developmental delay came to emergency room for alleged aggressive and violent behavior as per step father. Evaluated by Psych; Step Father left stated pt needs placement refusing to take child home.     # Bipolar disorder and likely developmental disorder- stable  - continue current meds (on depakote, risperidone, and lithium)   - pt has been stable without aggressive behaviour in the hospital  - father states he cant take care of pt    # Depressed mood, negative thoughts  - lithium and valproic acid level wnl  - psych consult appreciated    # Obesity: Likely multifactorial: excess calories and Psych medication induced     DVT px - pt ambulatory     DISPO: pending placement      Total time spent to complete patient's bedside assessment, review medical chart, discuss medical plan of care with covering medical team was ___15_____ with > 50% of time spent face to face w/ patient, discussion with patient/family and/or coordination of care

## 2022-12-18 PROCEDURE — 99232 SBSQ HOSP IP/OBS MODERATE 35: CPT

## 2022-12-18 RX ADMIN — RISPERIDONE 2 MILLIGRAM(S): 4 TABLET ORAL at 17:34

## 2022-12-18 RX ADMIN — LITHIUM CARBONATE 450 MILLIGRAM(S): 300 TABLET, EXTENDED RELEASE ORAL at 17:35

## 2022-12-18 RX ADMIN — DIVALPROEX SODIUM 250 MILLIGRAM(S): 500 TABLET, DELAYED RELEASE ORAL at 12:19

## 2022-12-18 RX ADMIN — DIVALPROEX SODIUM 500 MILLIGRAM(S): 500 TABLET, DELAYED RELEASE ORAL at 22:14

## 2022-12-18 RX ADMIN — DIVALPROEX SODIUM 500 MILLIGRAM(S): 500 TABLET, DELAYED RELEASE ORAL at 12:20

## 2022-12-18 RX ADMIN — RISPERIDONE 2 MILLIGRAM(S): 4 TABLET ORAL at 05:53

## 2022-12-18 RX ADMIN — LITHIUM CARBONATE 450 MILLIGRAM(S): 300 TABLET, EXTENDED RELEASE ORAL at 05:53

## 2022-12-18 RX ADMIN — Medication 25 MILLIGRAM(S): at 00:37

## 2022-12-18 RX ADMIN — DIVALPROEX SODIUM 250 MILLIGRAM(S): 500 TABLET, DELAYED RELEASE ORAL at 22:14

## 2022-12-18 NOTE — PROGRESS NOTE ADULT - SUBJECTIVE AND OBJECTIVE BOX
Progress note    Patient seen and examined at bedside. No acute distress. Laying in bed.    INTERVAL HPI/OVERNIGHT EVENTS:    REVIEW OF SYSTEMS:  CONSTITUTIONAL: No fever, weight loss, or fatigue  EYES: No eye pain, visual disturbances, or discharge  ENMT:  No difficulty hearing, tinnitus, vertigo; No sinus or throat pain  NECK: No pain or stiffness  BREASTS: No pain, masses, or nipple discharge  RESPIRATORY: No cough, wheezing, chills or hemoptysis; No shortness of breath  CARDIOVASCULAR: No chest pain, palpitations, dizziness, or leg swelling  GASTROINTESTINAL: No abdominal or epigastric pain. No nausea, vomiting, or hematemesis; No diarrhea or constipation. No melena or hematochezia.  GENITOURINARY: No dysuria, frequency, hematuria, or incontinence  NEUROLOGICAL: No headaches, memory loss, loss of strength, numbness, or tremors  SKIN: No itching, burning, rashes, or lesions   LYMPH NODES: No enlarged glands  ENDOCRINE: No heat or cold intolerance; No hair loss  MUSCULOSKELETAL: No joint pain or swelling; No muscle, back, or extremity pain  PSYCHIATRIC: No depression, anxiety, mood swings, or difficulty sleeping  HEME/LYMPH: No easy bruising, or bleeding gums  ALLERY AND IMMUNOLOGIC: No hives or eczema  FAMILY HISTORY:    T(C): 36.4 (12-18-22 @ 14:09), Max: 36.6 (12-17-22 @ 20:47)  HR: 81 (12-18-22 @ 14:09) (77 - 92)  BP: 108/66 (12-18-22 @ 14:09) (91/52 - 120/72)  RR: 18 (12-18-22 @ 14:09) (18 - 18)  SpO2: --  Wt(kg): --Vital Signs Last 24 Hrs  T(C): 36.4 (18 Dec 2022 14:09), Max: 36.6 (17 Dec 2022 20:47)  T(F): 97.6 (18 Dec 2022 14:09), Max: 97.8 (17 Dec 2022 20:47)  HR: 81 (18 Dec 2022 14:09) (77 - 92)  BP: 108/66 (18 Dec 2022 14:09) (91/52 - 120/72)  BP(mean): --  RR: 18 (18 Dec 2022 14:09) (18 - 18)  SpO2: --      No Known Allergies      PHYSICAL EXAM:  GENERAL: NAD, well-groomed, well-developed  HEAD:  Atraumatic, Normocephalic  EYES: EOMI, PERRLA, conjunctiva and sclera clear  ENMT: No tonsillar erythema, exudates, or enlargement; Moist mucous membranes, Good dentition, No lesions  NECK: Supple, No JVD, Normal thyroid  NERVOUS SYSTEM:  Alert & Oriented X3, Good concentration; Motor Strength 5/5 B/L upper and lower extremities; DTRs 2+ intact and symmetric  CHEST/LUNG: Clear to percussion bilaterally; No rales, rhonchi, wheezing, or rubs  HEART: Regular rate and rhythm; No murmurs, rubs, or gallops  ABDOMEN: Soft, Nontender, Nondistended; Bowel sounds present  EXTREMITIES:  2+ Peripheral Pulses, No clubbing, cyanosis, or edema  LYMPH: No lymphadenopathy noted  SKIN: No rashes or lesions    Consultant(s) Notes Reviewed:  [x ] YES  [ ] NO  Care Discussed with Consultants/Other Providers [ x] YES  [ ] NO    LABS:      RADIOLOGY & ADDITIONAL TESTS:    Imaging Personally Reviewed:  [ ] YES  [ ] NO  diVALproex  milliGRAM(s) Oral daily  diVALproex  milliGRAM(s) Oral daily  diVALproex  milliGRAM(s) Oral at bedtime  diVALproex  milliGRAM(s) Oral at bedtime  hydrOXYzine hydrochloride 25 milliGRAM(s) Oral at bedtime PRN  lithium CR (ESKALITH-CR) 450 milliGRAM(s) Oral two times a day  risperiDONE   Tablet 2 milliGRAM(s) Oral two times a day      HEALTH ISSUES - PROBLEM Dx:  History of developmental delay    Bipolar disorder    22 years old male past medical history of bipolar and developmental delay came to emergency room for alleged aggressive and violent behavior as per step father. Evaluated by Psych; Step Father left stated pt needs placement refusing to take child home.     # Bipolar disorder and likely developmental disorder- stable  - continue current meds (on depakote, risperidone, and lithium)   - pt has been stable without aggressive behaviour in the hospital  - father states he cant take care of pt    # Depressed mood, negative thoughts  - lithium and valproic acid level wnl  - psych consult appreciated    # Obesity: Likely multifactorial: excess calories and Psych medication induced     DVT px - pt ambulatory     DISPO: pending placement      Total time spent to complete patient's bedside assessment, review medical chart, discuss medical plan of care with covering medical team was ___15_____ with > 50% of time spent face to face w/ patient, discussion with patient/family and/or coordination of care        Total time spent to complete patient's bedside assessment, review medical chart, discuss medical plan of care with covering medical team was ____15____ with > 50% of time spent face to face w/ patient, discussion with patient/family and/or coordination of care

## 2022-12-19 PROCEDURE — 99231 SBSQ HOSP IP/OBS SF/LOW 25: CPT

## 2022-12-19 RX ADMIN — DIVALPROEX SODIUM 250 MILLIGRAM(S): 500 TABLET, DELAYED RELEASE ORAL at 11:09

## 2022-12-19 RX ADMIN — LITHIUM CARBONATE 450 MILLIGRAM(S): 300 TABLET, EXTENDED RELEASE ORAL at 17:37

## 2022-12-19 RX ADMIN — DIVALPROEX SODIUM 500 MILLIGRAM(S): 500 TABLET, DELAYED RELEASE ORAL at 21:05

## 2022-12-19 RX ADMIN — DIVALPROEX SODIUM 250 MILLIGRAM(S): 500 TABLET, DELAYED RELEASE ORAL at 21:05

## 2022-12-19 RX ADMIN — RISPERIDONE 2 MILLIGRAM(S): 4 TABLET ORAL at 17:37

## 2022-12-19 RX ADMIN — RISPERIDONE 2 MILLIGRAM(S): 4 TABLET ORAL at 05:53

## 2022-12-19 RX ADMIN — DIVALPROEX SODIUM 500 MILLIGRAM(S): 500 TABLET, DELAYED RELEASE ORAL at 11:09

## 2022-12-19 RX ADMIN — LITHIUM CARBONATE 450 MILLIGRAM(S): 300 TABLET, EXTENDED RELEASE ORAL at 05:53

## 2022-12-19 NOTE — PROGRESS NOTE ADULT - SUBJECTIVE AND OBJECTIVE BOX
Progress note    Patient seen and examined at bedside in no distress.    INTERVAL HPI/OVERNIGHT EVENTS:    REVIEW OF SYSTEMS:  CONSTITUTIONAL: No fever, weight loss, or fatigue  EYES: No eye pain, visual disturbances, or discharge  ENMT:  No difficulty hearing, tinnitus, vertigo; No sinus or throat pain  NECK: No pain or stiffness  BREASTS: No pain, masses, or nipple discharge  RESPIRATORY: No cough, wheezing, chills or hemoptysis; No shortness of breath  CARDIOVASCULAR: No chest pain, palpitations, dizziness, or leg swelling  GASTROINTESTINAL: No abdominal or epigastric pain. No nausea, vomiting, or hematemesis; No diarrhea or constipation. No melena or hematochezia.  GENITOURINARY: No dysuria, frequency, hematuria, or incontinence  NEUROLOGICAL: No headaches, memory loss, loss of strength, numbness, or tremors  SKIN: No itching, burning, rashes, or lesions   LYMPH NODES: No enlarged glands  ENDOCRINE: No heat or cold intolerance; No hair loss  MUSCULOSKELETAL: No joint pain or swelling; No muscle, back, or extremity pain  PSYCHIATRIC: No depression, anxiety, mood swings, or difficulty sleeping  HEME/LYMPH: No easy bruising, or bleeding gums  ALLERY AND IMMUNOLOGIC: No hives or eczema  FAMILY HISTORY:    T(C): 36.4 (12-19-22 @ 14:55), Max: 36.7 (12-19-22 @ 04:40)  HR: 76 (12-19-22 @ 14:55) (67 - 76)  BP: 124/73 (12-19-22 @ 14:55) (101/57 - 124/73)  RR: 18 (12-19-22 @ 14:55) (18 - 18)  SpO2: --  Wt(kg): --Vital Signs Last 24 Hrs  T(C): 36.4 (19 Dec 2022 14:55), Max: 36.7 (19 Dec 2022 04:40)  T(F): 97.6 (19 Dec 2022 14:55), Max: 98.1 (19 Dec 2022 04:40)  HR: 76 (19 Dec 2022 14:55) (67 - 76)  BP: 124/73 (19 Dec 2022 14:55) (101/57 - 124/73)  BP(mean): --  RR: 18 (19 Dec 2022 14:55) (18 - 18)  SpO2: --      No Known Allergies      PHYSICAL EXAM:  GENERAL: NAD, well-groomed, well-developed  HEAD:  Atraumatic, Normocephalic  EYES: EOMI, PERRLA, conjunctiva and sclera clear  ENMT: No tonsillar erythema, exudates, or enlargement; Moist mucous membranes, Good dentition, No lesions  NECK: Supple, No JVD, Normal thyroid  NERVOUS SYSTEM:  Alert & Oriented X3, Good concentration; Motor Strength 5/5 B/L upper and lower extremities; DTRs 2+ intact and symmetric  CHEST/LUNG: Clear to percussion bilaterally; No rales, rhonchi, wheezing, or rubs  HEART: Regular rate and rhythm; No murmurs, rubs, or gallops  ABDOMEN: Soft, Nontender, Nondistended; Bowel sounds present  EXTREMITIES:  2+ Peripheral Pulses, No clubbing, cyanosis, or edema  LYMPH: No lymphadenopathy noted  SKIN: No rashes or lesions    Consultant(s) Notes Reviewed:  [x ] YES  [ ] NO  Care Discussed with Consultants/Other Providers [ x] YES  [ ] NO    LABS:      RADIOLOGY & ADDITIONAL TESTS:    Imaging Personally Reviewed:  [ ] YES  [ ] NO  diVALproex  milliGRAM(s) Oral daily  diVALproex  milliGRAM(s) Oral daily  diVALproex  milliGRAM(s) Oral at bedtime  diVALproex  milliGRAM(s) Oral at bedtime  hydrOXYzine hydrochloride 25 milliGRAM(s) Oral at bedtime PRN  lithium CR (ESKALITH-CR) 450 milliGRAM(s) Oral two times a day  risperiDONE   Tablet 2 milliGRAM(s) Oral two times a day      HEALTH ISSUES - PROBLEM Dx:  History of developmental delay    Bipolar disorder    Bipolar disorder    22 years old male past medical history of bipolar and developmental delay came to emergency room for alleged aggressive and violent behavior as per step father. Evaluated by Psych; Step Father left stated pt needs placement refusing to take child home.     # Bipolar disorder and likely developmental disorder- stable  - continue current meds (on depakote, risperidone, and lithium)   - pt has been stable without aggressive behaviour in the hospital  - father states he cant take care of pt    # Depressed mood, negative thoughts  - lithium and valproic acid level wnl  - psych consult appreciated    # Obesity: Likely multifactorial: excess calories and Psych medication induced     DVT px - pt ambulatory     DISPO: pending placement          Total time spent to complete patient's bedside assessment, review medical chart, discuss medical plan of care with covering medical team was ____15____ with > 50% of time spent face to face w/ patient, discussion with patient/family and/or coordination of care

## 2022-12-20 PROCEDURE — 99231 SBSQ HOSP IP/OBS SF/LOW 25: CPT

## 2022-12-20 RX ADMIN — RISPERIDONE 2 MILLIGRAM(S): 4 TABLET ORAL at 05:52

## 2022-12-20 RX ADMIN — LITHIUM CARBONATE 450 MILLIGRAM(S): 300 TABLET, EXTENDED RELEASE ORAL at 05:52

## 2022-12-20 RX ADMIN — DIVALPROEX SODIUM 500 MILLIGRAM(S): 500 TABLET, DELAYED RELEASE ORAL at 11:16

## 2022-12-20 RX ADMIN — DIVALPROEX SODIUM 500 MILLIGRAM(S): 500 TABLET, DELAYED RELEASE ORAL at 21:10

## 2022-12-20 RX ADMIN — LITHIUM CARBONATE 450 MILLIGRAM(S): 300 TABLET, EXTENDED RELEASE ORAL at 17:26

## 2022-12-20 RX ADMIN — DIVALPROEX SODIUM 250 MILLIGRAM(S): 500 TABLET, DELAYED RELEASE ORAL at 11:16

## 2022-12-20 RX ADMIN — DIVALPROEX SODIUM 250 MILLIGRAM(S): 500 TABLET, DELAYED RELEASE ORAL at 21:10

## 2022-12-20 RX ADMIN — Medication 25 MILLIGRAM(S): at 00:16

## 2022-12-20 RX ADMIN — RISPERIDONE 2 MILLIGRAM(S): 4 TABLET ORAL at 17:26

## 2022-12-20 NOTE — PROGRESS NOTE ADULT - SUBJECTIVE AND OBJECTIVE BOX
Progress note    Patient seen and examined at bedside. No acute distress.    INTERVAL HPI/OVERNIGHT EVENTS:    REVIEW OF SYSTEMS:  CONSTITUTIONAL: No fever, weight loss, or fatigue  EYES: No eye pain, visual disturbances, or discharge  ENMT:  No difficulty hearing, tinnitus, vertigo; No sinus or throat pain  NECK: No pain or stiffness  BREASTS: No pain, masses, or nipple discharge  RESPIRATORY: No cough, wheezing, chills or hemoptysis; No shortness of breath  CARDIOVASCULAR: No chest pain, palpitations, dizziness, or leg swelling  GASTROINTESTINAL: No abdominal or epigastric pain. No nausea, vomiting, or hematemesis; No diarrhea or constipation. No melena or hematochezia.  GENITOURINARY: No dysuria, frequency, hematuria, or incontinence  NEUROLOGICAL: No headaches, memory loss, loss of strength, numbness, or tremors  SKIN: No itching, burning, rashes, or lesions   LYMPH NODES: No enlarged glands  ENDOCRINE: No heat or cold intolerance; No hair loss  MUSCULOSKELETAL: No joint pain or swelling; No muscle, back, or extremity pain  PSYCHIATRIC: No depression, anxiety, mood swings, or difficulty sleeping  HEME/LYMPH: No easy bruising, or bleeding gums  ALLERY AND IMMUNOLOGIC: No hives or eczema  FAMILY HISTORY:    T(C): 36.2 (12-20-22 @ 05:45), Max: 36.4 (12-19-22 @ 14:55)  HR: 70 (12-20-22 @ 05:45) (70 - 90)  BP: 107/58 (12-20-22 @ 05:45) (107/58 - 124/73)  RR: 18 (12-20-22 @ 05:45) (17 - 18)  SpO2: --  Wt(kg): --Vital Signs Last 24 Hrs  T(C): 36.2 (20 Dec 2022 05:45), Max: 36.4 (19 Dec 2022 14:55)  T(F): 97.2 (20 Dec 2022 05:45), Max: 97.6 (19 Dec 2022 14:55)  HR: 70 (20 Dec 2022 05:45) (70 - 90)  BP: 107/58 (20 Dec 2022 05:45) (107/58 - 124/73)  BP(mean): --  RR: 18 (20 Dec 2022 05:45) (17 - 18)  SpO2: --      No Known Allergies      PHYSICAL EXAM:  GENERAL: NAD, well-groomed, well-developed  HEAD:  Atraumatic, Normocephalic  EYES: EOMI, PERRLA, conjunctiva and sclera clear  ENMT: No tonsillar erythema, exudates, or enlargement; Moist mucous membranes, Good dentition, No lesions  NECK: Supple, No JVD, Normal thyroid  NERVOUS SYSTEM:  Alert & Oriented X3, Good concentration; Motor Strength 5/5 B/L upper and lower extremities; DTRs 2+ intact and symmetric  CHEST/LUNG: Clear to percussion bilaterally; No rales, rhonchi, wheezing, or rubs  HEART: Regular rate and rhythm; No murmurs, rubs, or gallops  ABDOMEN: Soft, Nontender, Nondistended; Bowel sounds present  EXTREMITIES:  2+ Peripheral Pulses, No clubbing, cyanosis, or edema  LYMPH: No lymphadenopathy noted  SKIN: No rashes or lesions    Consultant(s) Notes Reviewed:  [x ] YES  [ ] NO  Care Discussed with Consultants/Other Providers [ x] YES  [ ] NO    LABS:      RADIOLOGY & ADDITIONAL TESTS:    Imaging Personally Reviewed:  [ ] YES  [ ] NO  diVALproex  milliGRAM(s) Oral daily  diVALproex  milliGRAM(s) Oral daily  diVALproex  milliGRAM(s) Oral at bedtime  diVALproex  milliGRAM(s) Oral at bedtime  hydrOXYzine hydrochloride 25 milliGRAM(s) Oral at bedtime PRN  lithium CR (ESKALITH-CR) 450 milliGRAM(s) Oral two times a day  risperiDONE   Tablet 2 milliGRAM(s) Oral two times a day      HEALTH ISSUES - PROBLEM Dx:  History of developmental delay    Bipolar disorder    22 years old male past medical history of bipolar and developmental delay came to emergency room for alleged aggressive and violent behavior as per step father. Evaluated by Psych; Step Father left stated pt needs placement refusing to take child home.     # Bipolar disorder and likely developmental disorder- stable  - continue current meds (on depakote, risperidone, and lithium)   - pt has been stable without aggressive behaviour in the hospital  - father states he cant take care of pt    # Depressed mood, negative thoughts  - lithium and valproic acid level wnl  - psych consult appreciated    # Obesity: Likely multifactorial: excess calories and Psych medication induced     DVT px - pt ambulatory     DISPO: pending placement            Total time spent to complete patient's bedside assessment, review medical chart, discuss medical plan of care with covering medical team was ____15____ with > 50% of time spent face to face w/ patient, discussion with patient/family and/or coordination of care

## 2022-12-21 PROCEDURE — 99232 SBSQ HOSP IP/OBS MODERATE 35: CPT

## 2022-12-21 RX ADMIN — RISPERIDONE 2 MILLIGRAM(S): 4 TABLET ORAL at 18:08

## 2022-12-21 RX ADMIN — DIVALPROEX SODIUM 500 MILLIGRAM(S): 500 TABLET, DELAYED RELEASE ORAL at 11:50

## 2022-12-21 RX ADMIN — RISPERIDONE 2 MILLIGRAM(S): 4 TABLET ORAL at 05:13

## 2022-12-21 RX ADMIN — Medication 25 MILLIGRAM(S): at 00:51

## 2022-12-21 RX ADMIN — LITHIUM CARBONATE 450 MILLIGRAM(S): 300 TABLET, EXTENDED RELEASE ORAL at 18:08

## 2022-12-21 RX ADMIN — DIVALPROEX SODIUM 250 MILLIGRAM(S): 500 TABLET, DELAYED RELEASE ORAL at 21:35

## 2022-12-21 RX ADMIN — DIVALPROEX SODIUM 250 MILLIGRAM(S): 500 TABLET, DELAYED RELEASE ORAL at 11:50

## 2022-12-21 RX ADMIN — LITHIUM CARBONATE 450 MILLIGRAM(S): 300 TABLET, EXTENDED RELEASE ORAL at 05:13

## 2022-12-21 RX ADMIN — DIVALPROEX SODIUM 500 MILLIGRAM(S): 500 TABLET, DELAYED RELEASE ORAL at 21:35

## 2022-12-21 NOTE — PROGRESS NOTE ADULT - ASSESSMENT
22 years old male past medical history of bipolar and developmental delay came to emergency room for alleged aggressive and violent behavior as per step father. Evaluated by Psych; Step Father left stated pt needs placement refusing to take child home.     # Bipolar disorder and likely developmental disorder- stable  - continue current meds (on depakote, risperidone, and lithium)   - pt has been stable without aggressive behaviour in the hospital  - father states he cant take care of pt    # Depressed mood, negative thoughts  - cont w lithium and valproic acid, levels wnl  - psych consult appreciated    # Obesity: Likely multifactorial: excess calories and Psych medication induced     DVT px - pt ambulatory     DISPO: pending placement

## 2022-12-21 NOTE — PROGRESS NOTE ADULT - SUBJECTIVE AND OBJECTIVE BOX
Patient is a 22y old  Male who presents with a chief complaint of Placement (20 Dec 2022 11:17)      OVERNIGHT EVENTS: remained stable     SUBJECTIVE / INTERVAL HPI: Patient seen and examined at bedside.     VITAL SIGNS:  Vital Signs Last 24 Hrs  T(C): 36.8 (21 Dec 2022 05:25), Max: 36.8 (21 Dec 2022 05:25)  T(F): 98.2 (21 Dec 2022 05:25), Max: 98.2 (21 Dec 2022 05:25)  HR: 62 (21 Dec 2022 05:25) (62 - 131)  BP: 89/57 (21 Dec 2022 05:25) (89/57 - 116/76)  BP(mean): --  RR: 18 (20 Dec 2022 20:34) (18 - 18)  SpO2: 100% (20 Dec 2022 20:34) (100% - 100%)        PHYSICAL EXAM:    General: WDWN  HEENT: NC/AT; PERRL, clear conjunctiva  Neck: supple  Cardiovascular: +S1/S2; RRR  Respiratory: CTA b/l; no W/R/R  Gastrointestinal: soft, NT/ND; +BSx4  Extremities: WWP; 2+ peripheral pulses; no edema   Neurological: AAOx3; no focal deficits    MEDICATIONS:  MEDICATIONS  (STANDING):  diVALproex  milliGRAM(s) Oral daily  diVALproex  milliGRAM(s) Oral daily  diVALproex  milliGRAM(s) Oral at bedtime  diVALproex  milliGRAM(s) Oral at bedtime  lithium CR (ESKALITH-CR) 450 milliGRAM(s) Oral two times a day  risperiDONE   Tablet 2 milliGRAM(s) Oral two times a day    MEDICATIONS  (PRN):  hydrOXYzine hydrochloride 25 milliGRAM(s) Oral at bedtime PRN insomnia      ALLERGIES:  Allergies    No Known Allergies    Intolerances        LABS:              CAPILLARY BLOOD GLUCOSE          RADIOLOGY & ADDITIONAL TESTS: Reviewed.

## 2022-12-22 PROCEDURE — 99231 SBSQ HOSP IP/OBS SF/LOW 25: CPT

## 2022-12-22 RX ADMIN — DIVALPROEX SODIUM 500 MILLIGRAM(S): 500 TABLET, DELAYED RELEASE ORAL at 21:30

## 2022-12-22 RX ADMIN — LITHIUM CARBONATE 450 MILLIGRAM(S): 300 TABLET, EXTENDED RELEASE ORAL at 07:09

## 2022-12-22 RX ADMIN — LITHIUM CARBONATE 450 MILLIGRAM(S): 300 TABLET, EXTENDED RELEASE ORAL at 17:24

## 2022-12-22 RX ADMIN — DIVALPROEX SODIUM 250 MILLIGRAM(S): 500 TABLET, DELAYED RELEASE ORAL at 14:02

## 2022-12-22 RX ADMIN — RISPERIDONE 2 MILLIGRAM(S): 4 TABLET ORAL at 07:09

## 2022-12-22 RX ADMIN — Medication 25 MILLIGRAM(S): at 00:34

## 2022-12-22 RX ADMIN — RISPERIDONE 2 MILLIGRAM(S): 4 TABLET ORAL at 17:24

## 2022-12-22 RX ADMIN — DIVALPROEX SODIUM 500 MILLIGRAM(S): 500 TABLET, DELAYED RELEASE ORAL at 14:02

## 2022-12-22 RX ADMIN — DIVALPROEX SODIUM 250 MILLIGRAM(S): 500 TABLET, DELAYED RELEASE ORAL at 21:30

## 2022-12-22 NOTE — PROGRESS NOTE ADULT - SUBJECTIVE AND OBJECTIVE BOX
Patient is a 22y old  Male who presents with a chief complaint of Placement (21 Dec 2022 14:01)      OVERNIGHT EVENTS: remained stable    REVIEW OF SYSTEMS:    CONSTITUTIONAL: No weakness, fevers or chills  EYES/ENT: No visual changes;  No vertigo or throat pain   NECK: No pain or stiffness  RESPIRATORY: No cough, wheezing, hemoptysis; No shortness of breath  CARDIOVASCULAR: No chest pain or palpitations  GASTROINTESTINAL: No abdominal or epigastric pain. No nausea, vomiting, or hematemesis; No diarrhea or constipation. No melena or hematochezia.  GENITOURINARY: No dysuria, frequency or hematuria  NEUROLOGICAL: No numbness or weakness  SKIN: No itching, rashes      SUBJECTIVE / INTERVAL HPI: Patient seen and examined at bedside.     VITAL SIGNS:  Vital Signs Last 24 Hrs  T(C): 36.3 (22 Dec 2022 05:24), Max: 36.4 (21 Dec 2022 14:47)  T(F): 97.3 (22 Dec 2022 05:24), Max: 97.6 (21 Dec 2022 14:47)  HR: 70 (22 Dec 2022 05:24) (70 - 94)  BP: 86/52 (22 Dec 2022 05:24) (86/52 - 121/68)  BP(mean): --  RR: 16 (21 Dec 2022 20:05) (16 - 18)  SpO2: --        PHYSICAL EXAM:    General: WDWN  HEENT: NC/AT; PERRL, clear conjunctiva  Neck: supple  Cardiovascular: +S1/S2; RRR  Respiratory: CTA b/l; no W/R/R  Gastrointestinal: soft, NT/ND; +BSx4  Extremities: WWP; 2+ peripheral pulses; no edema   Neurological: AAOx3; no focal deficits    MEDICATIONS:  MEDICATIONS  (STANDING):  diVALproex  milliGRAM(s) Oral daily  diVALproex  milliGRAM(s) Oral daily  diVALproex  milliGRAM(s) Oral at bedtime  diVALproex  milliGRAM(s) Oral at bedtime  lithium CR (ESKALITH-CR) 450 milliGRAM(s) Oral two times a day  risperiDONE   Tablet 2 milliGRAM(s) Oral two times a day    MEDICATIONS  (PRN):  hydrOXYzine hydrochloride 25 milliGRAM(s) Oral at bedtime PRN insomnia      ALLERGIES:  Allergies    No Known Allergies    Intolerances        LABS:              CAPILLARY BLOOD GLUCOSE          RADIOLOGY & ADDITIONAL TESTS: Reviewed.

## 2022-12-23 PROCEDURE — 99231 SBSQ HOSP IP/OBS SF/LOW 25: CPT

## 2022-12-23 RX ADMIN — DIVALPROEX SODIUM 500 MILLIGRAM(S): 500 TABLET, DELAYED RELEASE ORAL at 12:50

## 2022-12-23 RX ADMIN — RISPERIDONE 2 MILLIGRAM(S): 4 TABLET ORAL at 17:36

## 2022-12-23 RX ADMIN — DIVALPROEX SODIUM 500 MILLIGRAM(S): 500 TABLET, DELAYED RELEASE ORAL at 21:19

## 2022-12-23 RX ADMIN — LITHIUM CARBONATE 450 MILLIGRAM(S): 300 TABLET, EXTENDED RELEASE ORAL at 17:36

## 2022-12-23 RX ADMIN — RISPERIDONE 2 MILLIGRAM(S): 4 TABLET ORAL at 05:38

## 2022-12-23 RX ADMIN — DIVALPROEX SODIUM 250 MILLIGRAM(S): 500 TABLET, DELAYED RELEASE ORAL at 21:19

## 2022-12-23 RX ADMIN — LITHIUM CARBONATE 450 MILLIGRAM(S): 300 TABLET, EXTENDED RELEASE ORAL at 05:38

## 2022-12-23 RX ADMIN — Medication 25 MILLIGRAM(S): at 00:40

## 2022-12-23 RX ADMIN — DIVALPROEX SODIUM 250 MILLIGRAM(S): 500 TABLET, DELAYED RELEASE ORAL at 12:50

## 2022-12-23 NOTE — PROGRESS NOTE ADULT - SUBJECTIVE AND OBJECTIVE BOX
Patient is a 22y old  Male who presents with a chief complaint of Placement (22 Dec 2022 12:49)      OVERNIGHT EVENTS: remained stable    REVIEW OF SYSTEMS:    CONSTITUTIONAL: No weakness, fevers or chills  EYES/ENT: No visual changes;  No vertigo or throat pain   NECK: No pain or stiffness  RESPIRATORY: No cough, wheezing, hemoptysis; No shortness of breath  CARDIOVASCULAR: No chest pain or palpitations  GASTROINTESTINAL: No abdominal or epigastric pain. No nausea, vomiting, or hematemesis; No diarrhea or constipation. No melena or hematochezia.  GENITOURINARY: No dysuria, frequency or hematuria  NEUROLOGICAL: No numbness or weakness  SKIN: No itching, rashes      SUBJECTIVE / INTERVAL HPI: Patient seen and examined at bedside.     VITAL SIGNS:  Vital Signs Last 24 Hrs  T(C): 36.2 (23 Dec 2022 04:55), Max: 36.7 (22 Dec 2022 20:49)  T(F): 97.1 (23 Dec 2022 04:55), Max: 98.1 (22 Dec 2022 20:49)  HR: 75 (23 Dec 2022 04:55) (75 - 96)  BP: 100/56 (23 Dec 2022 04:55) (100/56 - 134/76)  BP(mean): --  RR: 18 (23 Dec 2022 04:55) (16 - 18)  SpO2: --        PHYSICAL EXAM:    General: WDWN  HEENT: NC/AT; PERRL, clear conjunctiva  Neck: supple  Cardiovascular: +S1/S2; RRR  Respiratory: CTA b/l; no W/R/R  Gastrointestinal: soft, NT/ND; +BSx4  Extremities: WWP; 2+ peripheral pulses; no edema   Neurological: AAOx3; no focal deficits    MEDICATIONS:  MEDICATIONS  (STANDING):  diVALproex  milliGRAM(s) Oral daily  diVALproex  milliGRAM(s) Oral daily  diVALproex  milliGRAM(s) Oral at bedtime  diVALproex  milliGRAM(s) Oral at bedtime  lithium CR (ESKALITH-CR) 450 milliGRAM(s) Oral two times a day  risperiDONE   Tablet 2 milliGRAM(s) Oral two times a day    MEDICATIONS  (PRN):  hydrOXYzine hydrochloride 25 milliGRAM(s) Oral at bedtime PRN insomnia      ALLERGIES:  Allergies    No Known Allergies    Intolerances        LABS:              CAPILLARY BLOOD GLUCOSE          RADIOLOGY & ADDITIONAL TESTS: Reviewed.

## 2022-12-24 PROCEDURE — 99231 SBSQ HOSP IP/OBS SF/LOW 25: CPT

## 2022-12-24 RX ADMIN — DIVALPROEX SODIUM 500 MILLIGRAM(S): 500 TABLET, DELAYED RELEASE ORAL at 22:33

## 2022-12-24 RX ADMIN — LITHIUM CARBONATE 450 MILLIGRAM(S): 300 TABLET, EXTENDED RELEASE ORAL at 17:04

## 2022-12-24 RX ADMIN — Medication 25 MILLIGRAM(S): at 00:16

## 2022-12-24 RX ADMIN — DIVALPROEX SODIUM 500 MILLIGRAM(S): 500 TABLET, DELAYED RELEASE ORAL at 12:58

## 2022-12-24 RX ADMIN — RISPERIDONE 2 MILLIGRAM(S): 4 TABLET ORAL at 05:36

## 2022-12-24 RX ADMIN — RISPERIDONE 2 MILLIGRAM(S): 4 TABLET ORAL at 17:04

## 2022-12-24 RX ADMIN — DIVALPROEX SODIUM 250 MILLIGRAM(S): 500 TABLET, DELAYED RELEASE ORAL at 12:58

## 2022-12-24 RX ADMIN — LITHIUM CARBONATE 450 MILLIGRAM(S): 300 TABLET, EXTENDED RELEASE ORAL at 05:36

## 2022-12-24 RX ADMIN — DIVALPROEX SODIUM 250 MILLIGRAM(S): 500 TABLET, DELAYED RELEASE ORAL at 21:28

## 2022-12-24 NOTE — PROGRESS NOTE ADULT - SUBJECTIVE AND OBJECTIVE BOX
Patient is a 22y old  Male who presents with a chief complaint of Placement (23 Dec 2022 09:18)      OVERNIGHT EVENTS: remained stable    REVIEW OF SYSTEMS:    CONSTITUTIONAL: No weakness, fevers or chills  EYES/ENT: No visual changes;  No vertigo or throat pain   NECK: No pain or stiffness  RESPIRATORY: No cough, wheezing, hemoptysis; No shortness of breath  CARDIOVASCULAR: No chest pain or palpitations  GASTROINTESTINAL: No abdominal or epigastric pain. No nausea, vomiting, or hematemesis; No diarrhea or constipation. No melena or hematochezia.  GENITOURINARY: No dysuria, frequency or hematuria  NEUROLOGICAL: No numbness or weakness  SKIN: No itching, rashes      SUBJECTIVE / INTERVAL HPI: Patient seen and examined at bedside.     VITAL SIGNS:  Vital Signs Last 24 Hrs  T(C): 36.3 (24 Dec 2022 04:36), Max: 37.2 (23 Dec 2022 21:17)  T(F): 97.3 (24 Dec 2022 04:36), Max: 99 (23 Dec 2022 21:17)  HR: 85 (24 Dec 2022 04:36) (85 - 88)  BP: 111/55 (24 Dec 2022 04:36) (105/59 - 111/55)  BP(mean): --  RR: 16 (24 Dec 2022 04:36) (16 - 16)  SpO2: 98% (23 Dec 2022 21:17) (98% - 98%)    Parameters below as of 23 Dec 2022 21:17  Patient On (Oxygen Delivery Method): room air        PHYSICAL EXAM:    General: WDWN  HEENT: NC/AT; PERRL, clear conjunctiva  Neck: supple  Cardiovascular: +S1/S2; RRR  Respiratory: CTA b/l; no W/R/R  Gastrointestinal: soft, NT/ND; +BSx4  Extremities: WWP; 2+ peripheral pulses; no edema   Neurological: AAOx3; no focal deficits    MEDICATIONS:  MEDICATIONS  (STANDING):  diVALproex  milliGRAM(s) Oral daily  diVALproex  milliGRAM(s) Oral daily  diVALproex  milliGRAM(s) Oral at bedtime  diVALproex  milliGRAM(s) Oral at bedtime  lithium CR (ESKALITH-CR) 450 milliGRAM(s) Oral two times a day  risperiDONE   Tablet 2 milliGRAM(s) Oral two times a day    MEDICATIONS  (PRN):  hydrOXYzine hydrochloride 25 milliGRAM(s) Oral at bedtime PRN insomnia      ALLERGIES:  Allergies    No Known Allergies    Intolerances        LABS:              CAPILLARY BLOOD GLUCOSE          RADIOLOGY & ADDITIONAL TESTS: Reviewed.

## 2022-12-25 PROCEDURE — 99231 SBSQ HOSP IP/OBS SF/LOW 25: CPT

## 2022-12-25 RX ADMIN — DIVALPROEX SODIUM 250 MILLIGRAM(S): 500 TABLET, DELAYED RELEASE ORAL at 12:17

## 2022-12-25 RX ADMIN — LITHIUM CARBONATE 450 MILLIGRAM(S): 300 TABLET, EXTENDED RELEASE ORAL at 17:20

## 2022-12-25 RX ADMIN — LITHIUM CARBONATE 450 MILLIGRAM(S): 300 TABLET, EXTENDED RELEASE ORAL at 05:31

## 2022-12-25 RX ADMIN — DIVALPROEX SODIUM 500 MILLIGRAM(S): 500 TABLET, DELAYED RELEASE ORAL at 21:09

## 2022-12-25 RX ADMIN — DIVALPROEX SODIUM 250 MILLIGRAM(S): 500 TABLET, DELAYED RELEASE ORAL at 21:08

## 2022-12-25 RX ADMIN — RISPERIDONE 2 MILLIGRAM(S): 4 TABLET ORAL at 05:32

## 2022-12-25 RX ADMIN — DIVALPROEX SODIUM 500 MILLIGRAM(S): 500 TABLET, DELAYED RELEASE ORAL at 12:18

## 2022-12-25 RX ADMIN — RISPERIDONE 2 MILLIGRAM(S): 4 TABLET ORAL at 17:20

## 2022-12-25 NOTE — PROGRESS NOTE ADULT - ASSESSMENT
22 years old male past medical history of bipolar and developmental delay came to emergency room for alleged aggressive and violent behavior as per step father. Evaluated by Psych; Step Father left stated pt needs placement refusing to take child home.     # Bipolar disorder and likely developmental disorder- stable  - continue current meds (on depakote, risperidone, and lithium)   - pt has been stable without aggressive behaviour in the hospital  - father states he cant take care of pt    # Depressed mood, negative thoughts  - cont w lithium and valproic acid, levels wnl, c/w risperiDONE     - psych consult appreciated    # Obesity: Likely multifactorial: excess calories and Psych medication induced     DVT px - pt ambulatory     DISPO: pending placement

## 2022-12-25 NOTE — PROGRESS NOTE ADULT - SUBJECTIVE AND OBJECTIVE BOX
Patient is a 22y old  Male who presents with a chief complaint of Placement (24 Dec 2022 08:57)      OVERNIGHT EVENTS: remained stable    REVIEW OF SYSTEMS:    CONSTITUTIONAL: No weakness, fevers or chills  EYES/ENT: No visual changes;  No vertigo or throat pain   NECK: No pain or stiffness  RESPIRATORY: No cough, wheezing, hemoptysis; No shortness of breath  CARDIOVASCULAR: No chest pain or palpitations  GASTROINTESTINAL: No abdominal or epigastric pain. No nausea, vomiting, or hematemesis; No diarrhea or constipation. No melena or hematochezia.  GENITOURINARY: No dysuria, frequency or hematuria  NEUROLOGICAL: No numbness or weakness  SKIN: No itching, rashes      SUBJECTIVE / INTERVAL HPI: Patient seen and examined at bedside.     VITAL SIGNS:  Vital Signs Last 24 Hrs  T(C): 36.7 (25 Dec 2022 05:05), Max: 36.7 (25 Dec 2022 05:05)  T(F): 98 (25 Dec 2022 05:05), Max: 98 (25 Dec 2022 05:05)  HR: 74 (25 Dec 2022 05:05) (74 - 113)  BP: 100/59 (25 Dec 2022 05:05) (100/59 - 124/71)  BP(mean): --  RR: 18 (25 Dec 2022 05:05) (17 - 18)  SpO2: 99% (24 Dec 2022 14:39) (99% - 99%)    Parameters below as of 24 Dec 2022 14:39  Patient On (Oxygen Delivery Method): room air        PHYSICAL EXAM:    General: WDWN  HEENT: NC/AT; PERRL, clear conjunctiva  Neck: supple  Cardiovascular: +S1/S2; RRR  Respiratory: CTA b/l; no W/R/R  Gastrointestinal: soft, NT/ND; +BSx4  Extremities: WWP; 2+ peripheral pulses; no edema   Neurological: AAOx3; no focal deficits    MEDICATIONS:  MEDICATIONS  (STANDING):  diVALproex  milliGRAM(s) Oral daily  diVALproex  milliGRAM(s) Oral daily  diVALproex  milliGRAM(s) Oral at bedtime  diVALproex  milliGRAM(s) Oral at bedtime  lithium CR (ESKALITH-CR) 450 milliGRAM(s) Oral two times a day  risperiDONE   Tablet 2 milliGRAM(s) Oral two times a day    MEDICATIONS  (PRN):  hydrOXYzine hydrochloride 25 milliGRAM(s) Oral at bedtime PRN insomnia      ALLERGIES:  Allergies    No Known Allergies    Intolerances        LABS:              CAPILLARY BLOOD GLUCOSE          RADIOLOGY & ADDITIONAL TESTS: Reviewed.

## 2022-12-26 PROCEDURE — 99232 SBSQ HOSP IP/OBS MODERATE 35: CPT

## 2022-12-26 RX ADMIN — LITHIUM CARBONATE 450 MILLIGRAM(S): 300 TABLET, EXTENDED RELEASE ORAL at 05:40

## 2022-12-26 RX ADMIN — DIVALPROEX SODIUM 500 MILLIGRAM(S): 500 TABLET, DELAYED RELEASE ORAL at 11:24

## 2022-12-26 RX ADMIN — Medication 25 MILLIGRAM(S): at 23:06

## 2022-12-26 RX ADMIN — RISPERIDONE 2 MILLIGRAM(S): 4 TABLET ORAL at 18:15

## 2022-12-26 RX ADMIN — DIVALPROEX SODIUM 500 MILLIGRAM(S): 500 TABLET, DELAYED RELEASE ORAL at 21:20

## 2022-12-26 RX ADMIN — DIVALPROEX SODIUM 250 MILLIGRAM(S): 500 TABLET, DELAYED RELEASE ORAL at 11:24

## 2022-12-26 RX ADMIN — RISPERIDONE 2 MILLIGRAM(S): 4 TABLET ORAL at 05:40

## 2022-12-26 RX ADMIN — DIVALPROEX SODIUM 250 MILLIGRAM(S): 500 TABLET, DELAYED RELEASE ORAL at 21:19

## 2022-12-26 RX ADMIN — LITHIUM CARBONATE 450 MILLIGRAM(S): 300 TABLET, EXTENDED RELEASE ORAL at 18:15

## 2022-12-26 NOTE — PROGRESS NOTE ADULT - SUBJECTIVE AND OBJECTIVE BOX
Patient is a 22y old  Male who presents with a chief complaint of Placement (25 Dec 2022 09:47)      OVERNIGHT EVENTS: remained stable    REVIEW OF SYSTEMS:    CONSTITUTIONAL: No weakness, fevers or chills  EYES/ENT: No visual changes;  No vertigo or throat pain   NECK: No pain or stiffness  RESPIRATORY: No cough, wheezing, hemoptysis; No shortness of breath  CARDIOVASCULAR: No chest pain or palpitations  GASTROINTESTINAL: No abdominal or epigastric pain. No nausea, vomiting, or hematemesis; No diarrhea or constipation. No melena or hematochezia.  GENITOURINARY: No dysuria, frequency or hematuria  NEUROLOGICAL: No numbness or weakness  SKIN: No itching, rashes      SUBJECTIVE / INTERVAL HPI: Patient seen and examined at bedside.     VITAL SIGNS:  Vital Signs Last 24 Hrs  T(C): 36.9 (26 Dec 2022 05:00), Max: 36.9 (26 Dec 2022 05:00)  T(F): 98.4 (26 Dec 2022 05:00), Max: 98.4 (26 Dec 2022 05:00)  HR: 93 (26 Dec 2022 05:00) (91 - 102)  BP: 93/55 (26 Dec 2022 05:00) (93/55 - 124/70)  BP(mean): --  RR: 18 (26 Dec 2022 05:00) (18 - 18)  SpO2: --        PHYSICAL EXAM:    General: WDWN  HEENT: NC/AT; PERRL, clear conjunctiva  Neck: supple  Cardiovascular: +S1/S2; RRR  Respiratory: CTA b/l; no W/R/R  Gastrointestinal: soft, NT/ND; +BSx4  Extremities: WWP; 2+ peripheral pulses; no edema   Neurological: AAOx3; no focal deficits    MEDICATIONS:  MEDICATIONS  (STANDING):  diVALproex  milliGRAM(s) Oral daily  diVALproex  milliGRAM(s) Oral daily  diVALproex  milliGRAM(s) Oral at bedtime  diVALproex  milliGRAM(s) Oral at bedtime  lithium CR (ESKALITH-CR) 450 milliGRAM(s) Oral two times a day  risperiDONE   Tablet 2 milliGRAM(s) Oral two times a day    MEDICATIONS  (PRN):  hydrOXYzine hydrochloride 25 milliGRAM(s) Oral at bedtime PRN insomnia      ALLERGIES:  Allergies    No Known Allergies    Intolerances        LABS:              CAPILLARY BLOOD GLUCOSE          RADIOLOGY & ADDITIONAL TESTS: Reviewed.

## 2022-12-27 PROCEDURE — 99231 SBSQ HOSP IP/OBS SF/LOW 25: CPT

## 2022-12-27 RX ADMIN — RISPERIDONE 2 MILLIGRAM(S): 4 TABLET ORAL at 17:05

## 2022-12-27 RX ADMIN — DIVALPROEX SODIUM 250 MILLIGRAM(S): 500 TABLET, DELAYED RELEASE ORAL at 21:26

## 2022-12-27 RX ADMIN — DIVALPROEX SODIUM 500 MILLIGRAM(S): 500 TABLET, DELAYED RELEASE ORAL at 11:07

## 2022-12-27 RX ADMIN — DIVALPROEX SODIUM 500 MILLIGRAM(S): 500 TABLET, DELAYED RELEASE ORAL at 21:30

## 2022-12-27 RX ADMIN — LITHIUM CARBONATE 450 MILLIGRAM(S): 300 TABLET, EXTENDED RELEASE ORAL at 17:05

## 2022-12-27 RX ADMIN — LITHIUM CARBONATE 450 MILLIGRAM(S): 300 TABLET, EXTENDED RELEASE ORAL at 05:25

## 2022-12-27 RX ADMIN — RISPERIDONE 2 MILLIGRAM(S): 4 TABLET ORAL at 05:25

## 2022-12-27 RX ADMIN — DIVALPROEX SODIUM 250 MILLIGRAM(S): 500 TABLET, DELAYED RELEASE ORAL at 11:06

## 2022-12-27 NOTE — PROGRESS NOTE ADULT - SUBJECTIVE AND OBJECTIVE BOX
Patient is a 22y old  Male who presents with a chief complaint of Placement (26 Dec 2022 13:05)      OVERNIGHT EVENTS: remained stable    REVIEW OF SYSTEMS:    CONSTITUTIONAL: No weakness, fevers or chills  EYES/ENT: No visual changes;  No vertigo or throat pain   NECK: No pain or stiffness  RESPIRATORY: No cough, wheezing, hemoptysis; No shortness of breath  CARDIOVASCULAR: No chest pain or palpitations  GASTROINTESTINAL: No abdominal or epigastric pain. No nausea, vomiting, or hematemesis; No diarrhea or constipation. No melena or hematochezia.  GENITOURINARY: No dysuria, frequency or hematuria  NEUROLOGICAL: No numbness or weakness  SKIN: No itching, rashes      SUBJECTIVE / INTERVAL HPI: Patient seen and examined at bedside.     VITAL SIGNS:  Vital Signs Last 24 Hrs  T(C): 36.4 (27 Dec 2022 05:15), Max: 36.4 (27 Dec 2022 05:15)  T(F): 97.6 (27 Dec 2022 05:15), Max: 97.6 (27 Dec 2022 05:15)  HR: 72 (27 Dec 2022 05:15) (72 - 87)  BP: 98/59 (27 Dec 2022 05:15) (82/50 - 108/70)  BP(mean): --  RR: 18 (27 Dec 2022 05:15) (18 - 18)  SpO2: --        PHYSICAL EXAM:    General: WDWN  HEENT: NC/AT; PERRL, clear conjunctiva  Neck: supple  Cardiovascular: +S1/S2; RRR  Respiratory: CTA b/l; no W/R/R  Gastrointestinal: soft, NT/ND; +BSx4  Extremities: WWP; 2+ peripheral pulses; no edema   Neurological: AAOx3; no focal deficits    MEDICATIONS:  MEDICATIONS  (STANDING):  diVALproex  milliGRAM(s) Oral daily  diVALproex  milliGRAM(s) Oral daily  diVALproex  milliGRAM(s) Oral at bedtime  diVALproex  milliGRAM(s) Oral at bedtime  lithium CR (ESKALITH-CR) 450 milliGRAM(s) Oral two times a day  risperiDONE   Tablet 2 milliGRAM(s) Oral two times a day    MEDICATIONS  (PRN):  hydrOXYzine hydrochloride 25 milliGRAM(s) Oral at bedtime PRN insomnia      ALLERGIES:  Allergies    No Known Allergies    Intolerances        LABS:              CAPILLARY BLOOD GLUCOSE          RADIOLOGY & ADDITIONAL TESTS: Reviewed.

## 2022-12-28 PROCEDURE — 99232 SBSQ HOSP IP/OBS MODERATE 35: CPT

## 2022-12-28 RX ADMIN — LITHIUM CARBONATE 450 MILLIGRAM(S): 300 TABLET, EXTENDED RELEASE ORAL at 06:04

## 2022-12-28 RX ADMIN — Medication 25 MILLIGRAM(S): at 01:31

## 2022-12-28 RX ADMIN — RISPERIDONE 2 MILLIGRAM(S): 4 TABLET ORAL at 18:33

## 2022-12-28 RX ADMIN — DIVALPROEX SODIUM 500 MILLIGRAM(S): 500 TABLET, DELAYED RELEASE ORAL at 21:20

## 2022-12-28 RX ADMIN — DIVALPROEX SODIUM 500 MILLIGRAM(S): 500 TABLET, DELAYED RELEASE ORAL at 13:18

## 2022-12-28 RX ADMIN — LITHIUM CARBONATE 450 MILLIGRAM(S): 300 TABLET, EXTENDED RELEASE ORAL at 18:33

## 2022-12-28 RX ADMIN — DIVALPROEX SODIUM 250 MILLIGRAM(S): 500 TABLET, DELAYED RELEASE ORAL at 21:20

## 2022-12-28 RX ADMIN — RISPERIDONE 2 MILLIGRAM(S): 4 TABLET ORAL at 06:04

## 2022-12-28 RX ADMIN — DIVALPROEX SODIUM 250 MILLIGRAM(S): 500 TABLET, DELAYED RELEASE ORAL at 13:17

## 2022-12-28 NOTE — PROGRESS NOTE ADULT - SUBJECTIVE AND OBJECTIVE BOX
CC.   Placement   HPI.  Patient appears to be comfortable  Offers no complaints                    Constitutional: No fever, fatigue or weight loss.  Skin: No rash.  Eyes: No recent vision problems or eye pain.  ENT: No congestion, ear pain, or sore throat.  Endocrine: No thyroid problems.  Cardiovascular: No chest pain or palpation.  Respiratory: No cough, shortness of breath, congestion, or wheezing.  Gastrointestinal: No abdominal pain, nausea, vomiting, or diarrhea.  Genitourinary: No dysuria.  Musculoskeletal: No joint swelling.  Neurologic: No headache.      Vital Signs Last 24 Hrs  T(C): 36.8 (12-28-22 @ 04:39), Max: 36.8 (12-28-22 @ 04:39)  T(F): 98.2 (12-28-22 @ 04:39), Max: 98.2 (12-28-22 @ 04:39)  HR: 75 (12-28-22 @ 04:39) (53 - 76)  BP: 107/63 (12-28-22 @ 04:39) (105/55 - 118/69)  BP(mean): --  RR: 18 (12-28-22 @ 04:39) (18 - 18)  SpO2: --        PHYSICAL EXAM-  GENERAL: NAD   HEAD:  Atraumatic, Normocephalic  EYES: EOMI, PERRLA, conjunctiva and sclera clear  NECK: Supple, No JVD, Normal thyroid  NERVOUS SYSTEM:  Alert & Oriented X3, Moving all extremities  CHEST/LUNG: Clear to percussion bilaterally; No rales, rhonchi, wheezing, or rubs  HEART: Regular rate and rhythm; No murmurs, rubs, or gallops  ABDOMEN: Soft, Nontender, Nondistended; Bowel sounds present  EXTREMITIES:   No clubbing, cyanosis, or edema  SKIN: No rashes or lesions              MEDICATIONS  (STANDING):  diVALproex  milliGRAM(s) Oral daily  diVALproex  milliGRAM(s) Oral daily  diVALproex  milliGRAM(s) Oral at bedtime  diVALproex  milliGRAM(s) Oral at bedtime  lithium CR (ESKALITH-CR) 450 milliGRAM(s) Oral two times a day  risperiDONE   Tablet 2 milliGRAM(s) Oral two times a day    MEDICATIONS  (PRN):  hydrOXYzine hydrochloride 25 milliGRAM(s) Oral at bedtime PRN insomnia          RADIOLOGY RESULTS:

## 2022-12-29 PROCEDURE — 99232 SBSQ HOSP IP/OBS MODERATE 35: CPT

## 2022-12-29 RX ADMIN — DIVALPROEX SODIUM 250 MILLIGRAM(S): 500 TABLET, DELAYED RELEASE ORAL at 22:01

## 2022-12-29 RX ADMIN — LITHIUM CARBONATE 450 MILLIGRAM(S): 300 TABLET, EXTENDED RELEASE ORAL at 05:53

## 2022-12-29 RX ADMIN — DIVALPROEX SODIUM 500 MILLIGRAM(S): 500 TABLET, DELAYED RELEASE ORAL at 22:01

## 2022-12-29 RX ADMIN — LITHIUM CARBONATE 450 MILLIGRAM(S): 300 TABLET, EXTENDED RELEASE ORAL at 18:12

## 2022-12-29 RX ADMIN — DIVALPROEX SODIUM 500 MILLIGRAM(S): 500 TABLET, DELAYED RELEASE ORAL at 12:55

## 2022-12-29 RX ADMIN — RISPERIDONE 2 MILLIGRAM(S): 4 TABLET ORAL at 05:53

## 2022-12-29 RX ADMIN — RISPERIDONE 2 MILLIGRAM(S): 4 TABLET ORAL at 18:12

## 2022-12-29 RX ADMIN — Medication 25 MILLIGRAM(S): at 00:03

## 2022-12-29 RX ADMIN — DIVALPROEX SODIUM 250 MILLIGRAM(S): 500 TABLET, DELAYED RELEASE ORAL at 12:53

## 2022-12-29 NOTE — PROGRESS NOTE ADULT - SUBJECTIVE AND OBJECTIVE BOX
CC.   Placement   HPI.  Patient appears to be comfortable  Offers no complaints                    Constitutional: No fever, fatigue or weight loss.  Skin: No rash.  Eyes: No recent vision problems or eye pain.  ENT: No congestion, ear pain, or sore throat.  Endocrine: No thyroid problems.  Cardiovascular: No chest pain or palpation.  Respiratory: No cough, shortness of breath, congestion, or wheezing.  Gastrointestinal: No abdominal pain, nausea, vomiting, or diarrhea.  Genitourinary: No dysuria.  Musculoskeletal: No joint swelling.  Neurologic: No headache.      Vital Signs Last 24 Hrs  T(C): 36.9 (29 Dec 2022 04:38), Max: 36.9 (29 Dec 2022 04:38)  T(F): 98.5 (29 Dec 2022 04:38), Max: 98.5 (29 Dec 2022 04:38)  HR: 67 (29 Dec 2022 04:38) (67 - 77)  BP: 90/54 (29 Dec 2022 04:38) (90/54 - 120/67)  BP(mean): 68 (29 Dec 2022 04:38) (68 - 68)  RR: 18 (29 Dec 2022 04:38) (18 - 18)  SpO2: --            PHYSICAL EXAM-  GENERAL: NAD   HEAD:  Atraumatic, Normocephalic  EYES: EOMI, PERRLA, conjunctiva and sclera clear  NECK: Supple, No JVD, Normal thyroid  NERVOUS SYSTEM:  Alert & Oriented X3, Moving all extremities  CHEST/LUNG: Clear to percussion bilaterally; No rales, rhonchi, wheezing, or rubs  HEART: Regular rate and rhythm; No murmurs, rubs, or gallops  ABDOMEN: Soft, Nontender, Nondistended; Bowel sounds present  EXTREMITIES:   No clubbing, cyanosis, or edema  SKIN: No rashes or lesions              MEDICATIONS  (STANDING):  diVALproex  milliGRAM(s) Oral daily  diVALproex  milliGRAM(s) Oral daily  diVALproex  milliGRAM(s) Oral at bedtime  diVALproex  milliGRAM(s) Oral at bedtime  lithium CR (ESKALITH-CR) 450 milliGRAM(s) Oral two times a day  risperiDONE   Tablet 2 milliGRAM(s) Oral two times a day    MEDICATIONS  (PRN):  hydrOXYzine hydrochloride 25 milliGRAM(s) Oral at bedtime PRN insomnia          RADIOLOGY RESULTS:

## 2022-12-30 PROCEDURE — 99232 SBSQ HOSP IP/OBS MODERATE 35: CPT

## 2022-12-30 RX ADMIN — RISPERIDONE 2 MILLIGRAM(S): 4 TABLET ORAL at 05:05

## 2022-12-30 RX ADMIN — Medication 25 MILLIGRAM(S): at 01:02

## 2022-12-30 RX ADMIN — LITHIUM CARBONATE 450 MILLIGRAM(S): 300 TABLET, EXTENDED RELEASE ORAL at 17:45

## 2022-12-30 RX ADMIN — DIVALPROEX SODIUM 500 MILLIGRAM(S): 500 TABLET, DELAYED RELEASE ORAL at 11:26

## 2022-12-30 RX ADMIN — DIVALPROEX SODIUM 500 MILLIGRAM(S): 500 TABLET, DELAYED RELEASE ORAL at 21:27

## 2022-12-30 RX ADMIN — DIVALPROEX SODIUM 250 MILLIGRAM(S): 500 TABLET, DELAYED RELEASE ORAL at 11:25

## 2022-12-30 RX ADMIN — LITHIUM CARBONATE 450 MILLIGRAM(S): 300 TABLET, EXTENDED RELEASE ORAL at 05:05

## 2022-12-30 RX ADMIN — DIVALPROEX SODIUM 250 MILLIGRAM(S): 500 TABLET, DELAYED RELEASE ORAL at 21:26

## 2022-12-30 RX ADMIN — RISPERIDONE 2 MILLIGRAM(S): 4 TABLET ORAL at 17:45

## 2022-12-30 NOTE — PROGRESS NOTE ADULT - SUBJECTIVE AND OBJECTIVE BOX
CC.   Placement   HPI.  Patient appears to be comfortable  Offers no complaints                    Constitutional: No fever, fatigue or weight loss.  Skin: No rash.  Eyes: No recent vision problems or eye pain.  ENT: No congestion, ear pain, or sore throat.  Endocrine: No thyroid problems.  Cardiovascular: No chest pain or palpation.  Respiratory: No cough, shortness of breath, congestion, or wheezing.  Gastrointestinal: No abdominal pain, nausea, vomiting, or diarrhea.  Genitourinary: No dysuria.  Musculoskeletal: No joint swelling.  Neurologic: No headache.      Vital Signs Last 24 Hrs  T(C): 36.2 (30 Dec 2022 04:28), Max: 36.7 (29 Dec 2022 14:00)  T(F): 97.2 (30 Dec 2022 04:28), Max: 98.1 (29 Dec 2022 14:00)  HR: 74 (30 Dec 2022 04:28) (72 - 95)  BP: 90/51 (30 Dec 2022 04:28) (90/51 - 118/74)  BP(mean): --  RR: 18 (30 Dec 2022 04:28) (18 - 18)  SpO2: --                PHYSICAL EXAM-  GENERAL: NAD   HEAD:  Atraumatic, Normocephalic  EYES: EOMI, PERRLA, conjunctiva and sclera clear  NECK: Supple, No JVD, Normal thyroid  NERVOUS SYSTEM:  Alert & Oriented X3, Moving all extremities  CHEST/LUNG: Clear to percussion bilaterally; No rales, rhonchi, wheezing, or rubs  HEART: Regular rate and rhythm; No murmurs, rubs, or gallops  ABDOMEN: Soft, Nontender, Nondistended; Bowel sounds present  EXTREMITIES:   No clubbing, cyanosis, or edema  SKIN: No rashes or lesions              MEDICATIONS  (STANDING):  diVALproex  milliGRAM(s) Oral daily  diVALproex  milliGRAM(s) Oral daily  diVALproex  milliGRAM(s) Oral at bedtime  diVALproex  milliGRAM(s) Oral at bedtime  lithium CR (ESKALITH-CR) 450 milliGRAM(s) Oral two times a day  risperiDONE   Tablet 2 milliGRAM(s) Oral two times a day    MEDICATIONS  (PRN):  hydrOXYzine hydrochloride 25 milliGRAM(s) Oral at bedtime PRN insomnia          RADIOLOGY RESULTS:

## 2022-12-31 PROCEDURE — 99232 SBSQ HOSP IP/OBS MODERATE 35: CPT

## 2022-12-31 RX ADMIN — LITHIUM CARBONATE 450 MILLIGRAM(S): 300 TABLET, EXTENDED RELEASE ORAL at 17:38

## 2022-12-31 RX ADMIN — LITHIUM CARBONATE 450 MILLIGRAM(S): 300 TABLET, EXTENDED RELEASE ORAL at 06:33

## 2022-12-31 RX ADMIN — RISPERIDONE 2 MILLIGRAM(S): 4 TABLET ORAL at 17:39

## 2022-12-31 RX ADMIN — DIVALPROEX SODIUM 250 MILLIGRAM(S): 500 TABLET, DELAYED RELEASE ORAL at 22:07

## 2022-12-31 RX ADMIN — DIVALPROEX SODIUM 500 MILLIGRAM(S): 500 TABLET, DELAYED RELEASE ORAL at 22:07

## 2022-12-31 RX ADMIN — DIVALPROEX SODIUM 500 MILLIGRAM(S): 500 TABLET, DELAYED RELEASE ORAL at 13:53

## 2022-12-31 RX ADMIN — Medication 25 MILLIGRAM(S): at 01:17

## 2022-12-31 RX ADMIN — RISPERIDONE 2 MILLIGRAM(S): 4 TABLET ORAL at 06:33

## 2022-12-31 RX ADMIN — DIVALPROEX SODIUM 250 MILLIGRAM(S): 500 TABLET, DELAYED RELEASE ORAL at 12:28

## 2022-12-31 NOTE — PROGRESS NOTE ADULT - SUBJECTIVE AND OBJECTIVE BOX
CC.   Placement   HPI.  Patient appears to be comfortable  Offers no complaints                    Constitutional: No fever, fatigue or weight loss.  Skin: No rash.  Eyes: No recent vision problems or eye pain.  ENT: No congestion, ear pain, or sore throat.  Endocrine: No thyroid problems.  Cardiovascular: No chest pain or palpation.  Respiratory: No cough, shortness of breath, congestion, or wheezing.  Gastrointestinal: No abdominal pain, nausea, vomiting, or diarrhea.  Genitourinary: No dysuria.  Musculoskeletal: No joint swelling.  Neurologic: No headache.      Vital Signs Last 24 Hrs  T(C): 36.3 (31 Dec 2022 05:15), Max: 36.9 (30 Dec 2022 13:41)  T(F): 97.3 (31 Dec 2022 05:15), Max: 98.4 (30 Dec 2022 13:41)  HR: 60 (31 Dec 2022 05:15) (60 - 98)  BP: 89/55 (31 Dec 2022 05:15) (89/55 - 113/79)  BP(mean): --  RR: 18 (31 Dec 2022 05:15) (18 - 18)  SpO2: --                    PHYSICAL EXAM-  GENERAL: NAD   HEAD:  Atraumatic, Normocephalic  EYES: EOMI, PERRLA, conjunctiva and sclera clear  NECK: Supple, No JVD, Normal thyroid  NERVOUS SYSTEM:  Alert & Oriented X3, Moving all extremities  CHEST/LUNG: Clear to percussion bilaterally; No rales, rhonchi, wheezing, or rubs  HEART: Regular rate and rhythm; No murmurs, rubs, or gallops  ABDOMEN: Soft, Nontender, Nondistended; Bowel sounds present  EXTREMITIES:   No clubbing, cyanosis, or edema  SKIN: No rashes or lesions              MEDICATIONS  (STANDING):  diVALproex  milliGRAM(s) Oral daily  diVALproex  milliGRAM(s) Oral daily  diVALproex  milliGRAM(s) Oral at bedtime  diVALproex  milliGRAM(s) Oral at bedtime  lithium CR (ESKALITH-CR) 450 milliGRAM(s) Oral two times a day  risperiDONE   Tablet 2 milliGRAM(s) Oral two times a day    MEDICATIONS  (PRN):  hydrOXYzine hydrochloride 25 milliGRAM(s) Oral at bedtime PRN insomnia          RADIOLOGY RESULTS:

## 2023-01-01 PROCEDURE — 99232 SBSQ HOSP IP/OBS MODERATE 35: CPT

## 2023-01-01 RX ADMIN — DIVALPROEX SODIUM 500 MILLIGRAM(S): 500 TABLET, DELAYED RELEASE ORAL at 12:12

## 2023-01-01 RX ADMIN — DIVALPROEX SODIUM 250 MILLIGRAM(S): 500 TABLET, DELAYED RELEASE ORAL at 12:12

## 2023-01-01 RX ADMIN — RISPERIDONE 2 MILLIGRAM(S): 4 TABLET ORAL at 17:49

## 2023-01-01 RX ADMIN — LITHIUM CARBONATE 450 MILLIGRAM(S): 300 TABLET, EXTENDED RELEASE ORAL at 17:49

## 2023-01-01 RX ADMIN — RISPERIDONE 2 MILLIGRAM(S): 4 TABLET ORAL at 05:18

## 2023-01-01 RX ADMIN — Medication 25 MILLIGRAM(S): at 01:04

## 2023-01-01 RX ADMIN — DIVALPROEX SODIUM 500 MILLIGRAM(S): 500 TABLET, DELAYED RELEASE ORAL at 21:35

## 2023-01-01 RX ADMIN — DIVALPROEX SODIUM 250 MILLIGRAM(S): 500 TABLET, DELAYED RELEASE ORAL at 21:35

## 2023-01-01 RX ADMIN — LITHIUM CARBONATE 450 MILLIGRAM(S): 300 TABLET, EXTENDED RELEASE ORAL at 05:18

## 2023-01-01 NOTE — PROGRESS NOTE ADULT - SUBJECTIVE AND OBJECTIVE BOX
CC.   Placement   HPI.  Patient appears to be comfortable  Offers no complaints                    Constitutional: No fever, fatigue or weight loss.  Skin: No rash.  Eyes: No recent vision problems or eye pain.  ENT: No congestion, ear pain, or sore throat.  Endocrine: No thyroid problems.  Cardiovascular: No chest pain or palpation.  Respiratory: No cough, shortness of breath, congestion, or wheezing.  Gastrointestinal: No abdominal pain, nausea, vomiting, or diarrhea.  Genitourinary: No dysuria.  Musculoskeletal: No joint swelling.  Neurologic: No headache.    Vital Signs Last 24 Hrs  T(C): 36.8 (01 Jan 2023 05:00), Max: 36.8 (31 Dec 2022 20:52)  T(F): 98.3 (01 Jan 2023 05:00), Max: 98.3 (01 Jan 2023 05:00)  HR: 88 (31 Dec 2022 20:52) (66 - 88)  BP: 86/53 (01 Jan 2023 05:00) (86/53 - 140/70)  BP(mean): --  RR: 18 (01 Jan 2023 05:00) (18 - 187)  SpO2: --                        PHYSICAL EXAM-  GENERAL: NAD   HEAD:  Atraumatic, Normocephalic  EYES: EOMI, PERRLA, conjunctiva and sclera clear  NECK: Supple, No JVD, Normal thyroid  NERVOUS SYSTEM:  Alert & Oriented X3, Moving all extremities  CHEST/LUNG: Clear to percussion bilaterally; No rales, rhonchi, wheezing, or rubs  HEART: Regular rate and rhythm; No murmurs, rubs, or gallops  ABDOMEN: Soft, Nontender, Nondistended; Bowel sounds present  EXTREMITIES:   No clubbing, cyanosis, or edema  SKIN: No rashes or lesions              MEDICATIONS  (STANDING):  diVALproex  milliGRAM(s) Oral daily  diVALproex  milliGRAM(s) Oral daily  diVALproex  milliGRAM(s) Oral at bedtime  diVALproex  milliGRAM(s) Oral at bedtime  lithium CR (ESKALITH-CR) 450 milliGRAM(s) Oral two times a day  risperiDONE   Tablet 2 milliGRAM(s) Oral two times a day    MEDICATIONS  (PRN):  hydrOXYzine hydrochloride 25 milliGRAM(s) Oral at bedtime PRN insomnia          RADIOLOGY RESULTS:

## 2023-01-02 PROCEDURE — 99232 SBSQ HOSP IP/OBS MODERATE 35: CPT

## 2023-01-02 RX ADMIN — Medication 25 MILLIGRAM(S): at 23:40

## 2023-01-02 RX ADMIN — RISPERIDONE 2 MILLIGRAM(S): 4 TABLET ORAL at 05:46

## 2023-01-02 RX ADMIN — DIVALPROEX SODIUM 250 MILLIGRAM(S): 500 TABLET, DELAYED RELEASE ORAL at 21:55

## 2023-01-02 RX ADMIN — DIVALPROEX SODIUM 250 MILLIGRAM(S): 500 TABLET, DELAYED RELEASE ORAL at 11:11

## 2023-01-02 RX ADMIN — DIVALPROEX SODIUM 500 MILLIGRAM(S): 500 TABLET, DELAYED RELEASE ORAL at 21:54

## 2023-01-02 RX ADMIN — LITHIUM CARBONATE 450 MILLIGRAM(S): 300 TABLET, EXTENDED RELEASE ORAL at 05:46

## 2023-01-02 RX ADMIN — DIVALPROEX SODIUM 500 MILLIGRAM(S): 500 TABLET, DELAYED RELEASE ORAL at 11:12

## 2023-01-02 RX ADMIN — LITHIUM CARBONATE 450 MILLIGRAM(S): 300 TABLET, EXTENDED RELEASE ORAL at 17:06

## 2023-01-02 RX ADMIN — RISPERIDONE 2 MILLIGRAM(S): 4 TABLET ORAL at 17:05

## 2023-01-02 NOTE — PROGRESS NOTE ADULT - SUBJECTIVE AND OBJECTIVE BOX
CC.   Placement   HPI.  Patient appears to be comfortable  Offers no complaints                    Constitutional: No fever, fatigue or weight loss.  Skin: No rash.  Eyes: No recent vision problems or eye pain.  ENT: No congestion, ear pain, or sore throat.  Endocrine: No thyroid problems.  Cardiovascular: No chest pain or palpation.  Respiratory: No cough, shortness of breath, congestion, or wheezing.  Gastrointestinal: No abdominal pain, nausea, vomiting, or diarrhea.  Genitourinary: No dysuria.  Musculoskeletal: No joint swelling.  Neurologic: No headache.    Vital Signs Last 24 Hrs  T(C): 35.6 (02 Jan 2023 06:49), Max: 36.7 (01 Jan 2023 15:10)  T(F): 96 (02 Jan 2023 06:49), Max: 98.1 (01 Jan 2023 15:10)  HR: 66 (02 Jan 2023 06:49) (66 - 99)  BP: 98/59 (02 Jan 2023 06:49) (98/59 - 106/71)  BP(mean): --  RR: 18 (02 Jan 2023 06:49) (18 - 18)  SpO2: --                            PHYSICAL EXAM-  GENERAL: NAD   HEAD:  Atraumatic, Normocephalic  EYES: EOMI, PERRLA, conjunctiva and sclera clear  NECK: Supple, No JVD, Normal thyroid  NERVOUS SYSTEM:  Alert & Oriented X3, Moving all extremities  CHEST/LUNG: Clear to percussion bilaterally; No rales, rhonchi, wheezing, or rubs  HEART: Regular rate and rhythm; No murmurs, rubs, or gallops  ABDOMEN: Soft, Nontender, Nondistended; Bowel sounds present  EXTREMITIES:   No clubbing, cyanosis, or edema  SKIN: No rashes or lesions              MEDICATIONS  (STANDING):  diVALproex  milliGRAM(s) Oral daily  diVALproex  milliGRAM(s) Oral daily  diVALproex  milliGRAM(s) Oral at bedtime  diVALproex  milliGRAM(s) Oral at bedtime  lithium CR (ESKALITH-CR) 450 milliGRAM(s) Oral two times a day  risperiDONE   Tablet 2 milliGRAM(s) Oral two times a day    MEDICATIONS  (PRN):  hydrOXYzine hydrochloride 25 milliGRAM(s) Oral at bedtime PRN insomnia          RADIOLOGY RESULTS:

## 2023-01-03 PROCEDURE — 99232 SBSQ HOSP IP/OBS MODERATE 35: CPT

## 2023-01-03 RX ADMIN — RISPERIDONE 2 MILLIGRAM(S): 4 TABLET ORAL at 17:34

## 2023-01-03 RX ADMIN — DIVALPROEX SODIUM 500 MILLIGRAM(S): 500 TABLET, DELAYED RELEASE ORAL at 12:03

## 2023-01-03 RX ADMIN — LITHIUM CARBONATE 450 MILLIGRAM(S): 300 TABLET, EXTENDED RELEASE ORAL at 17:35

## 2023-01-03 RX ADMIN — DIVALPROEX SODIUM 250 MILLIGRAM(S): 500 TABLET, DELAYED RELEASE ORAL at 12:04

## 2023-01-03 RX ADMIN — DIVALPROEX SODIUM 500 MILLIGRAM(S): 500 TABLET, DELAYED RELEASE ORAL at 21:41

## 2023-01-03 RX ADMIN — LITHIUM CARBONATE 450 MILLIGRAM(S): 300 TABLET, EXTENDED RELEASE ORAL at 06:09

## 2023-01-03 RX ADMIN — RISPERIDONE 2 MILLIGRAM(S): 4 TABLET ORAL at 06:08

## 2023-01-03 RX ADMIN — DIVALPROEX SODIUM 250 MILLIGRAM(S): 500 TABLET, DELAYED RELEASE ORAL at 21:41

## 2023-01-03 NOTE — PROGRESS NOTE ADULT - SUBJECTIVE AND OBJECTIVE BOX
CC.   Placement   HPI.  Patient appears to be comfortable  Offers no complaints                    Constitutional: No fever, fatigue or weight loss.  Skin: No rash.  Eyes: No recent vision problems or eye pain.  ENT: No congestion, ear pain, or sore throat.  Endocrine: No thyroid problems.  Cardiovascular: No chest pain or palpation.  Respiratory: No cough, shortness of breath, congestion, or wheezing.  Gastrointestinal: No abdominal pain, nausea, vomiting, or diarrhea.  Genitourinary: No dysuria.  Musculoskeletal: No joint swelling.  Neurologic: No headache.    Vital Signs Last 24 Hrs  T(C): 35.8 (03 Jan 2023 05:20), Max: 36 (02 Jan 2023 21:21)  T(F): 96.4 (03 Jan 2023 05:20), Max: 96.8 (02 Jan 2023 21:21)  HR: 80 (03 Jan 2023 05:20) (79 - 101)  BP: 112/78 (03 Jan 2023 05:20) (112/78 - 118/74)  BP(mean): 91 (02 Jan 2023 21:21) (91 - 91)  RR: 18 (02 Jan 2023 14:02) (18 - 18)  SpO2: --        PHYSICAL EXAM-  GENERAL: NAD   HEAD:  Atraumatic, Normocephalic  EYES: EOMI, PERRLA, conjunctiva and sclera clear  NECK: Supple, No JVD, Normal thyroid  NERVOUS SYSTEM:  Alert & Oriented X3, Moving all extremities  CHEST/LUNG: Clear to percussion bilaterally; No rales, rhonchi, wheezing, or rubs  HEART: Regular rate and rhythm; No murmurs, rubs, or gallops  ABDOMEN: Soft, Nontender, Nondistended; Bowel sounds present  EXTREMITIES:   No clubbing, cyanosis, or edema  SKIN: No rashes or lesions              MEDICATIONS  (STANDING):  diVALproex  milliGRAM(s) Oral daily  diVALproex  milliGRAM(s) Oral daily  diVALproex  milliGRAM(s) Oral at bedtime  diVALproex  milliGRAM(s) Oral at bedtime  lithium CR (ESKALITH-CR) 450 milliGRAM(s) Oral two times a day  risperiDONE   Tablet 2 milliGRAM(s) Oral two times a day    MEDICATIONS  (PRN):  hydrOXYzine hydrochloride 25 milliGRAM(s) Oral at bedtime PRN insomnia          RADIOLOGY RESULTS:

## 2023-01-04 PROCEDURE — 99232 SBSQ HOSP IP/OBS MODERATE 35: CPT

## 2023-01-04 RX ADMIN — DIVALPROEX SODIUM 250 MILLIGRAM(S): 500 TABLET, DELAYED RELEASE ORAL at 11:37

## 2023-01-04 RX ADMIN — Medication 25 MILLIGRAM(S): at 00:09

## 2023-01-04 RX ADMIN — RISPERIDONE 2 MILLIGRAM(S): 4 TABLET ORAL at 17:35

## 2023-01-04 RX ADMIN — RISPERIDONE 2 MILLIGRAM(S): 4 TABLET ORAL at 05:47

## 2023-01-04 RX ADMIN — DIVALPROEX SODIUM 500 MILLIGRAM(S): 500 TABLET, DELAYED RELEASE ORAL at 11:37

## 2023-01-04 RX ADMIN — DIVALPROEX SODIUM 250 MILLIGRAM(S): 500 TABLET, DELAYED RELEASE ORAL at 22:14

## 2023-01-04 RX ADMIN — LITHIUM CARBONATE 450 MILLIGRAM(S): 300 TABLET, EXTENDED RELEASE ORAL at 17:35

## 2023-01-04 RX ADMIN — LITHIUM CARBONATE 450 MILLIGRAM(S): 300 TABLET, EXTENDED RELEASE ORAL at 05:48

## 2023-01-04 RX ADMIN — DIVALPROEX SODIUM 500 MILLIGRAM(S): 500 TABLET, DELAYED RELEASE ORAL at 22:14

## 2023-01-04 NOTE — PROGRESS NOTE ADULT - SUBJECTIVE AND OBJECTIVE BOX
CC.   Placement   HPI.  Patient appears to be comfortable      Patient has no complaints                   Constitutional: No fever, fatigue or weight loss.  Skin: No rash.  Eyes: No recent vision problems or eye pain.  ENT: No congestion, ear pain, or sore throat.  Endocrine: No thyroid problems.  Cardiovascular: No chest pain or palpation.  Respiratory: No cough, shortness of breath, congestion, or wheezing.  Gastrointestinal: No abdominal pain, nausea, vomiting, or diarrhea.  Genitourinary: No dysuria.  Musculoskeletal: No joint swelling.  Neurologic: No headache.    Vital Signs Last 24 Hrs  T(C): 35.8 (03 Jan 2023 05:20), Max: 36 (02 Jan 2023 21:21)  T(F): 96.4 (03 Jan 2023 05:20), Max: 96.8 (02 Jan 2023 21:21)  HR: 80 (03 Jan 2023 05:20) (79 - 101)  BP: 112/78 (03 Jan 2023 05:20) (112/78 - 118/74)  BP(mean): 91 (02 Jan 2023 21:21) (91 - 91)  RR: 18 (02 Jan 2023 14:02) (18 - 18)  SpO2: --        PHYSICAL EXAM-  GENERAL: NAD   HEAD:  Atraumatic, Normocephalic  EYES: EOMI, PERRLA, conjunctiva and sclera clear  NECK: Supple, No JVD, Normal thyroid  NERVOUS SYSTEM:  Alert & Oriented X3, Moving all extremities  CHEST/LUNG: Clear to percussion bilaterally; No rales, rhonchi, wheezing, or rubs  HEART: Regular rate and rhythm; No murmurs, rubs, or gallops  ABDOMEN: Soft, Nontender, Nondistended; Bowel sounds present  EXTREMITIES:   No clubbing, cyanosis, or edema  SKIN: No rashes or lesions              MEDICATIONS  (STANDING):  diVALproex  milliGRAM(s) Oral daily  diVALproex  milliGRAM(s) Oral daily  diVALproex  milliGRAM(s) Oral at bedtime  diVALproex  milliGRAM(s) Oral at bedtime  lithium CR (ESKALITH-CR) 450 milliGRAM(s) Oral two times a day  risperiDONE   Tablet 2 milliGRAM(s) Oral two times a day    MEDICATIONS  (PRN):  hydrOXYzine hydrochloride 25 milliGRAM(s) Oral at bedtime PRN insomnia          RADIOLOGY RESULTS:

## 2023-01-05 PROCEDURE — 99232 SBSQ HOSP IP/OBS MODERATE 35: CPT

## 2023-01-05 RX ADMIN — DIVALPROEX SODIUM 500 MILLIGRAM(S): 500 TABLET, DELAYED RELEASE ORAL at 11:44

## 2023-01-05 RX ADMIN — DIVALPROEX SODIUM 250 MILLIGRAM(S): 500 TABLET, DELAYED RELEASE ORAL at 21:22

## 2023-01-05 RX ADMIN — Medication 25 MILLIGRAM(S): at 00:57

## 2023-01-05 RX ADMIN — DIVALPROEX SODIUM 250 MILLIGRAM(S): 500 TABLET, DELAYED RELEASE ORAL at 11:44

## 2023-01-05 RX ADMIN — LITHIUM CARBONATE 450 MILLIGRAM(S): 300 TABLET, EXTENDED RELEASE ORAL at 06:17

## 2023-01-05 RX ADMIN — RISPERIDONE 2 MILLIGRAM(S): 4 TABLET ORAL at 17:16

## 2023-01-05 RX ADMIN — DIVALPROEX SODIUM 500 MILLIGRAM(S): 500 TABLET, DELAYED RELEASE ORAL at 21:22

## 2023-01-05 RX ADMIN — RISPERIDONE 2 MILLIGRAM(S): 4 TABLET ORAL at 06:17

## 2023-01-05 RX ADMIN — LITHIUM CARBONATE 450 MILLIGRAM(S): 300 TABLET, EXTENDED RELEASE ORAL at 17:16

## 2023-01-06 PROCEDURE — 99232 SBSQ HOSP IP/OBS MODERATE 35: CPT

## 2023-01-06 RX ADMIN — Medication 25 MILLIGRAM(S): at 01:03

## 2023-01-06 RX ADMIN — RISPERIDONE 2 MILLIGRAM(S): 4 TABLET ORAL at 17:36

## 2023-01-06 RX ADMIN — LITHIUM CARBONATE 450 MILLIGRAM(S): 300 TABLET, EXTENDED RELEASE ORAL at 06:30

## 2023-01-06 RX ADMIN — DIVALPROEX SODIUM 250 MILLIGRAM(S): 500 TABLET, DELAYED RELEASE ORAL at 21:30

## 2023-01-06 RX ADMIN — DIVALPROEX SODIUM 500 MILLIGRAM(S): 500 TABLET, DELAYED RELEASE ORAL at 21:29

## 2023-01-06 RX ADMIN — LITHIUM CARBONATE 450 MILLIGRAM(S): 300 TABLET, EXTENDED RELEASE ORAL at 17:36

## 2023-01-06 RX ADMIN — RISPERIDONE 2 MILLIGRAM(S): 4 TABLET ORAL at 06:30

## 2023-01-06 RX ADMIN — DIVALPROEX SODIUM 250 MILLIGRAM(S): 500 TABLET, DELAYED RELEASE ORAL at 11:51

## 2023-01-06 RX ADMIN — DIVALPROEX SODIUM 500 MILLIGRAM(S): 500 TABLET, DELAYED RELEASE ORAL at 11:51

## 2023-01-06 NOTE — PROGRESS NOTE ADULT - SUBJECTIVE AND OBJECTIVE BOX
CC.   Placement   HPI.  Patient appears to be comfortable      Patient has no complaints         Constitutional: No fever, fatigue or weight loss.  Skin: No rash.  Eyes: No recent vision problems or eye pain.  ENT: No congestion, ear pain, or sore throat.  Endocrine: No thyroid problems.  Cardiovascular: No chest pain or palpation.  Respiratory: No cough, shortness of breath, congestion, or wheezing.  Gastrointestinal: No abdominal pain, nausea, vomiting, or diarrhea.  Genitourinary: No dysuria.  Musculoskeletal: No joint swelling.  Neurologic: No headache.      MEDICATIONS  (STANDING):  diVALproex  milliGRAM(s) Oral daily  diVALproex  milliGRAM(s) Oral daily  diVALproex  milliGRAM(s) Oral at bedtime  diVALproex  milliGRAM(s) Oral at bedtime  lithium CR (ESKALITH-CR) 450 milliGRAM(s) Oral two times a day  risperiDONE   Tablet 2 milliGRAM(s) Oral two times a day    MEDICATIONS  (PRN):  hydrOXYzine hydrochloride 25 milliGRAM(s) Oral at bedtime PRN insomnia      VITALS  T(F): 97.2 (01-06-23 @ 14:00), Max: 97.2 (01-06-23 @ 14:00)  HR: 77 (01-06-23 @ 14:00) (77 - 91)  BP: 102/56 (01-06-23 @ 14:00) (102/56 - 122/79)  RR: 18 (01-06-23 @ 14:00) (18 - 18)  SpO2: --      PHYSICAL EXAM-  GENERAL: NAD   HEAD:  Atraumatic, Normocephalic  EYES: EOMI, PERRLA, conjunctiva and sclera clear  NECK: Supple, No JVD, Normal thyroid  NERVOUS SYSTEM:  Alert & Oriented X3, Moving all extremities  CHEST/LUNG: Clear to percussion bilaterally; No rales, rhonchi, wheezing, or rubs  HEART: Regular rate and rhythm; No murmurs, rubs, or gallops  ABDOMEN: Soft, Nontender, Nondistended; Bowel sounds present  EXTREMITIES:   No clubbing, cyanosis, or edema  SKIN: No rashes or lesions              MEDICATIONS  (STANDING):  diVALproex  milliGRAM(s) Oral daily  diVALproex  milliGRAM(s) Oral daily  diVALproex  milliGRAM(s) Oral at bedtime  diVALproex  milliGRAM(s) Oral at bedtime  lithium CR (ESKALITH-CR) 450 milliGRAM(s) Oral two times a day  risperiDONE   Tablet 2 milliGRAM(s) Oral two times a day    MEDICATIONS  (PRN):  hydrOXYzine hydrochloride 25 milliGRAM(s) Oral at bedtime PRN insomnia          RADIOLOGY RESULTS:

## 2023-01-07 PROCEDURE — 99232 SBSQ HOSP IP/OBS MODERATE 35: CPT

## 2023-01-07 RX ADMIN — DIVALPROEX SODIUM 250 MILLIGRAM(S): 500 TABLET, DELAYED RELEASE ORAL at 21:24

## 2023-01-07 RX ADMIN — DIVALPROEX SODIUM 250 MILLIGRAM(S): 500 TABLET, DELAYED RELEASE ORAL at 11:24

## 2023-01-07 RX ADMIN — RISPERIDONE 2 MILLIGRAM(S): 4 TABLET ORAL at 17:21

## 2023-01-07 RX ADMIN — Medication 25 MILLIGRAM(S): at 00:55

## 2023-01-07 RX ADMIN — LITHIUM CARBONATE 450 MILLIGRAM(S): 300 TABLET, EXTENDED RELEASE ORAL at 17:21

## 2023-01-07 RX ADMIN — LITHIUM CARBONATE 450 MILLIGRAM(S): 300 TABLET, EXTENDED RELEASE ORAL at 05:35

## 2023-01-07 RX ADMIN — DIVALPROEX SODIUM 500 MILLIGRAM(S): 500 TABLET, DELAYED RELEASE ORAL at 11:24

## 2023-01-07 RX ADMIN — DIVALPROEX SODIUM 500 MILLIGRAM(S): 500 TABLET, DELAYED RELEASE ORAL at 21:24

## 2023-01-07 RX ADMIN — RISPERIDONE 2 MILLIGRAM(S): 4 TABLET ORAL at 05:35

## 2023-01-08 PROCEDURE — 99232 SBSQ HOSP IP/OBS MODERATE 35: CPT

## 2023-01-08 RX ADMIN — LITHIUM CARBONATE 450 MILLIGRAM(S): 300 TABLET, EXTENDED RELEASE ORAL at 18:30

## 2023-01-08 RX ADMIN — DIVALPROEX SODIUM 250 MILLIGRAM(S): 500 TABLET, DELAYED RELEASE ORAL at 11:41

## 2023-01-08 RX ADMIN — RISPERIDONE 2 MILLIGRAM(S): 4 TABLET ORAL at 06:06

## 2023-01-08 RX ADMIN — DIVALPROEX SODIUM 250 MILLIGRAM(S): 500 TABLET, DELAYED RELEASE ORAL at 21:05

## 2023-01-08 RX ADMIN — Medication 25 MILLIGRAM(S): at 01:22

## 2023-01-08 RX ADMIN — DIVALPROEX SODIUM 500 MILLIGRAM(S): 500 TABLET, DELAYED RELEASE ORAL at 21:05

## 2023-01-08 RX ADMIN — RISPERIDONE 2 MILLIGRAM(S): 4 TABLET ORAL at 18:30

## 2023-01-08 RX ADMIN — LITHIUM CARBONATE 450 MILLIGRAM(S): 300 TABLET, EXTENDED RELEASE ORAL at 06:06

## 2023-01-08 RX ADMIN — DIVALPROEX SODIUM 500 MILLIGRAM(S): 500 TABLET, DELAYED RELEASE ORAL at 11:41

## 2023-01-09 PROCEDURE — 99232 SBSQ HOSP IP/OBS MODERATE 35: CPT

## 2023-01-09 RX ADMIN — LITHIUM CARBONATE 450 MILLIGRAM(S): 300 TABLET, EXTENDED RELEASE ORAL at 17:09

## 2023-01-09 RX ADMIN — LITHIUM CARBONATE 450 MILLIGRAM(S): 300 TABLET, EXTENDED RELEASE ORAL at 06:05

## 2023-01-09 RX ADMIN — DIVALPROEX SODIUM 500 MILLIGRAM(S): 500 TABLET, DELAYED RELEASE ORAL at 21:51

## 2023-01-09 RX ADMIN — Medication 25 MILLIGRAM(S): at 01:10

## 2023-01-09 RX ADMIN — DIVALPROEX SODIUM 500 MILLIGRAM(S): 500 TABLET, DELAYED RELEASE ORAL at 11:41

## 2023-01-09 RX ADMIN — DIVALPROEX SODIUM 250 MILLIGRAM(S): 500 TABLET, DELAYED RELEASE ORAL at 11:42

## 2023-01-09 RX ADMIN — RISPERIDONE 2 MILLIGRAM(S): 4 TABLET ORAL at 06:05

## 2023-01-09 RX ADMIN — RISPERIDONE 2 MILLIGRAM(S): 4 TABLET ORAL at 17:09

## 2023-01-09 RX ADMIN — DIVALPROEX SODIUM 250 MILLIGRAM(S): 500 TABLET, DELAYED RELEASE ORAL at 21:51

## 2023-01-10 PROCEDURE — 99232 SBSQ HOSP IP/OBS MODERATE 35: CPT

## 2023-01-10 RX ADMIN — RISPERIDONE 2 MILLIGRAM(S): 4 TABLET ORAL at 05:24

## 2023-01-10 RX ADMIN — DIVALPROEX SODIUM 250 MILLIGRAM(S): 500 TABLET, DELAYED RELEASE ORAL at 11:30

## 2023-01-10 RX ADMIN — DIVALPROEX SODIUM 500 MILLIGRAM(S): 500 TABLET, DELAYED RELEASE ORAL at 21:37

## 2023-01-10 RX ADMIN — Medication 25 MILLIGRAM(S): at 01:03

## 2023-01-10 RX ADMIN — DIVALPROEX SODIUM 500 MILLIGRAM(S): 500 TABLET, DELAYED RELEASE ORAL at 11:30

## 2023-01-10 RX ADMIN — LITHIUM CARBONATE 450 MILLIGRAM(S): 300 TABLET, EXTENDED RELEASE ORAL at 17:00

## 2023-01-10 RX ADMIN — DIVALPROEX SODIUM 250 MILLIGRAM(S): 500 TABLET, DELAYED RELEASE ORAL at 21:37

## 2023-01-10 RX ADMIN — RISPERIDONE 2 MILLIGRAM(S): 4 TABLET ORAL at 17:00

## 2023-01-10 RX ADMIN — LITHIUM CARBONATE 450 MILLIGRAM(S): 300 TABLET, EXTENDED RELEASE ORAL at 05:24

## 2023-01-10 NOTE — PROGRESS NOTE ADULT - SUBJECTIVE AND OBJECTIVE BOX
CC.   Placement   HPI.  Patient appears to be comfortable      Patient has no complaints         Constitutional: No fever, fatigue or weight loss.  Skin: No rash.  Eyes: No recent vision problems or eye pain.  ENT: No congestion, ear pain, or sore throat.  Endocrine: No thyroid problems.  Cardiovascular: No chest pain or palpation.  Respiratory: No cough, shortness of breath, congestion, or wheezing.  Gastrointestinal: No abdominal pain, nausea, vomiting, or diarrhea.  Genitourinary: No dysuria.  Musculoskeletal: No joint swelling.  Neurologic: No headache.      MEDICATIONS  (STANDING):  diVALproex  milliGRAM(s) Oral daily  diVALproex  milliGRAM(s) Oral daily  diVALproex  milliGRAM(s) Oral at bedtime  diVALproex  milliGRAM(s) Oral at bedtime  lithium CR (ESKALITH-CR) 450 milliGRAM(s) Oral two times a day  risperiDONE   Tablet 2 milliGRAM(s) Oral two times a day    MEDICATIONS  (PRN):  hydrOXYzine hydrochloride 25 milliGRAM(s) Oral at bedtime PRN insomnia      VITALS  T(F): 97.2 (01-06-23 @ 14:00), Max: 97.2 (01-06-23 @ 14:00)  HR: 77 (01-06-23 @ 14:00) (77 - 91)  BP: 102/56 (01-06-23 @ 14:00) (102/56 - 122/79)  RR: 18 (01-06-23 @ 14:00) (18 - 18)  SpO2: --      PHYSICAL EXAM-  GENERAL: NAD   HEAD:  Atraumatic, Normocephalic  EYES: EOMI, PERRLA, conjunctiva and sclera clear  NECK: Supple, No JVD, Normal thyroid  NERVOUS SYSTEM:  Alert & Oriented X3, Moving all extremities  CHEST/LUNG: Clear to percussion bilaterally; No rales, rhonchi, wheezing, or rubs  HEART: Regular rate and rhythm; No murmurs, rubs, or gallops  ABDOMEN: Soft, Nontender, Nondistended; Bowel sounds present  EXTREMITIES:   No clubbing, cyanosis, or edema  SKIN: No rashes or lesions              MEDICATIONS  (STANDING):  diVALproex  milliGRAM(s) Oral daily  diVALproex  milliGRAM(s) Oral daily  diVALproex  milliGRAM(s) Oral at bedtime  diVALproex  milliGRAM(s) Oral at bedtime  lithium CR (ESKALITH-CR) 450 milliGRAM(s) Oral two times a day  risperiDONE   Tablet 2 milliGRAM(s) Oral two times a day    MEDICATIONS  (PRN):  hydrOXYzine hydrochloride 25 milliGRAM(s) Oral at bedtime PRN insomnia          RADIOLOGY RESULTS:   CC.   Placement   HPI.  Patient appears to be comfortable    Hand-off  [ ] pending placement    Interval  Patient has no complaints         Constitutional: No fever, fatigue or weight loss.  Skin: No rash.  Eyes: No recent vision problems or eye pain.  ENT: No congestion, ear pain, or sore throat.  Endocrine: No thyroid problems.  Cardiovascular: No chest pain or palpation.  Respiratory: No cough, shortness of breath, congestion, or wheezing.  Gastrointestinal: No abdominal pain, nausea, vomiting, or diarrhea.  Genitourinary: No dysuria.  Musculoskeletal: No joint swelling.  Neurologic: No headache.      MEDICATIONS  (STANDING):  diVALproex  milliGRAM(s) Oral daily  diVALproex  milliGRAM(s) Oral daily  diVALproex  milliGRAM(s) Oral at bedtime  diVALproex  milliGRAM(s) Oral at bedtime  lithium CR (ESKALITH-CR) 450 milliGRAM(s) Oral two times a day  risperiDONE   Tablet 2 milliGRAM(s) Oral two times a day    MEDICATIONS  (PRN):  hydrOXYzine hydrochloride 25 milliGRAM(s) Oral at bedtime PRN insomnia      VITALS  T(F): 97.2 (01-06-23 @ 14:00), Max: 97.2 (01-06-23 @ 14:00)  HR: 77 (01-06-23 @ 14:00) (77 - 91)  BP: 102/56 (01-06-23 @ 14:00) (102/56 - 122/79)  RR: 18 (01-06-23 @ 14:00) (18 - 18)  SpO2: --      PHYSICAL EXAM-  GENERAL: NAD   HEAD:  Atraumatic, Normocephalic  EYES: EOMI, PERRLA, conjunctiva and sclera clear  NECK: Supple, No JVD, Normal thyroid  NERVOUS SYSTEM:  Alert & Oriented X3, Moving all extremities  CHEST/LUNG: Clear to percussion bilaterally; No rales, rhonchi, wheezing, or rubs  HEART: Regular rate and rhythm; No murmurs, rubs, or gallops  ABDOMEN: Soft, Nontender, Nondistended; Bowel sounds present  EXTREMITIES:   No clubbing, cyanosis, or edema  SKIN: No rashes or lesions              MEDICATIONS  (STANDING):  diVALproex  milliGRAM(s) Oral daily  diVALproex  milliGRAM(s) Oral daily  diVALproex  milliGRAM(s) Oral at bedtime  diVALproex  milliGRAM(s) Oral at bedtime  lithium CR (ESKALITH-CR) 450 milliGRAM(s) Oral two times a day  risperiDONE   Tablet 2 milliGRAM(s) Oral two times a day    MEDICATIONS  (PRN):  hydrOXYzine hydrochloride 25 milliGRAM(s) Oral at bedtime PRN insomnia          RADIOLOGY RESULTS:

## 2023-01-11 PROCEDURE — 99232 SBSQ HOSP IP/OBS MODERATE 35: CPT

## 2023-01-11 RX ADMIN — LITHIUM CARBONATE 450 MILLIGRAM(S): 300 TABLET, EXTENDED RELEASE ORAL at 05:21

## 2023-01-11 RX ADMIN — DIVALPROEX SODIUM 250 MILLIGRAM(S): 500 TABLET, DELAYED RELEASE ORAL at 21:17

## 2023-01-11 RX ADMIN — DIVALPROEX SODIUM 500 MILLIGRAM(S): 500 TABLET, DELAYED RELEASE ORAL at 21:17

## 2023-01-11 RX ADMIN — DIVALPROEX SODIUM 250 MILLIGRAM(S): 500 TABLET, DELAYED RELEASE ORAL at 12:03

## 2023-01-11 RX ADMIN — RISPERIDONE 2 MILLIGRAM(S): 4 TABLET ORAL at 17:13

## 2023-01-11 RX ADMIN — LITHIUM CARBONATE 450 MILLIGRAM(S): 300 TABLET, EXTENDED RELEASE ORAL at 17:13

## 2023-01-11 RX ADMIN — Medication 25 MILLIGRAM(S): at 01:33

## 2023-01-11 RX ADMIN — RISPERIDONE 2 MILLIGRAM(S): 4 TABLET ORAL at 05:21

## 2023-01-11 RX ADMIN — DIVALPROEX SODIUM 500 MILLIGRAM(S): 500 TABLET, DELAYED RELEASE ORAL at 12:03

## 2023-01-11 NOTE — PROGRESS NOTE ADULT - SUBJECTIVE AND OBJECTIVE BOX
Progress note    Patient seen and examined at bedside. No acute distress. Playing on Ipad.    INTERVAL HPI/OVERNIGHT EVENTS:    REVIEW OF SYSTEMS:  CONSTITUTIONAL: No fever, weight loss, or fatigue  EYES: No eye pain, visual disturbances, or discharge  ENMT:  No difficulty hearing, tinnitus, vertigo; No sinus or throat pain  NECK: No pain or stiffness  BREASTS: No pain, masses, or nipple discharge  RESPIRATORY: No cough, wheezing, chills or hemoptysis; No shortness of breath  CARDIOVASCULAR: No chest pain, palpitations, dizziness, or leg swelling  GASTROINTESTINAL: No abdominal or epigastric pain. No nausea, vomiting, or hematemesis; No diarrhea or constipation. No melena or hematochezia.  GENITOURINARY: No dysuria, frequency, hematuria, or incontinence  NEUROLOGICAL: No headaches, memory loss, loss of strength, numbness, or tremors  SKIN: No itching, burning, rashes, or lesions   LYMPH NODES: No enlarged glands  ENDOCRINE: No heat or cold intolerance; No hair loss  MUSCULOSKELETAL: No joint pain or swelling; No muscle, back, or extremity pain  PSYCHIATRIC: No depression, anxiety, mood swings, or difficulty sleeping  HEME/LYMPH: No easy bruising, or bleeding gums  ALLERY AND IMMUNOLOGIC: No hives or eczema  FAMILY HISTORY:    T(C): 36.6 (01-11-23 @ 14:05), Max: 36.6 (01-11-23 @ 14:05)  HR: 71 (01-11-23 @ 14:05) (62 - 71)  BP: 116/65 (01-11-23 @ 14:05) (96/66 - 118/72)  RR: 18 (01-11-23 @ 14:05) (18 - 18)  SpO2: 98% (01-10-23 @ 21:21) (98% - 98%)  Wt(kg): --Vital Signs Last 24 Hrs  T(C): 36.6 (11 Jan 2023 14:05), Max: 36.6 (11 Jan 2023 14:05)  T(F): 97.9 (11 Jan 2023 14:05), Max: 97.9 (11 Jan 2023 14:05)  HR: 71 (11 Jan 2023 14:05) (62 - 71)  BP: 116/65 (11 Jan 2023 14:05) (96/66 - 118/72)  BP(mean): --  RR: 18 (11 Jan 2023 14:05) (18 - 18)  SpO2: 98% (10 Norman 2023 21:21) (98% - 98%)      No Known Allergies      PHYSICAL EXAM:  GENERAL: NAD, well-groomed, well-developed  HEAD:  Atraumatic, Normocephalic  EYES: EOMI, PERRLA, conjunctiva and sclera clear  ENMT: No tonsillar erythema, exudates, or enlargement; Moist mucous membranes, Good dentition, No lesions  NECK: Supple, No JVD, Normal thyroid  NERVOUS SYSTEM:  Alert & Oriented X3, Good concentration; Motor Strength 5/5 B/L upper and lower extremities; DTRs 2+ intact and symmetric  CHEST/LUNG: Clear to percussion bilaterally; No rales, rhonchi, wheezing, or rubs  HEART: Regular rate and rhythm; No murmurs, rubs, or gallops  ABDOMEN: Soft, Nontender, Nondistended; Bowel sounds present  EXTREMITIES:  2+ Peripheral Pulses, No clubbing, cyanosis, or edema  LYMPH: No lymphadenopathy noted  SKIN: No rashes or lesions    Consultant(s) Notes Reviewed:  [x ] YES  [ ] NO  Care Discussed with Consultants/Other Providers [ x] YES  [ ] NO    LABS:      RADIOLOGY & ADDITIONAL TESTS:    Imaging Personally Reviewed:  [ ] YES  [ ] NO  diVALproex  milliGRAM(s) Oral daily  diVALproex  milliGRAM(s) Oral daily  diVALproex  milliGRAM(s) Oral at bedtime  diVALproex  milliGRAM(s) Oral at bedtime  hydrOXYzine hydrochloride 25 milliGRAM(s) Oral at bedtime PRN  lithium CR (ESKALITH-CR) 450 milliGRAM(s) Oral two times a day  risperiDONE   Tablet 2 milliGRAM(s) Oral two times a day      HEALTH ISSUES - PROBLEM Dx:  History of developmental delay    Bipolar disorder    22 years old male past medical history of bipolar and developmental delay came to emergency room for alleged aggressive and violent behavior as per step father. Evaluated by Psych; Step Father left stated pt needs placement refusing to take child home.     # Bipolar disorder and likely developmental disorder- stable  - continue current meds (on depakote, risperidone, and lithium)   - pt has been stable without aggressive behaviour in the hospital  - father states he cant take care of pt    # Depressed mood, negative thoughts  - cont w lithium and valproic acid, levels wnl, c/w risperiDONE     - psych consult appreciated    # Obesity: Likely multifactorial: excess calories and Psych medication induced     DVT px - pt ambulatory     DISPO: pending placement        Total time spent to complete patient's bedside assessment, review medical chart, discuss medical plan of care with covering medical team was ____15____ with > 50% of time spent face to face w/ patient, discussion with patient/family and/or coordination of care

## 2023-01-12 PROCEDURE — 99231 SBSQ HOSP IP/OBS SF/LOW 25: CPT

## 2023-01-12 RX ADMIN — RISPERIDONE 2 MILLIGRAM(S): 4 TABLET ORAL at 05:55

## 2023-01-12 RX ADMIN — DIVALPROEX SODIUM 250 MILLIGRAM(S): 500 TABLET, DELAYED RELEASE ORAL at 21:09

## 2023-01-12 RX ADMIN — RISPERIDONE 2 MILLIGRAM(S): 4 TABLET ORAL at 17:36

## 2023-01-12 RX ADMIN — DIVALPROEX SODIUM 250 MILLIGRAM(S): 500 TABLET, DELAYED RELEASE ORAL at 12:03

## 2023-01-12 RX ADMIN — DIVALPROEX SODIUM 500 MILLIGRAM(S): 500 TABLET, DELAYED RELEASE ORAL at 12:03

## 2023-01-12 RX ADMIN — LITHIUM CARBONATE 450 MILLIGRAM(S): 300 TABLET, EXTENDED RELEASE ORAL at 05:55

## 2023-01-12 RX ADMIN — LITHIUM CARBONATE 450 MILLIGRAM(S): 300 TABLET, EXTENDED RELEASE ORAL at 17:36

## 2023-01-12 RX ADMIN — Medication 25 MILLIGRAM(S): at 02:12

## 2023-01-12 RX ADMIN — DIVALPROEX SODIUM 500 MILLIGRAM(S): 500 TABLET, DELAYED RELEASE ORAL at 21:09

## 2023-01-12 NOTE — PROGRESS NOTE ADULT - SUBJECTIVE AND OBJECTIVE BOX
Progress note    Patient seen and examined at bedside. He is sleeping comfortably in his room in no acute distress.    INTERVAL HPI/OVERNIGHT EVENTS:    REVIEW OF SYSTEMS:  CONSTITUTIONAL: No fever, weight loss, or fatigue  EYES: No eye pain, visual disturbances, or discharge  ENMT:  No difficulty hearing, tinnitus, vertigo; No sinus or throat pain  NECK: No pain or stiffness  BREASTS: No pain, masses, or nipple discharge  RESPIRATORY: No cough, wheezing, chills or hemoptysis; No shortness of breath  CARDIOVASCULAR: No chest pain, palpitations, dizziness, or leg swelling  GASTROINTESTINAL: No abdominal or epigastric pain. No nausea, vomiting, or hematemesis; No diarrhea or constipation. No melena or hematochezia.  GENITOURINARY: No dysuria, frequency, hematuria, or incontinence  NEUROLOGICAL: No headaches, memory loss, loss of strength, numbness, or tremors  SKIN: No itching, burning, rashes, or lesions   LYMPH NODES: No enlarged glands  ENDOCRINE: No heat or cold intolerance; No hair loss  MUSCULOSKELETAL: No joint pain or swelling; No muscle, back, or extremity pain  PSYCHIATRIC: No depression, anxiety, mood swings, or difficulty sleeping  HEME/LYMPH: No easy bruising, or bleeding gums  ALLERY AND IMMUNOLOGIC: No hives or eczema  FAMILY HISTORY:    T(C): 36.7 (01-12-23 @ 04:51), Max: 36.7 (01-12-23 @ 04:51)  HR: 66 (01-12-23 @ 04:51) (62 - 71)  BP: 103/58 (01-12-23 @ 04:51) (103/58 - 116/65)  RR: 18 (01-12-23 @ 04:51) (18 - 18)  SpO2: --  Wt(kg): --Vital Signs Last 24 Hrs  T(C): 36.7 (12 Jan 2023 04:51), Max: 36.7 (12 Jan 2023 04:51)  T(F): 98 (12 Jan 2023 04:51), Max: 98 (12 Jan 2023 04:51)  HR: 66 (12 Jan 2023 04:51) (62 - 71)  BP: 103/58 (12 Jan 2023 04:51) (103/58 - 116/65)  BP(mean): --  RR: 18 (12 Jan 2023 04:51) (18 - 18)  SpO2: --      No Known Allergies      PHYSICAL EXAM:  GENERAL: NAD, well-groomed, well-developed  HEAD:  Atraumatic, Normocephalic  EYES: EOMI, PERRLA, conjunctiva and sclera clear  ENMT: No tonsillar erythema, exudates, or enlargement; Moist mucous membranes, Good dentition, No lesions  NECK: Supple, No JVD, Normal thyroid  NERVOUS SYSTEM:  Alert & Oriented X3, Good concentration; Motor Strength 5/5 B/L upper and lower extremities; DTRs 2+ intact and symmetric  CHEST/LUNG: Clear to percussion bilaterally; No rales, rhonchi, wheezing, or rubs  HEART: Regular rate and rhythm; No murmurs, rubs, or gallops  ABDOMEN: Soft, Nontender, Nondistended; Bowel sounds present  EXTREMITIES:  2+ Peripheral Pulses, No clubbing, cyanosis, or edema  LYMPH: No lymphadenopathy noted  SKIN: No rashes or lesions    Consultant(s) Notes Reviewed:  [x ] YES  [ ] NO  Care Discussed with Consultants/Other Providers [ x] YES  [ ] NO    LABS:      RADIOLOGY & ADDITIONAL TESTS:    Imaging Personally Reviewed:  [ ] YES  [ ] NO  diVALproex  milliGRAM(s) Oral daily  diVALproex  milliGRAM(s) Oral daily  diVALproex  milliGRAM(s) Oral at bedtime  diVALproex  milliGRAM(s) Oral at bedtime  hydrOXYzine hydrochloride 25 milliGRAM(s) Oral at bedtime PRN  lithium CR (ESKALITH-CR) 450 milliGRAM(s) Oral two times a day  risperiDONE   Tablet 2 milliGRAM(s) Oral two times a day      HEALTH ISSUES - PROBLEM Dx:  History of developmental delay    Bipolar disorder      22 years old male past medical history of bipolar and developmental delay came to emergency room for alleged aggressive and violent behavior as per step father. Evaluated by Psych; Step Father left stated pt needs placement refusing to take child home.     # Bipolar disorder and likely developmental disorder- stable  - continue current meds (on depakote, risperidone, and lithium)   - pt has been stable without aggressive behaviour in the hospital  - father states he cant take care of pt    # Depressed mood, negative thoughts  - cont w lithium and valproic acid, levels wnl, c/w risperiDONE     - psych consult appreciated    # Obesity: Likely multifactorial: excess calories and Psych medication induced     DVT px - pt ambulatory     DISPO: pending placement      Total time spent to complete patient's bedside assessment, review medical chart, discuss medical plan of care with covering medical team was ____15____ with > 50% of time spent face to face w/ patient, discussion with patient/family and/or coordination of care

## 2023-01-13 PROCEDURE — 99231 SBSQ HOSP IP/OBS SF/LOW 25: CPT

## 2023-01-13 RX ADMIN — DIVALPROEX SODIUM 250 MILLIGRAM(S): 500 TABLET, DELAYED RELEASE ORAL at 13:28

## 2023-01-13 RX ADMIN — RISPERIDONE 2 MILLIGRAM(S): 4 TABLET ORAL at 05:16

## 2023-01-13 RX ADMIN — Medication 25 MILLIGRAM(S): at 02:29

## 2023-01-13 RX ADMIN — LITHIUM CARBONATE 450 MILLIGRAM(S): 300 TABLET, EXTENDED RELEASE ORAL at 05:16

## 2023-01-13 RX ADMIN — DIVALPROEX SODIUM 250 MILLIGRAM(S): 500 TABLET, DELAYED RELEASE ORAL at 21:40

## 2023-01-13 RX ADMIN — LITHIUM CARBONATE 450 MILLIGRAM(S): 300 TABLET, EXTENDED RELEASE ORAL at 17:17

## 2023-01-13 RX ADMIN — DIVALPROEX SODIUM 500 MILLIGRAM(S): 500 TABLET, DELAYED RELEASE ORAL at 13:29

## 2023-01-13 RX ADMIN — RISPERIDONE 2 MILLIGRAM(S): 4 TABLET ORAL at 17:17

## 2023-01-13 RX ADMIN — DIVALPROEX SODIUM 500 MILLIGRAM(S): 500 TABLET, DELAYED RELEASE ORAL at 21:40

## 2023-01-13 NOTE — PROGRESS NOTE ADULT - SUBJECTIVE AND OBJECTIVE BOX
Progress note    Patient seen and examined laying in bed. No acute distress.    INTERVAL HPI/OVERNIGHT EVENTS:    REVIEW OF SYSTEMS:  CONSTITUTIONAL: No fever, weight loss, or fatigue  EYES: No eye pain, visual disturbances, or discharge  ENMT:  No difficulty hearing, tinnitus, vertigo; No sinus or throat pain  NECK: No pain or stiffness  BREASTS: No pain, masses, or nipple discharge  RESPIRATORY: No cough, wheezing, chills or hemoptysis; No shortness of breath  CARDIOVASCULAR: No chest pain, palpitations, dizziness, or leg swelling  GASTROINTESTINAL: No abdominal or epigastric pain. No nausea, vomiting, or hematemesis; No diarrhea or constipation. No melena or hematochezia.  GENITOURINARY: No dysuria, frequency, hematuria, or incontinence  NEUROLOGICAL: No headaches, memory loss, loss of strength, numbness, or tremors  SKIN: No itching, burning, rashes, or lesions   LYMPH NODES: No enlarged glands  ENDOCRINE: No heat or cold intolerance; No hair loss  MUSCULOSKELETAL: No joint pain or swelling; No muscle, back, or extremity pain  PSYCHIATRIC: No depression, anxiety, mood swings, or difficulty sleeping  HEME/LYMPH: No easy bruising, or bleeding gums  ALLERY AND IMMUNOLOGIC: No hives or eczema  FAMILY HISTORY:    T(C): 36.1 (01-13-23 @ 05:44), Max: 36.1 (01-13-23 @ 05:44)  HR: 82 (01-13-23 @ 05:44) (76 - 101)  BP: 120/75 (01-13-23 @ 05:44) (118/52 - 125/73)  RR: 18 (01-13-23 @ 05:44) (18 - 18)  SpO2: 100% (01-13-23 @ 05:44) (100% - 100%)  Wt(kg): --Vital Signs Last 24 Hrs  T(C): 36.1 (13 Jan 2023 05:44), Max: 36.1 (13 Jan 2023 05:44)  T(F): 96.9 (13 Jan 2023 05:44), Max: 96.9 (13 Jan 2023 05:44)  HR: 82 (13 Jan 2023 05:44) (76 - 101)  BP: 120/75 (13 Jan 2023 05:44) (118/52 - 125/73)  BP(mean): --  RR: 18 (13 Jan 2023 05:44) (18 - 18)  SpO2: 100% (13 Jan 2023 05:44) (100% - 100%)      No Known Allergies      PHYSICAL EXAM:  GENERAL: NAD, well-groomed, well-developed  HEAD:  Atraumatic, Normocephalic  EYES: EOMI, PERRLA, conjunctiva and sclera clear  ENMT: No tonsillar erythema, exudates, or enlargement; Moist mucous membranes, Good dentition, No lesions  NECK: Supple, No JVD, Normal thyroid  NERVOUS SYSTEM:  Alert & Oriented X3, Good concentration; Motor Strength 5/5 B/L upper and lower extremities; DTRs 2+ intact and symmetric  CHEST/LUNG: Clear to percussion bilaterally; No rales, rhonchi, wheezing, or rubs  HEART: Regular rate and rhythm; No murmurs, rubs, or gallops  ABDOMEN: Soft, Nontender, Nondistended; Bowel sounds present  EXTREMITIES:  2+ Peripheral Pulses, No clubbing, cyanosis, or edema  LYMPH: No lymphadenopathy noted  SKIN: No rashes or lesions    Consultant(s) Notes Reviewed:  [x ] YES  [ ] NO  Care Discussed with Consultants/Other Providers [ x] YES  [ ] NO    LABS:      RADIOLOGY & ADDITIONAL TESTS:    Imaging Personally Reviewed:  [ ] YES  [ ] NO  diVALproex  milliGRAM(s) Oral daily  diVALproex  milliGRAM(s) Oral daily  diVALproex  milliGRAM(s) Oral at bedtime  diVALproex  milliGRAM(s) Oral at bedtime  hydrOXYzine hydrochloride 25 milliGRAM(s) Oral at bedtime PRN  lithium CR (ESKALITH-CR) 450 milliGRAM(s) Oral two times a day  risperiDONE   Tablet 2 milliGRAM(s) Oral two times a day      HEALTH ISSUES - PROBLEM Dx:  History of developmental delay    Bipolar disorder    22 years old male past medical history of bipolar and developmental delay came to emergency room for alleged aggressive and violent behavior as per step father. Evaluated by Psych; Step Father left stated pt needs placement refusing to take child home.     # Bipolar disorder and likely developmental disorder- stable  - continue current meds (on depakote, risperidone, and lithium)   - pt has been stable without aggressive behaviour in the hospital  - father states he cant take care of pt    # Depressed mood, negative thoughts  - cont w lithium and valproic acid, levels wnl, c/w risperiDONE     - psych consult appreciated    # Obesity: Likely multifactorial: excess calories and Psych medication induced     DVT px - pt ambulatory     DISPO: pending placement      Total time spent to complete patient's bedside assessment, review medical chart, discuss medical plan of care with covering medical team was ___25_____ with > 50% of time spent face to face w/ patient, discussion with patient/family and/or coordination of care

## 2023-01-14 PROCEDURE — 99231 SBSQ HOSP IP/OBS SF/LOW 25: CPT

## 2023-01-14 RX ADMIN — LITHIUM CARBONATE 450 MILLIGRAM(S): 300 TABLET, EXTENDED RELEASE ORAL at 05:41

## 2023-01-14 RX ADMIN — DIVALPROEX SODIUM 500 MILLIGRAM(S): 500 TABLET, DELAYED RELEASE ORAL at 21:43

## 2023-01-14 RX ADMIN — DIVALPROEX SODIUM 500 MILLIGRAM(S): 500 TABLET, DELAYED RELEASE ORAL at 11:25

## 2023-01-14 RX ADMIN — DIVALPROEX SODIUM 250 MILLIGRAM(S): 500 TABLET, DELAYED RELEASE ORAL at 11:25

## 2023-01-14 RX ADMIN — RISPERIDONE 2 MILLIGRAM(S): 4 TABLET ORAL at 18:07

## 2023-01-14 RX ADMIN — Medication 25 MILLIGRAM(S): at 01:29

## 2023-01-14 RX ADMIN — RISPERIDONE 2 MILLIGRAM(S): 4 TABLET ORAL at 05:41

## 2023-01-14 RX ADMIN — DIVALPROEX SODIUM 250 MILLIGRAM(S): 500 TABLET, DELAYED RELEASE ORAL at 21:43

## 2023-01-14 RX ADMIN — LITHIUM CARBONATE 450 MILLIGRAM(S): 300 TABLET, EXTENDED RELEASE ORAL at 18:07

## 2023-01-14 NOTE — PROGRESS NOTE ADULT - SUBJECTIVE AND OBJECTIVE BOX
Progress note    Patient seen and examined at bedside laying down. No acute complaints.    INTERVAL HPI/OVERNIGHT EVENTS:    REVIEW OF SYSTEMS:  CONSTITUTIONAL: No fever, weight loss, or fatigue  EYES: No eye pain, visual disturbances, or discharge  ENMT:  No difficulty hearing, tinnitus, vertigo; No sinus or throat pain  NECK: No pain or stiffness  BREASTS: No pain, masses, or nipple discharge  RESPIRATORY: No cough, wheezing, chills or hemoptysis; No shortness of breath  CARDIOVASCULAR: No chest pain, palpitations, dizziness, or leg swelling  GASTROINTESTINAL: No abdominal or epigastric pain. No nausea, vomiting, or hematemesis; No diarrhea or constipation. No melena or hematochezia.  GENITOURINARY: No dysuria, frequency, hematuria, or incontinence  NEUROLOGICAL: No headaches, memory loss, loss of strength, numbness, or tremors  SKIN: No itching, burning, rashes, or lesions   LYMPH NODES: No enlarged glands  ENDOCRINE: No heat or cold intolerance; No hair loss  MUSCULOSKELETAL: No joint pain or swelling; No muscle, back, or extremity pain  PSYCHIATRIC: No depression, anxiety, mood swings, or difficulty sleeping  HEME/LYMPH: No easy bruising, or bleeding gums  ALLERY AND IMMUNOLOGIC: No hives or eczema  FAMILY HISTORY:    T(C): 35.6 (01-14-23 @ 05:56), Max: 36 (01-13-23 @ 14:00)  HR: 68 (01-14-23 @ 05:56) (68 - 97)  BP: 94/57 (01-14-23 @ 05:56) (94/57 - 115/71)  RR: 18 (01-14-23 @ 05:56) (18 - 18)  SpO2: 97% (01-13-23 @ 21:24) (97% - 97%)  Wt(kg): --Vital Signs Last 24 Hrs  T(C): 35.6 (14 Jan 2023 05:56), Max: 36 (13 Jan 2023 14:00)  T(F): 96 (14 Jan 2023 05:56), Max: 96.8 (13 Jan 2023 14:00)  HR: 68 (14 Jan 2023 05:56) (68 - 97)  BP: 94/57 (14 Jan 2023 05:56) (94/57 - 115/71)  BP(mean): --  RR: 18 (14 Jan 2023 05:56) (18 - 18)  SpO2: 97% (13 Jan 2023 21:24) (97% - 97%)      No Known Allergies      PHYSICAL EXAM:  GENERAL: NAD, well-groomed, well-developed  HEAD:  Atraumatic, Normocephalic  EYES: EOMI, PERRLA, conjunctiva and sclera clear  ENMT: No tonsillar erythema, exudates, or enlargement; Moist mucous membranes, Good dentition, No lesions  NECK: Supple, No JVD, Normal thyroid  NERVOUS SYSTEM:  Alert & Oriented X3, Good concentration; Motor Strength 5/5 B/L upper and lower extremities; DTRs 2+ intact and symmetric  CHEST/LUNG: Clear to percussion bilaterally; No rales, rhonchi, wheezing, or rubs  HEART: Regular rate and rhythm; No murmurs, rubs, or gallops  ABDOMEN: Soft, Nontender, Nondistended; Bowel sounds present  EXTREMITIES:  2+ Peripheral Pulses, No clubbing, cyanosis, or edema  LYMPH: No lymphadenopathy noted  SKIN: No rashes or lesions    Consultant(s) Notes Reviewed:  [x ] YES  [ ] NO  Care Discussed with Consultants/Other Providers [ x] YES  [ ] NO    LABS:      RADIOLOGY & ADDITIONAL TESTS:    Imaging Personally Reviewed:  [ ] YES  [ ] NO  diVALproex  milliGRAM(s) Oral daily  diVALproex  milliGRAM(s) Oral daily  diVALproex  milliGRAM(s) Oral at bedtime  diVALproex  milliGRAM(s) Oral at bedtime  hydrOXYzine hydrochloride 25 milliGRAM(s) Oral at bedtime PRN  lithium CR (ESKALITH-CR) 450 milliGRAM(s) Oral two times a day  risperiDONE   Tablet 2 milliGRAM(s) Oral two times a day      HEALTH ISSUES - PROBLEM Dx:  History of developmental delay    Bipolar disorder      22 years old male past medical history of bipolar and developmental delay came to emergency room for alleged aggressive and violent behavior as per step father. Evaluated by Psych; Step Father left stated pt needs placement refusing to take child home.     # Bipolar disorder and likely developmental disorder- stable  - continue current meds (on depakote, risperidone, and lithium)   - pt has been stable without aggressive behaviour in the hospital  - father states he cant take care of pt    # Depressed mood, negative thoughts  - cont w lithium and valproic acid, levels wnl, c/w risperiDONE     - psych consult appreciated    # Obesity: Likely multifactorial: excess calories and Psych medication induced     DVT px - pt ambulatory     DISPO: pending placement          Total time spent to complete patient's bedside assessment, review medical chart, discuss medical plan of care with covering medical team was ____15____ with > 50% of time spent face to face w/ patient, discussion with patient/family and/or coordination of care

## 2023-01-15 PROCEDURE — 99231 SBSQ HOSP IP/OBS SF/LOW 25: CPT

## 2023-01-15 RX ADMIN — RISPERIDONE 2 MILLIGRAM(S): 4 TABLET ORAL at 17:05

## 2023-01-15 RX ADMIN — DIVALPROEX SODIUM 500 MILLIGRAM(S): 500 TABLET, DELAYED RELEASE ORAL at 21:29

## 2023-01-15 RX ADMIN — LITHIUM CARBONATE 450 MILLIGRAM(S): 300 TABLET, EXTENDED RELEASE ORAL at 05:31

## 2023-01-15 RX ADMIN — Medication 25 MILLIGRAM(S): at 01:29

## 2023-01-15 RX ADMIN — DIVALPROEX SODIUM 500 MILLIGRAM(S): 500 TABLET, DELAYED RELEASE ORAL at 11:02

## 2023-01-15 RX ADMIN — LITHIUM CARBONATE 450 MILLIGRAM(S): 300 TABLET, EXTENDED RELEASE ORAL at 17:05

## 2023-01-15 RX ADMIN — RISPERIDONE 2 MILLIGRAM(S): 4 TABLET ORAL at 05:31

## 2023-01-15 RX ADMIN — DIVALPROEX SODIUM 250 MILLIGRAM(S): 500 TABLET, DELAYED RELEASE ORAL at 11:02

## 2023-01-15 RX ADMIN — DIVALPROEX SODIUM 250 MILLIGRAM(S): 500 TABLET, DELAYED RELEASE ORAL at 21:29

## 2023-01-15 NOTE — PROGRESS NOTE ADULT - SUBJECTIVE AND OBJECTIVE BOX
Progress note    Patient seen and examined at bedside. No acute distress, he is laying in his bed without any issues.    INTERVAL HPI/OVERNIGHT EVENTS:    REVIEW OF SYSTEMS:  CONSTITUTIONAL: No fever, weight loss, or fatigue  EYES: No eye pain, visual disturbances, or discharge  ENMT:  No difficulty hearing, tinnitus, vertigo; No sinus or throat pain  NECK: No pain or stiffness  BREASTS: No pain, masses, or nipple discharge  RESPIRATORY: No cough, wheezing, chills or hemoptysis; No shortness of breath  CARDIOVASCULAR: No chest pain, palpitations, dizziness, or leg swelling  GASTROINTESTINAL: No abdominal or epigastric pain. No nausea, vomiting, or hematemesis; No diarrhea or constipation. No melena or hematochezia.  GENITOURINARY: No dysuria, frequency, hematuria, or incontinence  NEUROLOGICAL: No headaches, memory loss, loss of strength, numbness, or tremors  SKIN: No itching, burning, rashes, or lesions   LYMPH NODES: No enlarged glands  ENDOCRINE: No heat or cold intolerance; No hair loss  MUSCULOSKELETAL: No joint pain or swelling; No muscle, back, or extremity pain  PSYCHIATRIC: No depression, anxiety, mood swings, or difficulty sleeping  HEME/LYMPH: No easy bruising, or bleeding gums  ALLERY AND IMMUNOLOGIC: No hives or eczema  FAMILY HISTORY:    T(C): 35.7 (01-15-23 @ 05:32), Max: 35.9 (01-14-23 @ 13:17)  HR: 56 (01-15-23 @ 05:32) (56 - 75)  BP: 116/64 (01-15-23 @ 05:32) (114/68 - 120/71)  RR: 18 (01-15-23 @ 05:32) (18 - 18)  SpO2: --  Wt(kg): --Vital Signs Last 24 Hrs  T(C): 35.7 (15 Norman 2023 05:32), Max: 35.9 (14 Jan 2023 13:17)  T(F): 96.3 (15 Norman 2023 05:32), Max: 96.6 (14 Jan 2023 13:17)  HR: 56 (15 Norman 2023 05:32) (56 - 75)  BP: 116/64 (15 Norman 2023 05:32) (114/68 - 120/71)  BP(mean): --  RR: 18 (15 Norman 2023 05:32) (18 - 18)  SpO2: --      No Known Allergies      PHYSICAL EXAM:  GENERAL: NAD, well-groomed, well-developed  HEAD:  Atraumatic, Normocephalic  EYES: EOMI, PERRLA, conjunctiva and sclera clear  ENMT: No tonsillar erythema, exudates, or enlargement; Moist mucous membranes, Good dentition, No lesions  NECK: Supple, No JVD, Normal thyroid  NERVOUS SYSTEM:  Alert & Oriented X3, Good concentration; Motor Strength 5/5 B/L upper and lower extremities; DTRs 2+ intact and symmetric  CHEST/LUNG: Clear to percussion bilaterally; No rales, rhonchi, wheezing, or rubs  HEART: Regular rate and rhythm; No murmurs, rubs, or gallops  ABDOMEN: Soft, Nontender, Nondistended; Bowel sounds present  EXTREMITIES:  2+ Peripheral Pulses, No clubbing, cyanosis, or edema  LYMPH: No lymphadenopathy noted  SKIN: No rashes or lesions    Consultant(s) Notes Reviewed:  [x ] YES  [ ] NO  Care Discussed with Consultants/Other Providers [ x] YES  [ ] NO    LABS:      RADIOLOGY & ADDITIONAL TESTS:    Imaging Personally Reviewed:  [ ] YES  [ ] NO  diVALproex  milliGRAM(s) Oral daily  diVALproex  milliGRAM(s) Oral daily  diVALproex  milliGRAM(s) Oral at bedtime  diVALproex  milliGRAM(s) Oral at bedtime  hydrOXYzine hydrochloride 25 milliGRAM(s) Oral at bedtime PRN  lithium CR (ESKALITH-CR) 450 milliGRAM(s) Oral two times a day  risperiDONE   Tablet 2 milliGRAM(s) Oral two times a day      HEALTH ISSUES - PROBLEM Dx:  History of developmental delay    Bipolar disorder    22 years old male past medical history of bipolar and developmental delay came to emergency room for alleged aggressive and violent behavior as per step father. Evaluated by Psych; Step Father left stated pt needs placement refusing to take child home.     # Bipolar disorder and likely developmental disorder- stable  - continue current meds (on depakote, risperidone, and lithium)   - pt has been stable without aggressive behaviour in the hospital  - father states he cant take care of pt    # Depressed mood, negative thoughts  - cont w lithium and valproic acid, levels wnl, c/w risperiDONE     - psych consult appreciated    # Obesity: Likely multifactorial: excess calories and Psych medication induced     DVT px - pt ambulatory     DISPO: pending placement        Total time spent to complete patient's bedside assessment, review medical chart, discuss medical plan of care with covering medical team was ___15_____ with > 50% of time spent face to face w/ patient, discussion with patient/family and/or coordination of care

## 2023-01-16 PROCEDURE — 99231 SBSQ HOSP IP/OBS SF/LOW 25: CPT

## 2023-01-16 RX ADMIN — DIVALPROEX SODIUM 500 MILLIGRAM(S): 500 TABLET, DELAYED RELEASE ORAL at 12:53

## 2023-01-16 RX ADMIN — RISPERIDONE 2 MILLIGRAM(S): 4 TABLET ORAL at 05:07

## 2023-01-16 RX ADMIN — DIVALPROEX SODIUM 250 MILLIGRAM(S): 500 TABLET, DELAYED RELEASE ORAL at 21:35

## 2023-01-16 RX ADMIN — LITHIUM CARBONATE 450 MILLIGRAM(S): 300 TABLET, EXTENDED RELEASE ORAL at 05:07

## 2023-01-16 RX ADMIN — DIVALPROEX SODIUM 500 MILLIGRAM(S): 500 TABLET, DELAYED RELEASE ORAL at 21:35

## 2023-01-16 RX ADMIN — Medication 25 MILLIGRAM(S): at 02:01

## 2023-01-16 RX ADMIN — LITHIUM CARBONATE 450 MILLIGRAM(S): 300 TABLET, EXTENDED RELEASE ORAL at 17:25

## 2023-01-16 RX ADMIN — DIVALPROEX SODIUM 250 MILLIGRAM(S): 500 TABLET, DELAYED RELEASE ORAL at 12:53

## 2023-01-16 RX ADMIN — RISPERIDONE 2 MILLIGRAM(S): 4 TABLET ORAL at 17:25

## 2023-01-16 NOTE — PROGRESS NOTE ADULT - SUBJECTIVE AND OBJECTIVE BOX
Progress note    Patient seen and examined in common room. No acute distress.    INTERVAL HPI/OVERNIGHT EVENTS:    REVIEW OF SYSTEMS:  CONSTITUTIONAL: No fever, weight loss, or fatigue  EYES: No eye pain, visual disturbances, or discharge  ENMT:  No difficulty hearing, tinnitus, vertigo; No sinus or throat pain  NECK: No pain or stiffness  BREASTS: No pain, masses, or nipple discharge  RESPIRATORY: No cough, wheezing, chills or hemoptysis; No shortness of breath  CARDIOVASCULAR: No chest pain, palpitations, dizziness, or leg swelling  GASTROINTESTINAL: No abdominal or epigastric pain. No nausea, vomiting, or hematemesis; No diarrhea or constipation. No melena or hematochezia.  GENITOURINARY: No dysuria, frequency, hematuria, or incontinence  NEUROLOGICAL: No headaches, memory loss, loss of strength, numbness, or tremors  SKIN: No itching, burning, rashes, or lesions   LYMPH NODES: No enlarged glands  ENDOCRINE: No heat or cold intolerance; No hair loss  MUSCULOSKELETAL: No joint pain or swelling; No muscle, back, or extremity pain  PSYCHIATRIC: No depression, anxiety, mood swings, or difficulty sleeping  HEME/LYMPH: No easy bruising, or bleeding gums  ALLERY AND IMMUNOLOGIC: No hives or eczema  FAMILY HISTORY:    T(C): 35.9 (01-16-23 @ 13:23), Max: 36.4 (01-16-23 @ 04:00)  HR: 67 (01-16-23 @ 13:23) (67 - 92)  BP: 122/80 (01-16-23 @ 13:23) (102/59 - 122/80)  RR: 16 (01-16-23 @ 13:23) (16 - 18)  SpO2: --  Wt(kg): --Vital Signs Last 24 Hrs  T(C): 35.9 (16 Jan 2023 13:23), Max: 36.4 (16 Jan 2023 04:00)  T(F): 96.7 (16 Jan 2023 13:23), Max: 97.5 (16 Jan 2023 04:00)  HR: 67 (16 Jan 2023 13:23) (67 - 92)  BP: 122/80 (16 Jan 2023 13:23) (102/59 - 122/80)  BP(mean): --  RR: 16 (16 Jan 2023 13:23) (16 - 18)  SpO2: --    Parameters below as of 16 Jan 2023 13:23  Patient On (Oxygen Delivery Method): room air      No Known Allergies      PHYSICAL EXAM:  GENERAL: NAD, well-groomed, well-developed  HEAD:  Atraumatic, Normocephalic  EYES: EOMI, PERRLA, conjunctiva and sclera clear  ENMT: No tonsillar erythema, exudates, or enlargement; Moist mucous membranes, Good dentition, No lesions  NECK: Supple, No JVD, Normal thyroid  NERVOUS SYSTEM:  Alert & Oriented X3, Good concentration; Motor Strength 5/5 B/L upper and lower extremities; DTRs 2+ intact and symmetric  CHEST/LUNG: Clear to percussion bilaterally; No rales, rhonchi, wheezing, or rubs  HEART: Regular rate and rhythm; No murmurs, rubs, or gallops  ABDOMEN: Soft, Nontender, Nondistended; Bowel sounds present  EXTREMITIES:  2+ Peripheral Pulses, No clubbing, cyanosis, or edema  LYMPH: No lymphadenopathy noted  SKIN: No rashes or lesions    Consultant(s) Notes Reviewed:  [x ] YES  [ ] NO  Care Discussed with Consultants/Other Providers [ x] YES  [ ] NO    LABS:      RADIOLOGY & ADDITIONAL TESTS:    Imaging Personally Reviewed:  [ ] YES  [ ] NO  diVALproex  milliGRAM(s) Oral daily  diVALproex  milliGRAM(s) Oral daily  diVALproex  milliGRAM(s) Oral at bedtime  diVALproex  milliGRAM(s) Oral at bedtime  hydrOXYzine hydrochloride 25 milliGRAM(s) Oral at bedtime PRN  lithium CR (ESKALITH-CR) 450 milliGRAM(s) Oral two times a day  risperiDONE   Tablet 2 milliGRAM(s) Oral two times a day      HEALTH ISSUES - PROBLEM Dx:  History of developmental delay    Bipolar disorder    22 years old male past medical history of bipolar and developmental delay came to emergency room for alleged aggressive and violent behavior as per step father. Evaluated by Psych; Step Father left stated pt needs placement refusing to take child home.     # Bipolar disorder and likely developmental disorder- stable  - continue current meds (on depakote, risperidone, and lithium)   - pt has been stable without aggressive behaviour in the hospital  - father states he cant take care of pt    # Depressed mood, negative thoughts  - cont w lithium and valproic acid, levels wnl, c/w risperiDONE     - psych consult appreciated    # Obesity: Likely multifactorial: excess calories and Psych medication induced     DVT px - pt ambulatory     DISPO: pending placement      Total time spent to complete patient's bedside assessment, review medical chart, discuss medical plan of care with covering medical team was ___15_____ with > 50% of time spent face to face w/ patient, discussion with patient/family and/or coordination of care

## 2023-01-17 PROCEDURE — 99232 SBSQ HOSP IP/OBS MODERATE 35: CPT

## 2023-01-17 RX ADMIN — LITHIUM CARBONATE 450 MILLIGRAM(S): 300 TABLET, EXTENDED RELEASE ORAL at 17:37

## 2023-01-17 RX ADMIN — LITHIUM CARBONATE 450 MILLIGRAM(S): 300 TABLET, EXTENDED RELEASE ORAL at 05:15

## 2023-01-17 RX ADMIN — RISPERIDONE 2 MILLIGRAM(S): 4 TABLET ORAL at 17:37

## 2023-01-17 RX ADMIN — RISPERIDONE 2 MILLIGRAM(S): 4 TABLET ORAL at 05:15

## 2023-01-17 RX ADMIN — DIVALPROEX SODIUM 500 MILLIGRAM(S): 500 TABLET, DELAYED RELEASE ORAL at 22:02

## 2023-01-17 RX ADMIN — DIVALPROEX SODIUM 500 MILLIGRAM(S): 500 TABLET, DELAYED RELEASE ORAL at 11:31

## 2023-01-17 RX ADMIN — Medication 25 MILLIGRAM(S): at 01:22

## 2023-01-17 RX ADMIN — DIVALPROEX SODIUM 250 MILLIGRAM(S): 500 TABLET, DELAYED RELEASE ORAL at 11:31

## 2023-01-17 RX ADMIN — DIVALPROEX SODIUM 250 MILLIGRAM(S): 500 TABLET, DELAYED RELEASE ORAL at 22:02

## 2023-01-17 NOTE — PROGRESS NOTE ADULT - SUBJECTIVE AND OBJECTIVE BOX
Progress note    Patient seen and examined at bedside. No acute distress.    INTERVAL HPI/OVERNIGHT EVENTS:    REVIEW OF SYSTEMS:  CONSTITUTIONAL: No fever, weight loss, or fatigue  EYES: No eye pain, visual disturbances, or discharge  ENMT:  No difficulty hearing, tinnitus, vertigo; No sinus or throat pain  NECK: No pain or stiffness  BREASTS: No pain, masses, or nipple discharge  RESPIRATORY: No cough, wheezing, chills or hemoptysis; No shortness of breath  CARDIOVASCULAR: No chest pain, palpitations, dizziness, or leg swelling  GASTROINTESTINAL: No abdominal or epigastric pain. No nausea, vomiting, or hematemesis; No diarrhea or constipation. No melena or hematochezia.  GENITOURINARY: No dysuria, frequency, hematuria, or incontinence  NEUROLOGICAL: No headaches, memory loss, loss of strength, numbness, or tremors  SKIN: No itching, burning, rashes, or lesions   LYMPH NODES: No enlarged glands  ENDOCRINE: No heat or cold intolerance; No hair loss  MUSCULOSKELETAL: No joint pain or swelling; No muscle, back, or extremity pain  PSYCHIATRIC: No depression, anxiety, mood swings, or difficulty sleeping  HEME/LYMPH: No easy bruising, or bleeding gums  ALLERY AND IMMUNOLOGIC: No hives or eczema  FAMILY HISTORY:    T(C): 35.8 (01-17-23 @ 13:41), Max: 36.2 (01-16-23 @ 21:15)  HR: 77 (01-17-23 @ 13:41) (66 - 82)  BP: 120/75 (01-17-23 @ 13:41) (105/88 - 120/75)  RR: 16 (01-17-23 @ 13:41) (16 - 16)  SpO2: --  Wt(kg): --Vital Signs Last 24 Hrs  T(C): 35.8 (17 Jan 2023 13:41), Max: 36.2 (16 Jan 2023 21:15)  T(F): 96.5 (17 Jan 2023 13:41), Max: 97.1 (16 Jan 2023 21:15)  HR: 77 (17 Jan 2023 13:41) (66 - 82)  BP: 120/75 (17 Jan 2023 13:41) (105/88 - 120/75)  BP(mean): --  RR: 16 (17 Jan 2023 13:41) (16 - 16)  SpO2: --    Parameters below as of 17 Jan 2023 13:41  Patient On (Oxygen Delivery Method): room air      No Known Allergies      PHYSICAL EXAM:  GENERAL: NAD, well-groomed, well-developed  HEAD:  Atraumatic, Normocephalic  EYES: EOMI, PERRLA, conjunctiva and sclera clear  ENMT: No tonsillar erythema, exudates, or enlargement; Moist mucous membranes, Good dentition, No lesions  NECK: Supple, No JVD, Normal thyroid  NERVOUS SYSTEM:  Alert & Oriented X3, Good concentration; Motor Strength 5/5 B/L upper and lower extremities; DTRs 2+ intact and symmetric  CHEST/LUNG: Clear to percussion bilaterally; No rales, rhonchi, wheezing, or rubs  HEART: Regular rate and rhythm; No murmurs, rubs, or gallops  ABDOMEN: Soft, Nontender, Nondistended; Bowel sounds present  EXTREMITIES:  2+ Peripheral Pulses, No clubbing, cyanosis, or edema  LYMPH: No lymphadenopathy noted  SKIN: No rashes or lesions    Consultant(s) Notes Reviewed:  [x ] YES  [ ] NO  Care Discussed with Consultants/Other Providers [ x] YES  [ ] NO    LABS:      RADIOLOGY & ADDITIONAL TESTS:    Imaging Personally Reviewed:  [ ] YES  [ ] NO  diVALproex  milliGRAM(s) Oral daily  diVALproex  milliGRAM(s) Oral daily  diVALproex  milliGRAM(s) Oral at bedtime  diVALproex  milliGRAM(s) Oral at bedtime  hydrOXYzine hydrochloride 25 milliGRAM(s) Oral at bedtime PRN  lithium CR (ESKALITH-CR) 450 milliGRAM(s) Oral two times a day  risperiDONE   Tablet 2 milliGRAM(s) Oral two times a day      HEALTH ISSUES - PROBLEM Dx:  History of developmental delay    Bipolar disorder    22 years old male past medical history of bipolar and developmental delay came to emergency room for alleged aggressive and violent behavior as per step father. Evaluated by Psych; Step Father left stated pt needs placement refusing to take child home.     # Bipolar disorder and likely developmental disorder- stable  - continue current meds (on depakote, risperidone, and lithium)   - pt has been stable without aggressive behaviour in the hospital  - father states he cant take care of pt    # Depressed mood, negative thoughts  - cont w lithium and valproic acid, levels wnl, c/w risperiDONE     - psych consult appreciated    # Obesity: Likely multifactorial: excess calories and Psych medication induced     DVT px - pt ambulatory     DISPO: pending placement            Total time spent to complete patient's bedside assessment, review medical chart, discuss medical plan of care with covering medical team was ___15_____ with > 50% of time spent face to face w/ patient, discussion with patient/family and/or coordination of care

## 2023-01-18 PROCEDURE — 99231 SBSQ HOSP IP/OBS SF/LOW 25: CPT

## 2023-01-18 RX ADMIN — DIVALPROEX SODIUM 250 MILLIGRAM(S): 500 TABLET, DELAYED RELEASE ORAL at 21:27

## 2023-01-18 RX ADMIN — RISPERIDONE 2 MILLIGRAM(S): 4 TABLET ORAL at 05:51

## 2023-01-18 RX ADMIN — DIVALPROEX SODIUM 250 MILLIGRAM(S): 500 TABLET, DELAYED RELEASE ORAL at 11:32

## 2023-01-18 RX ADMIN — Medication 25 MILLIGRAM(S): at 01:37

## 2023-01-18 RX ADMIN — LITHIUM CARBONATE 450 MILLIGRAM(S): 300 TABLET, EXTENDED RELEASE ORAL at 05:51

## 2023-01-18 RX ADMIN — LITHIUM CARBONATE 450 MILLIGRAM(S): 300 TABLET, EXTENDED RELEASE ORAL at 17:30

## 2023-01-18 RX ADMIN — RISPERIDONE 2 MILLIGRAM(S): 4 TABLET ORAL at 17:30

## 2023-01-18 RX ADMIN — DIVALPROEX SODIUM 500 MILLIGRAM(S): 500 TABLET, DELAYED RELEASE ORAL at 11:33

## 2023-01-18 RX ADMIN — DIVALPROEX SODIUM 500 MILLIGRAM(S): 500 TABLET, DELAYED RELEASE ORAL at 21:27

## 2023-01-18 NOTE — PROGRESS NOTE ADULT - SUBJECTIVE AND OBJECTIVE BOX
PROGRESS NOTE:   Joey Vasques MD  Available on teams    Patient is a 22y old  Male who presents with a chief complaint of Placement (17 Jan 2023 17:42)      SUBJECTIVE / OVERNIGHT EVENTS:  Seen for follow up for placemen  Reports no new complaints  Denies fever, chills, fatigue, chest pain, shortness of breath, abdominal pain, nausea/ vomiting or diarrhea    ADDITIONAL REVIEW OF SYSTEMS:    MEDICATIONS  (STANDING):  diVALproex  milliGRAM(s) Oral daily  diVALproex  milliGRAM(s) Oral daily  diVALproex  milliGRAM(s) Oral at bedtime  diVALproex  milliGRAM(s) Oral at bedtime  lithium CR (ESKALITH-CR) 450 milliGRAM(s) Oral two times a day  risperiDONE   Tablet 2 milliGRAM(s) Oral two times a day    MEDICATIONS  (PRN):  hydrOXYzine hydrochloride 25 milliGRAM(s) Oral at bedtime PRN insomnia      CAPILLARY BLOOD GLUCOSE        I&O's Summary      PHYSICAL EXAM:  Vital Signs Last 24 Hrs  T(C): 36.2 (18 Jan 2023 05:01), Max: 36.2 (18 Jan 2023 05:01)  T(F): 97.2 (18 Jan 2023 05:01), Max: 97.2 (18 Jan 2023 05:01)  HR: 72 (18 Jan 2023 05:01) (72 - 85)  BP: 107/56 (18 Jan 2023 05:01) (107/56 - 141/77)  BP(mean): --  RR: 18 (18 Jan 2023 05:01) (16 - 18)  SpO2: --    Parameters below as of 17 Jan 2023 13:41  Patient On (Oxygen Delivery Method): room air        GENERAL: No acute distress, well-developed  HEAD:  Atraumatic, Normocephalic  EYES: EOMI, PERRLA, conjunctiva and sclera clear  NECK: Supple, no lymphadenopathy, no JVD  CHEST/LUNG: CTAB; No wheezes, rales, or rhonchi  HEART: Regular rate and rhythm; No murmurs, rubs, or gallops  ABDOMEN: Soft, non-tender, non-distended; normal bowel sounds, no organomegaly  EXTREMITIES:  2+ peripheral pulses b/l, No clubbing, cyanosis, or edema  NEUROLOGY: A&O x 3, no focal deficits  SKIN: No rashes or lesions    LABS:                      RADIOLOGY & ADDITIONAL TESTS:  Results Reviewed:   Imaging Personally Reviewed:  Electrocardiogram Personally Reviewed:    COORDINATION OF CARE:  Care Discussed with Consultants/Other Providers [Y/N]:  Prior or Outpatient Records Reviewed [Y/N]:

## 2023-01-18 NOTE — PROGRESS NOTE ADULT - ASSESSMENT
22 years old male past medical history of bipolar and developmental delay came to emergency room for alleged aggressive and violent behavior as per step father. Evaluated by Psych; Step Father left stated pt needs placement refusing to take child home.     Bipolar disorder and likely developmental disorder- stable  - continue current meds (on depakote, risperidone, and lithium)   - pt has been stable without aggressive behaviour in the hospital  - father states he cant take care of pt    Depressed mood, negative thoughts  - cont w lithium and valproic acid, levels wnl, c/w risperiDONE     - psych consult appreciated    Obesity: Likely multifactorial: excess calories and Psych medication induced     DVT px - pt ambulatory     DISPO: pending placement

## 2023-01-19 PROCEDURE — 99231 SBSQ HOSP IP/OBS SF/LOW 25: CPT

## 2023-01-19 RX ADMIN — DIVALPROEX SODIUM 500 MILLIGRAM(S): 500 TABLET, DELAYED RELEASE ORAL at 21:32

## 2023-01-19 RX ADMIN — DIVALPROEX SODIUM 500 MILLIGRAM(S): 500 TABLET, DELAYED RELEASE ORAL at 11:51

## 2023-01-19 RX ADMIN — LITHIUM CARBONATE 450 MILLIGRAM(S): 300 TABLET, EXTENDED RELEASE ORAL at 05:52

## 2023-01-19 RX ADMIN — DIVALPROEX SODIUM 250 MILLIGRAM(S): 500 TABLET, DELAYED RELEASE ORAL at 11:51

## 2023-01-19 RX ADMIN — LITHIUM CARBONATE 450 MILLIGRAM(S): 300 TABLET, EXTENDED RELEASE ORAL at 17:20

## 2023-01-19 RX ADMIN — Medication 25 MILLIGRAM(S): at 01:08

## 2023-01-19 RX ADMIN — DIVALPROEX SODIUM 250 MILLIGRAM(S): 500 TABLET, DELAYED RELEASE ORAL at 21:32

## 2023-01-19 RX ADMIN — RISPERIDONE 2 MILLIGRAM(S): 4 TABLET ORAL at 05:52

## 2023-01-19 RX ADMIN — RISPERIDONE 2 MILLIGRAM(S): 4 TABLET ORAL at 17:21

## 2023-01-20 PROCEDURE — 99231 SBSQ HOSP IP/OBS SF/LOW 25: CPT

## 2023-01-20 RX ADMIN — DIVALPROEX SODIUM 250 MILLIGRAM(S): 500 TABLET, DELAYED RELEASE ORAL at 21:07

## 2023-01-20 RX ADMIN — RISPERIDONE 2 MILLIGRAM(S): 4 TABLET ORAL at 17:20

## 2023-01-20 RX ADMIN — DIVALPROEX SODIUM 250 MILLIGRAM(S): 500 TABLET, DELAYED RELEASE ORAL at 11:43

## 2023-01-20 RX ADMIN — DIVALPROEX SODIUM 500 MILLIGRAM(S): 500 TABLET, DELAYED RELEASE ORAL at 21:06

## 2023-01-20 RX ADMIN — LITHIUM CARBONATE 450 MILLIGRAM(S): 300 TABLET, EXTENDED RELEASE ORAL at 06:05

## 2023-01-20 RX ADMIN — LITHIUM CARBONATE 450 MILLIGRAM(S): 300 TABLET, EXTENDED RELEASE ORAL at 17:20

## 2023-01-20 RX ADMIN — DIVALPROEX SODIUM 500 MILLIGRAM(S): 500 TABLET, DELAYED RELEASE ORAL at 11:43

## 2023-01-20 RX ADMIN — Medication 25 MILLIGRAM(S): at 01:26

## 2023-01-20 RX ADMIN — RISPERIDONE 2 MILLIGRAM(S): 4 TABLET ORAL at 06:05

## 2023-01-20 NOTE — PROGRESS NOTE ADULT - SUBJECTIVE AND OBJECTIVE BOX
PROGRESS NOTE:   Joey Vasques MD  Available on teams    Patient is a 22y old  Male who presents with a chief complaint of Placement (17 Jan 2023 17:42)      SUBJECTIVE / OVERNIGHT EVENTS:  Seen for follow up for placemen  Reports no new complaints  Denies fever, chills, fatigue, chest pain, shortness of breath, abdominal pain, nausea/ vomiting or diarrhea  Playing video games    ADDITIONAL REVIEW OF SYSTEMS:    MEDICATIONS  (STANDING):  diVALproex  milliGRAM(s) Oral daily  diVALproex  milliGRAM(s) Oral daily  diVALproex  milliGRAM(s) Oral at bedtime  diVALproex  milliGRAM(s) Oral at bedtime  lithium CR (ESKALITH-CR) 450 milliGRAM(s) Oral two times a day  risperiDONE   Tablet 2 milliGRAM(s) Oral two times a day    MEDICATIONS  (PRN):  hydrOXYzine hydrochloride 25 milliGRAM(s) Oral at bedtime PRN insomnia      CAPILLARY BLOOD GLUCOSE        I&O's Summary      PHYSICAL EXAM:  Vital Signs Last 24 Hrs  T(C): 35.6 (20 Jan 2023 13:28), Max: 36.6 (19 Jan 2023 23:26)  T(F): 96 (20 Jan 2023 13:28), Max: 97.8 (19 Jan 2023 23:26)  HR: 72 (20 Jan 2023 13:28) (68 - 91)  BP: 123/79 (20 Jan 2023 13:28) (108/67 - 139/68)  BP(mean): --  RR: 18 (20 Jan 2023 13:28) (18 - 20)  SpO2: --          GENERAL: No acute distress, well-developed  HEAD:  Atraumatic, Normocephalic  EYES: EOMI, PERRLA, conjunctiva and sclera clear  NECK: Supple, no lymphadenopathy, no JVD  CHEST/LUNG: CTAB; No wheezes, rales, or rhonchi  HEART: Regular rate and rhythm; No murmurs, rubs, or gallops  ABDOMEN: Soft, non-tender, non-distended; normal bowel sounds, no organomegaly  EXTREMITIES:  2+ peripheral pulses b/l, No clubbing, cyanosis, or edema  NEUROLOGY: A&O x 3, no focal deficits  SKIN: No rashes or lesions    LABS:                      RADIOLOGY & ADDITIONAL TESTS:  Results Reviewed:   Imaging Personally Reviewed:  Electrocardiogram Personally Reviewed:    COORDINATION OF CARE:  Care Discussed with Consultants/Other Providers [Y/N]:  Prior or Outpatient Records Reviewed [Y/N]:

## 2023-01-21 PROCEDURE — 99231 SBSQ HOSP IP/OBS SF/LOW 25: CPT

## 2023-01-21 RX ADMIN — DIVALPROEX SODIUM 250 MILLIGRAM(S): 500 TABLET, DELAYED RELEASE ORAL at 12:29

## 2023-01-21 RX ADMIN — DIVALPROEX SODIUM 500 MILLIGRAM(S): 500 TABLET, DELAYED RELEASE ORAL at 12:29

## 2023-01-21 RX ADMIN — LITHIUM CARBONATE 450 MILLIGRAM(S): 300 TABLET, EXTENDED RELEASE ORAL at 06:39

## 2023-01-21 RX ADMIN — DIVALPROEX SODIUM 250 MILLIGRAM(S): 500 TABLET, DELAYED RELEASE ORAL at 21:10

## 2023-01-21 RX ADMIN — LITHIUM CARBONATE 450 MILLIGRAM(S): 300 TABLET, EXTENDED RELEASE ORAL at 19:12

## 2023-01-21 RX ADMIN — DIVALPROEX SODIUM 500 MILLIGRAM(S): 500 TABLET, DELAYED RELEASE ORAL at 21:11

## 2023-01-21 RX ADMIN — RISPERIDONE 2 MILLIGRAM(S): 4 TABLET ORAL at 19:12

## 2023-01-21 RX ADMIN — RISPERIDONE 2 MILLIGRAM(S): 4 TABLET ORAL at 06:39

## 2023-01-21 NOTE — PROGRESS NOTE ADULT - SUBJECTIVE AND OBJECTIVE BOX
PROGRESS NOTE:   Joey Vasques MD  Available on teams    Patient is a 22y old  Male who presents with a chief complaint of Placement (17 Jan 2023 17:42)      SUBJECTIVE / OVERNIGHT EVENTS:  Seen for follow up for placemen  Reports no new complaints  Denies fever, chills, fatigue, chest pain, shortness of breath, abdominal pain, nausea/ vomiting or diarrhea  Playing video games    ADDITIONAL REVIEW OF SYSTEMS:    MEDICATIONS  (STANDING):  diVALproex  milliGRAM(s) Oral daily  diVALproex  milliGRAM(s) Oral daily  diVALproex  milliGRAM(s) Oral at bedtime  diVALproex  milliGRAM(s) Oral at bedtime  lithium CR (ESKALITH-CR) 450 milliGRAM(s) Oral two times a day  risperiDONE   Tablet 2 milliGRAM(s) Oral two times a day    MEDICATIONS  (PRN):  hydrOXYzine hydrochloride 25 milliGRAM(s) Oral at bedtime PRN insomnia      CAPILLARY BLOOD GLUCOSE        I&O's Summary      PHYSICAL EXAM:  Vital Signs Last 24 Hrs  T(C): 36.1 (21 Jan 2023 05:00), Max: 36.2 (20 Jan 2023 21:18)  T(F): 97 (21 Jan 2023 05:00), Max: 97.1 (20 Jan 2023 21:18)  HR: 77 (21 Jan 2023 05:00) (72 - 99)  BP: 109/61 (21 Jan 2023 05:00) (95/53 - 123/79)  BP(mean): --  RR: 18 (21 Jan 2023 05:00) (18 - 18)  SpO2: --      GENERAL: No acute distress, well-developed  HEAD:  Atraumatic, Normocephalic  EYES: EOMI, PERRLA, conjunctiva and sclera clear  NECK: Supple, no lymphadenopathy, no JVD  CHEST/LUNG: CTAB; No wheezes, rales, or rhonchi  HEART: Regular rate and rhythm; No murmurs, rubs, or gallops  ABDOMEN: Soft, non-tender, non-distended; normal bowel sounds, no organomegaly  EXTREMITIES:  2+ peripheral pulses b/l, No clubbing, cyanosis, or edema  NEUROLOGY: A&O x 3, no focal deficits  SKIN: No rashes or lesions    LABS:                      RADIOLOGY & ADDITIONAL TESTS:  Results Reviewed:   Imaging Personally Reviewed:  Electrocardiogram Personally Reviewed:    COORDINATION OF CARE:  Care Discussed with Consultants/Other Providers [Y/N]:  Prior or Outpatient Records Reviewed [Y/N]:

## 2023-01-22 PROCEDURE — 99231 SBSQ HOSP IP/OBS SF/LOW 25: CPT

## 2023-01-22 RX ADMIN — Medication 25 MILLIGRAM(S): at 01:10

## 2023-01-22 RX ADMIN — DIVALPROEX SODIUM 500 MILLIGRAM(S): 500 TABLET, DELAYED RELEASE ORAL at 21:35

## 2023-01-22 RX ADMIN — LITHIUM CARBONATE 450 MILLIGRAM(S): 300 TABLET, EXTENDED RELEASE ORAL at 18:04

## 2023-01-22 RX ADMIN — DIVALPROEX SODIUM 500 MILLIGRAM(S): 500 TABLET, DELAYED RELEASE ORAL at 11:29

## 2023-01-22 RX ADMIN — DIVALPROEX SODIUM 250 MILLIGRAM(S): 500 TABLET, DELAYED RELEASE ORAL at 11:29

## 2023-01-22 RX ADMIN — RISPERIDONE 2 MILLIGRAM(S): 4 TABLET ORAL at 06:23

## 2023-01-22 RX ADMIN — LITHIUM CARBONATE 450 MILLIGRAM(S): 300 TABLET, EXTENDED RELEASE ORAL at 06:23

## 2023-01-22 RX ADMIN — RISPERIDONE 2 MILLIGRAM(S): 4 TABLET ORAL at 18:03

## 2023-01-22 RX ADMIN — DIVALPROEX SODIUM 250 MILLIGRAM(S): 500 TABLET, DELAYED RELEASE ORAL at 21:35

## 2023-01-22 NOTE — PROGRESS NOTE ADULT - SUBJECTIVE AND OBJECTIVE BOX
PROGRESS NOTE:   Joey Vasques MD  Available on teams    Patient is a 22y old  Male who presents with a chief complaint of Placement (17 Jan 2023 17:42)      SUBJECTIVE / OVERNIGHT EVENTS:  Seen for follow up for placemen  Reports no new complaints  Denies fever, chills, fatigue, chest pain, shortness of breath, abdominal pain, nausea/ vomiting or diarrhea  On his phone    ADDITIONAL REVIEW OF SYSTEMS:    MEDICATIONS  (STANDING):  diVALproex  milliGRAM(s) Oral daily  diVALproex  milliGRAM(s) Oral daily  diVALproex  milliGRAM(s) Oral at bedtime  diVALproex  milliGRAM(s) Oral at bedtime  lithium CR (ESKALITH-CR) 450 milliGRAM(s) Oral two times a day  risperiDONE   Tablet 2 milliGRAM(s) Oral two times a day    MEDICATIONS  (PRN):  hydrOXYzine hydrochloride 25 milliGRAM(s) Oral at bedtime PRN insomnia      CAPILLARY BLOOD GLUCOSE        I&O's Summary      PHYSICAL EXAM:  Vital Signs Last 24 Hrs  T(C): 36.1 (22 Jan 2023 05:00), Max: 36.1 (22 Jan 2023 05:00)  T(F): 97 (22 Jan 2023 05:00), Max: 97 (22 Jan 2023 05:00)  HR: 65 (22 Jan 2023 05:00) (65 - 82)  BP: 101/59 (22 Jan 2023 05:00) (91/56 - 131/85)  BP(mean): --  RR: 16 (22 Jan 2023 05:00) (16 - 16)  SpO2: --    Parameters below as of 21 Jan 2023 13:49  Patient On (Oxygen Delivery Method): room air        GENERAL: No acute distress, well-developed  HEAD:  Atraumatic, Normocephalic  EYES: EOMI, PERRLA, conjunctiva and sclera clear  NECK: Supple, no lymphadenopathy, no JVD  CHEST/LUNG: CTAB; No wheezes, rales, or rhonchi  HEART: Regular rate and rhythm; No murmurs, rubs, or gallops  ABDOMEN: Soft, non-tender, non-distended; normal bowel sounds, no organomegaly  EXTREMITIES:  2+ peripheral pulses b/l, No clubbing, cyanosis, or edema  NEUROLOGY: A&O x 3, no focal deficits  SKIN: No rashes or lesions    LABS:                      RADIOLOGY & ADDITIONAL TESTS:  Results Reviewed:   Imaging Personally Reviewed:  Electrocardiogram Personally Reviewed:    COORDINATION OF CARE:  Care Discussed with Consultants/Other Providers [Y/N]:  Prior or Outpatient Records Reviewed [Y/N]:

## 2023-01-22 NOTE — PROGRESS NOTE ADULT - ASSESSMENT
22 years old male past medical history of bipolar and developmental delay came to emergency room for alleged aggressive and violent behavior as per step father. Evaluated by Psych; Step Father left stated pt needs placement refusing to take child home.     Bipolar disorder and likely developmental disorder- stable  - continue current meds (on depakote, risperidone, and lithium)   - pt has been stable without aggressive behaviour in the hospital  - father states he cant take care of pt    Depressed mood, negative thoughts  - cont w lithium and valproic acid, levels wnl, c/w risperiDONE     - psych consult appreciated    Obesity: Likely multifactorial: excess calories and Psych medication induced     DVT px - pt ambulatory     DISPO: pending placement/gaurdianship

## 2023-01-23 RX ADMIN — DIVALPROEX SODIUM 500 MILLIGRAM(S): 500 TABLET, DELAYED RELEASE ORAL at 11:50

## 2023-01-23 RX ADMIN — LITHIUM CARBONATE 450 MILLIGRAM(S): 300 TABLET, EXTENDED RELEASE ORAL at 17:08

## 2023-01-23 RX ADMIN — RISPERIDONE 2 MILLIGRAM(S): 4 TABLET ORAL at 05:34

## 2023-01-23 RX ADMIN — DIVALPROEX SODIUM 250 MILLIGRAM(S): 500 TABLET, DELAYED RELEASE ORAL at 11:50

## 2023-01-23 RX ADMIN — DIVALPROEX SODIUM 250 MILLIGRAM(S): 500 TABLET, DELAYED RELEASE ORAL at 22:50

## 2023-01-23 RX ADMIN — DIVALPROEX SODIUM 500 MILLIGRAM(S): 500 TABLET, DELAYED RELEASE ORAL at 22:50

## 2023-01-23 RX ADMIN — Medication 25 MILLIGRAM(S): at 01:23

## 2023-01-23 RX ADMIN — LITHIUM CARBONATE 450 MILLIGRAM(S): 300 TABLET, EXTENDED RELEASE ORAL at 05:34

## 2023-01-23 RX ADMIN — RISPERIDONE 2 MILLIGRAM(S): 4 TABLET ORAL at 17:08

## 2023-01-23 NOTE — PROGRESS NOTE ADULT - SUBJECTIVE AND OBJECTIVE BOX
PROGRESS NOTE:   Joey Vasques MD  Available on teams    Patient is a 22y old  Male who presents with a chief complaint of Placement (17 Jan 2023 17:42)      SUBJECTIVE / OVERNIGHT EVENTS:  Seen for follow up for placemen  Reports no new complaints  Denies fever, chills, fatigue, chest pain, shortness of breath, abdominal pain, nausea/ vomiting or diarrhea      ADDITIONAL REVIEW OF SYSTEMS:    MEDICATIONS  (STANDING):  diVALproex  milliGRAM(s) Oral daily  diVALproex  milliGRAM(s) Oral daily  diVALproex  milliGRAM(s) Oral at bedtime  diVALproex  milliGRAM(s) Oral at bedtime  lithium CR (ESKALITH-CR) 450 milliGRAM(s) Oral two times a day  risperiDONE   Tablet 2 milliGRAM(s) Oral two times a day    MEDICATIONS  (PRN):  hydrOXYzine hydrochloride 25 milliGRAM(s) Oral at bedtime PRN insomnia      CAPILLARY BLOOD GLUCOSE        I&O's Summary      PHYSICAL EXAM:  Vital Signs Last 24 Hrs  T(C): 36.1 (23 Jan 2023 05:00), Max: 36.1 (23 Jan 2023 05:00)  T(F): 97 (23 Jan 2023 05:00), Max: 97 (23 Jan 2023 05:00)  HR: 63 (23 Jan 2023 05:00) (63 - 88)  BP: 102/56 (23 Jan 2023 05:00) (102/56 - 129/74)  BP(mean): --  RR: 16 (23 Jan 2023 05:00) (16 - 16)  SpO2: --    Parameters below as of 22 Jan 2023 14:14  Patient On (Oxygen Delivery Method): room air          GENERAL: No acute distress, well-developed  HEAD:  Atraumatic, Normocephalic  EYES: EOMI, PERRLA, conjunctiva and sclera clear  NECK: Supple, no lymphadenopathy, no JVD  CHEST/LUNG: CTAB; No wheezes, rales, or rhonchi  HEART: Regular rate and rhythm; No murmurs, rubs, or gallops  ABDOMEN: Soft, non-tender, non-distended; normal bowel sounds, no organomegaly  EXTREMITIES:  2+ peripheral pulses b/l, No clubbing, cyanosis, or edema  NEUROLOGY: A&O x 3, no focal deficits  SKIN: No rashes or lesions    LABS:                      RADIOLOGY & ADDITIONAL TESTS:  Results Reviewed:   Imaging Personally Reviewed:  Electrocardiogram Personally Reviewed:    COORDINATION OF CARE:  Care Discussed with Consultants/Other Providers [Y/N]:  Prior or Outpatient Records Reviewed [Y/N]:

## 2023-01-24 PROCEDURE — 99231 SBSQ HOSP IP/OBS SF/LOW 25: CPT

## 2023-01-24 RX ADMIN — LITHIUM CARBONATE 450 MILLIGRAM(S): 300 TABLET, EXTENDED RELEASE ORAL at 05:18

## 2023-01-24 RX ADMIN — RISPERIDONE 2 MILLIGRAM(S): 4 TABLET ORAL at 05:18

## 2023-01-24 RX ADMIN — DIVALPROEX SODIUM 500 MILLIGRAM(S): 500 TABLET, DELAYED RELEASE ORAL at 11:11

## 2023-01-24 RX ADMIN — DIVALPROEX SODIUM 250 MILLIGRAM(S): 500 TABLET, DELAYED RELEASE ORAL at 11:11

## 2023-01-24 RX ADMIN — RISPERIDONE 2 MILLIGRAM(S): 4 TABLET ORAL at 17:43

## 2023-01-24 RX ADMIN — DIVALPROEX SODIUM 500 MILLIGRAM(S): 500 TABLET, DELAYED RELEASE ORAL at 21:02

## 2023-01-24 RX ADMIN — LITHIUM CARBONATE 450 MILLIGRAM(S): 300 TABLET, EXTENDED RELEASE ORAL at 17:43

## 2023-01-24 RX ADMIN — Medication 25 MILLIGRAM(S): at 00:59

## 2023-01-24 RX ADMIN — DIVALPROEX SODIUM 250 MILLIGRAM(S): 500 TABLET, DELAYED RELEASE ORAL at 21:02

## 2023-01-24 NOTE — PROGRESS NOTE ADULT - SUBJECTIVE AND OBJECTIVE BOX
PROGRESS NOTE:   Joey Vasques MD  Available on teams    Patient is a 22y old  Male who presents with a chief complaint of Placement (17 Jan 2023 17:42)      SUBJECTIVE / OVERNIGHT EVENTS:  Seen for follow up for placement  Reports no new complaints  Denies fever, chills, fatigue, chest pain, shortness of breath, abdominal pain, nausea/ vomiting or diarrhea      ADDITIONAL REVIEW OF SYSTEMS:    MEDICATIONS  (STANDING):  diVALproex  milliGRAM(s) Oral daily  diVALproex  milliGRAM(s) Oral daily  diVALproex  milliGRAM(s) Oral at bedtime  diVALproex  milliGRAM(s) Oral at bedtime  lithium CR (ESKALITH-CR) 450 milliGRAM(s) Oral two times a day  risperiDONE   Tablet 2 milliGRAM(s) Oral two times a day    MEDICATIONS  (PRN):  hydrOXYzine hydrochloride 25 milliGRAM(s) Oral at bedtime PRN insomnia      CAPILLARY BLOOD GLUCOSE        I&O's Summary      PHYSICAL EXAM:  Vital Signs Last 24 Hrs  T(C): 36.1 (23 Jan 2023 05:00), Max: 36.1 (23 Jan 2023 05:00)  T(F): 97 (23 Jan 2023 05:00), Max: 97 (23 Jan 2023 05:00)  HR: 63 (23 Jan 2023 05:00) (63 - 88)  BP: 102/56 (23 Jan 2023 05:00) (102/56 - 129/74)  BP(mean): --  RR: 16 (23 Jan 2023 05:00) (16 - 16)  SpO2: --    Parameters below as of 22 Jan 2023 14:14  Patient On (Oxygen Delivery Method): room air          GENERAL: No acute distress, well-developed  HEAD:  Atraumatic, Normocephalic  EYES: EOMI, PERRLA, conjunctiva and sclera clear  NECK: Supple, no lymphadenopathy, no JVD  CHEST/LUNG: CTAB; No wheezes, rales, or rhonchi  HEART: Regular rate and rhythm; No murmurs, rubs, or gallops  ABDOMEN: Soft, non-tender, non-distended; normal bowel sounds, no organomegaly  EXTREMITIES:  2+ peripheral pulses b/l, No clubbing, cyanosis, or edema  NEUROLOGY: A&O x 3, no focal deficits  SKIN: No rashes or lesions    LABS:                      RADIOLOGY & ADDITIONAL TESTS:  Results Reviewed:   Imaging Personally Reviewed:  Electrocardiogram Personally Reviewed:    COORDINATION OF CARE:  Care Discussed with Consultants/Other Providers [Y/N]:  Prior or Outpatient Records Reviewed [Y/N]:

## 2023-01-25 PROCEDURE — 99231 SBSQ HOSP IP/OBS SF/LOW 25: CPT

## 2023-01-25 RX ADMIN — RISPERIDONE 2 MILLIGRAM(S): 4 TABLET ORAL at 05:18

## 2023-01-25 RX ADMIN — DIVALPROEX SODIUM 500 MILLIGRAM(S): 500 TABLET, DELAYED RELEASE ORAL at 21:19

## 2023-01-25 RX ADMIN — LITHIUM CARBONATE 450 MILLIGRAM(S): 300 TABLET, EXTENDED RELEASE ORAL at 05:18

## 2023-01-25 RX ADMIN — DIVALPROEX SODIUM 250 MILLIGRAM(S): 500 TABLET, DELAYED RELEASE ORAL at 21:19

## 2023-01-25 RX ADMIN — DIVALPROEX SODIUM 500 MILLIGRAM(S): 500 TABLET, DELAYED RELEASE ORAL at 11:29

## 2023-01-25 RX ADMIN — DIVALPROEX SODIUM 250 MILLIGRAM(S): 500 TABLET, DELAYED RELEASE ORAL at 11:28

## 2023-01-25 RX ADMIN — LITHIUM CARBONATE 450 MILLIGRAM(S): 300 TABLET, EXTENDED RELEASE ORAL at 17:02

## 2023-01-25 RX ADMIN — RISPERIDONE 2 MILLIGRAM(S): 4 TABLET ORAL at 17:02

## 2023-01-25 NOTE — PROGRESS NOTE ADULT - SUBJECTIVE AND OBJECTIVE BOX
Progress note    Patient seen and examined at bedside. No acute distress.    INTERVAL HPI/OVERNIGHT EVENTS:    REVIEW OF SYSTEMS:  CONSTITUTIONAL: No fever, weight loss, or fatigue  EYES: No eye pain, visual disturbances, or discharge  ENMT:  No difficulty hearing, tinnitus, vertigo; No sinus or throat pain  NECK: No pain or stiffness  BREASTS: No pain, masses, or nipple discharge  RESPIRATORY: No cough, wheezing, chills or hemoptysis; No shortness of breath  CARDIOVASCULAR: No chest pain, palpitations, dizziness, or leg swelling  GASTROINTESTINAL: No abdominal or epigastric pain. No nausea, vomiting, or hematemesis; No diarrhea or constipation. No melena or hematochezia.  GENITOURINARY: No dysuria, frequency, hematuria, or incontinence  NEUROLOGICAL: No headaches, memory loss, loss of strength, numbness, or tremors  SKIN: No itching, burning, rashes, or lesions   LYMPH NODES: No enlarged glands  ENDOCRINE: No heat or cold intolerance; No hair loss  MUSCULOSKELETAL: No joint pain or swelling; No muscle, back, or extremity pain  PSYCHIATRIC: No depression, anxiety, mood swings, or difficulty sleeping  HEME/LYMPH: No easy bruising, or bleeding gums  ALLERY AND IMMUNOLOGIC: No hives or eczema  FAMILY HISTORY:    T(C): 35.6 (01-25-23 @ 13:37), Max: 36.7 (01-24-23 @ 21:24)  HR: 82 (01-25-23 @ 13:37) (80 - 87)  BP: 121/76 (01-25-23 @ 13:37) (115/81 - 122/77)  RR: 16 (01-25-23 @ 13:37) (16 - 16)  SpO2: --  Wt(kg): --Vital Signs Last 24 Hrs  T(C): 35.6 (25 Jan 2023 13:37), Max: 36.7 (24 Jan 2023 21:24)  T(F): 96 (25 Jan 2023 13:37), Max: 98 (24 Jan 2023 21:24)  HR: 82 (25 Jan 2023 13:37) (80 - 87)  BP: 121/76 (25 Jan 2023 13:37) (115/81 - 122/77)  BP(mean): --  RR: 16 (25 Jan 2023 13:37) (16 - 16)  SpO2: --    Parameters below as of 25 Jan 2023 13:37  Patient On (Oxygen Delivery Method): room air      No Known Allergies      PHYSICAL EXAM:  GENERAL: NAD, well-groomed, well-developed  HEAD:  Atraumatic, Normocephalic  EYES: EOMI, PERRLA, conjunctiva and sclera clear  ENMT: No tonsillar erythema, exudates, or enlargement; Moist mucous membranes, Good dentition, No lesions  NECK: Supple, No JVD, Normal thyroid  NERVOUS SYSTEM:  Alert & Oriented X3, Good concentration; Motor Strength 5/5 B/L upper and lower extremities; DTRs 2+ intact and symmetric  CHEST/LUNG: Clear to percussion bilaterally; No rales, rhonchi, wheezing, or rubs  HEART: Regular rate and rhythm; No murmurs, rubs, or gallops  ABDOMEN: Soft, Nontender, Nondistended; Bowel sounds present  EXTREMITIES:  2+ Peripheral Pulses, No clubbing, cyanosis, or edema  LYMPH: No lymphadenopathy noted  SKIN: No rashes or lesions    Consultant(s) Notes Reviewed:  [x ] YES  [ ] NO  Care Discussed with Consultants/Other Providers [ x] YES  [ ] NO    LABS:      RADIOLOGY & ADDITIONAL TESTS:    Imaging Personally Reviewed:  [ ] YES  [ ] NO  diVALproex  milliGRAM(s) Oral daily  diVALproex  milliGRAM(s) Oral daily  diVALproex  milliGRAM(s) Oral at bedtime  diVALproex  milliGRAM(s) Oral at bedtime  hydrOXYzine hydrochloride 25 milliGRAM(s) Oral at bedtime PRN  lithium CR (ESKALITH-CR) 450 milliGRAM(s) Oral two times a day  risperiDONE   Tablet 2 milliGRAM(s) Oral two times a day      HEALTH ISSUES - PROBLEM Dx:  History of developmental delay    Bipolar disorder    22 years old male past medical history of bipolar and developmental delay came to emergency room for alleged aggressive and violent behavior as per step father. Evaluated by Psych; Step Father left stated pt needs placement refusing to take child home.     Bipolar disorder and likely developmental disorder- stable  - continue current meds (on depakote, risperidone, and lithium)   - pt has been stable without aggressive behaviour in the hospital  - father states he cant take care of pt    Depressed mood, negative thoughts  - cont w lithium and valproic acid, levels wnl, c/w risperiDONE     - psych consult appreciated    Obesity: Likely multifactorial: excess calories and Psych medication induced     DVT px - pt ambulatory     DISPO: pending placement/gaurdianship        Total time spent to complete patient's bedside assessment, review medical chart, discuss medical plan of care with covering medical team was ___15_____ with > 50% of time spent face to face w/ patient, discussion with patient/family and/or coordination of care

## 2023-01-26 PROCEDURE — 99231 SBSQ HOSP IP/OBS SF/LOW 25: CPT

## 2023-01-26 RX ADMIN — DIVALPROEX SODIUM 500 MILLIGRAM(S): 500 TABLET, DELAYED RELEASE ORAL at 21:11

## 2023-01-26 RX ADMIN — Medication 25 MILLIGRAM(S): at 01:33

## 2023-01-26 RX ADMIN — RISPERIDONE 2 MILLIGRAM(S): 4 TABLET ORAL at 17:21

## 2023-01-26 RX ADMIN — LITHIUM CARBONATE 450 MILLIGRAM(S): 300 TABLET, EXTENDED RELEASE ORAL at 05:08

## 2023-01-26 RX ADMIN — LITHIUM CARBONATE 450 MILLIGRAM(S): 300 TABLET, EXTENDED RELEASE ORAL at 17:21

## 2023-01-26 RX ADMIN — DIVALPROEX SODIUM 250 MILLIGRAM(S): 500 TABLET, DELAYED RELEASE ORAL at 21:11

## 2023-01-26 RX ADMIN — RISPERIDONE 2 MILLIGRAM(S): 4 TABLET ORAL at 05:08

## 2023-01-26 RX ADMIN — DIVALPROEX SODIUM 250 MILLIGRAM(S): 500 TABLET, DELAYED RELEASE ORAL at 11:24

## 2023-01-26 RX ADMIN — DIVALPROEX SODIUM 500 MILLIGRAM(S): 500 TABLET, DELAYED RELEASE ORAL at 11:24

## 2023-01-26 NOTE — PROGRESS NOTE ADULT - SUBJECTIVE AND OBJECTIVE BOX
Progress note    Patient seen and examined at bedside. No acute distress.    INTERVAL HPI/OVERNIGHT EVENTS:    REVIEW OF SYSTEMS:  CONSTITUTIONAL: No fever, weight loss, or fatigue  EYES: No eye pain, visual disturbances, or discharge  ENMT:  No difficulty hearing, tinnitus, vertigo; No sinus or throat pain  NECK: No pain or stiffness  BREASTS: No pain, masses, or nipple discharge  RESPIRATORY: No cough, wheezing, chills or hemoptysis; No shortness of breath  CARDIOVASCULAR: No chest pain, palpitations, dizziness, or leg swelling  GASTROINTESTINAL: No abdominal or epigastric pain. No nausea, vomiting, or hematemesis; No diarrhea or constipation. No melena or hematochezia.  GENITOURINARY: No dysuria, frequency, hematuria, or incontinence  NEUROLOGICAL: No headaches, memory loss, loss of strength, numbness, or tremors  SKIN: No itching, burning, rashes, or lesions   LYMPH NODES: No enlarged glands  ENDOCRINE: No heat or cold intolerance; No hair loss  MUSCULOSKELETAL: No joint pain or swelling; No muscle, back, or extremity pain  PSYCHIATRIC: No depression, anxiety, mood swings, or difficulty sleeping  HEME/LYMPH: No easy bruising, or bleeding gums  ALLERY AND IMMUNOLOGIC: No hives or eczema  FAMILY HISTORY:    T(C): 35.6 (01-26-23 @ 05:13), Max: 35.9 (01-25-23 @ 20:57)  HR: 69 (01-26-23 @ 05:13) (69 - 104)  BP: 99/54 (01-26-23 @ 05:13) (99/54 - 121/76)  RR: 18 (01-26-23 @ 05:13) (16 - 18)  SpO2: --  Wt(kg): --Vital Signs Last 24 Hrs  T(C): 35.6 (26 Jan 2023 05:13), Max: 35.9 (25 Jan 2023 20:57)  T(F): 96.1 (26 Jan 2023 05:13), Max: 96.7 (25 Jan 2023 20:57)  HR: 69 (26 Jan 2023 05:13) (69 - 104)  BP: 99/54 (26 Jan 2023 05:13) (99/54 - 121/76)  BP(mean): --  RR: 18 (26 Jan 2023 05:13) (16 - 18)  SpO2: --    Parameters below as of 25 Jan 2023 13:37  Patient On (Oxygen Delivery Method): room air      No Known Allergies      PHYSICAL EXAM:  GENERAL: NAD, well-groomed, well-developed  HEAD:  Atraumatic, Normocephalic  EYES: EOMI, PERRLA, conjunctiva and sclera clear  ENMT: No tonsillar erythema, exudates, or enlargement; Moist mucous membranes, Good dentition, No lesions  NECK: Supple, No JVD, Normal thyroid  NERVOUS SYSTEM:  Alert & Oriented X3, Good concentration; Motor Strength 5/5 B/L upper and lower extremities; DTRs 2+ intact and symmetric  CHEST/LUNG: Clear to percussion bilaterally; No rales, rhonchi, wheezing, or rubs  HEART: Regular rate and rhythm; No murmurs, rubs, or gallops  ABDOMEN: Soft, Nontender, Nondistended; Bowel sounds present  EXTREMITIES:  2+ Peripheral Pulses, No clubbing, cyanosis, or edema  LYMPH: No lymphadenopathy noted  SKIN: No rashes or lesions    Consultant(s) Notes Reviewed:  [x ] YES  [ ] NO  Care Discussed with Consultants/Other Providers [ x] YES  [ ] NO    LABS:      RADIOLOGY & ADDITIONAL TESTS:    Imaging Personally Reviewed:  [ ] YES  [ ] NO  diVALproex  milliGRAM(s) Oral daily  diVALproex  milliGRAM(s) Oral daily  diVALproex  milliGRAM(s) Oral at bedtime  diVALproex  milliGRAM(s) Oral at bedtime  hydrOXYzine hydrochloride 25 milliGRAM(s) Oral at bedtime PRN  lithium CR (ESKALITH-CR) 450 milliGRAM(s) Oral two times a day  risperiDONE   Tablet 2 milliGRAM(s) Oral two times a day      HEALTH ISSUES - PROBLEM Dx:  History of developmental delay    Bipolar disorder    22 years old male past medical history of bipolar and developmental delay came to emergency room for alleged aggressive and violent behavior as per step father. Evaluated by Psych; Step Father left stated pt needs placement refusing to take child home.     Bipolar disorder and likely developmental disorder- stable  - continue current meds (on depakote, risperidone, and lithium)   - pt has been stable without aggressive behaviour in the hospital  - father states he cant take care of pt    Depressed mood, negative thoughts  - cont w lithium and valproic acid, levels wnl, c/w risperiDONE     - psych consult appreciated    Obesity: Likely multifactorial: excess calories and Psych medication induced     DVT px - pt ambulatory     DISPO: pending placement/gaurdianship          Total time spent to complete patient's bedside assessment, review medical chart, discuss medical plan of care with covering medical team was ____15____ with > 50% of time spent face to face w/ patient, discussion with patient/family and/or coordination of care

## 2023-01-27 PROCEDURE — 99231 SBSQ HOSP IP/OBS SF/LOW 25: CPT

## 2023-01-27 RX ADMIN — DIVALPROEX SODIUM 500 MILLIGRAM(S): 500 TABLET, DELAYED RELEASE ORAL at 11:44

## 2023-01-27 RX ADMIN — DIVALPROEX SODIUM 250 MILLIGRAM(S): 500 TABLET, DELAYED RELEASE ORAL at 11:44

## 2023-01-27 RX ADMIN — RISPERIDONE 2 MILLIGRAM(S): 4 TABLET ORAL at 05:11

## 2023-01-27 RX ADMIN — LITHIUM CARBONATE 450 MILLIGRAM(S): 300 TABLET, EXTENDED RELEASE ORAL at 17:06

## 2023-01-27 RX ADMIN — DIVALPROEX SODIUM 500 MILLIGRAM(S): 500 TABLET, DELAYED RELEASE ORAL at 21:37

## 2023-01-27 RX ADMIN — DIVALPROEX SODIUM 250 MILLIGRAM(S): 500 TABLET, DELAYED RELEASE ORAL at 21:37

## 2023-01-27 RX ADMIN — Medication 25 MILLIGRAM(S): at 01:36

## 2023-01-27 RX ADMIN — LITHIUM CARBONATE 450 MILLIGRAM(S): 300 TABLET, EXTENDED RELEASE ORAL at 05:11

## 2023-01-27 RX ADMIN — RISPERIDONE 2 MILLIGRAM(S): 4 TABLET ORAL at 17:06

## 2023-01-27 NOTE — PROGRESS NOTE ADULT - SUBJECTIVE AND OBJECTIVE BOX
Progress note    Patient seen and examined at bedside. No acute distress.    INTERVAL HPI/OVERNIGHT EVENTS:    REVIEW OF SYSTEMS:  CONSTITUTIONAL: No fever, weight loss, or fatigue  EYES: No eye pain, visual disturbances, or discharge  ENMT:  No difficulty hearing, tinnitus, vertigo; No sinus or throat pain  NECK: No pain or stiffness  BREASTS: No pain, masses, or nipple discharge  RESPIRATORY: No cough, wheezing, chills or hemoptysis; No shortness of breath  CARDIOVASCULAR: No chest pain, palpitations, dizziness, or leg swelling  GASTROINTESTINAL: No abdominal or epigastric pain. No nausea, vomiting, or hematemesis; No diarrhea or constipation. No melena or hematochezia.  GENITOURINARY: No dysuria, frequency, hematuria, or incontinence  NEUROLOGICAL: No headaches, memory loss, loss of strength, numbness, or tremors  SKIN: No itching, burning, rashes, or lesions   LYMPH NODES: No enlarged glands  ENDOCRINE: No heat or cold intolerance; No hair loss  MUSCULOSKELETAL: No joint pain or swelling; No muscle, back, or extremity pain  PSYCHIATRIC: No depression, anxiety, mood swings, or difficulty sleeping  HEME/LYMPH: No easy bruising, or bleeding gums  ALLERY AND IMMUNOLOGIC: No hives or eczema  FAMILY HISTORY:    T(C): 36.6 (01-27-23 @ 04:40), Max: 36.6 (01-27-23 @ 04:40)  HR: 71 (01-27-23 @ 04:40) (65 - 80)  BP: 99/57 (01-27-23 @ 04:40) (99/57 - 128/75)  RR: 18 (01-27-23 @ 04:40) (16 - 18)  SpO2: 97% (01-27-23 @ 04:40) (97% - 97%)  Wt(kg): --Vital Signs Last 24 Hrs  T(C): 36.6 (27 Jan 2023 04:40), Max: 36.6 (27 Jan 2023 04:40)  T(F): 97.8 (27 Jan 2023 04:40), Max: 97.8 (27 Jan 2023 04:40)  HR: 71 (27 Jan 2023 04:40) (65 - 80)  BP: 99/57 (27 Jan 2023 04:40) (99/57 - 128/75)  BP(mean): --  RR: 18 (27 Jan 2023 04:40) (16 - 18)  SpO2: 97% (27 Jan 2023 04:40) (97% - 97%)      No Known Allergies      PHYSICAL EXAM:  GENERAL: NAD, well-groomed, well-developed  HEAD:  Atraumatic, Normocephalic  EYES: EOMI, PERRLA, conjunctiva and sclera clear  ENMT: No tonsillar erythema, exudates, or enlargement; Moist mucous membranes, Good dentition, No lesions  NECK: Supple, No JVD, Normal thyroid  NERVOUS SYSTEM:  Alert & Oriented X3, Good concentration; Motor Strength 5/5 B/L upper and lower extremities; DTRs 2+ intact and symmetric  CHEST/LUNG: Clear to percussion bilaterally; No rales, rhonchi, wheezing, or rubs  HEART: Regular rate and rhythm; No murmurs, rubs, or gallops  ABDOMEN: Soft, Nontender, Nondistended; Bowel sounds present  EXTREMITIES:  2+ Peripheral Pulses, No clubbing, cyanosis, or edema  LYMPH: No lymphadenopathy noted  SKIN: No rashes or lesions    Consultant(s) Notes Reviewed:  [x ] YES  [ ] NO  Care Discussed with Consultants/Other Providers [ x] YES  [ ] NO    LABS:      RADIOLOGY & ADDITIONAL TESTS:    Imaging Personally Reviewed:  [ ] YES  [ ] NO  diVALproex  milliGRAM(s) Oral daily  diVALproex  milliGRAM(s) Oral daily  diVALproex  milliGRAM(s) Oral at bedtime  diVALproex  milliGRAM(s) Oral at bedtime  hydrOXYzine hydrochloride 25 milliGRAM(s) Oral at bedtime PRN  lithium CR (ESKALITH-CR) 450 milliGRAM(s) Oral two times a day  risperiDONE   Tablet 2 milliGRAM(s) Oral two times a day      HEALTH ISSUES - PROBLEM Dx:  History of developmental delay    Bipolar disorder    22 years old male past medical history of bipolar and developmental delay came to emergency room for alleged aggressive and violent behavior as per step father. Evaluated by Psych; Step Father left stated pt needs placement refusing to take child home.     Bipolar disorder and likely developmental disorder- stable  - continue current meds (on depakote, risperidone, and lithium)   - pt has been stable without aggressive behaviour in the hospital  - father states he cant take care of pt    Depressed mood, negative thoughts  - cont w lithium and valproic acid, levels wnl, c/w risperiDONE     - psych consult appreciated    Obesity: Likely multifactorial: excess calories and Psych medication induced     DVT px - pt ambulatory     DISPO: pending placement/gaurdianship        Total time spent to complete patient's bedside assessment, review medical chart, discuss medical plan of care with covering medical team was ____15____ with > 50% of time spent face to face w/ patient, discussion with patient/family and/or coordination of care

## 2023-01-28 PROCEDURE — 99231 SBSQ HOSP IP/OBS SF/LOW 25: CPT

## 2023-01-28 RX ADMIN — DIVALPROEX SODIUM 250 MILLIGRAM(S): 500 TABLET, DELAYED RELEASE ORAL at 11:41

## 2023-01-28 RX ADMIN — DIVALPROEX SODIUM 500 MILLIGRAM(S): 500 TABLET, DELAYED RELEASE ORAL at 11:41

## 2023-01-28 RX ADMIN — RISPERIDONE 2 MILLIGRAM(S): 4 TABLET ORAL at 17:10

## 2023-01-28 RX ADMIN — Medication 25 MILLIGRAM(S): at 01:05

## 2023-01-28 RX ADMIN — RISPERIDONE 2 MILLIGRAM(S): 4 TABLET ORAL at 05:12

## 2023-01-28 RX ADMIN — DIVALPROEX SODIUM 250 MILLIGRAM(S): 500 TABLET, DELAYED RELEASE ORAL at 21:05

## 2023-01-28 RX ADMIN — LITHIUM CARBONATE 450 MILLIGRAM(S): 300 TABLET, EXTENDED RELEASE ORAL at 17:10

## 2023-01-28 RX ADMIN — LITHIUM CARBONATE 450 MILLIGRAM(S): 300 TABLET, EXTENDED RELEASE ORAL at 05:13

## 2023-01-28 RX ADMIN — DIVALPROEX SODIUM 500 MILLIGRAM(S): 500 TABLET, DELAYED RELEASE ORAL at 21:05

## 2023-01-28 NOTE — PROGRESS NOTE ADULT - SUBJECTIVE AND OBJECTIVE BOX
Progress note    Patient seen and examined, no acute distress.    INTERVAL HPI/OVERNIGHT EVENTS:    REVIEW OF SYSTEMS:  CONSTITUTIONAL: No fever, weight loss, or fatigue  EYES: No eye pain, visual disturbances, or discharge  ENMT:  No difficulty hearing, tinnitus, vertigo; No sinus or throat pain  NECK: No pain or stiffness  BREASTS: No pain, masses, or nipple discharge  RESPIRATORY: No cough, wheezing, chills or hemoptysis; No shortness of breath  CARDIOVASCULAR: No chest pain, palpitations, dizziness, or leg swelling  GASTROINTESTINAL: No abdominal or epigastric pain. No nausea, vomiting, or hematemesis; No diarrhea or constipation. No melena or hematochezia.  GENITOURINARY: No dysuria, frequency, hematuria, or incontinence  NEUROLOGICAL: No headaches, memory loss, loss of strength, numbness, or tremors  SKIN: No itching, burning, rashes, or lesions   LYMPH NODES: No enlarged glands  ENDOCRINE: No heat or cold intolerance; No hair loss  MUSCULOSKELETAL: No joint pain or swelling; No muscle, back, or extremity pain  PSYCHIATRIC: No depression, anxiety, mood swings, or difficulty sleeping  HEME/LYMPH: No easy bruising, or bleeding gums  ALLERY AND IMMUNOLOGIC: No hives or eczema  FAMILY HISTORY:    T(C): 35.6 (01-28-23 @ 05:32), Max: 36.2 (01-27-23 @ 21:06)  HR: 65 (01-28-23 @ 05:32) (65 - 75)  BP: 115/56 (01-28-23 @ 05:32) (108/66 - 124/81)  RR: 18 (01-28-23 @ 05:32) (18 - 18)  SpO2: --  Wt(kg): --Vital Signs Last 24 Hrs  T(C): 35.6 (28 Jan 2023 05:32), Max: 36.2 (27 Jan 2023 21:06)  T(F): 96 (28 Jan 2023 05:32), Max: 97.2 (27 Jan 2023 21:06)  HR: 65 (28 Jan 2023 05:32) (65 - 75)  BP: 115/56 (28 Jan 2023 05:32) (108/66 - 124/81)  BP(mean): --  RR: 18 (28 Jan 2023 05:32) (18 - 18)  SpO2: --      No Known Allergies      PHYSICAL EXAM:  GENERAL: NAD, well-groomed, well-developed  HEAD:  Atraumatic, Normocephalic  EYES: EOMI, PERRLA, conjunctiva and sclera clear  ENMT: No tonsillar erythema, exudates, or enlargement; Moist mucous membranes, Good dentition, No lesions  NECK: Supple, No JVD, Normal thyroid  NERVOUS SYSTEM:  Alert & Oriented X3, Good concentration; Motor Strength 5/5 B/L upper and lower extremities; DTRs 2+ intact and symmetric  CHEST/LUNG: Clear to percussion bilaterally; No rales, rhonchi, wheezing, or rubs  HEART: Regular rate and rhythm; No murmurs, rubs, or gallops  ABDOMEN: Soft, Nontender, Nondistended; Bowel sounds present  EXTREMITIES:  2+ Peripheral Pulses, No clubbing, cyanosis, or edema  LYMPH: No lymphadenopathy noted  SKIN: No rashes or lesions    Consultant(s) Notes Reviewed:  [x ] YES  [ ] NO  Care Discussed with Consultants/Other Providers [ x] YES  [ ] NO    LABS:      RADIOLOGY & ADDITIONAL TESTS:    Imaging Personally Reviewed:  [ ] YES  [ ] NO  diVALproex  milliGRAM(s) Oral daily  diVALproex  milliGRAM(s) Oral daily  diVALproex  milliGRAM(s) Oral at bedtime  diVALproex  milliGRAM(s) Oral at bedtime  hydrOXYzine hydrochloride 25 milliGRAM(s) Oral at bedtime PRN  lithium CR (ESKALITH-CR) 450 milliGRAM(s) Oral two times a day  risperiDONE   Tablet 2 milliGRAM(s) Oral two times a day      HEALTH ISSUES - PROBLEM Dx:  History of developmental delay    Bipolar disorder    22 years old male past medical history of bipolar and developmental delay came to emergency room for alleged aggressive and violent behavior as per step father. Evaluated by Psych; Step Father left stated pt needs placement refusing to take child home.     Bipolar disorder and likely developmental disorder- stable  - continue current meds (on depakote, risperidone, and lithium)   - pt has been stable without aggressive behaviour in the hospital  - father states he cant take care of pt    Depressed mood, negative thoughts  - cont w lithium and valproic acid, levels wnl, c/w risperiDONE     - psych consult appreciated    Obesity: Likely multifactorial: excess calories and Psych medication induced     DVT px - pt ambulatory     DISPO: pending placement/gaurdianship        Total time spent to complete patient's bedside assessment, review medical chart, discuss medical plan of care with covering medical team was ____15____ with > 50% of time spent face to face w/ patient, discussion with patient/family and/or coordination of care

## 2023-01-29 PROCEDURE — 99231 SBSQ HOSP IP/OBS SF/LOW 25: CPT

## 2023-01-29 RX ADMIN — Medication 25 MILLIGRAM(S): at 00:43

## 2023-01-29 RX ADMIN — RISPERIDONE 2 MILLIGRAM(S): 4 TABLET ORAL at 17:39

## 2023-01-29 RX ADMIN — DIVALPROEX SODIUM 500 MILLIGRAM(S): 500 TABLET, DELAYED RELEASE ORAL at 11:59

## 2023-01-29 RX ADMIN — DIVALPROEX SODIUM 250 MILLIGRAM(S): 500 TABLET, DELAYED RELEASE ORAL at 11:58

## 2023-01-29 RX ADMIN — DIVALPROEX SODIUM 250 MILLIGRAM(S): 500 TABLET, DELAYED RELEASE ORAL at 21:01

## 2023-01-29 RX ADMIN — RISPERIDONE 2 MILLIGRAM(S): 4 TABLET ORAL at 05:02

## 2023-01-29 RX ADMIN — DIVALPROEX SODIUM 500 MILLIGRAM(S): 500 TABLET, DELAYED RELEASE ORAL at 21:01

## 2023-01-29 RX ADMIN — LITHIUM CARBONATE 450 MILLIGRAM(S): 300 TABLET, EXTENDED RELEASE ORAL at 17:39

## 2023-01-29 RX ADMIN — LITHIUM CARBONATE 450 MILLIGRAM(S): 300 TABLET, EXTENDED RELEASE ORAL at 05:02

## 2023-01-29 NOTE — PROGRESS NOTE ADULT - SUBJECTIVE AND OBJECTIVE BOX
Progress note    Patient seen and examined at bedside. No acute distress.    INTERVAL HPI/OVERNIGHT EVENTS:    REVIEW OF SYSTEMS:  CONSTITUTIONAL: No fever, weight loss, or fatigue  EYES: No eye pain, visual disturbances, or discharge  ENMT:  No difficulty hearing, tinnitus, vertigo; No sinus or throat pain  NECK: No pain or stiffness  BREASTS: No pain, masses, or nipple discharge  RESPIRATORY: No cough, wheezing, chills or hemoptysis; No shortness of breath  CARDIOVASCULAR: No chest pain, palpitations, dizziness, or leg swelling  GASTROINTESTINAL: No abdominal or epigastric pain. No nausea, vomiting, or hematemesis; No diarrhea or constipation. No melena or hematochezia.  GENITOURINARY: No dysuria, frequency, hematuria, or incontinence  NEUROLOGICAL: No headaches, memory loss, loss of strength, numbness, or tremors  SKIN: No itching, burning, rashes, or lesions   LYMPH NODES: No enlarged glands  ENDOCRINE: No heat or cold intolerance; No hair loss  MUSCULOSKELETAL: No joint pain or swelling; No muscle, back, or extremity pain  PSYCHIATRIC: No depression, anxiety, mood swings, or difficulty sleeping  HEME/LYMPH: No easy bruising, or bleeding gums  ALLERY AND IMMUNOLOGIC: No hives or eczema  FAMILY HISTORY:    T(C): 36.2 (01-29-23 @ 05:20), Max: 36.3 (01-28-23 @ 13:00)  HR: 68 (01-29-23 @ 05:20) (68 - 81)  BP: 108/59 (01-29-23 @ 05:20) (108/59 - 123/71)  RR: 18 (01-29-23 @ 05:20) (18 - 18)  SpO2: 99% (01-29-23 @ 05:20) (99% - 99%)  Wt(kg): --Vital Signs Last 24 Hrs  T(C): 36.2 (29 Jan 2023 05:20), Max: 36.3 (28 Jan 2023 13:00)  T(F): 97.1 (29 Jan 2023 05:20), Max: 97.3 (28 Jan 2023 13:00)  HR: 68 (29 Jan 2023 05:20) (68 - 81)  BP: 108/59 (29 Jan 2023 05:20) (108/59 - 123/71)  BP(mean): --  RR: 18 (29 Jan 2023 05:20) (18 - 18)  SpO2: 99% (29 Jan 2023 05:20) (99% - 99%)      No Known Allergies      PHYSICAL EXAM:  GENERAL: NAD, well-groomed, well-developed  HEAD:  Atraumatic, Normocephalic  EYES: EOMI, PERRLA, conjunctiva and sclera clear  ENMT: No tonsillar erythema, exudates, or enlargement; Moist mucous membranes, Good dentition, No lesions  NECK: Supple, No JVD, Normal thyroid  NERVOUS SYSTEM:  Alert & Oriented X3, Good concentration; Motor Strength 5/5 B/L upper and lower extremities; DTRs 2+ intact and symmetric  CHEST/LUNG: Clear to percussion bilaterally; No rales, rhonchi, wheezing, or rubs  HEART: Regular rate and rhythm; No murmurs, rubs, or gallops  ABDOMEN: Soft, Nontender, Nondistended; Bowel sounds present  EXTREMITIES:  2+ Peripheral Pulses, No clubbing, cyanosis, or edema  LYMPH: No lymphadenopathy noted  SKIN: No rashes or lesions    Consultant(s) Notes Reviewed:  [x ] YES  [ ] NO  Care Discussed with Consultants/Other Providers [ x] YES  [ ] NO    LABS:      RADIOLOGY & ADDITIONAL TESTS:    Imaging Personally Reviewed:  [ ] YES  [ ] NO  diVALproex  milliGRAM(s) Oral daily  diVALproex  milliGRAM(s) Oral daily  diVALproex  milliGRAM(s) Oral at bedtime  diVALproex  milliGRAM(s) Oral at bedtime  hydrOXYzine hydrochloride 25 milliGRAM(s) Oral at bedtime PRN  lithium CR (ESKALITH-CR) 450 milliGRAM(s) Oral two times a day  risperiDONE   Tablet 2 milliGRAM(s) Oral two times a day      HEALTH ISSUES - PROBLEM Dx:  History of developmental delay    Bipolar disorder    22 years old male past medical history of bipolar and developmental delay came to emergency room for alleged aggressive and violent behavior as per step father. Evaluated by Psych; Step Father left stated pt needs placement refusing to take child home.     Bipolar disorder and likely developmental disorder- stable  - continue current meds (on depakote, risperidone, and lithium)   - pt has been stable without aggressive behaviour in the hospital  - father states he cant take care of pt    Depressed mood, negative thoughts  - cont w lithium and valproic acid, levels wnl, c/w risperiDONE     - psych consult appreciated    Obesity: Likely multifactorial: excess calories and Psych medication induced     DVT px - pt ambulatory     DISPO: pending placement/gaurdianship          Total time spent to complete patient's bedside assessment, review medical chart, discuss medical plan of care with covering medical team was ____15____ with > 50% of time spent face to face w/ patient, discussion with patient/family and/or coordination of care

## 2023-01-30 PROCEDURE — 99231 SBSQ HOSP IP/OBS SF/LOW 25: CPT

## 2023-01-30 RX ADMIN — DIVALPROEX SODIUM 250 MILLIGRAM(S): 500 TABLET, DELAYED RELEASE ORAL at 12:23

## 2023-01-30 RX ADMIN — DIVALPROEX SODIUM 250 MILLIGRAM(S): 500 TABLET, DELAYED RELEASE ORAL at 21:56

## 2023-01-30 RX ADMIN — LITHIUM CARBONATE 450 MILLIGRAM(S): 300 TABLET, EXTENDED RELEASE ORAL at 05:11

## 2023-01-30 RX ADMIN — RISPERIDONE 2 MILLIGRAM(S): 4 TABLET ORAL at 17:04

## 2023-01-30 RX ADMIN — RISPERIDONE 2 MILLIGRAM(S): 4 TABLET ORAL at 05:11

## 2023-01-30 RX ADMIN — DIVALPROEX SODIUM 500 MILLIGRAM(S): 500 TABLET, DELAYED RELEASE ORAL at 12:23

## 2023-01-30 RX ADMIN — LITHIUM CARBONATE 450 MILLIGRAM(S): 300 TABLET, EXTENDED RELEASE ORAL at 17:04

## 2023-01-30 RX ADMIN — Medication 25 MILLIGRAM(S): at 01:18

## 2023-01-30 RX ADMIN — DIVALPROEX SODIUM 500 MILLIGRAM(S): 500 TABLET, DELAYED RELEASE ORAL at 21:56

## 2023-01-30 NOTE — PROGRESS NOTE ADULT - SUBJECTIVE AND OBJECTIVE BOX
Progress note    Patient seen and examined at bedside. No acute distress.    INTERVAL HPI/OVERNIGHT EVENTS:    REVIEW OF SYSTEMS:  CONSTITUTIONAL: No fever, weight loss, or fatigue  EYES: No eye pain, visual disturbances, or discharge  ENMT:  No difficulty hearing, tinnitus, vertigo; No sinus or throat pain  NECK: No pain or stiffness  BREASTS: No pain, masses, or nipple discharge  RESPIRATORY: No cough, wheezing, chills or hemoptysis; No shortness of breath  CARDIOVASCULAR: No chest pain, palpitations, dizziness, or leg swelling  GASTROINTESTINAL: No abdominal or epigastric pain. No nausea, vomiting, or hematemesis; No diarrhea or constipation. No melena or hematochezia.  GENITOURINARY: No dysuria, frequency, hematuria, or incontinence  NEUROLOGICAL: No headaches, memory loss, loss of strength, numbness, or tremors  SKIN: No itching, burning, rashes, or lesions   LYMPH NODES: No enlarged glands  ENDOCRINE: No heat or cold intolerance; No hair loss  MUSCULOSKELETAL: No joint pain or swelling; No muscle, back, or extremity pain  PSYCHIATRIC: No depression, anxiety, mood swings, or difficulty sleeping  HEME/LYMPH: No easy bruising, or bleeding gums  ALLERY AND IMMUNOLOGIC: No hives or eczema  FAMILY HISTORY:    T(C): 36.3 (01-30-23 @ 04:20), Max: 36.3 (01-30-23 @ 04:20)  HR: 72 (01-30-23 @ 04:20) (72 - 80)  BP: 115/56 (01-30-23 @ 04:20) (115/56 - 122/76)  RR: 18 (01-30-23 @ 04:20) (16 - 18)  SpO2: --  Wt(kg): --Vital Signs Last 24 Hrs  T(C): 36.3 (30 Jan 2023 04:20), Max: 36.3 (30 Jan 2023 04:20)  T(F): 97.4 (30 Jan 2023 04:20), Max: 97.4 (30 Jan 2023 04:20)  HR: 72 (30 Jan 2023 04:20) (72 - 80)  BP: 115/56 (30 Jan 2023 04:20) (115/56 - 122/76)  BP(mean): --  RR: 18 (30 Jan 2023 04:20) (16 - 18)  SpO2: --      No Known Allergies      PHYSICAL EXAM:  GENERAL: NAD, well-groomed, well-developed  HEAD:  Atraumatic, Normocephalic  EYES: EOMI, PERRLA, conjunctiva and sclera clear  ENMT: No tonsillar erythema, exudates, or enlargement; Moist mucous membranes, Good dentition, No lesions  NECK: Supple, No JVD, Normal thyroid  NERVOUS SYSTEM:  Alert & Oriented X3, Good concentration; Motor Strength 5/5 B/L upper and lower extremities; DTRs 2+ intact and symmetric  CHEST/LUNG: Clear to percussion bilaterally; No rales, rhonchi, wheezing, or rubs  HEART: Regular rate and rhythm; No murmurs, rubs, or gallops  ABDOMEN: Soft, Nontender, Nondistended; Bowel sounds present  EXTREMITIES:  2+ Peripheral Pulses, No clubbing, cyanosis, or edema  LYMPH: No lymphadenopathy noted  SKIN: No rashes or lesions    Consultant(s) Notes Reviewed:  [x ] YES  [ ] NO  Care Discussed with Consultants/Other Providers [ x] YES  [ ] NO    LABS:      RADIOLOGY & ADDITIONAL TESTS:    Imaging Personally Reviewed:  [ ] YES  [ ] NO  diVALproex  milliGRAM(s) Oral daily  diVALproex  milliGRAM(s) Oral daily  diVALproex  milliGRAM(s) Oral at bedtime  diVALproex  milliGRAM(s) Oral at bedtime  hydrOXYzine hydrochloride 25 milliGRAM(s) Oral at bedtime PRN  lithium CR (ESKALITH-CR) 450 milliGRAM(s) Oral two times a day  risperiDONE   Tablet 2 milliGRAM(s) Oral two times a day      HEALTH ISSUES - PROBLEM Dx:  History of developmental delay    Bipolar disorder    22 years old male past medical history of bipolar and developmental delay came to emergency room for alleged aggressive and violent behavior as per step father. Evaluated by Psych; Step Father left stated pt needs placement refusing to take child home.     Bipolar disorder and likely developmental disorder- stable  - continue current meds (on depakote, risperidone, and lithium)   - pt has been stable without aggressive behaviour in the hospital  - father states he cant take care of pt    Depressed mood, negative thoughts  - cont w lithium and valproic acid, levels wnl, c/w risperiDONE     - psych consult appreciated    Obesity: Likely multifactorial: excess calories and Psych medication induced     DVT px - pt ambulatory     DISPO: pending placement/gaurdianship        Total time spent to complete patient's bedside assessment, review medical chart, discuss medical plan of care with covering medical team was ____15___ with > 50% of time spent face to face w/ patient, discussion with patient/family and/or coordination of care

## 2023-01-31 PROCEDURE — 99231 SBSQ HOSP IP/OBS SF/LOW 25: CPT

## 2023-01-31 RX ADMIN — LITHIUM CARBONATE 450 MILLIGRAM(S): 300 TABLET, EXTENDED RELEASE ORAL at 05:17

## 2023-01-31 RX ADMIN — DIVALPROEX SODIUM 250 MILLIGRAM(S): 500 TABLET, DELAYED RELEASE ORAL at 23:36

## 2023-01-31 RX ADMIN — RISPERIDONE 2 MILLIGRAM(S): 4 TABLET ORAL at 17:36

## 2023-01-31 RX ADMIN — DIVALPROEX SODIUM 500 MILLIGRAM(S): 500 TABLET, DELAYED RELEASE ORAL at 23:35

## 2023-01-31 RX ADMIN — DIVALPROEX SODIUM 250 MILLIGRAM(S): 500 TABLET, DELAYED RELEASE ORAL at 11:13

## 2023-01-31 RX ADMIN — DIVALPROEX SODIUM 500 MILLIGRAM(S): 500 TABLET, DELAYED RELEASE ORAL at 11:13

## 2023-01-31 RX ADMIN — LITHIUM CARBONATE 450 MILLIGRAM(S): 300 TABLET, EXTENDED RELEASE ORAL at 17:37

## 2023-01-31 RX ADMIN — Medication 25 MILLIGRAM(S): at 01:11

## 2023-01-31 RX ADMIN — RISPERIDONE 2 MILLIGRAM(S): 4 TABLET ORAL at 05:17

## 2023-01-31 NOTE — PROGRESS NOTE ADULT - SUBJECTIVE AND OBJECTIVE BOX
Progress note    Patient seen and examined at bedside. no acute distress.    INTERVAL HPI/OVERNIGHT EVENTS:    REVIEW OF SYSTEMS:  CONSTITUTIONAL: No fever, weight loss, or fatigue  EYES: No eye pain, visual disturbances, or discharge  ENMT:  No difficulty hearing, tinnitus, vertigo; No sinus or throat pain  NECK: No pain or stiffness  BREASTS: No pain, masses, or nipple discharge  RESPIRATORY: No cough, wheezing, chills or hemoptysis; No shortness of breath  CARDIOVASCULAR: No chest pain, palpitations, dizziness, or leg swelling  GASTROINTESTINAL: No abdominal or epigastric pain. No nausea, vomiting, or hematemesis; No diarrhea or constipation. No melena or hematochezia.  GENITOURINARY: No dysuria, frequency, hematuria, or incontinence  NEUROLOGICAL: No headaches, memory loss, loss of strength, numbness, or tremors  SKIN: No itching, burning, rashes, or lesions   LYMPH NODES: No enlarged glands  ENDOCRINE: No heat or cold intolerance; No hair loss  MUSCULOSKELETAL: No joint pain or swelling; No muscle, back, or extremity pain  PSYCHIATRIC: No depression, anxiety, mood swings, or difficulty sleeping  HEME/LYMPH: No easy bruising, or bleeding gums  ALLERY AND IMMUNOLOGIC: No hives or eczema  FAMILY HISTORY:    T(C): 36 (01-31-23 @ 05:35), Max: 36.1 (01-30-23 @ 14:19)  HR: 64 (01-31-23 @ 05:35) (64 - 94)  BP: 106/61 (01-31-23 @ 05:35) (106/61 - 117/76)  RR: 18 (01-31-23 @ 05:35) (18 - 18)  SpO2: --  Wt(kg): --Vital Signs Last 24 Hrs  T(C): 36 (31 Jan 2023 05:35), Max: 36.1 (30 Jan 2023 14:19)  T(F): 96.8 (31 Jan 2023 05:35), Max: 96.9 (30 Jan 2023 14:19)  HR: 64 (31 Jan 2023 05:35) (64 - 94)  BP: 106/61 (31 Jan 2023 05:35) (106/61 - 117/76)  BP(mean): --  RR: 18 (31 Jan 2023 05:35) (18 - 18)  SpO2: --      No Known Allergies      PHYSICAL EXAM:  GENERAL: NAD, well-groomed, well-developed  HEAD:  Atraumatic, Normocephalic  EYES: EOMI, PERRLA, conjunctiva and sclera clear  ENMT: No tonsillar erythema, exudates, or enlargement; Moist mucous membranes, Good dentition, No lesions  NECK: Supple, No JVD, Normal thyroid  NERVOUS SYSTEM:  Alert & Oriented X3, Good concentration; Motor Strength 5/5 B/L upper and lower extremities; DTRs 2+ intact and symmetric  CHEST/LUNG: Clear to percussion bilaterally; No rales, rhonchi, wheezing, or rubs  HEART: Regular rate and rhythm; No murmurs, rubs, or gallops  ABDOMEN: Soft, Nontender, Nondistended; Bowel sounds present  EXTREMITIES:  2+ Peripheral Pulses, No clubbing, cyanosis, or edema  LYMPH: No lymphadenopathy noted  SKIN: No rashes or lesions    Consultant(s) Notes Reviewed:  [x ] YES  [ ] NO  Care Discussed with Consultants/Other Providers [ x] YES  [ ] NO    LABS:      RADIOLOGY & ADDITIONAL TESTS:    Imaging Personally Reviewed:  [ ] YES  [ ] NO  diVALproex  milliGRAM(s) Oral daily  diVALproex  milliGRAM(s) Oral daily  diVALproex  milliGRAM(s) Oral at bedtime  diVALproex  milliGRAM(s) Oral at bedtime  hydrOXYzine hydrochloride 25 milliGRAM(s) Oral at bedtime PRN  lithium CR (ESKALITH-CR) 450 milliGRAM(s) Oral two times a day  risperiDONE   Tablet 2 milliGRAM(s) Oral two times a day      HEALTH ISSUES - PROBLEM Dx:  History of developmental delay    Bipolar disorder    22 years old male past medical history of bipolar and developmental delay came to emergency room for alleged aggressive and violent behavior as per step father. Evaluated by Psych; Step Father left stated pt needs placement refusing to take child home.     Bipolar disorder and likely developmental disorder- stable  - continue current meds (on depakote, risperidone, and lithium)   - pt has been stable without aggressive behaviour in the hospital  - father states he cant take care of pt    Depressed mood, negative thoughts  - cont w lithium and valproic acid, levels wnl, c/w risperiDONE     - psych consult appreciated    Obesity: Likely multifactorial: excess calories and Psych medication induced     DVT px - pt ambulatory     DISPO: pending placement/gaurdianship    Total time spent to complete patient's bedside assessment, review medical chart, discuss medical plan of care with covering medical team was ____15____ with > 50% of time spent face to face w/ patient, discussion with patient/family and/or coordination of care

## 2023-02-01 PROCEDURE — 99232 SBSQ HOSP IP/OBS MODERATE 35: CPT

## 2023-02-01 RX ADMIN — DIVALPROEX SODIUM 500 MILLIGRAM(S): 500 TABLET, DELAYED RELEASE ORAL at 21:10

## 2023-02-01 RX ADMIN — DIVALPROEX SODIUM 500 MILLIGRAM(S): 500 TABLET, DELAYED RELEASE ORAL at 11:33

## 2023-02-01 RX ADMIN — LITHIUM CARBONATE 450 MILLIGRAM(S): 300 TABLET, EXTENDED RELEASE ORAL at 05:18

## 2023-02-01 RX ADMIN — LITHIUM CARBONATE 450 MILLIGRAM(S): 300 TABLET, EXTENDED RELEASE ORAL at 17:11

## 2023-02-01 RX ADMIN — RISPERIDONE 2 MILLIGRAM(S): 4 TABLET ORAL at 17:11

## 2023-02-01 RX ADMIN — Medication 25 MILLIGRAM(S): at 01:26

## 2023-02-01 RX ADMIN — DIVALPROEX SODIUM 250 MILLIGRAM(S): 500 TABLET, DELAYED RELEASE ORAL at 11:33

## 2023-02-01 RX ADMIN — DIVALPROEX SODIUM 250 MILLIGRAM(S): 500 TABLET, DELAYED RELEASE ORAL at 21:10

## 2023-02-01 RX ADMIN — RISPERIDONE 2 MILLIGRAM(S): 4 TABLET ORAL at 05:18

## 2023-02-02 PROCEDURE — 99232 SBSQ HOSP IP/OBS MODERATE 35: CPT

## 2023-02-02 RX ADMIN — DIVALPROEX SODIUM 250 MILLIGRAM(S): 500 TABLET, DELAYED RELEASE ORAL at 11:19

## 2023-02-02 RX ADMIN — DIVALPROEX SODIUM 250 MILLIGRAM(S): 500 TABLET, DELAYED RELEASE ORAL at 21:48

## 2023-02-02 RX ADMIN — RISPERIDONE 2 MILLIGRAM(S): 4 TABLET ORAL at 05:07

## 2023-02-02 RX ADMIN — LITHIUM CARBONATE 450 MILLIGRAM(S): 300 TABLET, EXTENDED RELEASE ORAL at 17:52

## 2023-02-02 RX ADMIN — Medication 25 MILLIGRAM(S): at 01:34

## 2023-02-02 RX ADMIN — DIVALPROEX SODIUM 500 MILLIGRAM(S): 500 TABLET, DELAYED RELEASE ORAL at 11:19

## 2023-02-02 RX ADMIN — LITHIUM CARBONATE 450 MILLIGRAM(S): 300 TABLET, EXTENDED RELEASE ORAL at 05:07

## 2023-02-02 RX ADMIN — DIVALPROEX SODIUM 500 MILLIGRAM(S): 500 TABLET, DELAYED RELEASE ORAL at 21:47

## 2023-02-02 RX ADMIN — RISPERIDONE 2 MILLIGRAM(S): 4 TABLET ORAL at 17:52

## 2023-02-03 PROCEDURE — 99232 SBSQ HOSP IP/OBS MODERATE 35: CPT

## 2023-02-03 RX ADMIN — DIVALPROEX SODIUM 500 MILLIGRAM(S): 500 TABLET, DELAYED RELEASE ORAL at 21:04

## 2023-02-03 RX ADMIN — DIVALPROEX SODIUM 250 MILLIGRAM(S): 500 TABLET, DELAYED RELEASE ORAL at 21:04

## 2023-02-03 RX ADMIN — RISPERIDONE 2 MILLIGRAM(S): 4 TABLET ORAL at 05:35

## 2023-02-03 RX ADMIN — Medication 25 MILLIGRAM(S): at 00:34

## 2023-02-03 RX ADMIN — LITHIUM CARBONATE 450 MILLIGRAM(S): 300 TABLET, EXTENDED RELEASE ORAL at 17:02

## 2023-02-03 RX ADMIN — DIVALPROEX SODIUM 500 MILLIGRAM(S): 500 TABLET, DELAYED RELEASE ORAL at 11:01

## 2023-02-03 RX ADMIN — DIVALPROEX SODIUM 250 MILLIGRAM(S): 500 TABLET, DELAYED RELEASE ORAL at 11:01

## 2023-02-03 RX ADMIN — RISPERIDONE 2 MILLIGRAM(S): 4 TABLET ORAL at 17:02

## 2023-02-03 RX ADMIN — LITHIUM CARBONATE 450 MILLIGRAM(S): 300 TABLET, EXTENDED RELEASE ORAL at 05:35

## 2023-02-04 PROCEDURE — 99232 SBSQ HOSP IP/OBS MODERATE 35: CPT

## 2023-02-04 RX ADMIN — LITHIUM CARBONATE 450 MILLIGRAM(S): 300 TABLET, EXTENDED RELEASE ORAL at 17:24

## 2023-02-04 RX ADMIN — RISPERIDONE 2 MILLIGRAM(S): 4 TABLET ORAL at 17:24

## 2023-02-04 RX ADMIN — DIVALPROEX SODIUM 250 MILLIGRAM(S): 500 TABLET, DELAYED RELEASE ORAL at 11:32

## 2023-02-04 RX ADMIN — DIVALPROEX SODIUM 500 MILLIGRAM(S): 500 TABLET, DELAYED RELEASE ORAL at 11:33

## 2023-02-04 RX ADMIN — LITHIUM CARBONATE 450 MILLIGRAM(S): 300 TABLET, EXTENDED RELEASE ORAL at 05:52

## 2023-02-04 RX ADMIN — DIVALPROEX SODIUM 250 MILLIGRAM(S): 500 TABLET, DELAYED RELEASE ORAL at 21:02

## 2023-02-04 RX ADMIN — Medication 25 MILLIGRAM(S): at 01:00

## 2023-02-04 RX ADMIN — DIVALPROEX SODIUM 500 MILLIGRAM(S): 500 TABLET, DELAYED RELEASE ORAL at 21:02

## 2023-02-04 RX ADMIN — RISPERIDONE 2 MILLIGRAM(S): 4 TABLET ORAL at 05:51

## 2023-02-05 DIAGNOSIS — Z60.9 PROBLEM RELATED TO SOCIAL ENVIRONMENT, UNSPECIFIED: ICD-10-CM

## 2023-02-05 PROCEDURE — 99232 SBSQ HOSP IP/OBS MODERATE 35: CPT

## 2023-02-05 RX ADMIN — DIVALPROEX SODIUM 250 MILLIGRAM(S): 500 TABLET, DELAYED RELEASE ORAL at 11:07

## 2023-02-05 RX ADMIN — LITHIUM CARBONATE 450 MILLIGRAM(S): 300 TABLET, EXTENDED RELEASE ORAL at 17:05

## 2023-02-05 RX ADMIN — DIVALPROEX SODIUM 500 MILLIGRAM(S): 500 TABLET, DELAYED RELEASE ORAL at 21:03

## 2023-02-05 RX ADMIN — RISPERIDONE 2 MILLIGRAM(S): 4 TABLET ORAL at 05:51

## 2023-02-05 RX ADMIN — DIVALPROEX SODIUM 500 MILLIGRAM(S): 500 TABLET, DELAYED RELEASE ORAL at 11:07

## 2023-02-05 RX ADMIN — RISPERIDONE 2 MILLIGRAM(S): 4 TABLET ORAL at 17:06

## 2023-02-05 RX ADMIN — DIVALPROEX SODIUM 250 MILLIGRAM(S): 500 TABLET, DELAYED RELEASE ORAL at 21:04

## 2023-02-05 RX ADMIN — Medication 25 MILLIGRAM(S): at 01:20

## 2023-02-05 RX ADMIN — LITHIUM CARBONATE 450 MILLIGRAM(S): 300 TABLET, EXTENDED RELEASE ORAL at 05:51

## 2023-02-05 SDOH — SOCIAL STABILITY - SOCIAL INSECURITY: PROBLEM RELATED TO SOCIAL ENVIRONMENT, UNSPECIFIED: Z60.9

## 2023-02-06 PROCEDURE — 99232 SBSQ HOSP IP/OBS MODERATE 35: CPT

## 2023-02-06 RX ADMIN — DIVALPROEX SODIUM 250 MILLIGRAM(S): 500 TABLET, DELAYED RELEASE ORAL at 21:32

## 2023-02-06 RX ADMIN — LITHIUM CARBONATE 450 MILLIGRAM(S): 300 TABLET, EXTENDED RELEASE ORAL at 06:13

## 2023-02-06 RX ADMIN — RISPERIDONE 2 MILLIGRAM(S): 4 TABLET ORAL at 06:13

## 2023-02-06 RX ADMIN — LITHIUM CARBONATE 450 MILLIGRAM(S): 300 TABLET, EXTENDED RELEASE ORAL at 17:30

## 2023-02-06 RX ADMIN — DIVALPROEX SODIUM 500 MILLIGRAM(S): 500 TABLET, DELAYED RELEASE ORAL at 21:33

## 2023-02-06 RX ADMIN — DIVALPROEX SODIUM 500 MILLIGRAM(S): 500 TABLET, DELAYED RELEASE ORAL at 11:55

## 2023-02-06 RX ADMIN — Medication 25 MILLIGRAM(S): at 01:43

## 2023-02-06 RX ADMIN — DIVALPROEX SODIUM 250 MILLIGRAM(S): 500 TABLET, DELAYED RELEASE ORAL at 11:55

## 2023-02-06 RX ADMIN — RISPERIDONE 2 MILLIGRAM(S): 4 TABLET ORAL at 17:30

## 2023-02-07 PROCEDURE — 99232 SBSQ HOSP IP/OBS MODERATE 35: CPT

## 2023-02-07 RX ADMIN — Medication 25 MILLIGRAM(S): at 01:22

## 2023-02-07 RX ADMIN — DIVALPROEX SODIUM 250 MILLIGRAM(S): 500 TABLET, DELAYED RELEASE ORAL at 12:05

## 2023-02-07 RX ADMIN — DIVALPROEX SODIUM 500 MILLIGRAM(S): 500 TABLET, DELAYED RELEASE ORAL at 12:05

## 2023-02-07 RX ADMIN — RISPERIDONE 2 MILLIGRAM(S): 4 TABLET ORAL at 17:57

## 2023-02-07 RX ADMIN — LITHIUM CARBONATE 450 MILLIGRAM(S): 300 TABLET, EXTENDED RELEASE ORAL at 17:57

## 2023-02-07 RX ADMIN — RISPERIDONE 2 MILLIGRAM(S): 4 TABLET ORAL at 05:16

## 2023-02-07 RX ADMIN — LITHIUM CARBONATE 450 MILLIGRAM(S): 300 TABLET, EXTENDED RELEASE ORAL at 05:17

## 2023-02-07 RX ADMIN — DIVALPROEX SODIUM 250 MILLIGRAM(S): 500 TABLET, DELAYED RELEASE ORAL at 21:20

## 2023-02-07 RX ADMIN — DIVALPROEX SODIUM 500 MILLIGRAM(S): 500 TABLET, DELAYED RELEASE ORAL at 21:19

## 2023-02-08 PROCEDURE — 99231 SBSQ HOSP IP/OBS SF/LOW 25: CPT

## 2023-02-08 RX ADMIN — DIVALPROEX SODIUM 500 MILLIGRAM(S): 500 TABLET, DELAYED RELEASE ORAL at 21:15

## 2023-02-08 RX ADMIN — DIVALPROEX SODIUM 500 MILLIGRAM(S): 500 TABLET, DELAYED RELEASE ORAL at 12:17

## 2023-02-08 RX ADMIN — LITHIUM CARBONATE 450 MILLIGRAM(S): 300 TABLET, EXTENDED RELEASE ORAL at 17:06

## 2023-02-08 RX ADMIN — RISPERIDONE 2 MILLIGRAM(S): 4 TABLET ORAL at 17:06

## 2023-02-08 RX ADMIN — RISPERIDONE 2 MILLIGRAM(S): 4 TABLET ORAL at 05:31

## 2023-02-08 RX ADMIN — DIVALPROEX SODIUM 250 MILLIGRAM(S): 500 TABLET, DELAYED RELEASE ORAL at 12:17

## 2023-02-08 RX ADMIN — LITHIUM CARBONATE 450 MILLIGRAM(S): 300 TABLET, EXTENDED RELEASE ORAL at 05:31

## 2023-02-08 RX ADMIN — DIVALPROEX SODIUM 250 MILLIGRAM(S): 500 TABLET, DELAYED RELEASE ORAL at 21:15

## 2023-02-08 NOTE — PROGRESS NOTE ADULT - ASSESSMENT
22 years old male past medical history of bipolar and developmental delay came to emergency room for alleged aggressive and violent behavior as per step father. Evaluated by Psych; Step Father left stated pt needs placement refusing to take child home.     #Bipolar disorder and likely developmental disorder- stable  - continue current meds (on depakote, risperidone, and lithium)   - pt has been stable without aggressive behaviour in the hospital  - father states he cant take care of pt    #Depressed mood, negative thoughts  - cont w lithium and valproic acid, levels wnl, c/w risperiDONE     - psych consult appreciated    #Obesity: Likely multifactorial: excess calories and Psych medication induced     DVT px - pt ambulatory     DISPO: pending placement/gaurdianship

## 2023-02-08 NOTE — PROGRESS NOTE ADULT - SUBJECTIVE AND OBJECTIVE BOX
Progress note    Patient seen and examined at bedside. Patient denies any new complaints, patient appears comfortable and resting.    INTERVAL HPI/OVERNIGHT EVENTS:    REVIEW OF SYSTEMS: All systems reviewed and are otherwise negative      Vital Signs Last 24 Hrs  T(C): 35.7 (07 Feb 2023 21:24), Max: 35.7 (07 Feb 2023 21:24)  T(F): 96.3 (07 Feb 2023 21:24), Max: 96.3 (07 Feb 2023 21:24)  HR: 72 (07 Feb 2023 21:24) (72 - 72)  BP: 109/55 (07 Feb 2023 21:24) (109/55 - 109/55)  BP(mean): --  RR: 16 (07 Feb 2023 21:24) (16 - 16)  SpO2: --  PHYSICAL EXAM:  GENERAL: NAD, well-groomed, well-developed  HEAD:  Atraumatic, Normocephalic  EYES: EOMI, PERRLA, conjunctiva and sclera clear  ENMT: No tonsillar erythema, exudates, or enlargement; Moist mucous membranes, Good dentition, No lesions  NECK: Supple, No JVD, Normal thyroid  NERVOUS SYSTEM:  Alert & Oriented X3, Good concentration; Motor Strength 5/5 B/L upper and lower extremities; DTRs 2+ intact and symmetric  CHEST/LUNG: Clear to percussion bilaterally; No rales, rhonchi, wheezing, or rubs  HEART: Regular rate and rhythm; No murmurs, rubs, or gallops  ABDOMEN: Soft, Nontender, Nondistended; Bowel sounds present  EXTREMITIES:  2+ Peripheral Pulses, No clubbing, cyanosis, or edema      Consultant(s) Notes Reviewed:  [x ] YES  [ ] NO  Care Discussed with Consultants/Other Providers [ x] YES  [ ] NO    LABS:      RADIOLOGY & ADDITIONAL TESTS:      MEDICATIONS  (STANDING):  diVALproex  milliGRAM(s) Oral daily  diVALproex  milliGRAM(s) Oral daily  diVALproex  milliGRAM(s) Oral at bedtime  diVALproex  milliGRAM(s) Oral at bedtime  lithium CR (ESKALITH-CR) 450 milliGRAM(s) Oral two times a day  risperiDONE   Tablet 2 milliGRAM(s) Oral two times a day    MEDICATIONS  (PRN):  hydrOXYzine hydrochloride 25 milliGRAM(s) Oral at bedtime PRN insomnia

## 2023-02-09 PROCEDURE — 99231 SBSQ HOSP IP/OBS SF/LOW 25: CPT

## 2023-02-09 RX ADMIN — RISPERIDONE 2 MILLIGRAM(S): 4 TABLET ORAL at 18:02

## 2023-02-09 RX ADMIN — DIVALPROEX SODIUM 500 MILLIGRAM(S): 500 TABLET, DELAYED RELEASE ORAL at 11:55

## 2023-02-09 RX ADMIN — Medication 25 MILLIGRAM(S): at 00:14

## 2023-02-09 RX ADMIN — DIVALPROEX SODIUM 250 MILLIGRAM(S): 500 TABLET, DELAYED RELEASE ORAL at 11:55

## 2023-02-09 RX ADMIN — DIVALPROEX SODIUM 500 MILLIGRAM(S): 500 TABLET, DELAYED RELEASE ORAL at 21:09

## 2023-02-09 RX ADMIN — LITHIUM CARBONATE 450 MILLIGRAM(S): 300 TABLET, EXTENDED RELEASE ORAL at 18:02

## 2023-02-09 RX ADMIN — RISPERIDONE 2 MILLIGRAM(S): 4 TABLET ORAL at 05:02

## 2023-02-09 RX ADMIN — LITHIUM CARBONATE 450 MILLIGRAM(S): 300 TABLET, EXTENDED RELEASE ORAL at 05:02

## 2023-02-09 RX ADMIN — DIVALPROEX SODIUM 250 MILLIGRAM(S): 500 TABLET, DELAYED RELEASE ORAL at 21:10

## 2023-02-09 NOTE — PROGRESS NOTE ADULT - SUBJECTIVE AND OBJECTIVE BOX
Progress note    Patient seen and examined at bedside. Patient denies any new complaints, patient appears comfortable and resting. He was having breakfast this AM.    INTERVAL HPI/OVERNIGHT EVENTS:    REVIEW OF SYSTEMS: All systems reviewed and are otherwise negative      Vital Signs Last 24 Hrs  T(C): 36.1 (09 Feb 2023 05:41), Max: 37.6 (08 Feb 2023 14:08)  T(F): 97 (09 Feb 2023 05:41), Max: 99.6 (08 Feb 2023 14:08)  HR: 73 (09 Feb 2023 05:41) (73 - 82)  BP: 101/53 (09 Feb 2023 05:41) (101/53 - 125/77)  BP(mean): --  RR: 16 (09 Feb 2023 05:41) (16 - 16)  SpO2: --    Parameters below as of 08 Feb 2023 14:08  Patient On (Oxygen Delivery Method): room air    PHYSICAL EXAM:  GENERAL: NAD, well-groomed, well-developed  HEAD:  Atraumatic, Normocephalic  EYES: EOMI, PERRLA, conjunctiva and sclera clear  ENMT: No tonsillar erythema, exudates, or enlargement; Moist mucous membranes, Good dentition, No lesions  NECK: Supple, No JVD, Normal thyroid  NERVOUS SYSTEM:  Alert & Oriented X3, Good concentration; Motor Strength 5/5 B/L upper and lower extremities; DTRs 2+ intact and symmetric  CHEST/LUNG: Clear to percussion bilaterally; No rales, rhonchi, wheezing, or rubs  HEART: Regular rate and rhythm; No murmurs, rubs, or gallops  ABDOMEN: Soft, Nontender, Nondistended; Bowel sounds present  EXTREMITIES:  2+ Peripheral Pulses, No clubbing, cyanosis, or edema      Consultant(s) Notes Reviewed:  [x ] YES  [ ] NO  Care Discussed with Consultants/Other Providers [ x] YES  [ ] NO    LABS:      RADIOLOGY & ADDITIONAL TESTS:      MEDICATIONS  (STANDING):  diVALproex  milliGRAM(s) Oral daily  diVALproex  milliGRAM(s) Oral daily  diVALproex  milliGRAM(s) Oral at bedtime  diVALproex  milliGRAM(s) Oral at bedtime  lithium CR (ESKALITH-CR) 450 milliGRAM(s) Oral two times a day  risperiDONE   Tablet 2 milliGRAM(s) Oral two times a day    MEDICATIONS  (PRN):  hydrOXYzine hydrochloride 25 milliGRAM(s) Oral at bedtime PRN insomnia

## 2023-02-10 PROCEDURE — 99231 SBSQ HOSP IP/OBS SF/LOW 25: CPT

## 2023-02-10 RX ADMIN — DIVALPROEX SODIUM 500 MILLIGRAM(S): 500 TABLET, DELAYED RELEASE ORAL at 22:14

## 2023-02-10 RX ADMIN — LITHIUM CARBONATE 450 MILLIGRAM(S): 300 TABLET, EXTENDED RELEASE ORAL at 05:06

## 2023-02-10 RX ADMIN — DIVALPROEX SODIUM 500 MILLIGRAM(S): 500 TABLET, DELAYED RELEASE ORAL at 11:58

## 2023-02-10 RX ADMIN — RISPERIDONE 2 MILLIGRAM(S): 4 TABLET ORAL at 05:07

## 2023-02-10 RX ADMIN — DIVALPROEX SODIUM 250 MILLIGRAM(S): 500 TABLET, DELAYED RELEASE ORAL at 11:58

## 2023-02-10 RX ADMIN — Medication 25 MILLIGRAM(S): at 01:21

## 2023-02-10 RX ADMIN — RISPERIDONE 2 MILLIGRAM(S): 4 TABLET ORAL at 18:01

## 2023-02-10 RX ADMIN — LITHIUM CARBONATE 450 MILLIGRAM(S): 300 TABLET, EXTENDED RELEASE ORAL at 18:01

## 2023-02-10 RX ADMIN — DIVALPROEX SODIUM 250 MILLIGRAM(S): 500 TABLET, DELAYED RELEASE ORAL at 22:14

## 2023-02-10 NOTE — PROGRESS NOTE ADULT - SUBJECTIVE AND OBJECTIVE BOX
Progress note    Patient seen and examined at bedside. Patient denies any new complaints, patient appears comfortable and resting. He reports he is okay.    INTERVAL HPI/OVERNIGHT EVENTS:    REVIEW OF SYSTEMS: All systems reviewed and are otherwise negative      Vital Signs Last 24 Hrs  T(C): 36.3 (10 Feb 2023 05:00), Max: 36.3 (10 Feb 2023 05:00)  T(F): 97.3 (10 Feb 2023 05:00), Max: 97.3 (10 Feb 2023 05:00)  HR: 68 (10 Feb 2023 05:00) (68 - 77)  BP: 105/70 (10 Feb 2023 05:00) (105/70 - 120/80)  BP(mean): --  RR: 16 (10 Feb 2023 05:00) (16 - 16)  SpO2: 96% (10 Feb 2023 05:00) (96% - 98%)    Parameters below as of 09 Feb 2023 14:17  Patient On (Oxygen Delivery Method): room air  PHYSICAL EXAM:  GENERAL: NAD, well-groomed, well-developed  HEAD:  Atraumatic, Normocephalic  EYES: EOMI, PERRLA, conjunctiva and sclera clear  ENMT: No tonsillar erythema, exudates, or enlargement; Moist mucous membranes, Good dentition, No lesions  NECK: Supple, No JVD, Normal thyroid  NERVOUS SYSTEM:  Alert & Oriented X3, Good concentration; Motor Strength 5/5 B/L upper and lower extremities; DTRs 2+ intact and symmetric  CHEST/LUNG: Clear to percussion bilaterally; No rales, rhonchi, wheezing, or rubs  HEART: Regular rate and rhythm; No murmurs, rubs, or gallops  ABDOMEN: Soft, Nontender, Nondistended; Bowel sounds present  EXTREMITIES:  2+ Peripheral Pulses, No clubbing, cyanosis, or edema      Consultant(s) Notes Reviewed:  [x ] YES  [ ] NO  Care Discussed with Consultants/Other Providers [ x] YES  [ ] NO    LABS:      RADIOLOGY & ADDITIONAL TESTS:      MEDICATIONS  (STANDING):  diVALproex  milliGRAM(s) Oral daily  diVALproex  milliGRAM(s) Oral daily  diVALproex  milliGRAM(s) Oral at bedtime  diVALproex  milliGRAM(s) Oral at bedtime  lithium CR (ESKALITH-CR) 450 milliGRAM(s) Oral two times a day  risperiDONE   Tablet 2 milliGRAM(s) Oral two times a day    MEDICATIONS  (PRN):  hydrOXYzine hydrochloride 25 milliGRAM(s) Oral at bedtime PRN insomnia

## 2023-02-11 PROCEDURE — 99231 SBSQ HOSP IP/OBS SF/LOW 25: CPT

## 2023-02-11 RX ADMIN — LITHIUM CARBONATE 450 MILLIGRAM(S): 300 TABLET, EXTENDED RELEASE ORAL at 05:10

## 2023-02-11 RX ADMIN — Medication 25 MILLIGRAM(S): at 00:20

## 2023-02-11 RX ADMIN — DIVALPROEX SODIUM 250 MILLIGRAM(S): 500 TABLET, DELAYED RELEASE ORAL at 12:08

## 2023-02-11 RX ADMIN — RISPERIDONE 2 MILLIGRAM(S): 4 TABLET ORAL at 18:05

## 2023-02-11 RX ADMIN — DIVALPROEX SODIUM 500 MILLIGRAM(S): 500 TABLET, DELAYED RELEASE ORAL at 21:38

## 2023-02-11 RX ADMIN — RISPERIDONE 2 MILLIGRAM(S): 4 TABLET ORAL at 05:10

## 2023-02-11 RX ADMIN — DIVALPROEX SODIUM 500 MILLIGRAM(S): 500 TABLET, DELAYED RELEASE ORAL at 12:08

## 2023-02-11 RX ADMIN — LITHIUM CARBONATE 450 MILLIGRAM(S): 300 TABLET, EXTENDED RELEASE ORAL at 18:05

## 2023-02-11 RX ADMIN — DIVALPROEX SODIUM 250 MILLIGRAM(S): 500 TABLET, DELAYED RELEASE ORAL at 21:38

## 2023-02-11 NOTE — PROGRESS NOTE ADULT - SUBJECTIVE AND OBJECTIVE BOX
Progress note    Patient seen and examined at bedside. Patient denies any new complaints, patient appears comfortable and resting.     INTERVAL HPI/OVERNIGHT EVENTS:    REVIEW OF SYSTEMS: All systems reviewed and are otherwise negative      Vital Signs Last 24 Hrs  T(C): 36.8 (11 Feb 2023 05:25), Max: 36.8 (11 Feb 2023 05:25)  T(F): 98.2 (11 Feb 2023 05:25), Max: 98.2 (11 Feb 2023 05:25)  HR: 74 (11 Feb 2023 05:25) (71 - 92)  BP: 115/79 (11 Feb 2023 05:25) (93/55 - 121/70)  BP(mean): --  RR: 16 (11 Feb 2023 05:25) (16 - 16)  SpO2: --      PHYSICAL EXAM:  GENERAL: NAD, well-groomed, well-developed  HEAD:  Atraumatic, Normocephalic  EYES: EOMI, PERRLA, conjunctiva and sclera clear  ENMT: No tonsillar erythema, exudates, or enlargement; Moist mucous membranes, Good dentition, No lesions  NECK: Supple, No JVD, Normal thyroid  NERVOUS SYSTEM:  Alert & Oriented X3, Good concentration; Motor Strength 5/5 B/L upper and lower extremities; DTRs 2+ intact and symmetric  CHEST/LUNG: Clear to percussion bilaterally; No rales, rhonchi, wheezing, or rubs  HEART: Regular rate and rhythm; No murmurs, rubs, or gallops  ABDOMEN: Soft, Nontender, Nondistended; Bowel sounds present  EXTREMITIES:  2+ Peripheral Pulses, No clubbing, cyanosis, or edema      Consultant(s) Notes Reviewed:  [x ] YES  [ ] NO  Care Discussed with Consultants/Other Providers [ x] YES  [ ] NO    LABS:      RADIOLOGY & ADDITIONAL TESTS:      MEDICATIONS  (STANDING):  diVALproex  milliGRAM(s) Oral daily  diVALproex  milliGRAM(s) Oral daily  diVALproex  milliGRAM(s) Oral at bedtime  diVALproex  milliGRAM(s) Oral at bedtime  lithium CR (ESKALITH-CR) 450 milliGRAM(s) Oral two times a day  risperiDONE   Tablet 2 milliGRAM(s) Oral two times a day    MEDICATIONS  (PRN):  hydrOXYzine hydrochloride 25 milliGRAM(s) Oral at bedtime PRN insomnia

## 2023-02-12 PROCEDURE — 99231 SBSQ HOSP IP/OBS SF/LOW 25: CPT

## 2023-02-12 RX ADMIN — Medication 25 MILLIGRAM(S): at 01:10

## 2023-02-12 RX ADMIN — DIVALPROEX SODIUM 500 MILLIGRAM(S): 500 TABLET, DELAYED RELEASE ORAL at 21:22

## 2023-02-12 RX ADMIN — RISPERIDONE 2 MILLIGRAM(S): 4 TABLET ORAL at 05:27

## 2023-02-12 RX ADMIN — RISPERIDONE 2 MILLIGRAM(S): 4 TABLET ORAL at 17:24

## 2023-02-12 RX ADMIN — DIVALPROEX SODIUM 250 MILLIGRAM(S): 500 TABLET, DELAYED RELEASE ORAL at 12:25

## 2023-02-12 RX ADMIN — LITHIUM CARBONATE 450 MILLIGRAM(S): 300 TABLET, EXTENDED RELEASE ORAL at 05:27

## 2023-02-12 RX ADMIN — DIVALPROEX SODIUM 250 MILLIGRAM(S): 500 TABLET, DELAYED RELEASE ORAL at 21:22

## 2023-02-12 RX ADMIN — LITHIUM CARBONATE 450 MILLIGRAM(S): 300 TABLET, EXTENDED RELEASE ORAL at 17:24

## 2023-02-12 RX ADMIN — DIVALPROEX SODIUM 500 MILLIGRAM(S): 500 TABLET, DELAYED RELEASE ORAL at 12:25

## 2023-02-12 NOTE — PROGRESS NOTE ADULT - SUBJECTIVE AND OBJECTIVE BOX
Progress note    Patient seen and examined at bedside. Patient denies any new complaints, patient appears comfortable and resting. He was sitting up on the bed and reports he feels ok.    INTERVAL HPI/OVERNIGHT EVENTS:    REVIEW OF SYSTEMS: All systems reviewed and are otherwise negative      Vital Signs Last 24 Hrs  T(C): 36.4 (12 Feb 2023 04:42), Max: 37.1 (11 Feb 2023 14:23)  T(F): 97.5 (12 Feb 2023 04:42), Max: 98.7 (11 Feb 2023 14:23)  HR: 68 (12 Feb 2023 04:42) (62 - 74)  BP: 128/58 (12 Feb 2023 04:42) (107/70 - 128/58)  BP(mean): --  RR: 16 (12 Feb 2023 04:42) (16 - 18)  SpO2: --      PHYSICAL EXAM:  GENERAL: NAD, well-groomed, well-developed  HEAD:  Atraumatic, Normocephalic  EYES: EOMI, PERRLA, conjunctiva and sclera clear  ENMT: No tonsillar erythema, exudates, or enlargement; Moist mucous membranes, Good dentition, No lesions  NECK: Supple, No JVD, Normal thyroid  NERVOUS SYSTEM:  Alert & Oriented X3, Good concentration; Motor Strength 5/5 B/L upper and lower extremities; DTRs 2+ intact and symmetric  CHEST/LUNG: Clear to percussion bilaterally; No rales, rhonchi, wheezing, or rubs  HEART: Regular rate and rhythm; No murmurs, rubs, or gallops  ABDOMEN: Soft, Nontender, Nondistended; Bowel sounds present  EXTREMITIES:  2+ Peripheral Pulses, No clubbing, cyanosis, or edema      Consultant(s) Notes Reviewed:  [x ] YES  [ ] NO  Care Discussed with Consultants/Other Providers [ x] YES  [ ] NO    LABS:      RADIOLOGY & ADDITIONAL TESTS:      MEDICATIONS  (STANDING):  diVALproex  milliGRAM(s) Oral daily  diVALproex  milliGRAM(s) Oral daily  diVALproex  milliGRAM(s) Oral at bedtime  diVALproex  milliGRAM(s) Oral at bedtime  lithium CR (ESKALITH-CR) 450 milliGRAM(s) Oral two times a day  risperiDONE   Tablet 2 milliGRAM(s) Oral two times a day    MEDICATIONS  (PRN):  hydrOXYzine hydrochloride 25 milliGRAM(s) Oral at bedtime PRN insomnia

## 2023-02-13 PROCEDURE — 99231 SBSQ HOSP IP/OBS SF/LOW 25: CPT

## 2023-02-13 RX ADMIN — LITHIUM CARBONATE 450 MILLIGRAM(S): 300 TABLET, EXTENDED RELEASE ORAL at 18:04

## 2023-02-13 RX ADMIN — DIVALPROEX SODIUM 500 MILLIGRAM(S): 500 TABLET, DELAYED RELEASE ORAL at 21:25

## 2023-02-13 RX ADMIN — DIVALPROEX SODIUM 250 MILLIGRAM(S): 500 TABLET, DELAYED RELEASE ORAL at 21:25

## 2023-02-13 RX ADMIN — DIVALPROEX SODIUM 250 MILLIGRAM(S): 500 TABLET, DELAYED RELEASE ORAL at 12:24

## 2023-02-13 RX ADMIN — RISPERIDONE 2 MILLIGRAM(S): 4 TABLET ORAL at 18:03

## 2023-02-13 RX ADMIN — Medication 25 MILLIGRAM(S): at 01:28

## 2023-02-13 RX ADMIN — LITHIUM CARBONATE 450 MILLIGRAM(S): 300 TABLET, EXTENDED RELEASE ORAL at 05:31

## 2023-02-13 RX ADMIN — DIVALPROEX SODIUM 500 MILLIGRAM(S): 500 TABLET, DELAYED RELEASE ORAL at 12:24

## 2023-02-13 RX ADMIN — RISPERIDONE 2 MILLIGRAM(S): 4 TABLET ORAL at 05:30

## 2023-02-13 NOTE — PROGRESS NOTE ADULT - SUBJECTIVE AND OBJECTIVE BOX
Progress note    Patient seen and examined at bedside. Patient denies any new complaints, patient appears comfortable and resting.   INTERVAL HPI/OVERNIGHT EVENTS:    REVIEW OF SYSTEMS: All systems reviewed and are otherwise negative      Vital Signs Last 24 Hrs  T(C): 36.5 (13 Feb 2023 04:32), Max: 36.5 (13 Feb 2023 04:32)  T(F): 97.7 (13 Feb 2023 04:32), Max: 97.7 (13 Feb 2023 04:32)  HR: 74 (13 Feb 2023 04:32) (74 - 83)  BP: 103/66 (13 Feb 2023 04:32) (103/66 - 116/71)  BP(mean): --  RR: 18 (13 Feb 2023 04:32) (16 - 18)  SpO2: --    PHYSICAL EXAM:  GENERAL: NAD, well-groomed, well-developed  HEAD:  Atraumatic, Normocephalic  EYES: EOMI, PERRLA, conjunctiva and sclera clear  ENMT: No tonsillar erythema, exudates, or enlargement; Moist mucous membranes, Good dentition, No lesions  NECK: Supple, No JVD, Normal thyroid  NERVOUS SYSTEM:  Alert & Oriented X3, Good concentration; Motor Strength 5/5 B/L upper and lower extremities; DTRs 2+ intact and symmetric  CHEST/LUNG: Clear to percussion bilaterally; No rales, rhonchi, wheezing, or rubs  HEART: Regular rate and rhythm; No murmurs, rubs, or gallops  ABDOMEN: Soft, Nontender, Nondistended; Bowel sounds present  EXTREMITIES:  2+ Peripheral Pulses, No clubbing, cyanosis, or edema      Consultant(s) Notes Reviewed:  [x ] YES  [ ] NO  Care Discussed with Consultants/Other Providers [ x] YES  [ ] NO    LABS:      RADIOLOGY & ADDITIONAL TESTS:      MEDICATIONS  (STANDING):  diVALproex  milliGRAM(s) Oral daily  diVALproex  milliGRAM(s) Oral daily  diVALproex  milliGRAM(s) Oral at bedtime  diVALproex  milliGRAM(s) Oral at bedtime  lithium CR (ESKALITH-CR) 450 milliGRAM(s) Oral two times a day  risperiDONE   Tablet 2 milliGRAM(s) Oral two times a day    MEDICATIONS  (PRN):  hydrOXYzine hydrochloride 25 milliGRAM(s) Oral at bedtime PRN insomnia

## 2023-02-14 PROCEDURE — 99231 SBSQ HOSP IP/OBS SF/LOW 25: CPT

## 2023-02-14 RX ADMIN — DIVALPROEX SODIUM 250 MILLIGRAM(S): 500 TABLET, DELAYED RELEASE ORAL at 12:16

## 2023-02-14 RX ADMIN — DIVALPROEX SODIUM 500 MILLIGRAM(S): 500 TABLET, DELAYED RELEASE ORAL at 21:48

## 2023-02-14 RX ADMIN — DIVALPROEX SODIUM 500 MILLIGRAM(S): 500 TABLET, DELAYED RELEASE ORAL at 12:18

## 2023-02-14 RX ADMIN — LITHIUM CARBONATE 450 MILLIGRAM(S): 300 TABLET, EXTENDED RELEASE ORAL at 05:16

## 2023-02-14 RX ADMIN — RISPERIDONE 2 MILLIGRAM(S): 4 TABLET ORAL at 05:16

## 2023-02-14 RX ADMIN — LITHIUM CARBONATE 450 MILLIGRAM(S): 300 TABLET, EXTENDED RELEASE ORAL at 17:06

## 2023-02-14 RX ADMIN — DIVALPROEX SODIUM 250 MILLIGRAM(S): 500 TABLET, DELAYED RELEASE ORAL at 21:48

## 2023-02-14 RX ADMIN — RISPERIDONE 2 MILLIGRAM(S): 4 TABLET ORAL at 17:05

## 2023-02-14 RX ADMIN — Medication 25 MILLIGRAM(S): at 02:09

## 2023-02-14 NOTE — PROGRESS NOTE ADULT - SUBJECTIVE AND OBJECTIVE BOX
Progress note    Patient seen and examined at bedside. Patient denies any new complaints, patient appears comfortable and resting.     REVIEW OF SYSTEMS: All systems reviewed and are otherwise negative      Vital Signs Last 24 Hrs  T(C): 36.5 (13 Feb 2023 04:32), Max: 36.5 (13 Feb 2023 04:32)  T(F): 97.7 (13 Feb 2023 04:32), Max: 97.7 (13 Feb 2023 04:32)  HR: 74 (13 Feb 2023 04:32) (74 - 83)  BP: 103/66 (13 Feb 2023 04:32) (103/66 - 116/71)  BP(mean): --  RR: 18 (13 Feb 2023 04:32) (16 - 18)  SpO2: --    PHYSICAL EXAM:  GENERAL: NAD, well-groomed, well-developed  HEAD:  Atraumatic, Normocephalic  EYES: EOMI, PERRLA, conjunctiva and sclera clear  ENMT: No tonsillar erythema, exudates, or enlargement; Moist mucous membranes, Good dentition, No lesions  NECK: Supple, No JVD, Normal thyroid  NERVOUS SYSTEM:  Alert & Oriented X3, Good concentration; Motor Strength 5/5 B/L upper and lower extremities; DTRs 2+ intact and symmetric  CHEST/LUNG: Clear to percussion bilaterally; No rales, rhonchi, wheezing, or rubs  HEART: Regular rate and rhythm; No murmurs, rubs, or gallops  ABDOMEN: Soft, Nontender, Nondistended; Bowel sounds present  EXTREMITIES:  2+ Peripheral Pulses, No clubbing, cyanosis, or edema      Consultant(s) Notes Reviewed:  [x ] YES  [ ] NO  Care Discussed with Consultants/Other Providers [ x] YES  [ ] NO    LABS:      RADIOLOGY & ADDITIONAL TESTS:      MEDICATIONS  (STANDING):  diVALproex  milliGRAM(s) Oral daily  diVALproex  milliGRAM(s) Oral daily  diVALproex  milliGRAM(s) Oral at bedtime  diVALproex  milliGRAM(s) Oral at bedtime  lithium CR (ESKALITH-CR) 450 milliGRAM(s) Oral two times a day  risperiDONE   Tablet 2 milliGRAM(s) Oral two times a day    MEDICATIONS  (PRN):  hydrOXYzine hydrochloride 25 milliGRAM(s) Oral at bedtime PRN insomnia

## 2023-02-15 PROCEDURE — 99231 SBSQ HOSP IP/OBS SF/LOW 25: CPT

## 2023-02-15 RX ADMIN — LITHIUM CARBONATE 450 MILLIGRAM(S): 300 TABLET, EXTENDED RELEASE ORAL at 17:02

## 2023-02-15 RX ADMIN — LITHIUM CARBONATE 450 MILLIGRAM(S): 300 TABLET, EXTENDED RELEASE ORAL at 05:49

## 2023-02-15 RX ADMIN — DIVALPROEX SODIUM 250 MILLIGRAM(S): 500 TABLET, DELAYED RELEASE ORAL at 21:19

## 2023-02-15 RX ADMIN — DIVALPROEX SODIUM 500 MILLIGRAM(S): 500 TABLET, DELAYED RELEASE ORAL at 21:19

## 2023-02-15 RX ADMIN — DIVALPROEX SODIUM 250 MILLIGRAM(S): 500 TABLET, DELAYED RELEASE ORAL at 12:00

## 2023-02-15 RX ADMIN — Medication 25 MILLIGRAM(S): at 01:33

## 2023-02-15 RX ADMIN — RISPERIDONE 2 MILLIGRAM(S): 4 TABLET ORAL at 17:02

## 2023-02-15 RX ADMIN — RISPERIDONE 2 MILLIGRAM(S): 4 TABLET ORAL at 05:48

## 2023-02-15 RX ADMIN — DIVALPROEX SODIUM 500 MILLIGRAM(S): 500 TABLET, DELAYED RELEASE ORAL at 12:01

## 2023-02-15 NOTE — PROGRESS NOTE ADULT - SUBJECTIVE AND OBJECTIVE BOX
PROGRESS NOTE:   Joey Vasques MD  Available on teams    Patient is a 22y old  Male who presents with a chief complaint of Placement (14 Feb 2023 10:58)      SUBJECTIVE / OVERNIGHT EVENTS:  Seen for follow up for placement issue  Reports no new complaints  Denies fever, chills, fatigue, chest pain, shortness of breath, abdominal pain, nausea/ vomiting or diarrhea    ADDITIONAL REVIEW OF SYSTEMS:    MEDICATIONS  (STANDING):  diVALproex  milliGRAM(s) Oral daily  diVALproex  milliGRAM(s) Oral daily  diVALproex  milliGRAM(s) Oral at bedtime  diVALproex  milliGRAM(s) Oral at bedtime  lithium CR (ESKALITH-CR) 450 milliGRAM(s) Oral two times a day  risperiDONE   Tablet 2 milliGRAM(s) Oral two times a day    MEDICATIONS  (PRN):  hydrOXYzine hydrochloride 25 milliGRAM(s) Oral at bedtime PRN insomnia      CAPILLARY BLOOD GLUCOSE        I&O's Summary      PHYSICAL EXAM:  Vital Signs Last 24 Hrs  T(C): 36.6 (14 Feb 2023 20:12), Max: 36.6 (14 Feb 2023 20:12)  T(F): 97.9 (14 Feb 2023 20:12), Max: 97.9 (14 Feb 2023 20:12)  HR: 101 (14 Feb 2023 20:12) (86 - 101)  BP: 113/68 (14 Feb 2023 20:12) (113/68 - 126/74)  BP(mean): --  RR: 20 (14 Feb 2023 20:12) (18 - 20)  SpO2: --        GENERAL: No acute distress, well-developed  HEAD:  Atraumatic, Normocephalic  EYES: EOMI, PERRLA, conjunctiva and sclera clear  NECK: Supple, no lymphadenopathy, no JVD  CHEST/LUNG: CTAB; No wheezes, rales, or rhonchi  HEART: Regular rate and rhythm; No murmurs, rubs, or gallops  ABDOMEN: Soft, non-tender, non-distended; normal bowel sounds, no organomegaly  EXTREMITIES:  2+ peripheral pulses b/l, No clubbing, cyanosis, or edema  NEUROLOGY:no focal deficits  SKIN: No rashes or lesions    LABS:                      RADIOLOGY & ADDITIONAL TESTS:  Results Reviewed:   Imaging Personally Reviewed:  Electrocardiogram Personally Reviewed:    COORDINATION OF CARE:  Care Discussed with Consultants/Other Providers [Y/N]:  Prior or Outpatient Records Reviewed [Y/N]:

## 2023-02-16 PROCEDURE — 99231 SBSQ HOSP IP/OBS SF/LOW 25: CPT

## 2023-02-16 RX ADMIN — LITHIUM CARBONATE 450 MILLIGRAM(S): 300 TABLET, EXTENDED RELEASE ORAL at 05:20

## 2023-02-16 RX ADMIN — DIVALPROEX SODIUM 250 MILLIGRAM(S): 500 TABLET, DELAYED RELEASE ORAL at 11:44

## 2023-02-16 RX ADMIN — DIVALPROEX SODIUM 500 MILLIGRAM(S): 500 TABLET, DELAYED RELEASE ORAL at 11:44

## 2023-02-16 RX ADMIN — LITHIUM CARBONATE 450 MILLIGRAM(S): 300 TABLET, EXTENDED RELEASE ORAL at 17:02

## 2023-02-16 RX ADMIN — DIVALPROEX SODIUM 500 MILLIGRAM(S): 500 TABLET, DELAYED RELEASE ORAL at 21:55

## 2023-02-16 RX ADMIN — RISPERIDONE 2 MILLIGRAM(S): 4 TABLET ORAL at 17:02

## 2023-02-16 RX ADMIN — RISPERIDONE 2 MILLIGRAM(S): 4 TABLET ORAL at 05:20

## 2023-02-16 RX ADMIN — DIVALPROEX SODIUM 250 MILLIGRAM(S): 500 TABLET, DELAYED RELEASE ORAL at 21:55

## 2023-02-16 RX ADMIN — Medication 25 MILLIGRAM(S): at 01:34

## 2023-02-16 NOTE — PROGRESS NOTE ADULT - SUBJECTIVE AND OBJECTIVE BOX
PROGRESS NOTE:   Joey Vasques MD  Available on teams    Patient is a 22y old  Male who presents with a chief complaint of Placement (14 Feb 2023 10:58)      SUBJECTIVE / OVERNIGHT EVENTS:  Seen for follow up for placement issue  Reports no new complaints  Denies fever, chills, fatigue, chest pain, shortness of breath, abdominal pain, nausea/ vomiting or diarrhea    ADDITIONAL REVIEW OF SYSTEMS:    MEDICATIONS  (STANDING):  diVALproex  milliGRAM(s) Oral daily  diVALproex  milliGRAM(s) Oral daily  diVALproex  milliGRAM(s) Oral at bedtime  diVALproex  milliGRAM(s) Oral at bedtime  lithium CR (ESKALITH-CR) 450 milliGRAM(s) Oral two times a day  risperiDONE   Tablet 2 milliGRAM(s) Oral two times a day    MEDICATIONS  (PRN):  hydrOXYzine hydrochloride 25 milliGRAM(s) Oral at bedtime PRN insomnia      CAPILLARY BLOOD GLUCOSE        I&O's Summary      PHYSICAL EXAM:  Vital Signs Last 24 Hrs  T(C): 36.2 (16 Feb 2023 05:05), Max: 36.4 (15 Feb 2023 13:36)  T(F): 97.1 (16 Feb 2023 05:05), Max: 97.6 (15 Feb 2023 13:36)  HR: 71 (16 Feb 2023 05:05) (71 - 92)  BP: 91/55 (16 Feb 2023 05:05) (91/55 - 125/88)  BP(mean): --  RR: 18 (16 Feb 2023 05:05) (18 - 20)  SpO2: --          GENERAL: No acute distress, well-developed  HEAD:  Atraumatic, Normocephalic  EYES: EOMI, PERRLA, conjunctiva and sclera clear  NECK: Supple, no lymphadenopathy, no JVD  CHEST/LUNG: CTAB; No wheezes, rales, or rhonchi  HEART: Regular rate and rhythm; No murmurs, rubs, or gallops  ABDOMEN: Soft, non-tender, non-distended; normal bowel sounds, no organomegaly  EXTREMITIES:  2+ peripheral pulses b/l, No clubbing, cyanosis, or edema  NEUROLOGY:no focal deficits  SKIN: No rashes or lesions    LABS:                      RADIOLOGY & ADDITIONAL TESTS:  Results Reviewed:   Imaging Personally Reviewed:  Electrocardiogram Personally Reviewed:    COORDINATION OF CARE:  Care Discussed with Consultants/Other Providers [Y/N]:  Prior or Outpatient Records Reviewed [Y/N]:

## 2023-02-17 PROCEDURE — 99231 SBSQ HOSP IP/OBS SF/LOW 25: CPT

## 2023-02-17 RX ADMIN — DIVALPROEX SODIUM 500 MILLIGRAM(S): 500 TABLET, DELAYED RELEASE ORAL at 21:57

## 2023-02-17 RX ADMIN — DIVALPROEX SODIUM 250 MILLIGRAM(S): 500 TABLET, DELAYED RELEASE ORAL at 21:57

## 2023-02-17 RX ADMIN — LITHIUM CARBONATE 450 MILLIGRAM(S): 300 TABLET, EXTENDED RELEASE ORAL at 21:56

## 2023-02-17 RX ADMIN — RISPERIDONE 2 MILLIGRAM(S): 4 TABLET ORAL at 05:22

## 2023-02-17 RX ADMIN — Medication 25 MILLIGRAM(S): at 01:37

## 2023-02-17 RX ADMIN — DIVALPROEX SODIUM 250 MILLIGRAM(S): 500 TABLET, DELAYED RELEASE ORAL at 12:15

## 2023-02-17 RX ADMIN — LITHIUM CARBONATE 450 MILLIGRAM(S): 300 TABLET, EXTENDED RELEASE ORAL at 05:22

## 2023-02-17 RX ADMIN — DIVALPROEX SODIUM 500 MILLIGRAM(S): 500 TABLET, DELAYED RELEASE ORAL at 12:15

## 2023-02-17 RX ADMIN — RISPERIDONE 2 MILLIGRAM(S): 4 TABLET ORAL at 21:55

## 2023-02-17 NOTE — PROGRESS NOTE ADULT - SUBJECTIVE AND OBJECTIVE BOX
PROGRESS NOTE:   Joey Vasques MD  Available on teams    Patient is a 22y old  Male who presents with a chief complaint of Placement (16 Feb 2023 13:19)      SUBJECTIVE / OVERNIGHT EVENTS:  Reports no new complaints  Denies fever, chills, fatigue, chest pain, shortness of breath, abdominal pain, nausea/ vomiting or diarrhea    ADDITIONAL REVIEW OF SYSTEMS:    MEDICATIONS  (STANDING):  diVALproex  milliGRAM(s) Oral daily  diVALproex  milliGRAM(s) Oral daily  diVALproex  milliGRAM(s) Oral at bedtime  diVALproex  milliGRAM(s) Oral at bedtime  lithium CR (ESKALITH-CR) 450 milliGRAM(s) Oral two times a day  risperiDONE   Tablet 2 milliGRAM(s) Oral two times a day    MEDICATIONS  (PRN):  hydrOXYzine hydrochloride 25 milliGRAM(s) Oral at bedtime PRN insomnia      CAPILLARY BLOOD GLUCOSE        I&O's Summary      PHYSICAL EXAM:  Vital Signs Last 24 Hrs  T(C): 36.1 (17 Feb 2023 05:05), Max: 36.9 (16 Feb 2023 14:04)  T(F): 97 (17 Feb 2023 05:05), Max: 98.5 (16 Feb 2023 14:04)  HR: 69 (17 Feb 2023 05:05) (69 - 84)  BP: 108/61 (17 Feb 2023 05:05) (99/56 - 109/64)  BP(mean): --  RR: 18 (17 Feb 2023 05:05) (18 - 18)  SpO2: 94% (16 Feb 2023 20:32) (94% - 94%)            GENERAL: No acute distress, well-developed  HEAD:  Atraumatic, Normocephalic  EYES: EOMI, PERRLA, conjunctiva and sclera clear  NECK: Supple, no lymphadenopathy, no JVD  CHEST/LUNG: CTAB; No wheezes, rales, or rhonchi  HEART: Regular rate and rhythm; No murmurs, rubs, or gallops  ABDOMEN: Soft, non-tender, non-distended; normal bowel sounds, no organomegaly  EXTREMITIES:  2+ peripheral pulses b/l, No clubbing, cyanosis, or edema  NEUROLOGY: A&O x 3, no focal deficits  SKIN: No rashes or lesions    LABS:                      RADIOLOGY & ADDITIONAL TESTS:  Results Reviewed:   Imaging Personally Reviewed:  Electrocardiogram Personally Reviewed:    COORDINATION OF CARE:  Care Discussed with Consultants/Other Providers [Y/N]:  Prior or Outpatient Records Reviewed [Y/N]:

## 2023-02-18 PROCEDURE — 99231 SBSQ HOSP IP/OBS SF/LOW 25: CPT

## 2023-02-18 RX ADMIN — DIVALPROEX SODIUM 250 MILLIGRAM(S): 500 TABLET, DELAYED RELEASE ORAL at 12:03

## 2023-02-18 RX ADMIN — LITHIUM CARBONATE 450 MILLIGRAM(S): 300 TABLET, EXTENDED RELEASE ORAL at 17:45

## 2023-02-18 RX ADMIN — DIVALPROEX SODIUM 250 MILLIGRAM(S): 500 TABLET, DELAYED RELEASE ORAL at 21:54

## 2023-02-18 RX ADMIN — DIVALPROEX SODIUM 500 MILLIGRAM(S): 500 TABLET, DELAYED RELEASE ORAL at 21:54

## 2023-02-18 RX ADMIN — RISPERIDONE 2 MILLIGRAM(S): 4 TABLET ORAL at 05:07

## 2023-02-18 RX ADMIN — LITHIUM CARBONATE 450 MILLIGRAM(S): 300 TABLET, EXTENDED RELEASE ORAL at 05:07

## 2023-02-18 RX ADMIN — Medication 25 MILLIGRAM(S): at 02:57

## 2023-02-18 RX ADMIN — DIVALPROEX SODIUM 500 MILLIGRAM(S): 500 TABLET, DELAYED RELEASE ORAL at 12:03

## 2023-02-18 RX ADMIN — RISPERIDONE 2 MILLIGRAM(S): 4 TABLET ORAL at 17:47

## 2023-02-18 NOTE — PROGRESS NOTE ADULT - SUBJECTIVE AND OBJECTIVE BOX
PROGRESS NOTE:   Joey Vasques MD  Available on teams    Patient is a 22y old  Male who presents with a chief complaint of Placement (16 Feb 2023 13:19)      SUBJECTIVE / OVERNIGHT EVENTS:  Reports no new complaints  Denies fever, chills, fatigue, chest pain, shortness of breath, abdominal pain, nausea/ vomiting or diarrhea    ADDITIONAL REVIEW OF SYSTEMS:    MEDICATIONS  (STANDING):  diVALproex  milliGRAM(s) Oral daily  diVALproex  milliGRAM(s) Oral daily  diVALproex  milliGRAM(s) Oral at bedtime  diVALproex  milliGRAM(s) Oral at bedtime  lithium CR (ESKALITH-CR) 450 milliGRAM(s) Oral two times a day  risperiDONE   Tablet 2 milliGRAM(s) Oral two times a day    MEDICATIONS  (PRN):  hydrOXYzine hydrochloride 25 milliGRAM(s) Oral at bedtime PRN insomnia      CAPILLARY BLOOD GLUCOSE        I&O's Summary      PHYSICAL EXAM:  Vital Signs Last 24 Hrs  T(C): 36.1 (18 Feb 2023 05:05), Max: 36.2 (17 Feb 2023 22:04)  T(F): 96.9 (18 Feb 2023 05:05), Max: 97.2 (17 Feb 2023 22:04)  HR: 64 (18 Feb 2023 05:05) (64 - 72)  BP: 102/60 (18 Feb 2023 05:05) (102/60 - 107/60)  BP(mean): --  RR: 16 (18 Feb 2023 05:05) (16 - 16)  SpO2: --          GENERAL: No acute distress, well-developed  HEAD:  Atraumatic, Normocephalic  EYES: EOMI, PERRLA, conjunctiva and sclera clear  NECK: Supple, no lymphadenopathy, no JVD  CHEST/LUNG: CTAB; No wheezes, rales, or rhonchi  HEART: Regular rate and rhythm; No murmurs, rubs, or gallops  ABDOMEN: Soft, non-tender, non-distended; normal bowel sounds, no organomegaly  EXTREMITIES:  2+ peripheral pulses b/l, No clubbing, cyanosis, or edema  NEUROLOGY: A&O x 3, no focal deficits  SKIN: No rashes or lesions    LABS:                      RADIOLOGY & ADDITIONAL TESTS:  Results Reviewed:   Imaging Personally Reviewed:  Electrocardiogram Personally Reviewed:    COORDINATION OF CARE:  Care Discussed with Consultants/Other Providers [Y/N]:  Prior or Outpatient Records Reviewed [Y/N]:

## 2023-02-19 PROCEDURE — 99231 SBSQ HOSP IP/OBS SF/LOW 25: CPT

## 2023-02-19 RX ADMIN — LITHIUM CARBONATE 450 MILLIGRAM(S): 300 TABLET, EXTENDED RELEASE ORAL at 06:07

## 2023-02-19 RX ADMIN — Medication 25 MILLIGRAM(S): at 02:04

## 2023-02-19 RX ADMIN — LITHIUM CARBONATE 450 MILLIGRAM(S): 300 TABLET, EXTENDED RELEASE ORAL at 17:27

## 2023-02-19 RX ADMIN — DIVALPROEX SODIUM 500 MILLIGRAM(S): 500 TABLET, DELAYED RELEASE ORAL at 22:04

## 2023-02-19 RX ADMIN — DIVALPROEX SODIUM 250 MILLIGRAM(S): 500 TABLET, DELAYED RELEASE ORAL at 13:47

## 2023-02-19 RX ADMIN — RISPERIDONE 2 MILLIGRAM(S): 4 TABLET ORAL at 17:27

## 2023-02-19 RX ADMIN — RISPERIDONE 2 MILLIGRAM(S): 4 TABLET ORAL at 06:07

## 2023-02-19 RX ADMIN — DIVALPROEX SODIUM 250 MILLIGRAM(S): 500 TABLET, DELAYED RELEASE ORAL at 22:05

## 2023-02-19 RX ADMIN — DIVALPROEX SODIUM 500 MILLIGRAM(S): 500 TABLET, DELAYED RELEASE ORAL at 13:47

## 2023-02-19 NOTE — PROGRESS NOTE ADULT - SUBJECTIVE AND OBJECTIVE BOX
PROGRESS NOTE:   Joey Vasques MD  Available on teams    Patient is a 22y old  Male who presents with a chief complaint of Placement (16 Feb 2023 13:19)      SUBJECTIVE / OVERNIGHT EVENTS:  Reports no new complaints  Denies fever, chills, fatigue, chest pain, shortness of breath, abdominal pain, nausea/ vomiting or diarrhea    ADDITIONAL REVIEW OF SYSTEMS:    MEDICATIONS  (STANDING):  diVALproex  milliGRAM(s) Oral daily  diVALproex  milliGRAM(s) Oral daily  diVALproex  milliGRAM(s) Oral at bedtime  diVALproex  milliGRAM(s) Oral at bedtime  lithium CR (ESKALITH-CR) 450 milliGRAM(s) Oral two times a day  risperiDONE   Tablet 2 milliGRAM(s) Oral two times a day    MEDICATIONS  (PRN):  hydrOXYzine hydrochloride 25 milliGRAM(s) Oral at bedtime PRN insomnia      CAPILLARY BLOOD GLUCOSE        I&O's Summary      PHYSICAL EXAM:  Vital Signs Last 24 Hrs  T(C): 35.5 (19 Feb 2023 05:00), Max: 36.7 (18 Feb 2023 21:19)  T(F): 95.9 (19 Feb 2023 05:00), Max: 98.1 (18 Feb 2023 21:19)  HR: 69 (19 Feb 2023 05:00) (69 - 86)  BP: 106/64 (19 Feb 2023 05:00) (106/64 - 136/77)  BP(mean): --  RR: 16 (18 Feb 2023 21:19) (16 - 16)  SpO2: 97% (19 Feb 2023 05:00) (97% - 97%)      GENERAL: No acute distress, well-developed  HEAD:  Atraumatic, Normocephalic  EYES: EOMI, PERRLA, conjunctiva and sclera clear  NECK: Supple, no lymphadenopathy, no JVD  CHEST/LUNG: CTAB; No wheezes, rales, or rhonchi  HEART: Regular rate and rhythm; No murmurs, rubs, or gallops  ABDOMEN: Soft, non-tender, non-distended; normal bowel sounds, no organomegaly  EXTREMITIES:  2+ peripheral pulses b/l, No clubbing, cyanosis, or edema  NEUROLOGY: A&O x 3, no focal deficits  SKIN: No rashes or lesions    LABS:                      RADIOLOGY & ADDITIONAL TESTS:  Results Reviewed:   Imaging Personally Reviewed:  Electrocardiogram Personally Reviewed:    COORDINATION OF CARE:  Care Discussed with Consultants/Other Providers [Y/N]:  Prior or Outpatient Records Reviewed [Y/N]:

## 2023-02-19 NOTE — PROGRESS NOTE ADULT - ASSESSMENT
Sent to [facility] 22 years old male past medical history of bipolar and developmental delay came to emergency room for alleged aggressive and violent behavior as per step father. Evaluated by Psych; Step Father left stated pt needs placement refusing to take child home.     Bipolar disorder and likely developmental disorder- stable  - continue current meds (on depakote, risperidone, and lithium)   - pt has been stable without aggressive behaviour in the hospital  - father states he cant take care of pt    Depressed mood, negative thoughts  - cont w lithium and valproic acid, levels wnl, c/w risperiDONE     - psych consult appreciated    Obesity: Likely multifactorial: excess calories and Psych medication induced     DVT px - pt ambulatory     DISPO: pending placement/gaurdianship

## 2023-02-20 PROCEDURE — 99231 SBSQ HOSP IP/OBS SF/LOW 25: CPT

## 2023-02-20 RX ADMIN — DIVALPROEX SODIUM 500 MILLIGRAM(S): 500 TABLET, DELAYED RELEASE ORAL at 11:50

## 2023-02-20 RX ADMIN — LITHIUM CARBONATE 450 MILLIGRAM(S): 300 TABLET, EXTENDED RELEASE ORAL at 06:15

## 2023-02-20 RX ADMIN — DIVALPROEX SODIUM 500 MILLIGRAM(S): 500 TABLET, DELAYED RELEASE ORAL at 21:09

## 2023-02-20 RX ADMIN — RISPERIDONE 2 MILLIGRAM(S): 4 TABLET ORAL at 06:15

## 2023-02-20 RX ADMIN — DIVALPROEX SODIUM 250 MILLIGRAM(S): 500 TABLET, DELAYED RELEASE ORAL at 11:48

## 2023-02-20 RX ADMIN — LITHIUM CARBONATE 450 MILLIGRAM(S): 300 TABLET, EXTENDED RELEASE ORAL at 17:20

## 2023-02-20 RX ADMIN — Medication 25 MILLIGRAM(S): at 02:12

## 2023-02-20 RX ADMIN — DIVALPROEX SODIUM 250 MILLIGRAM(S): 500 TABLET, DELAYED RELEASE ORAL at 21:09

## 2023-02-20 RX ADMIN — RISPERIDONE 2 MILLIGRAM(S): 4 TABLET ORAL at 17:20

## 2023-02-20 NOTE — PROGRESS NOTE ADULT - SUBJECTIVE AND OBJECTIVE BOX
PROGRESS NOTE:   Joey Vasques MD  Available on teams    Patient is a 22y old  Male who presents with a chief complaint of Placement (16 Feb 2023 13:19)      SUBJECTIVE / OVERNIGHT EVENTS:  Reports no new complaints  Denies fever, chills, fatigue, chest pain, shortness of breath, abdominal pain, nausea/ vomiting or diarrhea    ADDITIONAL REVIEW OF SYSTEMS:    MEDICATIONS  (STANDING):  diVALproex  milliGRAM(s) Oral daily  diVALproex  milliGRAM(s) Oral daily  diVALproex  milliGRAM(s) Oral at bedtime  diVALproex  milliGRAM(s) Oral at bedtime  lithium CR (ESKALITH-CR) 450 milliGRAM(s) Oral two times a day  risperiDONE   Tablet 2 milliGRAM(s) Oral two times a day    MEDICATIONS  (PRN):  hydrOXYzine hydrochloride 25 milliGRAM(s) Oral at bedtime PRN insomnia      CAPILLARY BLOOD GLUCOSE        I&O's Summary      PHYSICAL EXAM:  Vital Signs Last 24 Hrs  T(C): 36 (20 Feb 2023 11:30), Max: 36.3 (20 Feb 2023 05:00)  T(F): 96.8 (20 Feb 2023 11:30), Max: 97.4 (20 Feb 2023 05:00)  HR: 98 (20 Feb 2023 11:30) (79 - 98)  BP: 131/82 (20 Feb 2023 11:30) (107/58 - 131/82)  BP(mean): --  RR: 17 (19 Feb 2023 21:12) (17 - 17)  SpO2: 96% (20 Feb 2023 11:30) (96% - 96%)        GENERAL: No acute distress, well-developed  HEAD:  Atraumatic, Normocephalic  EYES: EOMI, PERRLA, conjunctiva and sclera clear  NECK: Supple, no lymphadenopathy, no JVD  CHEST/LUNG: CTAB; No wheezes, rales, or rhonchi  HEART: Regular rate and rhythm; No murmurs, rubs, or gallops  ABDOMEN: Soft, non-tender, non-distended; normal bowel sounds, no organomegaly  EXTREMITIES:  2+ peripheral pulses b/l, No clubbing, cyanosis, or edema  NEUROLOGY: A&O x 3, no focal deficits  SKIN: No rashes or lesions    LABS:                      RADIOLOGY & ADDITIONAL TESTS:  Results Reviewed:   Imaging Personally Reviewed:  Electrocardiogram Personally Reviewed:    COORDINATION OF CARE:  Care Discussed with Consultants/Other Providers [Y/N]:  Prior or Outpatient Records Reviewed [Y/N]:

## 2023-02-21 PROCEDURE — 99231 SBSQ HOSP IP/OBS SF/LOW 25: CPT

## 2023-02-21 RX ADMIN — Medication 25 MILLIGRAM(S): at 01:21

## 2023-02-21 RX ADMIN — DIVALPROEX SODIUM 500 MILLIGRAM(S): 500 TABLET, DELAYED RELEASE ORAL at 12:22

## 2023-02-21 RX ADMIN — RISPERIDONE 2 MILLIGRAM(S): 4 TABLET ORAL at 05:53

## 2023-02-21 RX ADMIN — RISPERIDONE 2 MILLIGRAM(S): 4 TABLET ORAL at 17:29

## 2023-02-21 RX ADMIN — DIVALPROEX SODIUM 500 MILLIGRAM(S): 500 TABLET, DELAYED RELEASE ORAL at 21:35

## 2023-02-21 RX ADMIN — DIVALPROEX SODIUM 250 MILLIGRAM(S): 500 TABLET, DELAYED RELEASE ORAL at 12:22

## 2023-02-21 RX ADMIN — DIVALPROEX SODIUM 250 MILLIGRAM(S): 500 TABLET, DELAYED RELEASE ORAL at 21:35

## 2023-02-21 RX ADMIN — LITHIUM CARBONATE 450 MILLIGRAM(S): 300 TABLET, EXTENDED RELEASE ORAL at 05:53

## 2023-02-21 RX ADMIN — LITHIUM CARBONATE 450 MILLIGRAM(S): 300 TABLET, EXTENDED RELEASE ORAL at 17:29

## 2023-02-21 NOTE — PROGRESS NOTE ADULT - SUBJECTIVE AND OBJECTIVE BOX
PROGRESS NOTE:   Joey Vasques MD  Available on teams    Patient is a 22y old  Male who presents with a chief complaint of Placement (16 Feb 2023 13:19)      SUBJECTIVE / OVERNIGHT EVENTS:  Reports no new complaints  Denies fever, chills, fatigue, chest pain, shortness of breath, abdominal pain, nausea/ vomiting or diarrhea  Listening to music    ADDITIONAL REVIEW OF SYSTEMS:    MEDICATIONS  (STANDING):  diVALproex  milliGRAM(s) Oral daily  diVALproex  milliGRAM(s) Oral daily  diVALproex  milliGRAM(s) Oral at bedtime  diVALproex  milliGRAM(s) Oral at bedtime  lithium CR (ESKALITH-CR) 450 milliGRAM(s) Oral two times a day  risperiDONE   Tablet 2 milliGRAM(s) Oral two times a day    MEDICATIONS  (PRN):  hydrOXYzine hydrochloride 25 milliGRAM(s) Oral at bedtime PRN insomnia      CAPILLARY BLOOD GLUCOSE        I&O's Summary      PHYSICAL EXAM:  Vital Signs Last 24 Hrs  T(C): 36.7 (21 Feb 2023 05:20), Max: 36.7 (21 Feb 2023 05:20)  T(F): 98 (21 Feb 2023 05:20), Max: 98 (21 Feb 2023 05:20)  HR: 53 (21 Feb 2023 05:20) (53 - 69)  BP: 111/59 (21 Feb 2023 05:20) (107/65 - 111/59)  BP(mean): --  RR: 17 (21 Feb 2023 05:20) (17 - 17)  SpO2: 98% (21 Feb 2023 05:20) (98% - 98%)        GENERAL: No acute distress, well-developed  HEAD:  Atraumatic, Normocephalic  EYES: EOMI, PERRLA, conjunctiva and sclera clear  NECK: Supple, no lymphadenopathy, no JVD  CHEST/LUNG: CTAB; No wheezes, rales, or rhonchi  HEART: Regular rate and rhythm; No murmurs, rubs, or gallops  ABDOMEN: Soft, non-tender, non-distended; normal bowel sounds, no organomegaly  EXTREMITIES:  2+ peripheral pulses b/l, No clubbing, cyanosis, or edema  NEUROLOGY: A&O x 3, no focal deficits  SKIN: No rashes or lesions    LABS:                      RADIOLOGY & ADDITIONAL TESTS:  Results Reviewed:   Imaging Personally Reviewed:  Electrocardiogram Personally Reviewed:    COORDINATION OF CARE:  Care Discussed with Consultants/Other Providers [Y/N]:  Prior or Outpatient Records Reviewed [Y/N]:

## 2023-02-22 PROCEDURE — 99231 SBSQ HOSP IP/OBS SF/LOW 25: CPT

## 2023-02-22 RX ADMIN — RISPERIDONE 2 MILLIGRAM(S): 4 TABLET ORAL at 17:30

## 2023-02-22 RX ADMIN — LITHIUM CARBONATE 450 MILLIGRAM(S): 300 TABLET, EXTENDED RELEASE ORAL at 17:30

## 2023-02-22 RX ADMIN — DIVALPROEX SODIUM 500 MILLIGRAM(S): 500 TABLET, DELAYED RELEASE ORAL at 21:42

## 2023-02-22 RX ADMIN — DIVALPROEX SODIUM 500 MILLIGRAM(S): 500 TABLET, DELAYED RELEASE ORAL at 12:06

## 2023-02-22 RX ADMIN — LITHIUM CARBONATE 450 MILLIGRAM(S): 300 TABLET, EXTENDED RELEASE ORAL at 05:12

## 2023-02-22 RX ADMIN — DIVALPROEX SODIUM 250 MILLIGRAM(S): 500 TABLET, DELAYED RELEASE ORAL at 21:42

## 2023-02-22 RX ADMIN — RISPERIDONE 2 MILLIGRAM(S): 4 TABLET ORAL at 05:12

## 2023-02-22 RX ADMIN — Medication 25 MILLIGRAM(S): at 01:17

## 2023-02-22 RX ADMIN — DIVALPROEX SODIUM 250 MILLIGRAM(S): 500 TABLET, DELAYED RELEASE ORAL at 12:06

## 2023-02-22 NOTE — PROGRESS NOTE ADULT - SUBJECTIVE AND OBJECTIVE BOX
PROGRESS NOTE:   Joey Vasques MD  Available on teams    Patient is a 22y old  Male who presents with a chief complaint of Placement (16 Feb 2023 13:19)      SUBJECTIVE / OVERNIGHT EVENTS:  Patient seen and examined at bedside; patient appears comfortable, denies any complaints.       ADDITIONAL REVIEW OF SYSTEMS: All systems were reviewed and are otherwise negative    MEDICATIONS  (STANDING):  diVALproex  milliGRAM(s) Oral daily  diVALproex  milliGRAM(s) Oral daily  diVALproex  milliGRAM(s) Oral at bedtime  diVALproex  milliGRAM(s) Oral at bedtime  lithium CR (ESKALITH-CR) 450 milliGRAM(s) Oral two times a day  risperiDONE   Tablet 2 milliGRAM(s) Oral two times a day    MEDICATIONS  (PRN):  hydrOXYzine hydrochloride 25 milliGRAM(s) Oral at bedtime PRN insomnia        CAPILLARY BLOOD GLUCOSE        I&O's Summary      PHYSICAL EXAM:  Vital Signs Last 24 Hrs  T(C): 36 (21 Feb 2023 21:04), Max: 36 (21 Feb 2023 21:04)  T(F): 96.8 (21 Feb 2023 21:04), Max: 96.8 (21 Feb 2023 21:04)  HR: 91 (21 Feb 2023 21:04) (85 - 91)  BP: 120/64 (21 Feb 2023 21:04) (120/64 - 121/73)  BP(mean): --  RR: 16 (21 Feb 2023 21:04) (16 - 17)  SpO2: 97% (21 Feb 2023 13:48) (97% - 97%)    GENERAL: No acute distress, well-developed  HEAD:  Atraumatic, Normocephalic  EYES: EOMI, PERRLA, conjunctiva and sclera clear  NECK: Supple, no lymphadenopathy, no JVD  CHEST/LUNG: CTAB; No wheezes, rales, or rhonchi  HEART: Regular rate and rhythm; No murmurs, rubs, or gallops  ABDOMEN: Soft, non-tender, non-distended; normal bowel sounds, no organomegaly  EXTREMITIES:  2+ peripheral pulses b/l, No clubbing, cyanosis, or edema  NEUROLOGY: A&O x 3, no focal deficits  SKIN: No rashes or lesions    LABS:                    RADIOLOGY & ADDITIONAL TESTS:  Results Reviewed:   Imaging Personally Reviewed:  Electrocardiogram Personally Reviewed:    COORDINATION OF CARE:  Care Discussed with Consultants/Other Providers [Y/N]:  Prior or Outpatient Records Reviewed [Y/N]:

## 2023-02-23 PROCEDURE — 99231 SBSQ HOSP IP/OBS SF/LOW 25: CPT

## 2023-02-23 RX ADMIN — LITHIUM CARBONATE 450 MILLIGRAM(S): 300 TABLET, EXTENDED RELEASE ORAL at 17:01

## 2023-02-23 RX ADMIN — DIVALPROEX SODIUM 500 MILLIGRAM(S): 500 TABLET, DELAYED RELEASE ORAL at 12:08

## 2023-02-23 RX ADMIN — Medication 25 MILLIGRAM(S): at 01:09

## 2023-02-23 RX ADMIN — DIVALPROEX SODIUM 250 MILLIGRAM(S): 500 TABLET, DELAYED RELEASE ORAL at 12:08

## 2023-02-23 RX ADMIN — LITHIUM CARBONATE 450 MILLIGRAM(S): 300 TABLET, EXTENDED RELEASE ORAL at 06:10

## 2023-02-23 RX ADMIN — RISPERIDONE 2 MILLIGRAM(S): 4 TABLET ORAL at 06:10

## 2023-02-23 RX ADMIN — RISPERIDONE 2 MILLIGRAM(S): 4 TABLET ORAL at 17:01

## 2023-02-23 RX ADMIN — DIVALPROEX SODIUM 250 MILLIGRAM(S): 500 TABLET, DELAYED RELEASE ORAL at 21:23

## 2023-02-23 RX ADMIN — DIVALPROEX SODIUM 500 MILLIGRAM(S): 500 TABLET, DELAYED RELEASE ORAL at 21:22

## 2023-02-23 NOTE — PROGRESS NOTE ADULT - SUBJECTIVE AND OBJECTIVE BOX
PROGRESS NOTE:   Joey Vasques MD  Available on teams    Patient is a 22y old  Male who presents with a chief complaint of Placement (16 Feb 2023 13:19)      SUBJECTIVE / OVERNIGHT EVENTS:  Patient seen and examined at bedside; patient appears comfortable, denies any complaints.       ADDITIONAL REVIEW OF SYSTEMS: All systems were reviewed and are otherwise negative    MEDICATIONS  (STANDING):  diVALproex  milliGRAM(s) Oral daily  diVALproex  milliGRAM(s) Oral daily  diVALproex  milliGRAM(s) Oral at bedtime  diVALproex  milliGRAM(s) Oral at bedtime  lithium CR (ESKALITH-CR) 450 milliGRAM(s) Oral two times a day  risperiDONE   Tablet 2 milliGRAM(s) Oral two times a day    MEDICATIONS  (PRN):  hydrOXYzine hydrochloride 25 milliGRAM(s) Oral at bedtime PRN insomnia      CAPILLARY BLOOD GLUCOSE        I&O's Summary      PHYSICAL EXAM:  Vital Signs Last 24 Hrs  T(C): 36.8 (23 Feb 2023 05:01), Max: 36.8 (23 Feb 2023 05:01)  T(F): 98.3 (23 Feb 2023 05:01), Max: 98.3 (23 Feb 2023 05:01)  HR: 80 (23 Feb 2023 05:01) (80 - 98)  BP: 130/68 (23 Feb 2023 05:01) (124/69 - 130/73)  BP(mean): --  RR: 16 (23 Feb 2023 05:01) (16 - 18)  SpO2: --  GENERAL: No acute distress, well-developed  HEAD:  Atraumatic, Normocephalic  EYES: EOMI, PERRLA, conjunctiva and sclera clear  NECK: Supple, no lymphadenopathy, no JVD  CHEST/LUNG: CTAB; No wheezes, rales, or rhonchi  HEART: Regular rate and rhythm; No murmurs, rubs, or gallops  ABDOMEN: Soft, non-tender, non-distended; normal bowel sounds, no organomegaly  EXTREMITIES:  2+ peripheral pulses b/l, No clubbing, cyanosis, or edema  NEUROLOGY: A&O x 3, no focal deficits  SKIN: No rashes or lesions    LABS:                    RADIOLOGY & ADDITIONAL TESTS:  Results Reviewed:   Imaging Personally Reviewed:  Electrocardiogram Personally Reviewed:    COORDINATION OF CARE:  Care Discussed with Consultants/Other Providers [Y/N]:  Prior or Outpatient Records Reviewed [Y/N]:

## 2023-02-24 PROCEDURE — 99231 SBSQ HOSP IP/OBS SF/LOW 25: CPT

## 2023-02-24 RX ADMIN — DIVALPROEX SODIUM 250 MILLIGRAM(S): 500 TABLET, DELAYED RELEASE ORAL at 21:07

## 2023-02-24 RX ADMIN — DIVALPROEX SODIUM 500 MILLIGRAM(S): 500 TABLET, DELAYED RELEASE ORAL at 12:56

## 2023-02-24 RX ADMIN — Medication 25 MILLIGRAM(S): at 01:35

## 2023-02-24 RX ADMIN — LITHIUM CARBONATE 450 MILLIGRAM(S): 300 TABLET, EXTENDED RELEASE ORAL at 17:41

## 2023-02-24 RX ADMIN — RISPERIDONE 2 MILLIGRAM(S): 4 TABLET ORAL at 05:17

## 2023-02-24 RX ADMIN — DIVALPROEX SODIUM 500 MILLIGRAM(S): 500 TABLET, DELAYED RELEASE ORAL at 21:07

## 2023-02-24 RX ADMIN — RISPERIDONE 2 MILLIGRAM(S): 4 TABLET ORAL at 17:41

## 2023-02-24 RX ADMIN — DIVALPROEX SODIUM 250 MILLIGRAM(S): 500 TABLET, DELAYED RELEASE ORAL at 12:56

## 2023-02-24 RX ADMIN — LITHIUM CARBONATE 450 MILLIGRAM(S): 300 TABLET, EXTENDED RELEASE ORAL at 05:17

## 2023-02-25 PROCEDURE — 99231 SBSQ HOSP IP/OBS SF/LOW 25: CPT

## 2023-02-25 RX ADMIN — DIVALPROEX SODIUM 500 MILLIGRAM(S): 500 TABLET, DELAYED RELEASE ORAL at 11:09

## 2023-02-25 RX ADMIN — LITHIUM CARBONATE 450 MILLIGRAM(S): 300 TABLET, EXTENDED RELEASE ORAL at 05:05

## 2023-02-25 RX ADMIN — RISPERIDONE 2 MILLIGRAM(S): 4 TABLET ORAL at 17:21

## 2023-02-25 RX ADMIN — LITHIUM CARBONATE 450 MILLIGRAM(S): 300 TABLET, EXTENDED RELEASE ORAL at 17:21

## 2023-02-25 RX ADMIN — RISPERIDONE 2 MILLIGRAM(S): 4 TABLET ORAL at 05:05

## 2023-02-25 RX ADMIN — DIVALPROEX SODIUM 250 MILLIGRAM(S): 500 TABLET, DELAYED RELEASE ORAL at 11:09

## 2023-02-25 RX ADMIN — DIVALPROEX SODIUM 250 MILLIGRAM(S): 500 TABLET, DELAYED RELEASE ORAL at 21:12

## 2023-02-25 RX ADMIN — DIVALPROEX SODIUM 500 MILLIGRAM(S): 500 TABLET, DELAYED RELEASE ORAL at 21:12

## 2023-02-25 NOTE — PROGRESS NOTE ADULT - SUBJECTIVE AND OBJECTIVE BOX
PROGRESS NOTE:   Joey Vasques MD  Available on teams    Patient is a 22y old  Male who presents with a chief complaint of Placement (16 Feb 2023 13:19)      SUBJECTIVE / OVERNIGHT EVENTS:  Patient seen and examined at bedside; patient appears comfortable, denies any complaints. He reports some frustration over how long he has been in the hospital.      ADDITIONAL REVIEW OF SYSTEMS: All systems were reviewed and are otherwise negative    MEDICATIONS  (STANDING):  diVALproex  milliGRAM(s) Oral daily  diVALproex  milliGRAM(s) Oral daily  diVALproex  milliGRAM(s) Oral at bedtime  diVALproex  milliGRAM(s) Oral at bedtime  lithium CR (ESKALITH-CR) 450 milliGRAM(s) Oral two times a day  risperiDONE   Tablet 2 milliGRAM(s) Oral two times a day    MEDICATIONS  (PRN):  hydrOXYzine hydrochloride 25 milliGRAM(s) Oral at bedtime PRN insomnia      CAPILLARY BLOOD GLUCOSE        I&O's Summary      Vital Signs Last 24 Hrs  T(C): 36.3 (25 Feb 2023 05:00), Max: 36.4 (24 Feb 2023 13:24)  T(F): 97.4 (25 Feb 2023 05:00), Max: 97.6 (24 Feb 2023 13:24)  HR: 89 (25 Feb 2023 05:00) (89 - 95)  BP: 127/70 (25 Feb 2023 05:00) (113/74 - 135/75)  BP(mean): --  RR: 16 (25 Feb 2023 05:00) (16 - 18)  SpO2: --  GENERAL: No acute distress, well-developed  HEAD:  Atraumatic, Normocephalic  EYES: EOMI, PERRLA, conjunctiva and sclera clear  NECK: Supple, no lymphadenopathy, no JVD  CHEST/LUNG: CTAB; No wheezes, rales, or rhonchi  HEART: Regular rate and rhythm; No murmurs, rubs, or gallops  ABDOMEN: Soft, non-tender, non-distended; normal bowel sounds, no organomegaly  EXTREMITIES:  2+ peripheral pulses b/l, No clubbing, cyanosis, or edema  NEUROLOGY: A&O x 3, no focal deficits  SKIN: No rashes or lesions    LABS:                    RADIOLOGY & ADDITIONAL TESTS:  Results Reviewed:   Imaging Personally Reviewed:  Electrocardiogram Personally Reviewed:    COORDINATION OF CARE:  Care Discussed with Consultants/Other Providers [Y/N]:  Prior or Outpatient Records Reviewed [Y/N]:

## 2023-02-26 PROCEDURE — 99231 SBSQ HOSP IP/OBS SF/LOW 25: CPT

## 2023-02-26 RX ADMIN — DIVALPROEX SODIUM 250 MILLIGRAM(S): 500 TABLET, DELAYED RELEASE ORAL at 21:19

## 2023-02-26 RX ADMIN — DIVALPROEX SODIUM 250 MILLIGRAM(S): 500 TABLET, DELAYED RELEASE ORAL at 11:21

## 2023-02-26 RX ADMIN — RISPERIDONE 2 MILLIGRAM(S): 4 TABLET ORAL at 05:31

## 2023-02-26 RX ADMIN — DIVALPROEX SODIUM 500 MILLIGRAM(S): 500 TABLET, DELAYED RELEASE ORAL at 11:21

## 2023-02-26 RX ADMIN — LITHIUM CARBONATE 450 MILLIGRAM(S): 300 TABLET, EXTENDED RELEASE ORAL at 17:27

## 2023-02-26 RX ADMIN — Medication 25 MILLIGRAM(S): at 03:43

## 2023-02-26 RX ADMIN — LITHIUM CARBONATE 450 MILLIGRAM(S): 300 TABLET, EXTENDED RELEASE ORAL at 05:32

## 2023-02-26 RX ADMIN — DIVALPROEX SODIUM 500 MILLIGRAM(S): 500 TABLET, DELAYED RELEASE ORAL at 21:18

## 2023-02-26 RX ADMIN — RISPERIDONE 2 MILLIGRAM(S): 4 TABLET ORAL at 17:26

## 2023-02-26 NOTE — PROGRESS NOTE ADULT - SUBJECTIVE AND OBJECTIVE BOX
PROGRESS NOTE:   Joey Vasques MD  Available on teams    Patient is a 22y old  Male who presents with a chief complaint of Placement (16 Feb 2023 13:19)      SUBJECTIVE / OVERNIGHT EVENTS:  Patient seen and examined at bedside; patient appears comfortable, denies any complaints.      ADDITIONAL REVIEW OF SYSTEMS: All systems were reviewed and are otherwise negative    MEDICATIONS  (STANDING):  diVALproex  milliGRAM(s) Oral daily  diVALproex  milliGRAM(s) Oral daily  diVALproex  milliGRAM(s) Oral at bedtime  diVALproex  milliGRAM(s) Oral at bedtime  lithium CR (ESKALITH-CR) 450 milliGRAM(s) Oral two times a day  risperiDONE   Tablet 2 milliGRAM(s) Oral two times a day    MEDICATIONS  (PRN):  hydrOXYzine hydrochloride 25 milliGRAM(s) Oral at bedtime PRN insomnia      CAPILLARY BLOOD GLUCOSE        I&O's Summary      Vital Signs Last 24 Hrs  T(C): 36.8 (26 Feb 2023 05:36), Max: 37.1 (25 Feb 2023 21:10)  T(F): 98.3 (26 Feb 2023 05:36), Max: 98.7 (25 Feb 2023 21:10)  HR: 73 (26 Feb 2023 05:36) (73 - 90)  BP: 107/63 (26 Feb 2023 05:36) (107/63 - 129/72)  BP(mean): --  RR: 18 (26 Feb 2023 05:36) (18 - 18)  SpO2: --  GENERAL: No acute distress, well-developed  HEAD:  Atraumatic, Normocephalic  EYES: EOMI, PERRLA, conjunctiva and sclera clear  NECK: Supple, no lymphadenopathy, no JVD  CHEST/LUNG: CTAB; No wheezes, rales, or rhonchi  HEART: Regular rate and rhythm; No murmurs, rubs, or gallops  ABDOMEN: Soft, non-tender, non-distended; normal bowel sounds, no organomegaly  EXTREMITIES:  2+ peripheral pulses b/l, No clubbing, cyanosis, or edema  NEUROLOGY: A&O x 3, no focal deficits  SKIN: No rashes or lesions    LABS:                    RADIOLOGY & ADDITIONAL TESTS:  Results Reviewed:   Imaging Personally Reviewed:  Electrocardiogram Personally Reviewed:    COORDINATION OF CARE:  Care Discussed with Consultants/Other Providers [Y/N]:  Prior or Outpatient Records Reviewed [Y/N]:

## 2023-02-27 PROCEDURE — 99231 SBSQ HOSP IP/OBS SF/LOW 25: CPT

## 2023-02-27 RX ADMIN — DIVALPROEX SODIUM 250 MILLIGRAM(S): 500 TABLET, DELAYED RELEASE ORAL at 11:26

## 2023-02-27 RX ADMIN — DIVALPROEX SODIUM 250 MILLIGRAM(S): 500 TABLET, DELAYED RELEASE ORAL at 21:02

## 2023-02-27 RX ADMIN — LITHIUM CARBONATE 450 MILLIGRAM(S): 300 TABLET, EXTENDED RELEASE ORAL at 17:18

## 2023-02-27 RX ADMIN — RISPERIDONE 2 MILLIGRAM(S): 4 TABLET ORAL at 17:17

## 2023-02-27 RX ADMIN — DIVALPROEX SODIUM 500 MILLIGRAM(S): 500 TABLET, DELAYED RELEASE ORAL at 11:26

## 2023-02-27 RX ADMIN — LITHIUM CARBONATE 450 MILLIGRAM(S): 300 TABLET, EXTENDED RELEASE ORAL at 05:46

## 2023-02-27 RX ADMIN — RISPERIDONE 2 MILLIGRAM(S): 4 TABLET ORAL at 05:46

## 2023-02-27 RX ADMIN — DIVALPROEX SODIUM 500 MILLIGRAM(S): 500 TABLET, DELAYED RELEASE ORAL at 21:02

## 2023-02-27 NOTE — PROVIDER CONTACT NOTE (OTHER) - BACKGROUND
22 y/o male w/ PMH of bipolar and developmental delay came to ED for alleged aggressive and violent behavior as per step father. Step-father left stated pt needs placement refusing to take child home.

## 2023-02-27 NOTE — PROVIDER CONTACT NOTE (OTHER) - ASSESSMENT
Pt is calm quiet and cooperative, with pleasant demeanor. In the last 3 months of his stay there has been No Known incidents of violence or aggression in any capacity. Pt is ambulatory and independent and able to perform all ADL's without assistance. And mood has been improved with his current medication regimen.

## 2023-02-27 NOTE — PROVIDER CONTACT NOTE (OTHER) - SITUATION
21 y/o male Pt expresses frustration at a fact that he has been awaiting LTC placement since september 2022. Mood has changed over the past week as Pt appears depressed, frustrated & agitated,.

## 2023-02-27 NOTE — PROVIDER CONTACT NOTE (OTHER) - RECOMMENDATIONS
Recommend continuing current progress with group home placement. Having the process expedited would help Pt's emotional state.

## 2023-02-27 NOTE — PROGRESS NOTE ADULT - SUBJECTIVE AND OBJECTIVE BOX
PROGRESS NOTE:   Joey Vasques MD  Available on teams    Patient is a 22y old  Male who presents with a chief complaint of Placement (16 Feb 2023 13:19)      SUBJECTIVE / OVERNIGHT EVENTS:  Patient seen and examined at bedside; patient appears comfortable, denies any complaints. He reports frustration.       ADDITIONAL REVIEW OF SYSTEMS: All systems were reviewed and are otherwise negative    MEDICATIONS  (STANDING):  diVALproex  milliGRAM(s) Oral daily  diVALproex  milliGRAM(s) Oral daily  diVALproex  milliGRAM(s) Oral at bedtime  diVALproex  milliGRAM(s) Oral at bedtime  lithium CR (ESKALITH-CR) 450 milliGRAM(s) Oral two times a day  risperiDONE   Tablet 2 milliGRAM(s) Oral two times a day    MEDICATIONS  (PRN):  hydrOXYzine hydrochloride 25 milliGRAM(s) Oral at bedtime PRN insomnia      CAPILLARY BLOOD GLUCOSE      I&O's Summary      Vital Signs Last 24 Hrs  T(C): 36.2 (27 Feb 2023 04:35), Max: 37 (26 Feb 2023 22:07)  T(F): 97.2 (27 Feb 2023 04:35), Max: 98.6 (26 Feb 2023 22:07)  HR: 68 (27 Feb 2023 04:35) (65 - 89)  BP: 100/55 (27 Feb 2023 04:35) (100/55 - 122/79)  BP(mean): --  RR: 18 (27 Feb 2023 04:35) (16 - 18)  SpO2: --    GENERAL: No acute distress, well-developed  HEAD:  Atraumatic, Normocephalic  EYES: EOMI, PERRLA, conjunctiva and sclera clear  NECK: Supple, no lymphadenopathy, no JVD  CHEST/LUNG: CTAB; No wheezes, rales, or rhonchi  HEART: Regular rate and rhythm; No murmurs, rubs, or gallops  ABDOMEN: Soft, non-tender, non-distended; normal bowel sounds, no organomegaly  EXTREMITIES:  2+ peripheral pulses b/l, No clubbing, cyanosis, or edema  NEUROLOGY: A&O x 3, no focal deficits  SKIN: No rashes or lesions    LABS:                    RADIOLOGY & ADDITIONAL TESTS:  Results Reviewed:   Imaging Personally Reviewed:  Electrocardiogram Personally Reviewed:    COORDINATION OF CARE:  Care Discussed with Consultants/Other Providers [Y/N]:  Prior or Outpatient Records Reviewed [Y/N]:

## 2023-02-28 PROCEDURE — 99231 SBSQ HOSP IP/OBS SF/LOW 25: CPT

## 2023-02-28 RX ADMIN — DIVALPROEX SODIUM 500 MILLIGRAM(S): 500 TABLET, DELAYED RELEASE ORAL at 22:02

## 2023-02-28 RX ADMIN — DIVALPROEX SODIUM 250 MILLIGRAM(S): 500 TABLET, DELAYED RELEASE ORAL at 22:02

## 2023-02-28 RX ADMIN — RISPERIDONE 2 MILLIGRAM(S): 4 TABLET ORAL at 05:33

## 2023-02-28 RX ADMIN — DIVALPROEX SODIUM 500 MILLIGRAM(S): 500 TABLET, DELAYED RELEASE ORAL at 11:45

## 2023-02-28 RX ADMIN — LITHIUM CARBONATE 450 MILLIGRAM(S): 300 TABLET, EXTENDED RELEASE ORAL at 17:17

## 2023-02-28 RX ADMIN — LITHIUM CARBONATE 450 MILLIGRAM(S): 300 TABLET, EXTENDED RELEASE ORAL at 05:33

## 2023-02-28 RX ADMIN — DIVALPROEX SODIUM 250 MILLIGRAM(S): 500 TABLET, DELAYED RELEASE ORAL at 11:45

## 2023-02-28 RX ADMIN — RISPERIDONE 2 MILLIGRAM(S): 4 TABLET ORAL at 17:17

## 2023-02-28 NOTE — PROGRESS NOTE ADULT - SUBJECTIVE AND OBJECTIVE BOX
PROGRESS NOTE:   Joey Vasques MD  Available on teams    Patient is a 22y old  Male who presents with a chief complaint of Placement (16 Feb 2023 13:19)      SUBJECTIVE / OVERNIGHT EVENTS:  Patient seen and examined at bedside; patient appears comfortable, resting comfortable.       ADDITIONAL REVIEW OF SYSTEMS: All systems were reviewed and are otherwise negative    MEDICATIONS  (STANDING):  diVALproex  milliGRAM(s) Oral daily  diVALproex  milliGRAM(s) Oral daily  diVALproex  milliGRAM(s) Oral at bedtime  diVALproex  milliGRAM(s) Oral at bedtime  lithium CR (ESKALITH-CR) 450 milliGRAM(s) Oral two times a day  risperiDONE   Tablet 2 milliGRAM(s) Oral two times a day    MEDICATIONS  (PRN):  hydrOXYzine hydrochloride 25 milliGRAM(s) Oral at bedtime PRN insomnia        CAPILLARY BLOOD GLUCOSE      I&O's Summary      Vital Signs Last 24 Hrs  T(C): 36.2 (28 Feb 2023 04:35), Max: 36.8 (27 Feb 2023 14:00)  T(F): 97.1 (28 Feb 2023 04:35), Max: 98.2 (27 Feb 2023 14:00)  HR: 70 (28 Feb 2023 04:35) (70 - 88)  BP: 108/72 (28 Feb 2023 04:35) (108/62 - 115/59)  BP(mean): --  RR: 18 (28 Feb 2023 04:35) (18 - 18)  SpO2: --  GENERAL: No acute distress, well-developed  HEAD:  Atraumatic, Normocephalic  EYES: EOMI, PERRLA, conjunctiva and sclera clear  NECK: Supple, no lymphadenopathy, no JVD  CHEST/LUNG: CTAB; No wheezes, rales, or rhonchi  HEART: Regular rate and rhythm; No murmurs, rubs, or gallops  ABDOMEN: Soft, non-tender, non-distended; normal bowel sounds, no organomegaly  EXTREMITIES:  2+ peripheral pulses b/l, No clubbing, cyanosis, or edema  NEUROLOGY: A&O x 3, no focal deficits  SKIN: No rashes or lesions    LABS:                    RADIOLOGY & ADDITIONAL TESTS:  Results Reviewed:   Imaging Personally Reviewed:  Electrocardiogram Personally Reviewed:    COORDINATION OF CARE:  Care Discussed with Consultants/Other Providers [Y/N]:  Prior or Outpatient Records Reviewed [Y/N]:

## 2023-03-01 PROCEDURE — 99232 SBSQ HOSP IP/OBS MODERATE 35: CPT

## 2023-03-01 RX ADMIN — DIVALPROEX SODIUM 250 MILLIGRAM(S): 500 TABLET, DELAYED RELEASE ORAL at 11:38

## 2023-03-01 RX ADMIN — DIVALPROEX SODIUM 500 MILLIGRAM(S): 500 TABLET, DELAYED RELEASE ORAL at 11:39

## 2023-03-01 RX ADMIN — DIVALPROEX SODIUM 500 MILLIGRAM(S): 500 TABLET, DELAYED RELEASE ORAL at 21:05

## 2023-03-01 RX ADMIN — LITHIUM CARBONATE 450 MILLIGRAM(S): 300 TABLET, EXTENDED RELEASE ORAL at 17:22

## 2023-03-01 RX ADMIN — RISPERIDONE 2 MILLIGRAM(S): 4 TABLET ORAL at 17:21

## 2023-03-01 RX ADMIN — DIVALPROEX SODIUM 250 MILLIGRAM(S): 500 TABLET, DELAYED RELEASE ORAL at 21:05

## 2023-03-01 RX ADMIN — RISPERIDONE 2 MILLIGRAM(S): 4 TABLET ORAL at 05:43

## 2023-03-01 RX ADMIN — LITHIUM CARBONATE 450 MILLIGRAM(S): 300 TABLET, EXTENDED RELEASE ORAL at 05:43

## 2023-03-02 PROCEDURE — 99232 SBSQ HOSP IP/OBS MODERATE 35: CPT

## 2023-03-02 RX ADMIN — LITHIUM CARBONATE 450 MILLIGRAM(S): 300 TABLET, EXTENDED RELEASE ORAL at 17:42

## 2023-03-02 RX ADMIN — DIVALPROEX SODIUM 250 MILLIGRAM(S): 500 TABLET, DELAYED RELEASE ORAL at 21:12

## 2023-03-02 RX ADMIN — RISPERIDONE 2 MILLIGRAM(S): 4 TABLET ORAL at 06:03

## 2023-03-02 RX ADMIN — RISPERIDONE 2 MILLIGRAM(S): 4 TABLET ORAL at 17:41

## 2023-03-02 RX ADMIN — LITHIUM CARBONATE 450 MILLIGRAM(S): 300 TABLET, EXTENDED RELEASE ORAL at 06:03

## 2023-03-02 RX ADMIN — DIVALPROEX SODIUM 500 MILLIGRAM(S): 500 TABLET, DELAYED RELEASE ORAL at 21:12

## 2023-03-02 RX ADMIN — DIVALPROEX SODIUM 250 MILLIGRAM(S): 500 TABLET, DELAYED RELEASE ORAL at 12:30

## 2023-03-02 RX ADMIN — DIVALPROEX SODIUM 500 MILLIGRAM(S): 500 TABLET, DELAYED RELEASE ORAL at 12:30

## 2023-03-02 RX ADMIN — Medication 25 MILLIGRAM(S): at 01:09

## 2023-03-03 PROCEDURE — 99231 SBSQ HOSP IP/OBS SF/LOW 25: CPT

## 2023-03-03 RX ADMIN — DIVALPROEX SODIUM 250 MILLIGRAM(S): 500 TABLET, DELAYED RELEASE ORAL at 12:28

## 2023-03-03 RX ADMIN — RISPERIDONE 2 MILLIGRAM(S): 4 TABLET ORAL at 05:39

## 2023-03-03 RX ADMIN — RISPERIDONE 2 MILLIGRAM(S): 4 TABLET ORAL at 17:43

## 2023-03-03 RX ADMIN — LITHIUM CARBONATE 450 MILLIGRAM(S): 300 TABLET, EXTENDED RELEASE ORAL at 17:43

## 2023-03-03 RX ADMIN — DIVALPROEX SODIUM 500 MILLIGRAM(S): 500 TABLET, DELAYED RELEASE ORAL at 12:28

## 2023-03-03 RX ADMIN — LITHIUM CARBONATE 450 MILLIGRAM(S): 300 TABLET, EXTENDED RELEASE ORAL at 05:39

## 2023-03-03 RX ADMIN — DIVALPROEX SODIUM 500 MILLIGRAM(S): 500 TABLET, DELAYED RELEASE ORAL at 21:31

## 2023-03-03 RX ADMIN — DIVALPROEX SODIUM 250 MILLIGRAM(S): 500 TABLET, DELAYED RELEASE ORAL at 21:31

## 2023-03-03 RX ADMIN — Medication 25 MILLIGRAM(S): at 02:18

## 2023-03-03 NOTE — PROGRESS NOTE ADULT - ASSESSMENT
22 years old male past medical history of bipolar and developmental delay came to emergency room for alleged aggressive and violent behavior as per step father. Evaluated by Psych; Step Father left stated pt needs placement refusing to take child home.     Bipolar disorder and likely developmental disorder- stable  - continue current meds (on depakote, risperidone, and lithium)   - pt has been stable without aggressive behaviour in the hospital  - father states he cant take care of pt    Depressed mood, negative thoughts  - cont w lithium and valproic acid, levels wnl, c/w risperiDONE     - psych consult appreciated    Obesity: Likely multifactorial: excess calories and Psych medication induced     DVT px - pt ambulatory     DISPO: pending placement/gaurdianship   22 years old male past medical history of bipolar and developmental delay came to emergency room for alleged aggressive and violent behavior as per step father. Evaluated by Psych; Step Father left stated pt needs placement refusing to take child home.     Bipolar disorder and likely developmental disorder- stable  - continue current meds (on depakote, risperidone, and lithium)   - pt has been stable without aggressive behaviour in the hospital  - father states he cant take care of pt    Depressed mood, negative thoughts  - cont w lithium and valproic acid, levels wnl, c/w risperiDONE     - psych consult appreciated    Obesity BMI 33.8 kg/m2 : Likely multifactorial: excess calories and Psych medication induced     DVT px - pt ambulatory     DISPO: pending placement/guardianship

## 2023-03-03 NOTE — PROGRESS NOTE ADULT - SUBJECTIVE AND OBJECTIVE BOX
PHILIP [id number]    Subjective/Interval History       ROS    PHYSICAL EXAM  Vital Signs Last 24 Hrs  T(C): 36.7 (03 Mar 2023 13:46), Max: 36.7 (03 Mar 2023 13:46)  T(F): 98 (03 Mar 2023 13:46), Max: 98 (03 Mar 2023 13:46)  HR: 77 (03 Mar 2023 13:46) (66 - 97)  BP: 117/77 (03 Mar 2023 13:46) (95/52 - 122/71)  BP(mean): --  RR: 17 (03 Mar 2023 13:46) (17 - 17)  SpO2: 97% (02 Mar 2023 20:05) (97% - 97%)      GA : AAOX3, NAD   HEENT: PERRLA, EOMI  NECK: no JVD, no thyromegaly   CVS: S1 S2 no murmur no rubs no gallop  RESP: CTAB no wheeze, no rhonchi no rales  ABD: Soft, NT, ND, tympanic, no rebound or guarding   : No Prince, No CVA tenderness   EXT; no pedal edema, no cyanosis  MSK: No ML spinal tenderness, normal ROM   NEURO: AAOX3, no new focal deficits     LABS/ IMAGING              CAPILLARY BLOOD GLUCOSE                   PHILIP [id number]    Subjective/Interval History   - denies any complaints, awaiting placement.    ROS  -denies hallucinations auditory or visual.      PHYSICAL EXAM  Vital Signs Last 24 Hrs  T(C): 36.7 (03 Mar 2023 13:46), Max: 36.7 (03 Mar 2023 13:46)  T(F): 98 (03 Mar 2023 13:46), Max: 98 (03 Mar 2023 13:46)  HR: 77 (03 Mar 2023 13:46) (66 - 97)  BP: 117/77 (03 Mar 2023 13:46) (95/52 - 122/71)  BP(mean): --  RR: 17 (03 Mar 2023 13:46) (17 - 17)  SpO2: 97% (02 Mar 2023 20:05) (97% - 97%)      GA : AAOX3, NAD   HEENT: PERRLA, EOMI  NECK: no JVD, no thyromegaly   CVS: S1 S2 no murmur no rubs no gallop  RESP: CTAB no wheeze, no rhonchi no rales  ABD: Soft, NT, ND, tympanic, no rebound or guarding   : No Prince, No CVA tenderness   EXT; no pedal edema, no cyanosis  MSK: No ML spinal tenderness, normal ROM   NEURO: AAOX3, no new focal deficits     LABS/ IMAGING              CAPILLARY BLOOD GLUCOSE

## 2023-03-04 PROCEDURE — 99231 SBSQ HOSP IP/OBS SF/LOW 25: CPT

## 2023-03-04 RX ADMIN — RISPERIDONE 2 MILLIGRAM(S): 4 TABLET ORAL at 17:57

## 2023-03-04 RX ADMIN — DIVALPROEX SODIUM 250 MILLIGRAM(S): 500 TABLET, DELAYED RELEASE ORAL at 22:09

## 2023-03-04 RX ADMIN — LITHIUM CARBONATE 450 MILLIGRAM(S): 300 TABLET, EXTENDED RELEASE ORAL at 05:31

## 2023-03-04 RX ADMIN — DIVALPROEX SODIUM 500 MILLIGRAM(S): 500 TABLET, DELAYED RELEASE ORAL at 22:09

## 2023-03-04 RX ADMIN — LITHIUM CARBONATE 450 MILLIGRAM(S): 300 TABLET, EXTENDED RELEASE ORAL at 17:57

## 2023-03-04 RX ADMIN — DIVALPROEX SODIUM 250 MILLIGRAM(S): 500 TABLET, DELAYED RELEASE ORAL at 11:48

## 2023-03-04 RX ADMIN — RISPERIDONE 2 MILLIGRAM(S): 4 TABLET ORAL at 05:31

## 2023-03-04 RX ADMIN — DIVALPROEX SODIUM 500 MILLIGRAM(S): 500 TABLET, DELAYED RELEASE ORAL at 11:48

## 2023-03-04 NOTE — PROGRESS NOTE ADULT - SUBJECTIVE AND OBJECTIVE BOX
PHILIP [id number]    Subjective/Interval History   - denies any complaints, awaiting placement.    ROS  -denies hallucinations auditory or visual.      PHYSICAL EXAM  Vital Signs Last 24 Hrs  T(C): 36.7 (03 Mar 2023 13:46), Max: 36.7 (03 Mar 2023 13:46)  T(F): 98 (03 Mar 2023 13:46), Max: 98 (03 Mar 2023 13:46)  HR: 77 (03 Mar 2023 13:46) (66 - 97)  BP: 117/77 (03 Mar 2023 13:46) (95/52 - 122/71)  BP(mean): --  RR: 17 (03 Mar 2023 13:46) (17 - 17)  SpO2: 97% (02 Mar 2023 20:05) (97% - 97%)      GA : AAOX3, NAD   HEENT: PERRLA, EOMI  NECK: no JVD, no thyromegaly   CVS: S1 S2 no murmur no rubs no gallop  RESP: CTAB no wheeze, no rhonchi no rales  ABD: Soft, NT, ND, tympanic, no rebound or guarding   : No Prince, No CVA tenderness   EXT; no pedal edema, no cyanosis  MSK: No ML spinal tenderness, normal ROM   NEURO: AAOX3, no new focal deficits     LABS/ IMAGING              CAPILLARY BLOOD GLUCOSE

## 2023-03-04 NOTE — PROGRESS NOTE ADULT - ASSESSMENT
22 years old male past medical history of bipolar and developmental delay came to emergency room for alleged aggressive and violent behavior as per step father. Evaluated by Psych; Step Father left stated pt needs placement refusing to take child home.     Bipolar disorder and likely developmental disorder- stable  - continue current meds (on depakote, risperidone, and lithium)   - pt has been stable without aggressive behaviour in the hospital  - father states he cant take care of pt    Depressed mood, negative thoughts  - cont w lithium and valproic acid, levels wnl, c/w risperiDONE     - psych consult appreciated    Obesity BMI 33.8 kg/m2 : Likely multifactorial: excess calories and Psych medication induced     DVT px - pt ambulatory     DISPO: pending placement/guardianship

## 2023-03-05 PROCEDURE — 99232 SBSQ HOSP IP/OBS MODERATE 35: CPT

## 2023-03-05 RX ADMIN — RISPERIDONE 2 MILLIGRAM(S): 4 TABLET ORAL at 05:48

## 2023-03-05 RX ADMIN — DIVALPROEX SODIUM 500 MILLIGRAM(S): 500 TABLET, DELAYED RELEASE ORAL at 22:06

## 2023-03-05 RX ADMIN — LITHIUM CARBONATE 450 MILLIGRAM(S): 300 TABLET, EXTENDED RELEASE ORAL at 05:48

## 2023-03-05 RX ADMIN — DIVALPROEX SODIUM 500 MILLIGRAM(S): 500 TABLET, DELAYED RELEASE ORAL at 11:17

## 2023-03-05 RX ADMIN — RISPERIDONE 2 MILLIGRAM(S): 4 TABLET ORAL at 17:11

## 2023-03-05 RX ADMIN — DIVALPROEX SODIUM 250 MILLIGRAM(S): 500 TABLET, DELAYED RELEASE ORAL at 11:17

## 2023-03-05 RX ADMIN — DIVALPROEX SODIUM 250 MILLIGRAM(S): 500 TABLET, DELAYED RELEASE ORAL at 22:06

## 2023-03-05 RX ADMIN — LITHIUM CARBONATE 450 MILLIGRAM(S): 300 TABLET, EXTENDED RELEASE ORAL at 17:11

## 2023-03-05 RX ADMIN — Medication 25 MILLIGRAM(S): at 00:52

## 2023-03-05 NOTE — PROGRESS NOTE ADULT - SUBJECTIVE AND OBJECTIVE BOX
PHILIP MCCLOUD  22y    Subjective/Interval History   - was up all last night; was sleeping during my round  - have depressive mood revolving about his group home placement. No suicidal or homicidal on my exam today.  - denies any complaints, awaiting placement.  ROS  -denies hallucinations auditory or visual.      ICU Vital Signs Last 24 Hrs  T(C): 36.1 (05 Mar 2023 14:17), Max: 36.8 (05 Mar 2023 05:01)  T(F): 97 (05 Mar 2023 14:17), Max: 98.3 (05 Mar 2023 05:01)  HR: 91 (05 Mar 2023 14:17) (71 - 99)  BP: 109/71 (05 Mar 2023 14:17) (98/71 - 149/78)  BP(mean): --  ABP: --  ABP(mean): --  RR: 18 (05 Mar 2023 14:17) (18 - 18)  SpO2: --    PHYSICAL EXAM  GA : AAOX3, NAD   HEENT: PERRLA, EOMI  NECK: no JVD, no thyromegaly   CVS: S1 S2 no murmur no rubs no gallop  RESP: CTAB no wheeze, no rhonchi no rales  ABD: Soft, NT, ND, tympanic, no rebound or guarding   : No Prince, No CVA tenderness   EXT; no pedal edema, no cyanosis  MSK: No ML spinal tenderness, normal ROM   NEURO: AAOX3, no new focal deficits     LABS/ IMAGING              CAPILLARY BLOOD GLUCOSE

## 2023-03-06 PROCEDURE — 99232 SBSQ HOSP IP/OBS MODERATE 35: CPT

## 2023-03-06 RX ADMIN — RISPERIDONE 2 MILLIGRAM(S): 4 TABLET ORAL at 05:20

## 2023-03-06 RX ADMIN — LITHIUM CARBONATE 450 MILLIGRAM(S): 300 TABLET, EXTENDED RELEASE ORAL at 17:12

## 2023-03-06 RX ADMIN — DIVALPROEX SODIUM 500 MILLIGRAM(S): 500 TABLET, DELAYED RELEASE ORAL at 22:17

## 2023-03-06 RX ADMIN — DIVALPROEX SODIUM 250 MILLIGRAM(S): 500 TABLET, DELAYED RELEASE ORAL at 12:33

## 2023-03-06 RX ADMIN — DIVALPROEX SODIUM 250 MILLIGRAM(S): 500 TABLET, DELAYED RELEASE ORAL at 22:17

## 2023-03-06 RX ADMIN — DIVALPROEX SODIUM 500 MILLIGRAM(S): 500 TABLET, DELAYED RELEASE ORAL at 12:33

## 2023-03-06 RX ADMIN — LITHIUM CARBONATE 450 MILLIGRAM(S): 300 TABLET, EXTENDED RELEASE ORAL at 05:19

## 2023-03-06 RX ADMIN — RISPERIDONE 2 MILLIGRAM(S): 4 TABLET ORAL at 17:12

## 2023-03-06 NOTE — PROGRESS NOTE ADULT - SUBJECTIVE AND OBJECTIVE BOX
PHILIP MCCLOUD  22y    Subjective/Interval History   no complaints    ICU Vital Signs Last 24 Hrs  T(C): 36.2 (06 Mar 2023 14:20), Max: 36.6 (05 Mar 2023 22:23)  T(F): 97.1 (06 Mar 2023 14:20), Max: 97.8 (05 Mar 2023 22:23)  HR: 69 (06 Mar 2023 14:20) (69 - 102)  BP: 95/50 (06 Mar 2023 14:20) (95/50 - 118/75)  BP(mean): --  ABP: --  ABP(mean): --  RR: 18 (06 Mar 2023 14:20) (18 - 18)  SpO2: 97% (06 Mar 2023 14:20) (97% - 97%)      PHYSICAL EXAM  GA : AAOX3, NAD   HEENT: PERRLA, EOMI  NECK: no JVD, no thyromegaly   CVS: S1 S2 no murmur no rubs no gallop  RESP: CTAB no wheeze, no rhonchi no rales  ABD: Soft, NT, ND, tympanic, no rebound or guarding   : No Prince, No CVA tenderness   EXT; no pedal edema, no cyanosis  MSK: No ML spinal tenderness, normal ROM   NEURO: AAOX3, no new focal deficits     LABS/ IMAGING              CAPILLARY BLOOD GLUCOSE

## 2023-03-07 PROCEDURE — 99232 SBSQ HOSP IP/OBS MODERATE 35: CPT

## 2023-03-07 RX ADMIN — DIVALPROEX SODIUM 500 MILLIGRAM(S): 500 TABLET, DELAYED RELEASE ORAL at 21:29

## 2023-03-07 RX ADMIN — LITHIUM CARBONATE 450 MILLIGRAM(S): 300 TABLET, EXTENDED RELEASE ORAL at 06:31

## 2023-03-07 RX ADMIN — DIVALPROEX SODIUM 250 MILLIGRAM(S): 500 TABLET, DELAYED RELEASE ORAL at 11:57

## 2023-03-07 RX ADMIN — RISPERIDONE 2 MILLIGRAM(S): 4 TABLET ORAL at 06:30

## 2023-03-07 RX ADMIN — RISPERIDONE 2 MILLIGRAM(S): 4 TABLET ORAL at 17:42

## 2023-03-07 RX ADMIN — DIVALPROEX SODIUM 250 MILLIGRAM(S): 500 TABLET, DELAYED RELEASE ORAL at 21:29

## 2023-03-07 RX ADMIN — LITHIUM CARBONATE 450 MILLIGRAM(S): 300 TABLET, EXTENDED RELEASE ORAL at 17:42

## 2023-03-07 RX ADMIN — DIVALPROEX SODIUM 500 MILLIGRAM(S): 500 TABLET, DELAYED RELEASE ORAL at 11:57

## 2023-03-07 NOTE — PROGRESS NOTE ADULT - SUBJECTIVE AND OBJECTIVE BOX
PHILIP MCCLOUD  22y    Subjective/Interval History   no complaints    ICU Vital Signs Last 24 Hrs  T(C): 36.1 (07 Mar 2023 14:24), Max: 36.6 (06 Mar 2023 21:03)  T(F): 97 (07 Mar 2023 14:24), Max: 97.9 (06 Mar 2023 21:03)  HR: 88 (07 Mar 2023 14:24) (75 - 88)  BP: 131/81 (07 Mar 2023 14:24) (105/65 - 131/81)  BP(mean): --  ABP: --  ABP(mean): --  RR: 18 (07 Mar 2023 14:24) (18 - 18)  SpO2: 98% (07 Mar 2023 04:37) (98% - 98%)    PHYSICAL EXAM  GA : AAOX3, NAD   HEENT: PERRLA, EOMI  NECK: no JVD, no thyromegaly   CVS: S1 S2 no murmur no rubs no gallop  RESP: CTAB no wheeze, no rhonchi no rales  ABD: Soft, NT, ND, tympanic, no rebound or guarding   : No Prince, No CVA tenderness   EXT; no pedal edema, no cyanosis  MSK: No ML spinal tenderness, normal ROM   NEURO: AAOX3, no new focal deficits     LABS/ IMAGING              CAPILLARY BLOOD GLUCOSE

## 2023-03-08 PROCEDURE — 99231 SBSQ HOSP IP/OBS SF/LOW 25: CPT

## 2023-03-08 RX ADMIN — RISPERIDONE 2 MILLIGRAM(S): 4 TABLET ORAL at 18:22

## 2023-03-08 RX ADMIN — DIVALPROEX SODIUM 500 MILLIGRAM(S): 500 TABLET, DELAYED RELEASE ORAL at 21:20

## 2023-03-08 RX ADMIN — LITHIUM CARBONATE 450 MILLIGRAM(S): 300 TABLET, EXTENDED RELEASE ORAL at 18:22

## 2023-03-08 RX ADMIN — DIVALPROEX SODIUM 500 MILLIGRAM(S): 500 TABLET, DELAYED RELEASE ORAL at 11:42

## 2023-03-08 RX ADMIN — DIVALPROEX SODIUM 250 MILLIGRAM(S): 500 TABLET, DELAYED RELEASE ORAL at 21:21

## 2023-03-08 RX ADMIN — DIVALPROEX SODIUM 250 MILLIGRAM(S): 500 TABLET, DELAYED RELEASE ORAL at 11:42

## 2023-03-08 RX ADMIN — RISPERIDONE 2 MILLIGRAM(S): 4 TABLET ORAL at 05:30

## 2023-03-08 RX ADMIN — LITHIUM CARBONATE 450 MILLIGRAM(S): 300 TABLET, EXTENDED RELEASE ORAL at 05:30

## 2023-03-08 NOTE — PROGRESS NOTE ADULT - SUBJECTIVE AND OBJECTIVE BOX
PROGRESS NOTE:   Joey Vasques MD  Available on teams    Patient is a 22y old  Male who presents with a chief complaint of Placement (16 Feb 2023 13:19)      SUBJECTIVE / OVERNIGHT EVENTS:  Patient seen and examined at bedside; patient appears comfortable, denies any complaints. He asks if we have any updates in regards to his dispo.      ADDITIONAL REVIEW OF SYSTEMS: All systems were reviewed and are otherwise negative    MEDICATIONS  (STANDING):  diVALproex  milliGRAM(s) Oral daily  diVALproex  milliGRAM(s) Oral daily  diVALproex  milliGRAM(s) Oral at bedtime  diVALproex  milliGRAM(s) Oral at bedtime  lithium CR (ESKALITH-CR) 450 milliGRAM(s) Oral two times a day  risperiDONE   Tablet 2 milliGRAM(s) Oral two times a day    MEDICATIONS  (PRN):  hydrOXYzine hydrochloride 25 milliGRAM(s) Oral at bedtime PRN insomnia        CAPILLARY BLOOD GLUCOSE        I&O's Summary      Vital Signs Last 24 Hrs  T(C): 36.9 (07 Mar 2023 20:18), Max: 36.9 (07 Mar 2023 20:18)  T(F): 98.5 (07 Mar 2023 20:18), Max: 98.5 (07 Mar 2023 20:18)  HR: 80 (08 Mar 2023 06:02) (69 - 88)  BP: 105/55 (08 Mar 2023 06:02) (105/55 - 131/81)  BP(mean): --  RR: 18 (08 Mar 2023 06:02) (18 - 18)  SpO2: --  GENERAL: No acute distress, well-developed  HEAD:  Atraumatic, Normocephalic  EYES: EOMI, PERRLA, conjunctiva and sclera clear  NECK: Supple, no lymphadenopathy, no JVD  CHEST/LUNG: CTAB; No wheezes, rales, or rhonchi  HEART: Regular rate and rhythm; No murmurs, rubs, or gallops  ABDOMEN: Soft, non-tender, non-distended; normal bowel sounds, no organomegaly  EXTREMITIES:  2+ peripheral pulses b/l, No clubbing, cyanosis, or edema  NEUROLOGY: A&O x 3, no focal deficits  SKIN: No rashes or lesions    LABS:                    RADIOLOGY & ADDITIONAL TESTS:  Results Reviewed:   Imaging Personally Reviewed:  Electrocardiogram Personally Reviewed:    COORDINATION OF CARE:  Care Discussed with Consultants/Other Providers [Y/N]:  Prior or Outpatient Records Reviewed [Y/N]:

## 2023-03-09 PROCEDURE — 99231 SBSQ HOSP IP/OBS SF/LOW 25: CPT

## 2023-03-09 RX ADMIN — DIVALPROEX SODIUM 250 MILLIGRAM(S): 500 TABLET, DELAYED RELEASE ORAL at 21:02

## 2023-03-09 RX ADMIN — LITHIUM CARBONATE 450 MILLIGRAM(S): 300 TABLET, EXTENDED RELEASE ORAL at 05:39

## 2023-03-09 RX ADMIN — DIVALPROEX SODIUM 250 MILLIGRAM(S): 500 TABLET, DELAYED RELEASE ORAL at 11:33

## 2023-03-09 RX ADMIN — DIVALPROEX SODIUM 500 MILLIGRAM(S): 500 TABLET, DELAYED RELEASE ORAL at 11:33

## 2023-03-09 RX ADMIN — Medication 25 MILLIGRAM(S): at 00:51

## 2023-03-09 RX ADMIN — DIVALPROEX SODIUM 500 MILLIGRAM(S): 500 TABLET, DELAYED RELEASE ORAL at 21:02

## 2023-03-09 RX ADMIN — RISPERIDONE 2 MILLIGRAM(S): 4 TABLET ORAL at 17:26

## 2023-03-09 RX ADMIN — LITHIUM CARBONATE 450 MILLIGRAM(S): 300 TABLET, EXTENDED RELEASE ORAL at 17:27

## 2023-03-09 RX ADMIN — RISPERIDONE 2 MILLIGRAM(S): 4 TABLET ORAL at 05:39

## 2023-03-09 NOTE — PROGRESS NOTE ADULT - SUBJECTIVE AND OBJECTIVE BOX
Patient is a 22y old  Male who presents with a chief complaint of Placement (16 Feb 2023 13:19)      SUBJECTIVE / OVERNIGHT EVENTS:  Patient seen and examined at bedside; patient appears comfortable, denies any complaints.       ADDITIONAL REVIEW OF SYSTEMS: All systems were reviewed and are otherwise negative    MEDICATIONS  (STANDING):  diVALproex  milliGRAM(s) Oral daily  diVALproex  milliGRAM(s) Oral daily  diVALproex  milliGRAM(s) Oral at bedtime  diVALproex  milliGRAM(s) Oral at bedtime  lithium CR (ESKALITH-CR) 450 milliGRAM(s) Oral two times a day  risperiDONE   Tablet 2 milliGRAM(s) Oral two times a day    MEDICATIONS  (PRN):  hydrOXYzine hydrochloride 25 milliGRAM(s) Oral at bedtime PRN insomnia        CAPILLARY BLOOD GLUCOSE        I&O's Summary      Vital Signs Last 24 Hrs  T(C): 36.1 (08 Mar 2023 14:12), Max: 36.1 (08 Mar 2023 14:12)  T(F): 96.9 (08 Mar 2023 14:12), Max: 96.9 (08 Mar 2023 14:12)  HR: 69 (09 Mar 2023 06:14) (68 - 69)  BP: 103/66 (09 Mar 2023 06:14) (103/66 - 110/57)  BP(mean): --  RR: 16 (09 Mar 2023 06:14) (16 - 18)  SpO2: --    GENERAL: No acute distress, well-developed  HEAD:  Atraumatic, Normocephalic  EYES: EOMI, PERRLA, conjunctiva and sclera clear  NECK: Supple, no lymphadenopathy, no JVD  CHEST/LUNG: CTAB; No wheezes, rales, or rhonchi  HEART: Regular rate and rhythm; No murmurs, rubs, or gallops  ABDOMEN: Soft, non-tender, non-distended; normal bowel sounds, no organomegaly  EXTREMITIES:  2+ peripheral pulses b/l, No clubbing, cyanosis, or edema  NEUROLOGY: A&O x 3, no focal deficits  SKIN: No rashes or lesions    LABS:                    RADIOLOGY & ADDITIONAL TESTS:  Results Reviewed:   Imaging Personally Reviewed:  Electrocardiogram Personally Reviewed:    COORDINATION OF CARE:  Care Discussed with Consultants/Other Providers [Y/N]:  Prior or Outpatient Records Reviewed [Y/N]:

## 2023-03-10 PROCEDURE — 99231 SBSQ HOSP IP/OBS SF/LOW 25: CPT

## 2023-03-10 RX ADMIN — RISPERIDONE 2 MILLIGRAM(S): 4 TABLET ORAL at 05:32

## 2023-03-10 RX ADMIN — LITHIUM CARBONATE 450 MILLIGRAM(S): 300 TABLET, EXTENDED RELEASE ORAL at 17:28

## 2023-03-10 RX ADMIN — RISPERIDONE 2 MILLIGRAM(S): 4 TABLET ORAL at 17:28

## 2023-03-10 RX ADMIN — DIVALPROEX SODIUM 500 MILLIGRAM(S): 500 TABLET, DELAYED RELEASE ORAL at 21:42

## 2023-03-10 RX ADMIN — DIVALPROEX SODIUM 250 MILLIGRAM(S): 500 TABLET, DELAYED RELEASE ORAL at 21:42

## 2023-03-10 RX ADMIN — DIVALPROEX SODIUM 250 MILLIGRAM(S): 500 TABLET, DELAYED RELEASE ORAL at 11:23

## 2023-03-10 RX ADMIN — DIVALPROEX SODIUM 500 MILLIGRAM(S): 500 TABLET, DELAYED RELEASE ORAL at 11:23

## 2023-03-10 RX ADMIN — LITHIUM CARBONATE 450 MILLIGRAM(S): 300 TABLET, EXTENDED RELEASE ORAL at 05:31

## 2023-03-10 NOTE — PROGRESS NOTE ADULT - SUBJECTIVE AND OBJECTIVE BOX
Patient is a 22y old  Male who presents with a chief complaint of Placement (16 Feb 2023 13:19)      SUBJECTIVE / OVERNIGHT EVENTS:  Patient seen and examined at bedside; patient appears comfortable, denies any complaints.       ADDITIONAL REVIEW OF SYSTEMS: All systems were reviewed and are otherwise negative    MEDICATIONS  (STANDING):  diVALproex  milliGRAM(s) Oral daily  diVALproex  milliGRAM(s) Oral daily  diVALproex  milliGRAM(s) Oral at bedtime  diVALproex  milliGRAM(s) Oral at bedtime  lithium CR (ESKALITH-CR) 450 milliGRAM(s) Oral two times a day  risperiDONE   Tablet 2 milliGRAM(s) Oral two times a day    MEDICATIONS  (PRN):  hydrOXYzine hydrochloride 25 milliGRAM(s) Oral at bedtime PRN insomnia      CAPILLARY BLOOD GLUCOSE        I&O's Summary      Vital Signs Last 24 Hrs  T(C): 36.1 (09 Mar 2023 21:03), Max: 36.1 (09 Mar 2023 21:03)  T(F): 97 (09 Mar 2023 21:03), Max: 97 (09 Mar 2023 21:03)  HR: 95 (09 Mar 2023 21:03) (76 - 95)  BP: 120/69 (09 Mar 2023 21:03) (120/69 - 130/68)  BP(mean): --  RR: 18 (09 Mar 2023 21:03) (16 - 18)  SpO2: --  GENERAL: No acute distress, well-developed  HEAD:  Atraumatic, Normocephalic  EYES: EOMI, PERRLA, conjunctiva and sclera clear  NECK: Supple, no lymphadenopathy, no JVD  CHEST/LUNG: CTAB; No wheezes, rales, or rhonchi  HEART: Regular rate and rhythm; No murmurs, rubs, or gallops  ABDOMEN: Soft, non-tender, non-distended; normal bowel sounds, no organomegaly  EXTREMITIES:  2+ peripheral pulses b/l, No clubbing, cyanosis, or edema  NEUROLOGY: A&O x 3, no focal deficits  SKIN: No rashes or lesions    LABS:                    RADIOLOGY & ADDITIONAL TESTS:  Results Reviewed:   Imaging Personally Reviewed:  Electrocardiogram Personally Reviewed:    COORDINATION OF CARE:  Care Discussed with Consultants/Other Providers [Y/N]:  Prior or Outpatient Records Reviewed [Y/N]:

## 2023-03-11 PROCEDURE — 99231 SBSQ HOSP IP/OBS SF/LOW 25: CPT

## 2023-03-11 RX ADMIN — DIVALPROEX SODIUM 250 MILLIGRAM(S): 500 TABLET, DELAYED RELEASE ORAL at 12:47

## 2023-03-11 RX ADMIN — LITHIUM CARBONATE 450 MILLIGRAM(S): 300 TABLET, EXTENDED RELEASE ORAL at 18:45

## 2023-03-11 RX ADMIN — RISPERIDONE 2 MILLIGRAM(S): 4 TABLET ORAL at 18:45

## 2023-03-11 RX ADMIN — LITHIUM CARBONATE 450 MILLIGRAM(S): 300 TABLET, EXTENDED RELEASE ORAL at 05:11

## 2023-03-11 RX ADMIN — DIVALPROEX SODIUM 250 MILLIGRAM(S): 500 TABLET, DELAYED RELEASE ORAL at 21:05

## 2023-03-11 RX ADMIN — RISPERIDONE 2 MILLIGRAM(S): 4 TABLET ORAL at 05:11

## 2023-03-11 RX ADMIN — DIVALPROEX SODIUM 500 MILLIGRAM(S): 500 TABLET, DELAYED RELEASE ORAL at 12:47

## 2023-03-11 RX ADMIN — DIVALPROEX SODIUM 500 MILLIGRAM(S): 500 TABLET, DELAYED RELEASE ORAL at 21:05

## 2023-03-12 PROCEDURE — 99231 SBSQ HOSP IP/OBS SF/LOW 25: CPT

## 2023-03-12 RX ADMIN — DIVALPROEX SODIUM 500 MILLIGRAM(S): 500 TABLET, DELAYED RELEASE ORAL at 11:49

## 2023-03-12 RX ADMIN — DIVALPROEX SODIUM 500 MILLIGRAM(S): 500 TABLET, DELAYED RELEASE ORAL at 21:36

## 2023-03-12 RX ADMIN — RISPERIDONE 2 MILLIGRAM(S): 4 TABLET ORAL at 05:08

## 2023-03-12 RX ADMIN — DIVALPROEX SODIUM 250 MILLIGRAM(S): 500 TABLET, DELAYED RELEASE ORAL at 11:49

## 2023-03-12 RX ADMIN — RISPERIDONE 2 MILLIGRAM(S): 4 TABLET ORAL at 18:42

## 2023-03-12 RX ADMIN — LITHIUM CARBONATE 450 MILLIGRAM(S): 300 TABLET, EXTENDED RELEASE ORAL at 18:42

## 2023-03-12 RX ADMIN — DIVALPROEX SODIUM 250 MILLIGRAM(S): 500 TABLET, DELAYED RELEASE ORAL at 21:36

## 2023-03-12 RX ADMIN — LITHIUM CARBONATE 450 MILLIGRAM(S): 300 TABLET, EXTENDED RELEASE ORAL at 05:09

## 2023-03-12 NOTE — PROGRESS NOTE ADULT - SUBJECTIVE AND OBJECTIVE BOX
Patient is a 22y old  Male who presents with a chief complaint of Placement (16 Feb 2023 13:19)      SUBJECTIVE / OVERNIGHT EVENTS:  Patient seen and examined at bedside; patient appears comfortable, denies any complaints. Patient resting in bed.      ADDITIONAL REVIEW OF SYSTEMS: All systems were reviewed and are otherwise negative    MEDICATIONS  (STANDING):  diVALproex  milliGRAM(s) Oral daily  diVALproex  milliGRAM(s) Oral daily  diVALproex  milliGRAM(s) Oral at bedtime  diVALproex  milliGRAM(s) Oral at bedtime  lithium CR (ESKALITH-CR) 450 milliGRAM(s) Oral two times a day  risperiDONE   Tablet 2 milliGRAM(s) Oral two times a day    MEDICATIONS  (PRN):  hydrOXYzine hydrochloride 25 milliGRAM(s) Oral at bedtime PRN insomnia      CAPILLARY BLOOD GLUCOSE        I&O's Summary      Vital Signs Last 24 Hrs  T(C): 35.6 (12 Mar 2023 04:35), Max: 37.1 (11 Mar 2023 20:40)  T(F): 96.1 (12 Mar 2023 04:35), Max: 98.8 (11 Mar 2023 20:40)  HR: 61 (12 Mar 2023 04:35) (61 - 85)  BP: 115/72 (12 Mar 2023 04:35) (109/69 - 126/65)  BP(mean): --  RR: 18 (12 Mar 2023 04:35) (18 - 18)  SpO2: --    GENERAL: No acute distress, well-developed  HEAD:  Atraumatic, Normocephalic  EYES: EOMI, PERRLA, conjunctiva and sclera clear  NECK: Supple, no lymphadenopathy, no JVD  CHEST/LUNG: CTAB; No wheezes, rales, or rhonchi  HEART: Regular rate and rhythm; No murmurs, rubs, or gallops  ABDOMEN: Soft, non-tender, non-distended; normal bowel sounds, no organomegaly  EXTREMITIES:  2+ peripheral pulses b/l, No clubbing, cyanosis, or edema  NEUROLOGY: A&O x 3, no focal deficits  SKIN: No rashes or lesions    LABS:                    RADIOLOGY & ADDITIONAL TESTS:  Results Reviewed:   Imaging Personally Reviewed:  Electrocardiogram Personally Reviewed:    COORDINATION OF CARE:  Care Discussed with Consultants/Other Providers [Y/N]:  Prior or Outpatient Records Reviewed [Y/N]:

## 2023-03-13 PROCEDURE — 99231 SBSQ HOSP IP/OBS SF/LOW 25: CPT

## 2023-03-13 RX ADMIN — DIVALPROEX SODIUM 500 MILLIGRAM(S): 500 TABLET, DELAYED RELEASE ORAL at 21:35

## 2023-03-13 RX ADMIN — LITHIUM CARBONATE 450 MILLIGRAM(S): 300 TABLET, EXTENDED RELEASE ORAL at 18:13

## 2023-03-13 RX ADMIN — LITHIUM CARBONATE 450 MILLIGRAM(S): 300 TABLET, EXTENDED RELEASE ORAL at 05:48

## 2023-03-13 RX ADMIN — RISPERIDONE 2 MILLIGRAM(S): 4 TABLET ORAL at 05:47

## 2023-03-13 RX ADMIN — RISPERIDONE 2 MILLIGRAM(S): 4 TABLET ORAL at 18:13

## 2023-03-13 RX ADMIN — DIVALPROEX SODIUM 250 MILLIGRAM(S): 500 TABLET, DELAYED RELEASE ORAL at 21:35

## 2023-03-13 RX ADMIN — DIVALPROEX SODIUM 250 MILLIGRAM(S): 500 TABLET, DELAYED RELEASE ORAL at 12:04

## 2023-03-13 RX ADMIN — DIVALPROEX SODIUM 500 MILLIGRAM(S): 500 TABLET, DELAYED RELEASE ORAL at 12:04

## 2023-03-13 NOTE — PROGRESS NOTE ADULT - SUBJECTIVE AND OBJECTIVE BOX
Patient is a 22y old  Male who presents with a chief complaint of Placement (16 Feb 2023 13:19)      SUBJECTIVE / OVERNIGHT EVENTS:  Patient seen and examined at bedside; patient appears comfortable, denies any complaints. Patient resting in bed.      ADDITIONAL REVIEW OF SYSTEMS: All systems were reviewed and are otherwise negative    MEDICATIONS  (STANDING):  diVALproex  milliGRAM(s) Oral daily  diVALproex  milliGRAM(s) Oral daily  diVALproex  milliGRAM(s) Oral at bedtime  diVALproex  milliGRAM(s) Oral at bedtime  lithium CR (ESKALITH-CR) 450 milliGRAM(s) Oral two times a day  risperiDONE   Tablet 2 milliGRAM(s) Oral two times a day    MEDICATIONS  (PRN):  hydrOXYzine hydrochloride 25 milliGRAM(s) Oral at bedtime PRN insomnia      CAPILLARY BLOOD GLUCOSE        I&O's Summary      Vital Signs Last 24 Hrs  T(C): 35.7 (13 Mar 2023 05:38), Max: 36.7 (12 Mar 2023 14:00)  T(F): 96.2 (13 Mar 2023 05:38), Max: 98.1 (12 Mar 2023 14:00)  HR: 62 (13 Mar 2023 05:38) (62 - 87)  BP: 110/62 (13 Mar 2023 05:38) (110/62 - 134/80)  BP(mean): --  RR: 18 (13 Mar 2023 05:38) (17 - 18)  SpO2: 98% (13 Mar 2023 05:38) (98% - 98%)  GENERAL: No acute distress, well-developed  HEAD:  Atraumatic, Normocephalic  EYES: EOMI, PERRLA, conjunctiva and sclera clear  NECK: Supple, no lymphadenopathy, no JVD  CHEST/LUNG: CTAB; No wheezes, rales, or rhonchi  HEART: Regular rate and rhythm; No murmurs, rubs, or gallops  ABDOMEN: Soft, non-tender, non-distended; normal bowel sounds, no organomegaly  EXTREMITIES:  2+ peripheral pulses b/l, No clubbing, cyanosis, or edema  NEUROLOGY: A&O x 3, no focal deficits  SKIN: No rashes or lesions    LABS:                    RADIOLOGY & ADDITIONAL TESTS:  Results Reviewed:   Imaging Personally Reviewed:  Electrocardiogram Personally Reviewed:    COORDINATION OF CARE:  Care Discussed with Consultants/Other Providers [Y/N]:  Prior or Outpatient Records Reviewed [Y/N]:

## 2023-03-14 PROCEDURE — 99231 SBSQ HOSP IP/OBS SF/LOW 25: CPT

## 2023-03-14 RX ADMIN — LITHIUM CARBONATE 450 MILLIGRAM(S): 300 TABLET, EXTENDED RELEASE ORAL at 06:09

## 2023-03-14 RX ADMIN — DIVALPROEX SODIUM 500 MILLIGRAM(S): 500 TABLET, DELAYED RELEASE ORAL at 21:03

## 2023-03-14 RX ADMIN — LITHIUM CARBONATE 450 MILLIGRAM(S): 300 TABLET, EXTENDED RELEASE ORAL at 17:01

## 2023-03-14 RX ADMIN — DIVALPROEX SODIUM 250 MILLIGRAM(S): 500 TABLET, DELAYED RELEASE ORAL at 21:03

## 2023-03-14 RX ADMIN — RISPERIDONE 2 MILLIGRAM(S): 4 TABLET ORAL at 06:09

## 2023-03-14 RX ADMIN — RISPERIDONE 2 MILLIGRAM(S): 4 TABLET ORAL at 17:01

## 2023-03-14 RX ADMIN — DIVALPROEX SODIUM 250 MILLIGRAM(S): 500 TABLET, DELAYED RELEASE ORAL at 11:02

## 2023-03-14 RX ADMIN — DIVALPROEX SODIUM 500 MILLIGRAM(S): 500 TABLET, DELAYED RELEASE ORAL at 11:02

## 2023-03-14 NOTE — PROGRESS NOTE ADULT - SUBJECTIVE AND OBJECTIVE BOX
Patient is a 22y old  Male who presents with a chief complaint of Placement (16 Feb 2023 13:19)      SUBJECTIVE / OVERNIGHT EVENTS:  Patient seen and examined at bedside; patient appears comfortable, denies any complaints. Patient resting in bed.      ADDITIONAL REVIEW OF SYSTEMS: All systems were reviewed and are otherwise negative    MEDICATIONS  (STANDING):  diVALproex  milliGRAM(s) Oral daily  diVALproex  milliGRAM(s) Oral daily  diVALproex  milliGRAM(s) Oral at bedtime  diVALproex  milliGRAM(s) Oral at bedtime  lithium CR (ESKALITH-CR) 450 milliGRAM(s) Oral two times a day  risperiDONE   Tablet 2 milliGRAM(s) Oral two times a day    MEDICATIONS  (PRN):  hydrOXYzine hydrochloride 25 milliGRAM(s) Oral at bedtime PRN insomnia      CAPILLARY BLOOD GLUCOSE        I&O's Summary      Vital Signs Last 24 Hrs  T(C): 36.6 (14 Mar 2023 05:05), Max: 36.7 (13 Mar 2023 21:23)  T(F): 97.9 (14 Mar 2023 05:05), Max: 98.1 (13 Mar 2023 21:23)  HR: 63 (14 Mar 2023 05:05) (63 - 72)  BP: 97/58 (14 Mar 2023 05:05) (97/58 - 118/66)  BP(mean): --  RR: 18 (14 Mar 2023 05:05) (18 - 18)  SpO2: 98% (14 Mar 2023 05:05) (98% - 98%)    GENERAL: No acute distress, well-developed  HEAD:  Atraumatic, Normocephalic  EYES: EOMI, PERRLA, conjunctiva and sclera clear  NECK: Supple, no lymphadenopathy, no JVD  CHEST/LUNG: CTAB; No wheezes, rales, or rhonchi  HEART: Regular rate and rhythm; No murmurs, rubs, or gallops  ABDOMEN: Soft, non-tender, non-distended; normal bowel sounds, no organomegaly  EXTREMITIES:  2+ peripheral pulses b/l, No clubbing, cyanosis, or edema  NEUROLOGY: A&O x 3, no focal deficits  SKIN: No rashes or lesions    LABS:                    RADIOLOGY & ADDITIONAL TESTS:  Results Reviewed:   Imaging Personally Reviewed:  Electrocardiogram Personally Reviewed:    COORDINATION OF CARE:  Care Discussed with Consultants/Other Providers [Y/N]:  Prior or Outpatient Records Reviewed [Y/N]:

## 2023-03-15 PROCEDURE — 99232 SBSQ HOSP IP/OBS MODERATE 35: CPT

## 2023-03-15 RX ADMIN — DIVALPROEX SODIUM 250 MILLIGRAM(S): 500 TABLET, DELAYED RELEASE ORAL at 12:43

## 2023-03-15 RX ADMIN — RISPERIDONE 2 MILLIGRAM(S): 4 TABLET ORAL at 05:27

## 2023-03-15 RX ADMIN — RISPERIDONE 2 MILLIGRAM(S): 4 TABLET ORAL at 18:44

## 2023-03-15 RX ADMIN — Medication 25 MILLIGRAM(S): at 02:38

## 2023-03-15 RX ADMIN — DIVALPROEX SODIUM 500 MILLIGRAM(S): 500 TABLET, DELAYED RELEASE ORAL at 12:43

## 2023-03-15 RX ADMIN — LITHIUM CARBONATE 450 MILLIGRAM(S): 300 TABLET, EXTENDED RELEASE ORAL at 05:27

## 2023-03-15 RX ADMIN — LITHIUM CARBONATE 450 MILLIGRAM(S): 300 TABLET, EXTENDED RELEASE ORAL at 18:43

## 2023-03-15 RX ADMIN — DIVALPROEX SODIUM 250 MILLIGRAM(S): 500 TABLET, DELAYED RELEASE ORAL at 21:09

## 2023-03-15 RX ADMIN — DIVALPROEX SODIUM 500 MILLIGRAM(S): 500 TABLET, DELAYED RELEASE ORAL at 21:09

## 2023-03-15 NOTE — PROGRESS NOTE ADULT - ASSESSMENT
22 years old male past medical history of bipolar and developmental delay came to emergency room for alleged aggressive and violent behavior as per step father. Evaluated by Psych; Step Father left stated pt needs placement refusing to take child home.     Bipolar disorder and likely developmental disorder- stable  - continue current meds (on depakote, risperidone, and lithium)   - pt has been stable without aggressive behaviour in the hospital  - father states he cant take care of pt    Depressed mood  - cont w lithium and valproic acid, levels wnl, c/w risperiDONE     - psych consult appreciated    Obesity: Likely multifactorial: excess calories and Psych medication induced     DVT px - pt ambulatory     DISPO: pending placement/gaurdianship    Spent over 35 min reviewing chart and on coordinating patient care during interdisciplinary rounds     Eldon Martinez M.D./Misty  Attending Physician

## 2023-03-15 NOTE — PROGRESS NOTE ADULT - SUBJECTIVE AND OBJECTIVE BOX
MEDICINE ATTENDING PROGRESS NOTE  22 years old male past medical history of bipolar and developmental delay came to emergency room for alleged aggressive and violent behavior as per step father. Evaluated by Psych; Step Father left stated pt needs placement refusing to take child home.     Interval/Overnight events:  seen patient at bedside. No acute complaints. Reply appropriately to questions, and never been aggressive toward staffs. Vitals stable. Exam unchanged. Labs and imaging reviewed Pending placement.    ROS:   General: denies fever, chills, insomnia, depression, weight change  Skin: denies itch, jaundice   Resp: denies cough, pleuritic chest pain, wheezing   CV: denies PND  GI: denies N/V/D constipation   : denies d/c, itching, hematuria, dysuria   EXT: denies pain, swelling, erythema   Musculoskeletal: denies low back pain, joint pain, swelling   Neuro: denies headache, weakness, syncope    MEDICATIONS  (STANDING):  diVALproex  milliGRAM(s) Oral daily  diVALproex  milliGRAM(s) Oral daily  diVALproex  milliGRAM(s) Oral at bedtime  diVALproex  milliGRAM(s) Oral at bedtime  lithium CR (ESKALITH-CR) 450 milliGRAM(s) Oral two times a day  risperiDONE   Tablet 2 milliGRAM(s) Oral two times a day    MEDICATIONS  (PRN):  hydrOXYzine hydrochloride 25 milliGRAM(s) Oral at bedtime PRN insomnia    VITALS  T(F): 98.3 (03-15-23 @ 20:48), Max: 98.5 (03-15-23 @ 04:30)  HR: 92 (03-15-23 @ 20:48) (75 - 92)  BP: 96/55 (03-15-23 @ 20:48) (96/55 - 124/71)  RR: 18 (03-15-23 @ 20:48) (18 - 18)  SpO2: 97% (03-15-23 @ 20:48) (94% - 98%)    PHYSICAL EXAM  Gen- NAD, AAOx3  HEENT- no pallor, anicteric, moist mucous membranes  CVS- S1/S2, no m/r/g  Chest- CTA b/l no w/r/c, no use of accessory muscles, good inspiratory effort, speaking in full sentences  Abd- soft, nt, nd  Ext- no edema, pulses intact b/l  Neuro-CN II-XII intact;    LABS/IMAGING  no recent

## 2023-03-16 DIAGNOSIS — F79 UNSPECIFIED INTELLECTUAL DISABILITIES: ICD-10-CM

## 2023-03-16 PROCEDURE — 99232 SBSQ HOSP IP/OBS MODERATE 35: CPT

## 2023-03-16 PROCEDURE — 90792 PSYCH DIAG EVAL W/MED SRVCS: CPT | Mod: 1L,95

## 2023-03-16 RX ADMIN — Medication 25 MILLIGRAM(S): at 01:14

## 2023-03-16 RX ADMIN — RISPERIDONE 2 MILLIGRAM(S): 4 TABLET ORAL at 17:47

## 2023-03-16 RX ADMIN — DIVALPROEX SODIUM 250 MILLIGRAM(S): 500 TABLET, DELAYED RELEASE ORAL at 21:07

## 2023-03-16 RX ADMIN — LITHIUM CARBONATE 450 MILLIGRAM(S): 300 TABLET, EXTENDED RELEASE ORAL at 17:47

## 2023-03-16 RX ADMIN — DIVALPROEX SODIUM 500 MILLIGRAM(S): 500 TABLET, DELAYED RELEASE ORAL at 11:50

## 2023-03-16 RX ADMIN — DIVALPROEX SODIUM 250 MILLIGRAM(S): 500 TABLET, DELAYED RELEASE ORAL at 11:50

## 2023-03-16 RX ADMIN — RISPERIDONE 2 MILLIGRAM(S): 4 TABLET ORAL at 05:28

## 2023-03-16 RX ADMIN — DIVALPROEX SODIUM 500 MILLIGRAM(S): 500 TABLET, DELAYED RELEASE ORAL at 21:07

## 2023-03-16 RX ADMIN — LITHIUM CARBONATE 450 MILLIGRAM(S): 300 TABLET, EXTENDED RELEASE ORAL at 05:28

## 2023-03-16 NOTE — BH CONSULTATION LIAISON ASSESSMENT NOTE - VIOLENCE RISK FACTORS:
History of violence prior to age 18/Impulsivity
Affective dysregulation/Impulsivity/Lack of insight into violence risk/need for treatment

## 2023-03-16 NOTE — PROGRESS NOTE ADULT - SUBJECTIVE AND OBJECTIVE BOX
MEDICINE ATTENDING PROGRESS NOTE  22 years old male past medical history of bipolar and developmental delay came to emergency room for alleged aggressive and violent behavior as per step father. Evaluated by Psych; Step Father left stated pt needs placement refusing to take child home.     Interval/Overnight events:  seen patient at bedside. No acute complaints. Behavior still appropriate. Cont to sleep during the day. Will call psych for further eval. Will get fresh labs.    ROS:   General: denies fever, chills, insomnia, depression, weight change  Skin: denies itch, jaundice   Resp: denies cough, pleuritic chest pain, wheezing   CV: denies PND  GI: denies N/V/D constipation   : denies d/c, itching, hematuria, dysuria   EXT: denies pain, swelling, erythema   Musculoskeletal: denies low back pain, joint pain, swelling   Neuro: denies headache, weakness, syncope    MEDICATIONS  (STANDING):  diVALproex  milliGRAM(s) Oral daily  diVALproex  milliGRAM(s) Oral daily  diVALproex  milliGRAM(s) Oral at bedtime  diVALproex  milliGRAM(s) Oral at bedtime  lithium CR (ESKALITH-CR) 450 milliGRAM(s) Oral two times a day  risperiDONE   Tablet 2 milliGRAM(s) Oral two times a day    MEDICATIONS  (PRN):  hydrOXYzine hydrochloride 25 milliGRAM(s) Oral at bedtime PRN insomnia    VITALS  T(F): 96.1 (03-16-23 @ 21:03), Max: 98 (03-16-23 @ 13:56)  HR: 129 (03-16-23 @ 21:03) (73 - 129)  BP: 129/86 (03-16-23 @ 21:03) (106/60 - 129/86)  RR: 18 (03-16-23 @ 21:03) (18 - 18)  SpO2: 99% (03-16-23 @ 21:03) (96% - 99%)    PHYSICAL EXAM  Gen- NAD, AAOx3  HEENT- no pallor, anicteric, moist mucous membranes  CVS- S1/S2, no m/r/g  Chest- CTA b/l no w/r/c, no use of accessory muscles, good inspiratory effort, speaking in full sentences  Abd- soft, nt, nd  Ext- no edema, pulses intact b/l  Neuro-CN II-XII intact;    LABS/IMAGING  no recent

## 2023-03-16 NOTE — BH CONSULTATION LIAISON ASSESSMENT NOTE - RISK ASSESSMENT
He  wants  to live , supportive  family     h/o poor impulse control   
Pertinent known risk and protective factors are noted in documentation above

## 2023-03-16 NOTE — BH CONSULTATION LIAISON ASSESSMENT NOTE - NSBHINDICATION_PSY_ALL_CORE
Observation should be determined by primary team based on patient's cognition, behavior and supervision needs. Can continue with close supervision given patient's child-like cognitive capacity. Otherwise, there are NO psychiatric safety indications for constant observation at this time and patient would NOT require one to one in a group home setting.

## 2023-03-16 NOTE — BH CONSULTATION LIAISON ASSESSMENT NOTE - NSBHREFERDETAILS_PSY_A_CORE_FT
depression , negative thoughts 
change in behavior; hypersomnolent/lethargic during the day, not sleeping at night.

## 2023-03-16 NOTE — BH CONSULTATION LIAISON ASSESSMENT NOTE - OTHER
grossly attentive to writer but not assessed formally concrete and occasionally disorganized fair with chronic limitations chronically limited "frustrated"

## 2023-03-16 NOTE — BH CONSULTATION LIAISON ASSESSMENT NOTE - NSBHCHARTREVIEWVS_PSY_A_CORE FT
Vital Signs Last 24 Hrs  T(C): 36.7 (16 Mar 2023 13:56), Max: 36.8 (15 Mar 2023 20:48)  T(F): 98 (16 Mar 2023 13:56), Max: 98.3 (15 Mar 2023 20:48)  HR: 87 (16 Mar 2023 13:56) (73 - 92)  BP: 106/60 (16 Mar 2023 13:56) (96/55 - 122/80)  BP(mean): --  RR: 18 (16 Mar 2023 13:56) (18 - 18)  SpO2: 96% (16 Mar 2023 13:56) (96% - 97%)    
Vital Signs Last 24 Hrs  T(C): 36.8 (20 Nov 2022 14:00), Max: 36.8 (20 Nov 2022 14:00)  T(F): 98.2 (20 Nov 2022 14:00), Max: 98.2 (20 Nov 2022 14:00)  HR: 79 (20 Nov 2022 14:00) (79 - 83)  BP: 123/69 (20 Nov 2022 14:00) (105/58 - 123/69)  BP(mean): --  RR: 18 (20 Nov 2022 14:00) (18 - 18)  SpO2: --

## 2023-03-16 NOTE — BH CONSULTATION LIAISON ASSESSMENT NOTE - CURRENT MEDICATION
MEDICATIONS  (STANDING):  diVALproex  milliGRAM(s) Oral daily  diVALproex  milliGRAM(s) Oral daily  diVALproex  milliGRAM(s) Oral at bedtime  diVALproex  milliGRAM(s) Oral at bedtime  lithium CR (ESKALITH-CR) 450 milliGRAM(s) Oral two times a day  risperiDONE   Tablet 2 milliGRAM(s) Oral two times a day    MEDICATIONS  (PRN):  hydrOXYzine hydrochloride 25 milliGRAM(s) Oral at bedtime PRN insomnia  
MEDICATIONS  (STANDING):  diVALproex  milliGRAM(s) Oral daily  diVALproex  milliGRAM(s) Oral daily  diVALproex  milliGRAM(s) Oral at bedtime  diVALproex  milliGRAM(s) Oral at bedtime  lithium CR (ESKALITH-CR) 450 milliGRAM(s) Oral two times a day  risperiDONE   Tablet 2 milliGRAM(s) Oral two times a day    MEDICATIONS  (PRN):  hydrOXYzine hydrochloride 25 milliGRAM(s) Oral at bedtime PRN insomnia

## 2023-03-16 NOTE — BH CONSULTATION LIAISON ASSESSMENT NOTE - VIOLENCE PROTECTIVE FACTORS:
Engagement in treatment/Affective Stability
Sobriety/Engagement in treatment/Good treatment response/compliance

## 2023-03-16 NOTE — BH CONSULTATION LIAISON ASSESSMENT NOTE - HPI (INCLUDE ILLNESS QUALITY, SEVERITY, DURATION, TIMING, CONTEXT, MODIFYING FACTORS, ASSOCIATED SIGNS AND SYMPTOMS)
This is a 22 year old single male, with developmental delay, previously domiciled with parents and 3 younger siblings up until this admission 6 months ago (for SW assistance in  placement), charted psychiatric history of autism spectrum disorder and bipolar disorder, stable for years on Lithium, Depakote and Risperdal, no known psychiatric admissions, history of poor impulse control characterized by episodes of physical aggression or biting self, otherwise no history of suicidality, substance abuse, and no significant medical history otherwise, who was admitted for coordination of group home placement after initially being brought in by his father on 9/6/2022 following an episode of aggression at home, seen and cleared psychiatrically at that time but parents refused to take him home. He has had a lengthy hospital course awaiting placement but has remained in good behavior control, no known episodes of aggression, and compliant with medications. Psychiatry consulted for evaluation of recent behavior change, described as patient being hypersomnolent/lethargic during the day and overtly awake at night.     On assessment, patient explains the circumstances of his admission stating "it was the paperwork for lifestyles" that led to an issue, that "I was having a meltdown" and that "they're working on finding a better place, for a group home, for my age." He conveys responses to questions in a disorganized but comprehensible way. For example, he relays his length of stay in the hospital by stating "I was being here from the hospital for 7 months." He also perseverates on the phrase "the point is that" when offering explanations and answers to questions. When prompted to describe his recent mood, he states "I feel frustrated, I was getting angry, getting upset, I can't get out" elaborating on how he has been "going back and forth into the solarium and my room, to the solarium and my room," that "I was feeling bored, then I was sleeping, then I watch TV, I was playing with my phone." He describes a similar pattern of activities at home except "sometimes we go out," "people come visit," and "I go to school."     On ROS, patient does not recall feeling sad or depressed in recent days or weeks but rather reiterates feeling "frustrated" and "angry" elaborating how he attempted to call his grandfather and his father to tell them "I just can't stay here for a couple months." When prompted about any feelings of acting out, he instead tells me of his history of acting up "at home and at school" and how he would "go for a walk with the lyssa" whenever this happened at school. He relays some of his frustration is because "I do want to go to [employer] high school again." He denies lack of energy, tells me he plays music, including a guitar simulator on his ipad and gets "really excited." He describes sleep dysregulation stating "in the hospital I can't stop sleeping cause I don't know why I can't stop sleeping." He affirms this happening during the day. When prompted about his sleeping at night, he states "I can't sleep at night because of that sleeping pill" and affirms feeling more awake after he is given the pill (Hydroxyzine). He agrees to a plan of stopping this medicine and trying something else.     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 He affirms times of feeling angry with his brother but denies any HI. He can't recall any AVH and does not convey any paranoia or manic symptoms. Instead, he tells me of things he recalls occurred while he was at school, including fights instigated by other children. ROS is otherwise negative. He subsequently partakes in discussion surrounding continuing to utilize coping skills when frustrated and continued patience while awaiting placement. He tells me "my dad and grandpa said that group home is almost done" elaborating on his beliefs of going to a group home soon. He also conveys other sentiments about how long he has been in the hospital and asks whether he'll be in the hospital today "and the next day and the next day and the next day." He is offered reassurance as to the inevitability of being placed and encouraged to remain in good behavioral control. 
22 year  old male with h/o bipolar  d/o ,  autism  ,   prior out patient treatment  , no prior psych admission  as per mother ,  was  brought to Ed  after  he  became aggressive towards his father . patient is admitted to medicine  for placement.  consult was requested  to evaluate the patient for " negative  thoughts"   on   evaluation , patient  was calm and cooperative  , poor eye contact.  He denies  feeling  depress ,  denies  sleep or appetite  problem. denies  suicidal  ideations intent or plan.  He is preoccupied  with to play guitar .  His behavior  is in control. he is oddly  related. denies a/v hallucinations.   He  is on Depakote , Lithium and  Risperidone

## 2023-03-16 NOTE — BH CONSULTATION LIAISON ASSESSMENT NOTE - ORIENTATION
Pt utilizes visual cue to provide date and admits to limited capacity to estimate date without looking at his phone or other prompt.

## 2023-03-16 NOTE — BH CONSULTATION LIAISON ASSESSMENT NOTE - PATIENT'S CHIEF COMPLAINT
"I feel frustrated, I was getting angry, getting upset, I can't get out" 
"I was having melt down  "

## 2023-03-16 NOTE — BH CONSULTATION LIAISON ASSESSMENT NOTE - OTHER PAST PSYCHIATRIC HISTORY (INCLUDE DETAILS REGARDING ONSET, COURSE OF ILLNESS, INPATIENT/OUTPATIENT TREATMENT)
patient  follow up  with psychiatrist DR Beltran  in [place] 
PTA, pt was in outpatient treatment with a psychiatrist, Dr. Ruby Muir, whom pt has been seeing since he was a young child and had no recent changes to his medication regimen.

## 2023-03-16 NOTE — BH CONSULTATION LIAISON ASSESSMENT NOTE - SUMMARY
This is a 22 year old single male, with developmental delay, previously domiciled with parents and 3 younger siblings up until this admission 6 months ago (for  assistance in  placement), charted psychiatric history of autism spectrum disorder and bipolar disorder, stable for years on Lithium, Depakote and Risperdal, no known psychiatric admissions, history of poor impulse control characterized by episodes of physical aggression or biting self, otherwise no history of suicidality, substance abuse, and no significant medical history otherwise, who was admitted for coordination of group home placement after initially being brought in by his father on 9/6/2022 following an episode of aggression at home, seen and cleared psychiatrically at that time but parents refused to take him home. He has had a lengthy hospital course awaiting placement but has remained in good behavior control, no known episodes of aggression, and compliant with medications. Psychiatry consulted for evaluation of recent behavior change, described as patient being hypersomnolent/lethargic during the day and overtly awake at night.     On assessment, patient is pleasantly engaged and well mannered. He is appropriately expressive despite evident developmental limitations in cognition, thought process and intellect. There has been no reports of or evidenced aggression throughout admission (>6 months in good behavioral control). There is also no evidence of active psychosis, cory, suicidality or homicidality and patient is not endorsing any active psychiatric symptoms causing distress. Rather, he conveys feelings of frustration with prolonged hospital course and lengthy wait for group home placement. He spontaneously describes paradoxical alertness/restlessness w/ nightly Hydroxyzine coupled with hypersomnolence during the day. Sleep dysregulation could be due to a multitude of additional factors, such as prolonged hospitalization, limited stimulation during the day, frequent day time napping and therefore low sleep pressure at night. It would also be imperative to rule out physiologic anomalies (no evident labs/workup since reported change in behavior). Recommend medication adjustments and environmental provisions to regulate sleep (noted below). Otherwise, in the absence of organic pathology, patient's sleep pattern is likely to regulate in a more ideal environment after placement in a group home and patient is able to resume school and other stimulating day time activities. 
22 year old male with h/o bipolar d/o , Autism, intellectuals disability  admitted to medical floor for placement   after he  was aggressive towards his father. he is complaint with medications. He denies feeling depress.  denies s/h ideations intent or plan.   he is oddly related.  No overt psychosis.  denies manic symptoms. His  behavior is calm .

## 2023-03-16 NOTE — BH CONSULTATION LIAISON ASSESSMENT NOTE - DESCRIPTION
PTA, patient was domiciled in [place] with parents and 3 younger siblings (ages 9,12 & 16 at the time of admission in Sep,2022) in [place], and was enrolled as a student in [place] [id number] in the [employer] ED.
none
was in special Ed school. lives with parents. poor work history

## 2023-03-16 NOTE — BH CONSULTATION LIAISON ASSESSMENT NOTE - NSICDXBHSECONDARYDX_PSY_ALL_CORE
History of developmental delay   Z87.898  Bipolar disorder   F31.9  
History of developmental delay   Z87.898  Bipolar disorder   F31.9  Intellectual disability   F79

## 2023-03-16 NOTE — BH CONSULTATION LIAISON ASSESSMENT NOTE - NSBHCONSULTRECOMMENDOTHER_PSY_A_CORE FT
continue current rededications  f/u   Lithium level   VPA level  
Medication Management:   - Continue Depakote, Lithium and Risperdal as currently prescribed  - Discontinue PRN Hydroxyzine (concern for paradoxical alertness)  - Start Melatonin 3 mg daily at 7pm + Trazodone 25 mg daily at 7pm  - Recommend Trazodone 25 mg HS PRN breakthrough insomnia    Environmental Provisions:    - Encourage daytime alertness   - Windows open during the day, mobilize frequently  - Provide stimulating activities, encourage visitors  - Promote restful sleep at night   - Low lighting, noise avoidance, & minimizing unnecessary interventions at night    Disposition:   - SW/CM to facilitate placement   - Psychiatry follow up upon discharge  - There are NO psychiatric contraindications to discharge   - There are NO safety concerns limiting patient's eligibility for placement.

## 2023-03-16 NOTE — BH CONSULTATION LIAISON ASSESSMENT NOTE - DIFFERENTIAL
impulse  control d/o 
Autism spectrum disorder  Intellectual disability w/ developmental delay  Bipolar disorder by hx, in remission

## 2023-03-17 LAB
ALBUMIN SERPL ELPH-MCNC: 4.8 G/DL — SIGNIFICANT CHANGE UP (ref 3.5–5.2)
ALP SERPL-CCNC: 85 U/L — SIGNIFICANT CHANGE UP (ref 30–115)
ALT FLD-CCNC: 39 U/L — HIGH (ref 13–38)
ANION GAP SERPL CALC-SCNC: 13 MMOL/L — SIGNIFICANT CHANGE UP (ref 7–14)
AST SERPL-CCNC: 29 U/L — SIGNIFICANT CHANGE UP (ref 13–38)
BASOPHILS # BLD AUTO: 0.04 K/UL — SIGNIFICANT CHANGE UP (ref 0–0.2)
BASOPHILS NFR BLD AUTO: 0.5 % — SIGNIFICANT CHANGE UP (ref 0–1)
BILIRUB SERPL-MCNC: 0.3 MG/DL — SIGNIFICANT CHANGE UP (ref 0.2–1.2)
BUN SERPL-MCNC: 8 MG/DL — LOW (ref 10–20)
CALCIUM SERPL-MCNC: 10.2 MG/DL — SIGNIFICANT CHANGE UP (ref 8.4–10.5)
CHLORIDE SERPL-SCNC: 102 MMOL/L — SIGNIFICANT CHANGE UP (ref 98–110)
CO2 SERPL-SCNC: 24 MMOL/L — SIGNIFICANT CHANGE UP (ref 17–32)
CREAT SERPL-MCNC: 0.8 MG/DL — SIGNIFICANT CHANGE UP (ref 0.7–1.5)
EGFR: 130 ML/MIN/1.73M2 — SIGNIFICANT CHANGE UP
EOSINOPHIL # BLD AUTO: 0.26 K/UL — SIGNIFICANT CHANGE UP (ref 0–0.7)
EOSINOPHIL NFR BLD AUTO: 2.9 % — SIGNIFICANT CHANGE UP (ref 0–8)
GLUCOSE SERPL-MCNC: 108 MG/DL — HIGH (ref 70–99)
HCT VFR BLD CALC: 40.1 % — LOW (ref 42–52)
HGB BLD-MCNC: 13.7 G/DL — LOW (ref 14–18)
IMM GRANULOCYTES NFR BLD AUTO: 0.3 % — SIGNIFICANT CHANGE UP (ref 0.1–0.3)
LYMPHOCYTES # BLD AUTO: 3.06 K/UL — SIGNIFICANT CHANGE UP (ref 1.2–3.4)
LYMPHOCYTES # BLD AUTO: 34.7 % — SIGNIFICANT CHANGE UP (ref 20.5–51.1)
MAGNESIUM SERPL-MCNC: 2 MG/DL — SIGNIFICANT CHANGE UP (ref 1.8–2.4)
MCHC RBC-ENTMCNC: 29 PG — SIGNIFICANT CHANGE UP (ref 27–31)
MCHC RBC-ENTMCNC: 34.2 G/DL — SIGNIFICANT CHANGE UP (ref 32–37)
MCV RBC AUTO: 85 FL — SIGNIFICANT CHANGE UP (ref 80–94)
MONOCYTES # BLD AUTO: 0.87 K/UL — HIGH (ref 0.1–0.6)
MONOCYTES NFR BLD AUTO: 9.9 % — HIGH (ref 1.7–9.3)
NEUTROPHILS # BLD AUTO: 4.57 K/UL — SIGNIFICANT CHANGE UP (ref 1.4–6.5)
NEUTROPHILS NFR BLD AUTO: 51.7 % — SIGNIFICANT CHANGE UP (ref 42.2–75.2)
NRBC # BLD: 0 /100 WBCS — SIGNIFICANT CHANGE UP (ref 0–0)
PHOSPHATE SERPL-MCNC: 4.7 MG/DL — SIGNIFICANT CHANGE UP (ref 2.1–4.9)
PLATELET # BLD AUTO: 263 K/UL — SIGNIFICANT CHANGE UP (ref 130–400)
POTASSIUM SERPL-MCNC: 4.2 MMOL/L — SIGNIFICANT CHANGE UP (ref 3.5–5)
POTASSIUM SERPL-SCNC: 4.2 MMOL/L — SIGNIFICANT CHANGE UP (ref 3.5–5)
PROT SERPL-MCNC: 7.9 G/DL — SIGNIFICANT CHANGE UP (ref 6–8)
RBC # BLD: 4.72 M/UL — SIGNIFICANT CHANGE UP (ref 4.7–6.1)
RBC # FLD: 11.7 % — SIGNIFICANT CHANGE UP (ref 11.5–14.5)
SODIUM SERPL-SCNC: 139 MMOL/L — SIGNIFICANT CHANGE UP (ref 135–146)
WBC # BLD: 8.83 K/UL — SIGNIFICANT CHANGE UP (ref 4.8–10.8)
WBC # FLD AUTO: 8.83 K/UL — SIGNIFICANT CHANGE UP (ref 4.8–10.8)

## 2023-03-17 PROCEDURE — 99232 SBSQ HOSP IP/OBS MODERATE 35: CPT

## 2023-03-17 PROCEDURE — 99233 SBSQ HOSP IP/OBS HIGH 50: CPT | Mod: 1L,95

## 2023-03-17 RX ORDER — LANOLIN ALCOHOL/MO/W.PET/CERES
3 CREAM (GRAM) TOPICAL AT BEDTIME
Refills: 0 | Status: DISCONTINUED | OUTPATIENT
Start: 2023-03-17 | End: 2023-03-20

## 2023-03-17 RX ORDER — TRAZODONE HCL 50 MG
25 TABLET ORAL AT BEDTIME
Refills: 0 | Status: DISCONTINUED | OUTPATIENT
Start: 2023-03-17 | End: 2023-06-29

## 2023-03-17 RX ORDER — TRAZODONE HCL 50 MG
25 TABLET ORAL DAILY
Refills: 0 | Status: DISCONTINUED | OUTPATIENT
Start: 2023-03-17 | End: 2023-03-20

## 2023-03-17 RX ADMIN — DIVALPROEX SODIUM 250 MILLIGRAM(S): 500 TABLET, DELAYED RELEASE ORAL at 22:26

## 2023-03-17 RX ADMIN — DIVALPROEX SODIUM 500 MILLIGRAM(S): 500 TABLET, DELAYED RELEASE ORAL at 11:18

## 2023-03-17 RX ADMIN — LITHIUM CARBONATE 450 MILLIGRAM(S): 300 TABLET, EXTENDED RELEASE ORAL at 17:32

## 2023-03-17 RX ADMIN — DIVALPROEX SODIUM 250 MILLIGRAM(S): 500 TABLET, DELAYED RELEASE ORAL at 11:18

## 2023-03-17 RX ADMIN — RISPERIDONE 2 MILLIGRAM(S): 4 TABLET ORAL at 05:10

## 2023-03-17 RX ADMIN — Medication 25 MILLIGRAM(S): at 22:27

## 2023-03-17 RX ADMIN — RISPERIDONE 2 MILLIGRAM(S): 4 TABLET ORAL at 17:32

## 2023-03-17 RX ADMIN — DIVALPROEX SODIUM 500 MILLIGRAM(S): 500 TABLET, DELAYED RELEASE ORAL at 22:26

## 2023-03-17 RX ADMIN — LITHIUM CARBONATE 450 MILLIGRAM(S): 300 TABLET, EXTENDED RELEASE ORAL at 05:10

## 2023-03-17 NOTE — BH CONSULTATION LIAISON PROGRESS NOTE - NSBHCHARTREVIEWLAB_PSY_A_CORE FT
Complete Blood Count + Automated Diff (03.17.23 @ 08:56)    WBC Count: 8.83 K/uL    RBC Count: 4.72 M/uL    Hemoglobin: 13.7 g/dL    Hematocrit: 40.1 %    Mean Cell Volume: 85.0 fL    Mean Cell Hemoglobin: 29.0 pg    Mean Cell Hemoglobin Conc: 34.2 g/dL    Red Cell Distrib Width: 11.7 %    Platelet Count - Automated: 263 K/uL    Auto Neutrophil #: 4.57 K/uL    Auto Lymphocyte #: 3.06 K/uL    Auto Monocyte #: 0.87 K/uL    Auto Eosinophil #: 0.26 K/uL    Auto Basophil #: 0.04 K/uL    Auto Neutrophil %: 51.7: Differential percentages must be correlated with absolute numbers for  clinical significance. %    Auto Lymphocyte %: 34.7 %    Auto Monocyte %: 9.9 %    Auto Eosinophil %: 2.9 %    Auto Basophil %: 0.5 %    Auto Immature Granulocyte %: 0.3: (Includes meta, myelo and promyelocytes). Mild elevations in immature  granulocytes may be seen with many inflammatory processes and pregnancy;  clinical correlation suggested. %    Nucleated RBC: 0 /100 WBCs    03-17    139  |  102  |  8<L>  ----------------------------<  108<H>  4.2   |  24  |  0.8    Ca    10.2      17 Mar 2023 08:56  Phos  4.7     03-17  Mg     2.0     03-17    TPro  7.9  /  Alb  4.8  /  TBili  0.3  /  DBili  x   /  AST  29  /  ALT  39<H>  /  AlkPhos  85  03-17

## 2023-03-17 NOTE — BH CONSULTATION LIAISON PROGRESS NOTE - OTHER
grossly intact but evident limits in attention span (unable to tolerate listening to long/multiple sentences) chronically limited fair with chronic limitations "frustrated," "antsy," "bored" evident limitations; pt believes he's been in the hospital for "8 or 9 months."  discharge focused, expressive of frustrations surrounding very lengthy admission, ample future oriented, self preserving statements; devoid of suicidal, homicidal, psychotic and manic material.  concrete

## 2023-03-17 NOTE — BH CONSULTATION LIAISON PROGRESS NOTE - NSBHCONSULTRECOMMENDOTHER_PSY_A_CORE FT
Medication Management:   - Continue Depakote, Lithium and Risperdal as currently prescribed  - Continue Melatonin 3 mg daily at 7pm + Trazodone 25 mg daily at 7pm (no doses received yet)  - Continue Trazodone 25 mg HS PRN breakthrough insomnia (no doses received yet)    Environmental Provisions:    - Encourage daytime alertness   - Windows open during the day, mobilize frequently  - Provide stimulating activities, encourage visitors  - Promote restful sleep at night   - Low lighting, noise avoidance, & minimizing unnecessary interventions at night    Disposition:   - SW/GABRIEL to facilitate placement   - Psychiatry follow up upon discharge  - There are NO psychiatric contraindications to discharge   - There are NO safety concerns limiting patient's eligibility for placement.

## 2023-03-17 NOTE — BH CONSULTATION LIAISON PROGRESS NOTE - NSBHFUPINTERVALHXFT_PSY_A_CORE
Psychiatry re-consulted for evaluation of SI. Spoke with PA and MD for the patient who did not assess any SI from patient themselves but relayed SI was reported to them by nursing. Spoke with RN for patient who noted SI was conveyed by another staff member but that patient has expressed feeling sad, wanting to leave the hospital and may have misinterpreted a conversation as he may never leave when he requested to leave his room to go outside and was told by another staff that he can't leave the hospital because he requires supervision. Spoke with environmental services staff who conveyed that during her time in patient's room this morning, he was expressing complaints of 'Lashae won't let me leave' and 'I want to kill myself if I can't leave this hospital.' She subsequently reported this to nursing staff who alerted primary team.     On assessment today, patient reports feeling "good" and tells me he was thinking "about school, about feeling frustrated" and feeling "antsy" since last speaking with this writer yesterday. When prompted about occurrences this morning, he tells me "I remember this morning that I feel frustrated again and then I was like frustrated is normal." He then states "do you know what I hate? I hate fights and arguing" then goes on a tangent about "last year" and someone that was "trying to fight," "and then I get scared." He affirms feeling safe currently in the hospital. He tells me he spoke with his grandpa and that the conversation went well. When prompted directly about reports of having expressed wanting to kill himself, he states "I was never kill myself, I was trying to be a " and "well the point is that I don't want to be stuck in the hospital, every single year, it gets worse and worse and worse." When asked if he would ever hurt himself, he tells me of past instances, that he recalls "when I started to punch myself" and how his parents "started taking my phone away" in response to that, and that "I did at school, I did at home." When asked if he has felt like hurting himself since being in the hospital, he states "actually no." When prompted on desire to be dead or to kill himself, he states "the point is I don't want to kill myself because I believe that I'm a ," "I believe that I'm a good person," "I'm a good drawer" and "I'm going to have the best life." He goes on to spontaneously convey future aspirations stating "when I was becoming a great adult, I would start going to [employer], to work" and "I go online to buy the iloho."     Patient then goes on a tangent about the circumstances prior to admission involving his "mom trying to sign the lifestyles paper," some exchange that occurred between his father and grandfather, how "I was having a crazy meltdown" and "I remember the police came and the ambulance came" then "I said goodbye to my siblings and goodbye to my mom." He then tells me "my wish is to go back to [facility]" and that "I would love to go see Mr. Machado, my favorite  of all time." He tells me that "when I get into a group home" that he "will tell them I want to go to [facility]" and that "after they find a group home my mom is going to start picking me up to go to [employer] and to go to [employer]." He states that "finding a group home is not so easy" and "I wish I could leave this hospital to be discharged," "I wish I could get out of here." As he ruminates on sentiments concerning ongoing hospital stay, he conveys frustration again stating "I'm going to have the hardest life for being here in the hospital forever" and "I just want my mom to find a group home faster." He affirms at times feeling "sad" because of his situation but then clarifies spontaneously "I feel bored." When asked whether he ever feels like crying, he says "sort of" then begins to tell me how he wanted to walk out of the hospital at some point "then I said to myself I'm a , I'm a good person" elaborating on other sentiments of what he values in himself. He engages robustly in conversation (almost 40 minutes of continuous back and forth, expressing himself appropriately, conveying both positive and negative sentiments about his feelings for the future and regarding prolonged hospital course respectively).

## 2023-03-17 NOTE — PROGRESS NOTE ADULT - SUBJECTIVE AND OBJECTIVE BOX
MEDICINE ATTENDING PROGRESS NOTE  22 years old male past medical history of bipolar and developmental delay came to emergency room for alleged aggressive and violent behavior as per step father. Evaluated by Psych; Step Father left stated pt needs placement refusing to take child home.     Interval/Overnight events:  was down today; express that he wants to harm himself due to frustration. Psych made aware. Labs reviewd    ROS:   General: denies fever, chills, insomnia, depression, weight change  Skin: denies itch, jaundice   Resp: denies cough, pleuritic chest pain, wheezing   CV: denies PND  GI: denies N/V/D constipation   : denies d/c, itching, hematuria, dysuria   EXT: denies pain, swelling, erythema   Musculoskeletal: denies low back pain, joint pain, swelling   Neuro: denies headache, weakness, syncope    MEDICATIONS  (STANDING):  diVALproex  milliGRAM(s) Oral daily  diVALproex  milliGRAM(s) Oral daily  diVALproex  milliGRAM(s) Oral at bedtime  diVALproex  milliGRAM(s) Oral at bedtime  lithium CR (ESKALITH-CR) 450 milliGRAM(s) Oral two times a day  risperiDONE   Tablet 2 milliGRAM(s) Oral two times a day    MEDICATIONS  (PRN):  hydrOXYzine hydrochloride 25 milliGRAM(s) Oral at bedtime PRN insomnia    VITALS  T(F): 96.1 (03-16-23 @ 21:03), Max: 98 (03-16-23 @ 13:56)  HR: 129 (03-16-23 @ 21:03) (73 - 129)  BP: 129/86 (03-16-23 @ 21:03) (106/60 - 129/86)  RR: 18 (03-16-23 @ 21:03) (18 - 18)  SpO2: 99% (03-16-23 @ 21:03) (96% - 99%)    PHYSICAL EXAM  Gen- NAD, AAOx3  HEENT- no pallor, anicteric, moist mucous membranes  CVS- S1/S2, no m/r/g  Chest- CTA b/l no w/r/c, no use of accessory muscles, good inspiratory effort, speaking in full sentences  Abd- soft, nt, nd  Ext- no edema, pulses intact b/l  Neuro-CN II-XII intact;    LABS/IMAGING  03-17    139  |  102  |  8<L>  ----------------------------<  108<H>  4.2   |  24  |  0.8    Ca    10.2      17 Mar 2023 08:56  Phos  4.7     03-17  Mg     2.0     03-17    TPro  7.9  /  Alb  4.8  /  TBili  0.3  /  DBili  x   /  AST  29  /  ALT  39<H>  /  AlkPhos  85  03-17    LIVER FUNCTIONS - ( 17 Mar 2023 08:56 )  Alb: 4.8 g/dL / Pro: 7.9 g/dL / ALK PHOS: 85 U/L / ALT: 39 U/L / AST: 29 U/L / GGT: x                                 13.7   8.83  )-----------( 263      ( 17 Mar 2023 08:56 )             40.1

## 2023-03-17 NOTE — BH CONSULTATION LIAISON PROGRESS NOTE - CURRENT MEDICATION
MEDICATIONS  (STANDING):  diVALproex  milliGRAM(s) Oral daily  diVALproex  milliGRAM(s) Oral daily  diVALproex  milliGRAM(s) Oral at bedtime  diVALproex  milliGRAM(s) Oral at bedtime  lithium CR (ESKALITH-CR) 450 milliGRAM(s) Oral two times a day  melatonin 3 milliGRAM(s) Oral at bedtime  risperiDONE   Tablet 2 milliGRAM(s) Oral two times a day  traZODone 25 milliGRAM(s) Oral daily    MEDICATIONS  (PRN):  traZODone 25 milliGRAM(s) Oral at bedtime PRN breakthrough insomnia

## 2023-03-17 NOTE — BH CONSULTATION LIAISON PROGRESS NOTE - NSBHASSESSMENTFT_PSY_ALL_CORE
This is a 22 year old single male, with developmental delay, previously domiciled with parents and 3 younger siblings up until this admission 6 months ago (for  assistance in  placement), charted psychiatric history of autism spectrum disorder and bipolar disorder, stable for years on Lithium, Depakote and Risperdal, no known psychiatric admissions, history of poor impulse control characterized by episodes of physical aggression or biting self, otherwise no history of suicidality, substance abuse, and no significant medical history otherwise, who was admitted for coordination of group home placement after initially being brought in by his father on 9/6/2022 following an episode of aggression at home, seen and cleared psychiatrically at that time but parents refused to take him home. He has had a lengthy hospital course awaiting placement but has remained in good behavior control, no known episodes of aggression, and compliant with medications. Psychiatry consulted yesterday for evaluation of recent behavior change, described as patient being hypersomnolent/lethargic during the day and overtly awake at night (see 3/16/23 [facility] note for details of that evaluation).     On assessment, patient remains pleasantly engaged, well mannered and appropriately expressive despite evident developmental limitations in cognition, thought process and intellect. He continues to be an appropriate candidate for discharge and group home placement. This is a patient who at baseline has history of biting himself and displaying aggression towards others when frustrated (though he has not displayed such behavior throughout his hospital course and has actually been in remarkable behavioral control). Poor impulse control verbally and physically is a characteristic of his baseline neurocognition. Hence, this is not a patient that would easily tolerate a 6 month+ hospital course awaiting placement that, from his perspective, seems never ending. The reported SI statement he made today was made contingent on whether he can leave the hospital and there is no objective clinical evidence to suggest this statement was made in earnest. Given SI that is conditional, no behaviors to suggest a genuine desire to end his life, fleeting nature of these expressions (pt no longer verbalizing this and instead is spontaneous in ample future oriented statements and self preserving requests), no history of suicide attempts, no evident psychosis, suicidality, cory or homicidality on this and other psychiatric evaluations (just conducted yesterday & others during and prior to admission); there is no clinical basis to suggest an active primary psychiatric condition causing an imminent threat to patient's safety that would be remedied by adjusting medication or admitting patient psychiatrically. Recommend continued efforts towards placement and aiding in hospital discharge as this is the obvious source of patient's frustration and SI expressions. Reconsult psychiatry if patient exhibits behaviors concerning for a psychiatrically driven safety threat or expressions of suicide that are assessed to be genuine (rather than a means of expressing frustration without plan or intent) or other acute psychiatric symptoms.

## 2023-03-17 NOTE — BH CONSULTATION LIAISON PROGRESS NOTE - NSBHCHARTREVIEWVS_PSY_A_CORE FT
Vital Signs Last 24 Hrs  T(C): 36.1 (17 Mar 2023 13:00), Max: 36.1 (17 Mar 2023 13:00)  T(F): 97 (17 Mar 2023 13:00), Max: 97 (17 Mar 2023 13:00)  HR: 86 (17 Mar 2023 13:00) (70 - 129)  BP: 123/87 (17 Mar 2023 13:00) (118/76 - 129/86)  BP(mean): --  RR: 18 (17 Mar 2023 13:00) (18 - 18)  SpO2: 96% (17 Mar 2023 04:35) (96% - 99%)

## 2023-03-18 PROCEDURE — 99232 SBSQ HOSP IP/OBS MODERATE 35: CPT

## 2023-03-18 RX ADMIN — Medication 25 MILLIGRAM(S): at 12:36

## 2023-03-18 RX ADMIN — RISPERIDONE 2 MILLIGRAM(S): 4 TABLET ORAL at 17:51

## 2023-03-18 RX ADMIN — DIVALPROEX SODIUM 250 MILLIGRAM(S): 500 TABLET, DELAYED RELEASE ORAL at 12:35

## 2023-03-18 RX ADMIN — Medication 3 MILLIGRAM(S): at 05:12

## 2023-03-18 RX ADMIN — LITHIUM CARBONATE 450 MILLIGRAM(S): 300 TABLET, EXTENDED RELEASE ORAL at 17:52

## 2023-03-18 RX ADMIN — RISPERIDONE 2 MILLIGRAM(S): 4 TABLET ORAL at 05:14

## 2023-03-18 RX ADMIN — LITHIUM CARBONATE 450 MILLIGRAM(S): 300 TABLET, EXTENDED RELEASE ORAL at 05:18

## 2023-03-18 RX ADMIN — DIVALPROEX SODIUM 500 MILLIGRAM(S): 500 TABLET, DELAYED RELEASE ORAL at 12:35

## 2023-03-18 NOTE — PROGRESS NOTE ADULT - SUBJECTIVE AND OBJECTIVE BOX
MEDICINE ATTENDING PROGRESS NOTE  22 years old male past medical history of bipolar and developmental delay came to emergency room for alleged aggressive and violent behavior as per step father. Evaluated by Psych; Step Father left stated pt needs placement refusing to take child home.     Interval/Overnight events:  Patient is in better mood today. Denies SI/HI. Vitals stable. exam unchanged. Labs and imaging reviewed. Pending group home placement    ROS:   General: denies fever, chills, insomnia, depression, weight change  Skin: denies itch, jaundice   Resp: denies cough, pleuritic chest pain, wheezing   CV: denies PND  GI: denies N/V/D constipation   : denies d/c, itching, hematuria, dysuria   EXT: denies pain, swelling, erythema   Musculoskeletal: denies low back pain, joint pain, swelling   Neuro: denies headache, weakness, syncope    MEDICATIONS  (STANDING):  diVALproex  milliGRAM(s) Oral daily  diVALproex  milliGRAM(s) Oral daily  diVALproex  milliGRAM(s) Oral at bedtime  diVALproex  milliGRAM(s) Oral at bedtime  lithium CR (ESKALITH-CR) 450 milliGRAM(s) Oral two times a day  melatonin 3 milliGRAM(s) Oral at bedtime  risperiDONE   Tablet 2 milliGRAM(s) Oral two times a day  traZODone 25 milliGRAM(s) Oral daily    MEDICATIONS  (PRN):  traZODone 25 milliGRAM(s) Oral at bedtime PRN breakthrough insomnia    VITALS  T(F): 98.9 (03-18-23 @ 04:30), Max: 98.9 (03-18-23 @ 04:30)  HR: 74 (03-18-23 @ 04:30) (74 - 74)  BP: 103/58 (03-18-23 @ 04:30) (103/58 - 103/58)  RR: 18 (03-18-23 @ 04:30) (18 - 18)  SpO2: --    PHYSICAL EXAM  Gen- NAD, AAOx3  HEENT- no pallor, anicteric, moist mucous membranes  CVS- S1/S2, no m/r/g  Chest- CTA b/l no w/r/c, no use of accessory muscles, good inspiratory effort, speaking in full sentences  Abd- soft, nt, nd  Ext- no edema, pulses intact b/l  Neuro-CN II-XII intact;    LABS/IMAGING  03-17    139  |  102  |  8<L>  ----------------------------<  108<H>  4.2   |  24  |  0.8    Ca    10.2      17 Mar 2023 08:56  Phos  4.7     03-17  Mg     2.0     03-17    TPro  7.9  /  Alb  4.8  /  TBili  0.3  /  DBili  x   /  AST  29  /  ALT  39<H>  /  AlkPhos  85  03-17    LIVER FUNCTIONS - ( 17 Mar 2023 08:56 )  Alb: 4.8 g/dL / Pro: 7.9 g/dL / ALK PHOS: 85 U/L / ALT: 39 U/L / AST: 29 U/L / GGT: x                                 13.7   8.83  )-----------( 263      ( 17 Mar 2023 08:56 )             40.1

## 2023-03-19 PROCEDURE — 99232 SBSQ HOSP IP/OBS MODERATE 35: CPT

## 2023-03-19 RX ADMIN — DIVALPROEX SODIUM 500 MILLIGRAM(S): 500 TABLET, DELAYED RELEASE ORAL at 12:21

## 2023-03-19 RX ADMIN — LITHIUM CARBONATE 450 MILLIGRAM(S): 300 TABLET, EXTENDED RELEASE ORAL at 05:02

## 2023-03-19 RX ADMIN — RISPERIDONE 2 MILLIGRAM(S): 4 TABLET ORAL at 18:10

## 2023-03-19 RX ADMIN — Medication 25 MILLIGRAM(S): at 12:18

## 2023-03-19 RX ADMIN — DIVALPROEX SODIUM 250 MILLIGRAM(S): 500 TABLET, DELAYED RELEASE ORAL at 12:21

## 2023-03-19 RX ADMIN — DIVALPROEX SODIUM 500 MILLIGRAM(S): 500 TABLET, DELAYED RELEASE ORAL at 21:38

## 2023-03-19 RX ADMIN — DIVALPROEX SODIUM 250 MILLIGRAM(S): 500 TABLET, DELAYED RELEASE ORAL at 21:38

## 2023-03-19 RX ADMIN — Medication 25 MILLIGRAM(S): at 00:46

## 2023-03-19 RX ADMIN — LITHIUM CARBONATE 450 MILLIGRAM(S): 300 TABLET, EXTENDED RELEASE ORAL at 18:11

## 2023-03-19 RX ADMIN — Medication 3 MILLIGRAM(S): at 21:37

## 2023-03-19 RX ADMIN — RISPERIDONE 2 MILLIGRAM(S): 4 TABLET ORAL at 05:02

## 2023-03-19 NOTE — PROGRESS NOTE ADULT - SUBJECTIVE AND OBJECTIVE BOX
MEDICINE ATTENDING PROGRESS NOTE  22 years old male past medical history of bipolar and developmental delay came to emergency room for alleged aggressive and violent behavior as per step father. Evaluated by Psych; Step Father left stated pt needs placement refusing to take child home.     Interval/Overnight events:  No complains  Vitals stable. exam unchanged.   Labs and imaging reviewed.   Pending group home placement    ROS:   General: denies fever, chills, insomnia, depression, weight change  Skin: denies itch, jaundice   Resp: denies cough, pleuritic chest pain, wheezing   CV: denies PND  GI: denies N/V/D constipation   : denies d/c, itching, hematuria, dysuria   EXT: denies pain, swelling, erythema   Musculoskeletal: denies low back pain, joint pain, swelling   Neuro: denies headache, weakness, syncope    MEDICATIONS  (STANDING):  diVALproex  milliGRAM(s) Oral daily  diVALproex  milliGRAM(s) Oral daily  diVALproex  milliGRAM(s) Oral at bedtime  diVALproex  milliGRAM(s) Oral at bedtime  lithium CR (ESKALITH-CR) 450 milliGRAM(s) Oral two times a day  melatonin 3 milliGRAM(s) Oral at bedtime  risperiDONE   Tablet 2 milliGRAM(s) Oral two times a day  traZODone 25 milliGRAM(s) Oral daily    MEDICATIONS  (PRN):  traZODone 25 milliGRAM(s) Oral at bedtime PRN breakthrough insomnia    VITALS  T(F): 98.9 (03-18-23 @ 04:30), Max: 98.9 (03-18-23 @ 04:30)  HR: 74 (03-18-23 @ 04:30) (74 - 74)  BP: 103/58 (03-18-23 @ 04:30) (103/58 - 103/58)  RR: 18 (03-18-23 @ 04:30) (18 - 18)  SpO2: --    PHYSICAL EXAM  Gen- NAD, AAOx3  HEENT- no pallor, anicteric, moist mucous membranes  CVS- S1/S2, no m/r/g  Chest- CTA b/l no w/r/c, no use of accessory muscles, good inspiratory effort, speaking in full sentences  Abd- soft, nt, nd  Ext- no edema, pulses intact b/l  Neuro-CN II-XII intact;    LABS/IMAGING  03-17    139  |  102  |  8<L>  ----------------------------<  108<H>  4.2   |  24  |  0.8    Ca    10.2      17 Mar 2023 08:56  Phos  4.7     03-17  Mg     2.0     03-17    TPro  7.9  /  Alb  4.8  /  TBili  0.3  /  DBili  x   /  AST  29  /  ALT  39<H>  /  AlkPhos  85  03-17    LIVER FUNCTIONS - ( 17 Mar 2023 08:56 )  Alb: 4.8 g/dL / Pro: 7.9 g/dL / ALK PHOS: 85 U/L / ALT: 39 U/L / AST: 29 U/L / GGT: x                                 13.7   8.83  )-----------( 263      ( 17 Mar 2023 08:56 )             40.1

## 2023-03-20 PROCEDURE — 99232 SBSQ HOSP IP/OBS MODERATE 35: CPT

## 2023-03-20 RX ORDER — TRAZODONE HCL 50 MG
25 TABLET ORAL DAILY
Refills: 0 | Status: DISCONTINUED | OUTPATIENT
Start: 2023-03-20 | End: 2023-06-29

## 2023-03-20 RX ORDER — LANOLIN ALCOHOL/MO/W.PET/CERES
3 CREAM (GRAM) TOPICAL AT BEDTIME
Refills: 0 | Status: DISCONTINUED | OUTPATIENT
Start: 2023-03-20 | End: 2023-06-29

## 2023-03-20 RX ADMIN — LITHIUM CARBONATE 450 MILLIGRAM(S): 300 TABLET, EXTENDED RELEASE ORAL at 05:57

## 2023-03-20 RX ADMIN — Medication 3 MILLIGRAM(S): at 21:16

## 2023-03-20 RX ADMIN — DIVALPROEX SODIUM 250 MILLIGRAM(S): 500 TABLET, DELAYED RELEASE ORAL at 21:16

## 2023-03-20 RX ADMIN — RISPERIDONE 2 MILLIGRAM(S): 4 TABLET ORAL at 18:08

## 2023-03-20 RX ADMIN — LITHIUM CARBONATE 450 MILLIGRAM(S): 300 TABLET, EXTENDED RELEASE ORAL at 18:08

## 2023-03-20 RX ADMIN — DIVALPROEX SODIUM 500 MILLIGRAM(S): 500 TABLET, DELAYED RELEASE ORAL at 21:15

## 2023-03-20 RX ADMIN — RISPERIDONE 2 MILLIGRAM(S): 4 TABLET ORAL at 05:57

## 2023-03-20 RX ADMIN — DIVALPROEX SODIUM 500 MILLIGRAM(S): 500 TABLET, DELAYED RELEASE ORAL at 12:47

## 2023-03-20 RX ADMIN — DIVALPROEX SODIUM 250 MILLIGRAM(S): 500 TABLET, DELAYED RELEASE ORAL at 12:47

## 2023-03-20 RX ADMIN — Medication 25 MILLIGRAM(S): at 19:10

## 2023-03-20 NOTE — PROGRESS NOTE ADULT - REASON FOR ADMISSION
Placement Quality 110: Preventive Care And Screening: Influenza Immunization: Influenza Immunization Ordered or Recommended, but not Administered due to system reason Detail Level: Detailed Quality 130: Documentation Of Current Medications In The Medical Record: Current Medications Documented

## 2023-03-21 PROCEDURE — 99232 SBSQ HOSP IP/OBS MODERATE 35: CPT

## 2023-03-21 RX ADMIN — LITHIUM CARBONATE 450 MILLIGRAM(S): 300 TABLET, EXTENDED RELEASE ORAL at 17:50

## 2023-03-21 RX ADMIN — DIVALPROEX SODIUM 250 MILLIGRAM(S): 500 TABLET, DELAYED RELEASE ORAL at 22:47

## 2023-03-21 RX ADMIN — DIVALPROEX SODIUM 250 MILLIGRAM(S): 500 TABLET, DELAYED RELEASE ORAL at 11:42

## 2023-03-21 RX ADMIN — RISPERIDONE 2 MILLIGRAM(S): 4 TABLET ORAL at 05:24

## 2023-03-21 RX ADMIN — LITHIUM CARBONATE 450 MILLIGRAM(S): 300 TABLET, EXTENDED RELEASE ORAL at 05:25

## 2023-03-21 RX ADMIN — DIVALPROEX SODIUM 500 MILLIGRAM(S): 500 TABLET, DELAYED RELEASE ORAL at 11:42

## 2023-03-21 RX ADMIN — DIVALPROEX SODIUM 500 MILLIGRAM(S): 500 TABLET, DELAYED RELEASE ORAL at 22:47

## 2023-03-21 RX ADMIN — Medication 3 MILLIGRAM(S): at 22:47

## 2023-03-21 RX ADMIN — Medication 25 MILLIGRAM(S): at 11:42

## 2023-03-21 RX ADMIN — RISPERIDONE 2 MILLIGRAM(S): 4 TABLET ORAL at 17:50

## 2023-03-21 NOTE — PROGRESS NOTE ADULT - SUBJECTIVE AND OBJECTIVE BOX
MEDICINE ATTENDING PROGRESS NOTE  22 years old male past medical history of bipolar and developmental delay came to emergency room for alleged aggressive and violent behavior as per step father. Evaluated by Psych; Step Father left stated pt needs placement refusing to take child home.     To Do/Hand Off  [ ] Pending group home placement    Interval/Overnight events:  No complains  Vitals stable. exam unchanged.   Labs and imaging reviewed.   Pending group home placement    ROS:   General: denies fever, chills, insomnia, depression, weight change  Skin: denies itch, jaundice   Resp: denies cough, pleuritic chest pain, wheezing   CV: denies PND  GI: denies N/V/D constipation   : denies d/c, itching, hematuria, dysuria   EXT: denies pain, swelling, erythema   Musculoskeletal: denies low back pain, joint pain, swelling   Neuro: denies headache, weakness, syncope    MEDICATIONS  (STANDING):  diVALproex  milliGRAM(s) Oral daily  diVALproex  milliGRAM(s) Oral daily  diVALproex  milliGRAM(s) Oral at bedtime  diVALproex  milliGRAM(s) Oral at bedtime  lithium CR (ESKALITH-CR) 450 milliGRAM(s) Oral two times a day  melatonin 3 milliGRAM(s) Oral at bedtime  risperiDONE   Tablet 2 milliGRAM(s) Oral two times a day  traZODone 25 milliGRAM(s) Oral daily    MEDICATIONS  (PRN):  traZODone 25 milliGRAM(s) Oral at bedtime PRN breakthrough insomnia    Vital Signs Last 24 Hrs  T(C): 35.9 (20 Mar 2023 14:22), Max: 35.9 (20 Mar 2023 14:22)  T(F): 96.6 (20 Mar 2023 14:22), Max: 96.6 (20 Mar 2023 14:22)  HR: 94 (20 Mar 2023 14:22) (94 - 94)  BP: 120/82 (20 Mar 2023 14:22) (120/82 - 120/82)  BP(mean): --  RR: 16 (20 Mar 2023 14:22) (16 - 16)  SpO2: 95% (20 Mar 2023 14:22) (95% - 95%)    PHYSICAL EXAM  Gen- NAD, AAOx3  HEENT- no pallor, anicteric, moist mucous membranes  CVS- S1/S2, no m/r/g  Chest- CTA b/l no w/r/c, no use of accessory muscles, good inspiratory effort, speaking in full sentences  Abd- soft, nt, nd  Ext- no edema, pulses intact b/l  Neuro-CN II-XII intact;    LABS/IMAGING  03-17    139  |  102  |  8<L>  ----------------------------<  108<H>  4.2   |  24  |  0.8    Ca    10.2      17 Mar 2023 08:56  Phos  4.7     03-17  Mg     2.0     03-17    TPro  7.9  /  Alb  4.8  /  TBili  0.3  /  DBili  x   /  AST  29  /  ALT  39<H>  /  AlkPhos  85  03-17    LIVER FUNCTIONS - ( 17 Mar 2023 08:56 )  Alb: 4.8 g/dL / Pro: 7.9 g/dL / ALK PHOS: 85 U/L / ALT: 39 U/L / AST: 29 U/L / GGT: x                                 13.7   8.83  )-----------( 263      ( 17 Mar 2023 08:56 )             40.1

## 2023-03-22 PROCEDURE — 99231 SBSQ HOSP IP/OBS SF/LOW 25: CPT

## 2023-03-22 RX ADMIN — LITHIUM CARBONATE 450 MILLIGRAM(S): 300 TABLET, EXTENDED RELEASE ORAL at 17:57

## 2023-03-22 RX ADMIN — DIVALPROEX SODIUM 500 MILLIGRAM(S): 500 TABLET, DELAYED RELEASE ORAL at 11:58

## 2023-03-22 RX ADMIN — DIVALPROEX SODIUM 500 MILLIGRAM(S): 500 TABLET, DELAYED RELEASE ORAL at 21:56

## 2023-03-22 RX ADMIN — DIVALPROEX SODIUM 250 MILLIGRAM(S): 500 TABLET, DELAYED RELEASE ORAL at 11:59

## 2023-03-22 RX ADMIN — Medication 3 MILLIGRAM(S): at 21:56

## 2023-03-22 RX ADMIN — RISPERIDONE 2 MILLIGRAM(S): 4 TABLET ORAL at 17:57

## 2023-03-22 RX ADMIN — RISPERIDONE 2 MILLIGRAM(S): 4 TABLET ORAL at 07:00

## 2023-03-22 RX ADMIN — LITHIUM CARBONATE 450 MILLIGRAM(S): 300 TABLET, EXTENDED RELEASE ORAL at 07:00

## 2023-03-22 RX ADMIN — Medication 25 MILLIGRAM(S): at 11:59

## 2023-03-22 RX ADMIN — DIVALPROEX SODIUM 250 MILLIGRAM(S): 500 TABLET, DELAYED RELEASE ORAL at 21:56

## 2023-03-22 NOTE — PROGRESS NOTE ADULT - SUBJECTIVE AND OBJECTIVE BOX
Patient is a 22y old  Male who presents with a chief complaint of Placement (16 Feb 2023 13:19)      SUBJECTIVE / OVERNIGHT EVENTS:  Patient seen and examined at bedside; patient appears comfortable, denies any complaints. Patient was noted to be walking in the hallway.      ADDITIONAL REVIEW OF SYSTEMS: All systems were reviewed and are otherwise negative    MEDICATIONS  (STANDING):  diVALproex  milliGRAM(s) Oral daily  diVALproex  milliGRAM(s) Oral daily  diVALproex  milliGRAM(s) Oral at bedtime  diVALproex  milliGRAM(s) Oral at bedtime  lithium CR (ESKALITH-CR) 450 milliGRAM(s) Oral two times a day  melatonin 3 milliGRAM(s) Oral at bedtime  risperiDONE   Tablet 2 milliGRAM(s) Oral two times a day  traZODone 25 milliGRAM(s) Oral daily    MEDICATIONS  (PRN):  traZODone 25 milliGRAM(s) Oral at bedtime PRN breakthrough insomnia      CAPILLARY BLOOD GLUCOSE        I&O's Summary      Vital Signs Last 24 Hrs  T(C): 36.7 (21 Mar 2023 20:05), Max: 36.7 (21 Mar 2023 20:05)  T(F): 98.1 (21 Mar 2023 20:05), Max: 98.1 (21 Mar 2023 20:05)  HR: 76 (21 Mar 2023 20:05) (76 - 78)  BP: 116/84 (21 Mar 2023 20:05) (116/84 - 119/86)  BP(mean): --  RR: 16 (21 Mar 2023 20:05) (16 - 16)  SpO2: 96% (21 Mar 2023 20:05) (96% - 98%)  GENERAL: No acute distress, well-developed  HEAD:  Atraumatic, Normocephalic  EYES: EOMI, PERRLA, conjunctiva and sclera clear  NECK: Supple, no lymphadenopathy, no JVD  CHEST/LUNG: CTAB; No wheezes, rales, or rhonchi  HEART: Regular rate and rhythm; No murmurs, rubs, or gallops  ABDOMEN: Soft, non-tender, non-distended; normal bowel sounds, no organomegaly  EXTREMITIES:  2+ peripheral pulses b/l, No clubbing, cyanosis, or edema  NEUROLOGY: A&O x 3, no focal deficits  SKIN: No rashes or lesions    LABS:                    RADIOLOGY & ADDITIONAL TESTS:  Results Reviewed:   Imaging Personally Reviewed:  Electrocardiogram Personally Reviewed:    COORDINATION OF CARE:  Care Discussed with Consultants/Other Providers [Y/N]:  Prior or Outpatient Records Reviewed [Y/N]:

## 2023-03-23 PROCEDURE — 99231 SBSQ HOSP IP/OBS SF/LOW 25: CPT

## 2023-03-23 RX ADMIN — DIVALPROEX SODIUM 500 MILLIGRAM(S): 500 TABLET, DELAYED RELEASE ORAL at 11:41

## 2023-03-23 RX ADMIN — Medication 3 MILLIGRAM(S): at 21:47

## 2023-03-23 RX ADMIN — LITHIUM CARBONATE 450 MILLIGRAM(S): 300 TABLET, EXTENDED RELEASE ORAL at 06:37

## 2023-03-23 RX ADMIN — DIVALPROEX SODIUM 250 MILLIGRAM(S): 500 TABLET, DELAYED RELEASE ORAL at 21:46

## 2023-03-23 RX ADMIN — DIVALPROEX SODIUM 250 MILLIGRAM(S): 500 TABLET, DELAYED RELEASE ORAL at 11:41

## 2023-03-23 RX ADMIN — DIVALPROEX SODIUM 500 MILLIGRAM(S): 500 TABLET, DELAYED RELEASE ORAL at 21:46

## 2023-03-23 RX ADMIN — Medication 25 MILLIGRAM(S): at 11:42

## 2023-03-23 RX ADMIN — RISPERIDONE 2 MILLIGRAM(S): 4 TABLET ORAL at 06:37

## 2023-03-23 RX ADMIN — LITHIUM CARBONATE 450 MILLIGRAM(S): 300 TABLET, EXTENDED RELEASE ORAL at 17:10

## 2023-03-23 RX ADMIN — RISPERIDONE 2 MILLIGRAM(S): 4 TABLET ORAL at 17:10

## 2023-03-23 NOTE — PROGRESS NOTE ADULT - SUBJECTIVE AND OBJECTIVE BOX
Patient is a 22y old  Male who presents with a chief complaint of Placement (16 Feb 2023 13:19)      SUBJECTIVE / OVERNIGHT EVENTS:  Patient seen and examined at bedside; patient appears comfortable, denies any complaints.    ADDITIONAL REVIEW OF SYSTEMS: All systems were reviewed and are otherwise negative    MEDICATIONS  (STANDING):  diVALproex  milliGRAM(s) Oral daily  diVALproex  milliGRAM(s) Oral daily  diVALproex  milliGRAM(s) Oral at bedtime  diVALproex  milliGRAM(s) Oral at bedtime  lithium CR (ESKALITH-CR) 450 milliGRAM(s) Oral two times a day  melatonin 3 milliGRAM(s) Oral at bedtime  risperiDONE   Tablet 2 milliGRAM(s) Oral two times a day  traZODone 25 milliGRAM(s) Oral daily    MEDICATIONS  (PRN):  traZODone 25 milliGRAM(s) Oral at bedtime PRN breakthrough insomnia      CAPILLARY BLOOD GLUCOSE        I&O's Summary      Vital Signs Last 24 Hrs  T(C): 36.8 (22 Mar 2023 20:05), Max: 36.8 (22 Mar 2023 20:05)  T(F): 98.3 (22 Mar 2023 20:05), Max: 98.3 (22 Mar 2023 20:05)  HR: 65 (22 Mar 2023 20:05) (65 - 76)  BP: 108/64 (22 Mar 2023 20:05) (108/64 - 117/86)  BP(mean): --  RR: 16 (22 Mar 2023 20:05) (16 - 16)  SpO2: 96% (22 Mar 2023 20:05) (96% - 96%)  GENERAL: No acute distress, well-developed  HEAD:  Atraumatic, Normocephalic  EYES: EOMI, PERRLA, conjunctiva and sclera clear  NECK: Supple, no lymphadenopathy, no JVD  CHEST/LUNG: CTAB; No wheezes, rales, or rhonchi  HEART: Regular rate and rhythm; No murmurs, rubs, or gallops  ABDOMEN: Soft, non-tender, non-distended; normal bowel sounds, no organomegaly  EXTREMITIES:  2+ peripheral pulses b/l, No clubbing, cyanosis, or edema  NEUROLOGY: A&O x 3, no focal deficits  SKIN: No rashes or lesions    LABS:      RADIOLOGY & ADDITIONAL TESTS:  Results Reviewed:   Imaging Personally Reviewed:  Electrocardiogram Personally Reviewed:    COORDINATION OF CARE:  Care Discussed with Consultants/Other Providers [Y/N]:  Prior or Outpatient Records Reviewed [Y/N]:   Patient is a 22y old  Male who presents with a chief complaint of Placement (16 Feb 2023 13:19)      SUBJECTIVE / OVERNIGHT EVENTS:  Patient seen and examined at bedside; patient appears comfortable, denies any complaints. Patient very calm and cooperative; takes all his medications as given; patient shows no signs of aggressive behavior.     ADDITIONAL REVIEW OF SYSTEMS: All systems were reviewed and are otherwise negative    MEDICATIONS  (STANDING):  diVALproex  milliGRAM(s) Oral daily  diVALproex  milliGRAM(s) Oral daily  diVALproex  milliGRAM(s) Oral at bedtime  diVALproex  milliGRAM(s) Oral at bedtime  lithium CR (ESKALITH-CR) 450 milliGRAM(s) Oral two times a day  melatonin 3 milliGRAM(s) Oral at bedtime  risperiDONE   Tablet 2 milliGRAM(s) Oral two times a day  traZODone 25 milliGRAM(s) Oral daily    MEDICATIONS  (PRN):  traZODone 25 milliGRAM(s) Oral at bedtime PRN breakthrough insomnia      CAPILLARY BLOOD GLUCOSE        I&O's Summary      Vital Signs Last 24 Hrs  T(C): 36.8 (22 Mar 2023 20:05), Max: 36.8 (22 Mar 2023 20:05)  T(F): 98.3 (22 Mar 2023 20:05), Max: 98.3 (22 Mar 2023 20:05)  HR: 65 (22 Mar 2023 20:05) (65 - 76)  BP: 108/64 (22 Mar 2023 20:05) (108/64 - 117/86)  BP(mean): --  RR: 16 (22 Mar 2023 20:05) (16 - 16)  SpO2: 96% (22 Mar 2023 20:05) (96% - 96%)  GENERAL: No acute distress, well-developed  HEAD:  Atraumatic, Normocephalic  EYES: EOMI, PERRLA, conjunctiva and sclera clear  NECK: Supple, no lymphadenopathy, no JVD  CHEST/LUNG: CTAB; No wheezes, rales, or rhonchi  HEART: Regular rate and rhythm; No murmurs, rubs, or gallops  ABDOMEN: Soft, non-tender, non-distended; normal bowel sounds, no organomegaly  EXTREMITIES:  2+ peripheral pulses b/l, No clubbing, cyanosis, or edema  NEUROLOGY: A&O x 3, no focal deficits  SKIN: No rashes or lesions    LABS:      RADIOLOGY & ADDITIONAL TESTS:  Results Reviewed:   Imaging Personally Reviewed:  Electrocardiogram Personally Reviewed:    COORDINATION OF CARE:  Care Discussed with Consultants/Other Providers [Y/N]:  Prior or Outpatient Records Reviewed [Y/N]:

## 2023-03-24 PROCEDURE — 99231 SBSQ HOSP IP/OBS SF/LOW 25: CPT

## 2023-03-24 RX ADMIN — Medication 25 MILLIGRAM(S): at 23:52

## 2023-03-24 RX ADMIN — DIVALPROEX SODIUM 500 MILLIGRAM(S): 500 TABLET, DELAYED RELEASE ORAL at 12:14

## 2023-03-24 RX ADMIN — DIVALPROEX SODIUM 250 MILLIGRAM(S): 500 TABLET, DELAYED RELEASE ORAL at 12:14

## 2023-03-24 RX ADMIN — DIVALPROEX SODIUM 250 MILLIGRAM(S): 500 TABLET, DELAYED RELEASE ORAL at 20:31

## 2023-03-24 RX ADMIN — LITHIUM CARBONATE 450 MILLIGRAM(S): 300 TABLET, EXTENDED RELEASE ORAL at 05:21

## 2023-03-24 RX ADMIN — Medication 3 MILLIGRAM(S): at 20:30

## 2023-03-24 RX ADMIN — RISPERIDONE 2 MILLIGRAM(S): 4 TABLET ORAL at 05:21

## 2023-03-24 RX ADMIN — RISPERIDONE 2 MILLIGRAM(S): 4 TABLET ORAL at 17:40

## 2023-03-24 RX ADMIN — DIVALPROEX SODIUM 500 MILLIGRAM(S): 500 TABLET, DELAYED RELEASE ORAL at 20:31

## 2023-03-24 RX ADMIN — Medication 25 MILLIGRAM(S): at 12:17

## 2023-03-24 RX ADMIN — LITHIUM CARBONATE 450 MILLIGRAM(S): 300 TABLET, EXTENDED RELEASE ORAL at 17:41

## 2023-03-24 RX ADMIN — Medication 25 MILLIGRAM(S): at 00:23

## 2023-03-24 NOTE — PROGRESS NOTE ADULT - SUBJECTIVE AND OBJECTIVE BOX
Patient is a 22y old  Male who presents with a chief complaint of Placement (16 Feb 2023 13:19)      SUBJECTIVE / OVERNIGHT EVENTS:  Patient seen and examined at bedside; patient appears comfortable, denies any complaints. Patient very calm and cooperative; takes all his medications as given; patient shows no signs of aggressive behavior.     ADDITIONAL REVIEW OF SYSTEMS: All systems were reviewed and are otherwise negative    MEDICATIONS  (STANDING):  diVALproex  milliGRAM(s) Oral daily  diVALproex  milliGRAM(s) Oral daily  diVALproex  milliGRAM(s) Oral at bedtime  diVALproex  milliGRAM(s) Oral at bedtime  lithium CR (ESKALITH-CR) 450 milliGRAM(s) Oral two times a day  melatonin 3 milliGRAM(s) Oral at bedtime  risperiDONE   Tablet 2 milliGRAM(s) Oral two times a day  traZODone 25 milliGRAM(s) Oral daily    MEDICATIONS  (PRN):  traZODone 25 milliGRAM(s) Oral at bedtime PRN breakthrough insomnia      CAPILLARY BLOOD GLUCOSE        I&O's Summary      Vital Signs Last 24 Hrs  T(C): 35.6 (23 Mar 2023 21:00), Max: 35.8 (23 Mar 2023 13:54)  T(F): 96 (23 Mar 2023 21:00), Max: 96.4 (23 Mar 2023 13:54)  HR: 92 (23 Mar 2023 21:00) (86 - 92)  BP: 116/75 (23 Mar 2023 21:00) (106/55 - 116/75)  BP(mean): --  RR: 16 (23 Mar 2023 21:00) (16 - 16)  SpO2: --  GENERAL: No acute distress, well-developed  HEAD:  Atraumatic, Normocephalic  EYES: EOMI, PERRLA, conjunctiva and sclera clear  NECK: Supple, no lymphadenopathy, no JVD  CHEST/LUNG: CTAB; No wheezes, rales, or rhonchi  HEART: Regular rate and rhythm; No murmurs, rubs, or gallops  ABDOMEN: Soft, non-tender, non-distended; normal bowel sounds, no organomegaly  EXTREMITIES:  2+ peripheral pulses b/l, No clubbing, cyanosis, or edema  NEUROLOGY: A&O x 3, no focal deficits  SKIN: No rashes or lesions    LABS:      RADIOLOGY & ADDITIONAL TESTS:  Results Reviewed:   Imaging Personally Reviewed:  Electrocardiogram Personally Reviewed:    COORDINATION OF CARE:  Care Discussed with Consultants/Other Providers [Y/N]:  Prior or Outpatient Records Reviewed [Y/N]:

## 2023-03-25 PROCEDURE — 99231 SBSQ HOSP IP/OBS SF/LOW 25: CPT

## 2023-03-25 RX ADMIN — LITHIUM CARBONATE 450 MILLIGRAM(S): 300 TABLET, EXTENDED RELEASE ORAL at 17:07

## 2023-03-25 RX ADMIN — RISPERIDONE 2 MILLIGRAM(S): 4 TABLET ORAL at 17:07

## 2023-03-25 RX ADMIN — DIVALPROEX SODIUM 500 MILLIGRAM(S): 500 TABLET, DELAYED RELEASE ORAL at 21:21

## 2023-03-25 RX ADMIN — Medication 25 MILLIGRAM(S): at 12:29

## 2023-03-25 RX ADMIN — DIVALPROEX SODIUM 250 MILLIGRAM(S): 500 TABLET, DELAYED RELEASE ORAL at 12:28

## 2023-03-25 RX ADMIN — DIVALPROEX SODIUM 500 MILLIGRAM(S): 500 TABLET, DELAYED RELEASE ORAL at 12:29

## 2023-03-25 RX ADMIN — DIVALPROEX SODIUM 250 MILLIGRAM(S): 500 TABLET, DELAYED RELEASE ORAL at 21:21

## 2023-03-25 RX ADMIN — Medication 3 MILLIGRAM(S): at 21:21

## 2023-03-25 RX ADMIN — LITHIUM CARBONATE 450 MILLIGRAM(S): 300 TABLET, EXTENDED RELEASE ORAL at 06:00

## 2023-03-25 RX ADMIN — RISPERIDONE 2 MILLIGRAM(S): 4 TABLET ORAL at 06:00

## 2023-03-25 NOTE — PROGRESS NOTE ADULT - SUBJECTIVE AND OBJECTIVE BOX
Patient is a 22y old  Male who presents with a chief complaint of Placement (16 Feb 2023 13:19)      SUBJECTIVE / OVERNIGHT EVENTS:  Patient seen and examined at bedside; patient appears comfortable, denies any complaints. Patient very calm and cooperative; takes all his medications as given; patient shows no signs of aggressive behavior.     ADDITIONAL REVIEW OF SYSTEMS: All systems were reviewed and are otherwise negative    MEDICATIONS  (STANDING):  diVALproex  milliGRAM(s) Oral at bedtime  diVALproex  milliGRAM(s) Oral at bedtime  diVALproex  milliGRAM(s) Oral daily  diVALproex  milliGRAM(s) Oral daily  lithium CR (ESKALITH-CR) 450 milliGRAM(s) Oral two times a day  melatonin 3 milliGRAM(s) Oral at bedtime  risperiDONE   Tablet 2 milliGRAM(s) Oral two times a day  traZODone 25 milliGRAM(s) Oral daily    MEDICATIONS  (PRN):  traZODone 25 milliGRAM(s) Oral at bedtime PRN breakthrough insomnia        CAPILLARY BLOOD GLUCOSE        I&O's Summary      Vital Signs Last 24 Hrs  T(C): 35.7 (24 Mar 2023 21:53), Max: 36.1 (24 Mar 2023 13:52)  T(F): 96.2 (24 Mar 2023 21:53), Max: 97 (24 Mar 2023 13:52)  HR: 94 (24 Mar 2023 21:53) (94 - 101)  BP: 124/76 (24 Mar 2023 21:53) (124/76 - 124/78)  BP(mean): --  RR: 18 (24 Mar 2023 21:53) (16 - 18)  SpO2: --  GENERAL: No acute distress, well-developed  HEAD:  Atraumatic, Normocephalic  EYES: EOMI, PERRLA, conjunctiva and sclera clear  NECK: Supple, no lymphadenopathy, no JVD  CHEST/LUNG: CTAB; No wheezes, rales, or rhonchi  HEART: Regular rate and rhythm; No murmurs, rubs, or gallops  ABDOMEN: Soft, non-tender, non-distended; normal bowel sounds, no organomegaly  EXTREMITIES:  2+ peripheral pulses b/l, No clubbing, cyanosis, or edema  NEUROLOGY: A&O x 3, no focal deficits  SKIN: No rashes or lesions    LABS:      RADIOLOGY & ADDITIONAL TESTS:  Results Reviewed:   Imaging Personally Reviewed:  Electrocardiogram Personally Reviewed:    COORDINATION OF CARE:  Care Discussed with Consultants/Other Providers [Y/N]:  Prior or Outpatient Records Reviewed [Y/N]:

## 2023-03-26 PROCEDURE — 99231 SBSQ HOSP IP/OBS SF/LOW 25: CPT

## 2023-03-26 RX ADMIN — LITHIUM CARBONATE 450 MILLIGRAM(S): 300 TABLET, EXTENDED RELEASE ORAL at 05:02

## 2023-03-26 RX ADMIN — DIVALPROEX SODIUM 250 MILLIGRAM(S): 500 TABLET, DELAYED RELEASE ORAL at 11:29

## 2023-03-26 RX ADMIN — DIVALPROEX SODIUM 500 MILLIGRAM(S): 500 TABLET, DELAYED RELEASE ORAL at 21:34

## 2023-03-26 RX ADMIN — Medication 3 MILLIGRAM(S): at 21:34

## 2023-03-26 RX ADMIN — LITHIUM CARBONATE 450 MILLIGRAM(S): 300 TABLET, EXTENDED RELEASE ORAL at 17:06

## 2023-03-26 RX ADMIN — RISPERIDONE 2 MILLIGRAM(S): 4 TABLET ORAL at 05:03

## 2023-03-26 RX ADMIN — RISPERIDONE 2 MILLIGRAM(S): 4 TABLET ORAL at 17:06

## 2023-03-26 RX ADMIN — Medication 25 MILLIGRAM(S): at 17:06

## 2023-03-26 RX ADMIN — DIVALPROEX SODIUM 500 MILLIGRAM(S): 500 TABLET, DELAYED RELEASE ORAL at 11:29

## 2023-03-26 RX ADMIN — DIVALPROEX SODIUM 250 MILLIGRAM(S): 500 TABLET, DELAYED RELEASE ORAL at 21:32

## 2023-03-26 NOTE — PROGRESS NOTE ADULT - SUBJECTIVE AND OBJECTIVE BOX
Patient is a 22y old  Male who presents with a chief complaint of Placement (16 Feb 2023 13:19)      SUBJECTIVE / OVERNIGHT EVENTS:  Patient seen and examined at bedside; patient appears comfortable, denies any complaints. Patient very calm and cooperative; takes all his medications as given; patient shows no signs of aggressive behavior.     ADDITIONAL REVIEW OF SYSTEMS: All systems were reviewed and are otherwise negative    MEDICATIONS  (STANDING):  diVALproex  milliGRAM(s) Oral daily  diVALproex  milliGRAM(s) Oral daily  diVALproex  milliGRAM(s) Oral at bedtime  diVALproex  milliGRAM(s) Oral at bedtime  lithium CR (ESKALITH-CR) 450 milliGRAM(s) Oral two times a day  melatonin 3 milliGRAM(s) Oral at bedtime  risperiDONE   Tablet 2 milliGRAM(s) Oral two times a day  traZODone 25 milliGRAM(s) Oral daily    MEDICATIONS  (PRN):  traZODone 25 milliGRAM(s) Oral at bedtime PRN breakthrough insomnia        CAPILLARY BLOOD GLUCOSE        I&O's Summary      Vital Signs Last 24 Hrs  T(C): 36.1 (26 Mar 2023 04:00), Max: 36.1 (26 Mar 2023 04:00)  T(F): 97 (26 Mar 2023 04:00), Max: 97 (26 Mar 2023 04:00)  HR: 64 (26 Mar 2023 04:00) (63 - 84)  BP: 115/59 (26 Mar 2023 04:00) (103/55 - 115/71)  BP(mean): --  RR: 18 (26 Mar 2023 04:00) (18 - 18)  SpO2: 96% (26 Mar 2023 04:00) (96% - 96%)  GENERAL: No acute distress, well-developed  HEAD:  Atraumatic, Normocephalic  EYES: EOMI, PERRLA, conjunctiva and sclera clear  NECK: Supple, no lymphadenopathy, no JVD  CHEST/LUNG: CTAB; No wheezes, rales, or rhonchi  HEART: Regular rate and rhythm; No murmurs, rubs, or gallops  ABDOMEN: Soft, non-tender, non-distended; normal bowel sounds, no organomegaly  EXTREMITIES:  2+ peripheral pulses b/l, No clubbing, cyanosis, or edema  NEUROLOGY: A&O x 3, no focal deficits  SKIN: No rashes or lesions    LABS:      RADIOLOGY & ADDITIONAL TESTS:  Results Reviewed:   Imaging Personally Reviewed:  Electrocardiogram Personally Reviewed:    COORDINATION OF CARE:  Care Discussed with Consultants/Other Providers [Y/N]:  Prior or Outpatient Records Reviewed [Y/N]:

## 2023-03-27 PROCEDURE — 99231 SBSQ HOSP IP/OBS SF/LOW 25: CPT

## 2023-03-27 RX ADMIN — DIVALPROEX SODIUM 500 MILLIGRAM(S): 500 TABLET, DELAYED RELEASE ORAL at 22:00

## 2023-03-27 RX ADMIN — LITHIUM CARBONATE 450 MILLIGRAM(S): 300 TABLET, EXTENDED RELEASE ORAL at 18:27

## 2023-03-27 RX ADMIN — RISPERIDONE 2 MILLIGRAM(S): 4 TABLET ORAL at 18:27

## 2023-03-27 RX ADMIN — Medication 3 MILLIGRAM(S): at 22:00

## 2023-03-27 RX ADMIN — DIVALPROEX SODIUM 250 MILLIGRAM(S): 500 TABLET, DELAYED RELEASE ORAL at 13:12

## 2023-03-27 RX ADMIN — LITHIUM CARBONATE 450 MILLIGRAM(S): 300 TABLET, EXTENDED RELEASE ORAL at 06:31

## 2023-03-27 RX ADMIN — Medication 25 MILLIGRAM(S): at 13:12

## 2023-03-27 RX ADMIN — DIVALPROEX SODIUM 500 MILLIGRAM(S): 500 TABLET, DELAYED RELEASE ORAL at 13:12

## 2023-03-27 RX ADMIN — RISPERIDONE 2 MILLIGRAM(S): 4 TABLET ORAL at 06:31

## 2023-03-27 RX ADMIN — DIVALPROEX SODIUM 250 MILLIGRAM(S): 500 TABLET, DELAYED RELEASE ORAL at 22:00

## 2023-03-27 NOTE — PROGRESS NOTE ADULT - SUBJECTIVE AND OBJECTIVE BOX
Patient is a 22y old  Male who presents with a chief complaint of Placement (16 Feb 2023 13:19)      SUBJECTIVE / OVERNIGHT EVENTS:  Patient seen and examined at bedside; patient appears comfortable, denies any complaints. Patient very calm and cooperative; resting; takes all his medications as given; patient shows no signs of aggressive behavior.     ADDITIONAL REVIEW OF SYSTEMS: All systems were reviewed and are otherwise negative    MEDICATIONS  (STANDING):  diVALproex  milliGRAM(s) Oral daily  diVALproex  milliGRAM(s) Oral daily  diVALproex  milliGRAM(s) Oral at bedtime  diVALproex  milliGRAM(s) Oral at bedtime  lithium CR (ESKALITH-CR) 450 milliGRAM(s) Oral two times a day  melatonin 3 milliGRAM(s) Oral at bedtime  risperiDONE   Tablet 2 milliGRAM(s) Oral two times a day  traZODone 25 milliGRAM(s) Oral daily    MEDICATIONS  (PRN):  traZODone 25 milliGRAM(s) Oral at bedtime PRN breakthrough insomnia        CAPILLARY BLOOD GLUCOSE        Vital Signs Last 24 Hrs  T(C): 36.2 (27 Mar 2023 04:30), Max: 36.2 (26 Mar 2023 14:03)  T(F): 97.2 (27 Mar 2023 04:30), Max: 97.2 (26 Mar 2023 14:03)  HR: 66 (27 Mar 2023 04:30) (66 - 74)  BP: 101/55 (27 Mar 2023 04:30) (101/55 - 124/69)  BP(mean): --  RR: 16 (27 Mar 2023 04:30) (16 - 16)  SpO2: --      GENERAL: No acute distress, well-developed  HEAD:  Atraumatic, Normocephalic  EYES: EOMI, PERRLA, conjunctiva and sclera clear  NECK: Supple, no lymphadenopathy, no JVD  CHEST/LUNG: CTAB; No wheezes, rales, or rhonchi  HEART: Regular rate and rhythm; No murmurs, rubs, or gallops  ABDOMEN: Soft, non-tender, non-distended; normal bowel sounds, no organomegaly  EXTREMITIES:  2+ peripheral pulses b/l, No clubbing, cyanosis, or edema  NEUROLOGY: A&O x 3, no focal deficits  SKIN: No rashes or lesions    LABS:      RADIOLOGY & ADDITIONAL TESTS:  Results Reviewed:   Imaging Personally Reviewed:  Electrocardiogram Personally Reviewed:    COORDINATION OF CARE:  Care Discussed with Consultants/Other Providers [Y/N]:  Prior or Outpatient Records Reviewed [Y/N]:

## 2023-03-28 PROCEDURE — 99231 SBSQ HOSP IP/OBS SF/LOW 25: CPT

## 2023-03-28 RX ADMIN — RISPERIDONE 2 MILLIGRAM(S): 4 TABLET ORAL at 17:41

## 2023-03-28 RX ADMIN — Medication 3 MILLIGRAM(S): at 21:58

## 2023-03-28 RX ADMIN — DIVALPROEX SODIUM 500 MILLIGRAM(S): 500 TABLET, DELAYED RELEASE ORAL at 12:13

## 2023-03-28 RX ADMIN — Medication 25 MILLIGRAM(S): at 21:58

## 2023-03-28 RX ADMIN — DIVALPROEX SODIUM 500 MILLIGRAM(S): 500 TABLET, DELAYED RELEASE ORAL at 21:58

## 2023-03-28 RX ADMIN — LITHIUM CARBONATE 450 MILLIGRAM(S): 300 TABLET, EXTENDED RELEASE ORAL at 17:41

## 2023-03-28 RX ADMIN — LITHIUM CARBONATE 450 MILLIGRAM(S): 300 TABLET, EXTENDED RELEASE ORAL at 06:00

## 2023-03-28 RX ADMIN — RISPERIDONE 2 MILLIGRAM(S): 4 TABLET ORAL at 06:00

## 2023-03-28 RX ADMIN — DIVALPROEX SODIUM 250 MILLIGRAM(S): 500 TABLET, DELAYED RELEASE ORAL at 12:12

## 2023-03-28 RX ADMIN — DIVALPROEX SODIUM 250 MILLIGRAM(S): 500 TABLET, DELAYED RELEASE ORAL at 21:58

## 2023-03-28 NOTE — PROGRESS NOTE ADULT - SUBJECTIVE AND OBJECTIVE BOX
Patient is a 22y old  Male who presents with a chief complaint of Placement (16 Feb 2023 13:19)      SUBJECTIVE / OVERNIGHT EVENTS:  Patient seen and examined at bedside; patient appears comfortable, denies any complaints. Patient very calm and cooperative; walking in the hallway; takes all his medications as given; patient shows no signs of aggressive behavior.     ADDITIONAL REVIEW OF SYSTEMS: All systems were reviewed and are otherwise negative    MEDICATIONS  (STANDING):  diVALproex  milliGRAM(s) Oral daily  diVALproex  milliGRAM(s) Oral daily  diVALproex  milliGRAM(s) Oral at bedtime  diVALproex  milliGRAM(s) Oral at bedtime  lithium CR (ESKALITH-CR) 450 milliGRAM(s) Oral two times a day  melatonin 3 milliGRAM(s) Oral at bedtime  risperiDONE   Tablet 2 milliGRAM(s) Oral two times a day  traZODone 25 milliGRAM(s) Oral daily    MEDICATIONS  (PRN):  traZODone 25 milliGRAM(s) Oral at bedtime PRN breakthrough insomnia        CAPILLARY BLOOD GLUCOSE        Vital Signs Last 24 Hrs  T(C): --  T(F): --  HR: --  BP: --  BP(mean): --  RR: --  SpO2: --  GENERAL: No acute distress, well-developed  HEAD:  Atraumatic, Normocephalic  EYES: EOMI, PERRLA, conjunctiva and sclera clear  NECK: Supple, no lymphadenopathy, no JVD  CHEST/LUNG: CTAB; No wheezes, rales, or rhonchi  HEART: Regular rate and rhythm; No murmurs, rubs, or gallops  ABDOMEN: Soft, non-tender, non-distended; normal bowel sounds, no organomegaly  EXTREMITIES:  2+ peripheral pulses b/l, No clubbing, cyanosis, or edema  NEUROLOGY: A&O x 3, no focal deficits  SKIN: No rashes or lesions    LABS:      RADIOLOGY & ADDITIONAL TESTS:  Results Reviewed:   Imaging Personally Reviewed:  Electrocardiogram Personally Reviewed:    COORDINATION OF CARE:  Care Discussed with Consultants/Other Providers [Y/N]:  Prior or Outpatient Records Reviewed [Y/N]:

## 2023-03-29 PROCEDURE — 99232 SBSQ HOSP IP/OBS MODERATE 35: CPT

## 2023-03-29 RX ADMIN — DIVALPROEX SODIUM 250 MILLIGRAM(S): 500 TABLET, DELAYED RELEASE ORAL at 21:12

## 2023-03-29 RX ADMIN — DIVALPROEX SODIUM 500 MILLIGRAM(S): 500 TABLET, DELAYED RELEASE ORAL at 11:32

## 2023-03-29 RX ADMIN — LITHIUM CARBONATE 450 MILLIGRAM(S): 300 TABLET, EXTENDED RELEASE ORAL at 06:22

## 2023-03-29 RX ADMIN — DIVALPROEX SODIUM 250 MILLIGRAM(S): 500 TABLET, DELAYED RELEASE ORAL at 11:30

## 2023-03-29 RX ADMIN — Medication 25 MILLIGRAM(S): at 11:30

## 2023-03-29 RX ADMIN — LITHIUM CARBONATE 450 MILLIGRAM(S): 300 TABLET, EXTENDED RELEASE ORAL at 17:19

## 2023-03-29 RX ADMIN — RISPERIDONE 2 MILLIGRAM(S): 4 TABLET ORAL at 17:19

## 2023-03-29 RX ADMIN — DIVALPROEX SODIUM 500 MILLIGRAM(S): 500 TABLET, DELAYED RELEASE ORAL at 21:12

## 2023-03-29 RX ADMIN — RISPERIDONE 2 MILLIGRAM(S): 4 TABLET ORAL at 06:21

## 2023-03-29 NOTE — PROGRESS NOTE ADULT - SUBJECTIVE AND OBJECTIVE BOX
Seen and examined pt at bedside  No overnight issue per RN  Vitals stable  Exam unchanged from prior  labs and imaging reviewed  plan is to cont current management    A more detailed note to follow

## 2023-03-30 PROCEDURE — 99232 SBSQ HOSP IP/OBS MODERATE 35: CPT

## 2023-03-30 RX ADMIN — Medication 3 MILLIGRAM(S): at 02:13

## 2023-03-30 RX ADMIN — DIVALPROEX SODIUM 500 MILLIGRAM(S): 500 TABLET, DELAYED RELEASE ORAL at 11:40

## 2023-03-30 RX ADMIN — LITHIUM CARBONATE 450 MILLIGRAM(S): 300 TABLET, EXTENDED RELEASE ORAL at 18:04

## 2023-03-30 RX ADMIN — DIVALPROEX SODIUM 500 MILLIGRAM(S): 500 TABLET, DELAYED RELEASE ORAL at 21:44

## 2023-03-30 RX ADMIN — Medication 3 MILLIGRAM(S): at 21:43

## 2023-03-30 RX ADMIN — RISPERIDONE 2 MILLIGRAM(S): 4 TABLET ORAL at 18:04

## 2023-03-30 RX ADMIN — DIVALPROEX SODIUM 250 MILLIGRAM(S): 500 TABLET, DELAYED RELEASE ORAL at 21:44

## 2023-03-30 RX ADMIN — LITHIUM CARBONATE 450 MILLIGRAM(S): 300 TABLET, EXTENDED RELEASE ORAL at 06:01

## 2023-03-30 RX ADMIN — DIVALPROEX SODIUM 250 MILLIGRAM(S): 500 TABLET, DELAYED RELEASE ORAL at 11:39

## 2023-03-30 RX ADMIN — RISPERIDONE 2 MILLIGRAM(S): 4 TABLET ORAL at 06:01

## 2023-03-30 RX ADMIN — Medication 25 MILLIGRAM(S): at 18:04

## 2023-03-30 NOTE — PROGRESS NOTE ADULT - SUBJECTIVE AND OBJECTIVE BOX
Patient is a 22y old  Male who presents with a chief complaint of Placement (16 Feb 2023 13:19)      SUBJECTIVE / OVERNIGHT EVENTS:  no acute complaints.    ADDITIONAL REVIEW OF SYSTEMS: All systems were reviewed and are otherwise negative    MEDICATIONS  (STANDING):  diVALproex  milliGRAM(s) Oral daily  diVALproex  milliGRAM(s) Oral daily  diVALproex  milliGRAM(s) Oral at bedtime  diVALproex  milliGRAM(s) Oral at bedtime  lithium CR (ESKALITH-CR) 450 milliGRAM(s) Oral two times a day  melatonin 3 milliGRAM(s) Oral at bedtime  risperiDONE   Tablet 2 milliGRAM(s) Oral two times a day  traZODone 25 milliGRAM(s) Oral daily    MEDICATIONS  (PRN):  traZODone 25 milliGRAM(s) Oral at bedtime PRN breakthrough insomnia        CAPILLARY BLOOD GLUCOSE        Vital Signs Last 24 Hrs  T(C): --  T(F): --  HR: --  BP: --  BP(mean): --  RR: --  SpO2: --  GENERAL: No acute distress, well-developed  HEAD:  Atraumatic, Normocephalic  EYES: EOMI, PERRLA, conjunctiva and sclera clear  NECK: Supple, no lymphadenopathy, no JVD  CHEST/LUNG: CTAB; No wheezes, rales, or rhonchi  HEART: Regular rate and rhythm; No murmurs, rubs, or gallops  ABDOMEN: Soft, non-tender, non-distended; normal bowel sounds, no organomegaly  EXTREMITIES:  2+ peripheral pulses b/l, No clubbing, cyanosis, or edema  NEUROLOGY: A&O x 3, no focal deficits  SKIN: No rashes or lesions    LABS:      RADIOLOGY & ADDITIONAL TESTS:  Results Reviewed:   Imaging Personally Reviewed:  Electrocardiogram Personally Reviewed:    COORDINATION OF CARE:  Care Discussed with Consultants/Other Providers [Y/N]:  Prior or Outpatient Records Reviewed [Y/N]:

## 2023-03-31 PROCEDURE — 99232 SBSQ HOSP IP/OBS MODERATE 35: CPT

## 2023-03-31 RX ADMIN — Medication 25 MILLIGRAM(S): at 18:24

## 2023-03-31 RX ADMIN — DIVALPROEX SODIUM 250 MILLIGRAM(S): 500 TABLET, DELAYED RELEASE ORAL at 12:02

## 2023-03-31 RX ADMIN — LITHIUM CARBONATE 450 MILLIGRAM(S): 300 TABLET, EXTENDED RELEASE ORAL at 17:13

## 2023-03-31 RX ADMIN — RISPERIDONE 2 MILLIGRAM(S): 4 TABLET ORAL at 05:08

## 2023-03-31 RX ADMIN — LITHIUM CARBONATE 450 MILLIGRAM(S): 300 TABLET, EXTENDED RELEASE ORAL at 05:08

## 2023-03-31 RX ADMIN — RISPERIDONE 2 MILLIGRAM(S): 4 TABLET ORAL at 17:13

## 2023-03-31 RX ADMIN — DIVALPROEX SODIUM 500 MILLIGRAM(S): 500 TABLET, DELAYED RELEASE ORAL at 12:02

## 2023-03-31 NOTE — PROGRESS NOTE ADULT - SUBJECTIVE AND OBJECTIVE BOX
Patient is a 22y old  Male who presents with a chief complaint of Placement (16 Feb 2023 13:19)      SUBJECTIVE / OVERNIGHT EVENTS:  no acute complaints.  visited his potential group home today in [place]    ADDITIONAL REVIEW OF SYSTEMS: All systems were reviewed and are otherwise negative    MEDICATIONS  (STANDING):  diVALproex  milliGRAM(s) Oral daily  diVALproex  milliGRAM(s) Oral daily  diVALproex  milliGRAM(s) Oral at bedtime  diVALproex  milliGRAM(s) Oral at bedtime  lithium CR (ESKALITH-CR) 450 milliGRAM(s) Oral two times a day  melatonin 3 milliGRAM(s) Oral at bedtime  risperiDONE   Tablet 2 milliGRAM(s) Oral two times a day  traZODone 25 milliGRAM(s) Oral daily    MEDICATIONS  (PRN):  traZODone 25 milliGRAM(s) Oral at bedtime PRN breakthrough insomnia        CAPILLARY BLOOD GLUCOSE        Vital Signs Last 24 Hrs  T(C): --  T(F): --  HR: --  BP: --  BP(mean): --  RR: --  SpO2: --  GENERAL: No acute distress, well-developed  HEAD:  Atraumatic, Normocephalic  EYES: EOMI, PERRLA, conjunctiva and sclera clear  NECK: Supple, no lymphadenopathy, no JVD  CHEST/LUNG: CTAB; No wheezes, rales, or rhonchi  HEART: Regular rate and rhythm; No murmurs, rubs, or gallops  ABDOMEN: Soft, non-tender, non-distended; normal bowel sounds, no organomegaly  EXTREMITIES:  2+ peripheral pulses b/l, No clubbing, cyanosis, or edema  NEUROLOGY: A&O x 3, no focal deficits  SKIN: No rashes or lesions    LABS:      RADIOLOGY & ADDITIONAL TESTS:  Results Reviewed:   Imaging Personally Reviewed:  Electrocardiogram Personally Reviewed:    COORDINATION OF CARE:  Care Discussed with Consultants/Other Providers [Y/N]:  Prior or Outpatient Records Reviewed [Y/N]:

## 2023-04-01 PROCEDURE — 99232 SBSQ HOSP IP/OBS MODERATE 35: CPT

## 2023-04-01 RX ADMIN — DIVALPROEX SODIUM 500 MILLIGRAM(S): 500 TABLET, DELAYED RELEASE ORAL at 22:14

## 2023-04-01 RX ADMIN — DIVALPROEX SODIUM 250 MILLIGRAM(S): 500 TABLET, DELAYED RELEASE ORAL at 11:10

## 2023-04-01 RX ADMIN — RISPERIDONE 2 MILLIGRAM(S): 4 TABLET ORAL at 18:01

## 2023-04-01 RX ADMIN — Medication 25 MILLIGRAM(S): at 18:02

## 2023-04-01 RX ADMIN — LITHIUM CARBONATE 450 MILLIGRAM(S): 300 TABLET, EXTENDED RELEASE ORAL at 18:02

## 2023-04-01 RX ADMIN — Medication 3 MILLIGRAM(S): at 00:35

## 2023-04-01 RX ADMIN — DIVALPROEX SODIUM 250 MILLIGRAM(S): 500 TABLET, DELAYED RELEASE ORAL at 00:35

## 2023-04-01 RX ADMIN — DIVALPROEX SODIUM 500 MILLIGRAM(S): 500 TABLET, DELAYED RELEASE ORAL at 11:10

## 2023-04-01 RX ADMIN — RISPERIDONE 2 MILLIGRAM(S): 4 TABLET ORAL at 05:09

## 2023-04-01 RX ADMIN — DIVALPROEX SODIUM 500 MILLIGRAM(S): 500 TABLET, DELAYED RELEASE ORAL at 00:35

## 2023-04-01 RX ADMIN — Medication 3 MILLIGRAM(S): at 22:14

## 2023-04-01 RX ADMIN — LITHIUM CARBONATE 450 MILLIGRAM(S): 300 TABLET, EXTENDED RELEASE ORAL at 05:09

## 2023-04-01 RX ADMIN — DIVALPROEX SODIUM 250 MILLIGRAM(S): 500 TABLET, DELAYED RELEASE ORAL at 22:14

## 2023-04-01 NOTE — PROGRESS NOTE ADULT - SUBJECTIVE AND OBJECTIVE BOX
Patient is a 22y old  Male who presents with a chief complaint of Placement (16 Feb 2023 13:19)      SUBJECTIVE / OVERNIGHT EVENTS:  no acute complaints.  visited his potential group home today in [place]    ADDITIONAL REVIEW OF SYSTEMS: All systems were reviewed and are otherwise negative    MEDICATIONS  (STANDING):  diVALproex  milliGRAM(s) Oral daily  diVALproex  milliGRAM(s) Oral daily  diVALproex  milliGRAM(s) Oral at bedtime  diVALproex  milliGRAM(s) Oral at bedtime  lithium CR (ESKALITH-CR) 450 milliGRAM(s) Oral two times a day  melatonin 3 milliGRAM(s) Oral at bedtime  risperiDONE   Tablet 2 milliGRAM(s) Oral two times a day  traZODone 25 milliGRAM(s) Oral daily    MEDICATIONS  (PRN):  traZODone 25 milliGRAM(s) Oral at bedtime PRN breakthrough insomnia        CAPILLARY BLOOD GLUCOSE        Vital Signs Last 24 Hrs  T(C): --  T(F): --  HR: --  BP: --  BP(mean): --  RR: --  SpO2: --  GENERAL: No acute distress, well-developed  HEAD:  Atraumatic, Normocephalic  EYES: EOMI, PERRLA, conjunctiva and sclera clear  NECK: Supple, no lymphadenopathy, no JVD  CHEST/LUNG: CTAB; No wheezes, rales, or rhonchi  HEART: Regular rate and rhythm; No murmurs, rubs, or gallops  ABDOMEN: Soft, non-tender, non-distended; normal bowel sounds, no organomegaly  EXTREMITIES:  2+ peripheral pulses b/l, No clubbing, cyanosis, or edema  NEUROLOGY: A&O x 3, no focal deficits  SKIN: No rashes or lesions    LABS:      RADIOLOGY & ADDITIONAL TESTS:  Results Reviewed:   Imaging Personally Reviewed:  Electrocardiogram Personally Reviewed:    COORDINATION OF CARE:  Care Discussed with Consultants/Other Providers [Y/N]:  Prior or Outpatient Records Reviewed [Y/N]:

## 2023-04-02 PROCEDURE — 99232 SBSQ HOSP IP/OBS MODERATE 35: CPT

## 2023-04-02 RX ADMIN — DIVALPROEX SODIUM 250 MILLIGRAM(S): 500 TABLET, DELAYED RELEASE ORAL at 22:59

## 2023-04-02 RX ADMIN — Medication 25 MILLIGRAM(S): at 18:49

## 2023-04-02 RX ADMIN — DIVALPROEX SODIUM 500 MILLIGRAM(S): 500 TABLET, DELAYED RELEASE ORAL at 22:58

## 2023-04-02 RX ADMIN — DIVALPROEX SODIUM 250 MILLIGRAM(S): 500 TABLET, DELAYED RELEASE ORAL at 11:11

## 2023-04-02 RX ADMIN — LITHIUM CARBONATE 450 MILLIGRAM(S): 300 TABLET, EXTENDED RELEASE ORAL at 05:34

## 2023-04-02 RX ADMIN — DIVALPROEX SODIUM 500 MILLIGRAM(S): 500 TABLET, DELAYED RELEASE ORAL at 11:11

## 2023-04-02 RX ADMIN — RISPERIDONE 2 MILLIGRAM(S): 4 TABLET ORAL at 05:34

## 2023-04-02 RX ADMIN — Medication 3 MILLIGRAM(S): at 22:59

## 2023-04-02 RX ADMIN — LITHIUM CARBONATE 450 MILLIGRAM(S): 300 TABLET, EXTENDED RELEASE ORAL at 17:35

## 2023-04-02 RX ADMIN — RISPERIDONE 2 MILLIGRAM(S): 4 TABLET ORAL at 17:35

## 2023-04-03 PROCEDURE — 99232 SBSQ HOSP IP/OBS MODERATE 35: CPT

## 2023-04-03 RX ADMIN — DIVALPROEX SODIUM 500 MILLIGRAM(S): 500 TABLET, DELAYED RELEASE ORAL at 11:28

## 2023-04-03 RX ADMIN — LITHIUM CARBONATE 450 MILLIGRAM(S): 300 TABLET, EXTENDED RELEASE ORAL at 17:23

## 2023-04-03 RX ADMIN — RISPERIDONE 2 MILLIGRAM(S): 4 TABLET ORAL at 06:29

## 2023-04-03 RX ADMIN — Medication 3 MILLIGRAM(S): at 22:02

## 2023-04-03 RX ADMIN — LITHIUM CARBONATE 450 MILLIGRAM(S): 300 TABLET, EXTENDED RELEASE ORAL at 06:29

## 2023-04-03 RX ADMIN — RISPERIDONE 2 MILLIGRAM(S): 4 TABLET ORAL at 17:23

## 2023-04-03 RX ADMIN — Medication 25 MILLIGRAM(S): at 20:15

## 2023-04-03 RX ADMIN — DIVALPROEX SODIUM 250 MILLIGRAM(S): 500 TABLET, DELAYED RELEASE ORAL at 11:27

## 2023-04-03 RX ADMIN — DIVALPROEX SODIUM 250 MILLIGRAM(S): 500 TABLET, DELAYED RELEASE ORAL at 22:02

## 2023-04-03 RX ADMIN — DIVALPROEX SODIUM 500 MILLIGRAM(S): 500 TABLET, DELAYED RELEASE ORAL at 22:02

## 2023-04-04 PROCEDURE — 99232 SBSQ HOSP IP/OBS MODERATE 35: CPT

## 2023-04-04 RX ADMIN — RISPERIDONE 2 MILLIGRAM(S): 4 TABLET ORAL at 06:36

## 2023-04-04 RX ADMIN — Medication 3 MILLIGRAM(S): at 21:18

## 2023-04-04 RX ADMIN — DIVALPROEX SODIUM 500 MILLIGRAM(S): 500 TABLET, DELAYED RELEASE ORAL at 21:18

## 2023-04-04 RX ADMIN — DIVALPROEX SODIUM 250 MILLIGRAM(S): 500 TABLET, DELAYED RELEASE ORAL at 11:27

## 2023-04-04 RX ADMIN — DIVALPROEX SODIUM 500 MILLIGRAM(S): 500 TABLET, DELAYED RELEASE ORAL at 11:27

## 2023-04-04 RX ADMIN — DIVALPROEX SODIUM 250 MILLIGRAM(S): 500 TABLET, DELAYED RELEASE ORAL at 21:18

## 2023-04-04 RX ADMIN — LITHIUM CARBONATE 450 MILLIGRAM(S): 300 TABLET, EXTENDED RELEASE ORAL at 17:30

## 2023-04-04 RX ADMIN — Medication 25 MILLIGRAM(S): at 21:17

## 2023-04-04 RX ADMIN — LITHIUM CARBONATE 450 MILLIGRAM(S): 300 TABLET, EXTENDED RELEASE ORAL at 06:37

## 2023-04-04 RX ADMIN — RISPERIDONE 2 MILLIGRAM(S): 4 TABLET ORAL at 17:30

## 2023-04-05 PROCEDURE — 99232 SBSQ HOSP IP/OBS MODERATE 35: CPT

## 2023-04-05 RX ADMIN — Medication 3 MILLIGRAM(S): at 21:56

## 2023-04-05 RX ADMIN — LITHIUM CARBONATE 450 MILLIGRAM(S): 300 TABLET, EXTENDED RELEASE ORAL at 06:11

## 2023-04-05 RX ADMIN — DIVALPROEX SODIUM 250 MILLIGRAM(S): 500 TABLET, DELAYED RELEASE ORAL at 11:21

## 2023-04-05 RX ADMIN — DIVALPROEX SODIUM 500 MILLIGRAM(S): 500 TABLET, DELAYED RELEASE ORAL at 11:22

## 2023-04-05 RX ADMIN — RISPERIDONE 2 MILLIGRAM(S): 4 TABLET ORAL at 06:11

## 2023-04-05 RX ADMIN — DIVALPROEX SODIUM 500 MILLIGRAM(S): 500 TABLET, DELAYED RELEASE ORAL at 21:56

## 2023-04-05 RX ADMIN — DIVALPROEX SODIUM 250 MILLIGRAM(S): 500 TABLET, DELAYED RELEASE ORAL at 21:56

## 2023-04-05 RX ADMIN — LITHIUM CARBONATE 450 MILLIGRAM(S): 300 TABLET, EXTENDED RELEASE ORAL at 17:26

## 2023-04-05 RX ADMIN — RISPERIDONE 2 MILLIGRAM(S): 4 TABLET ORAL at 17:26

## 2023-04-05 NOTE — PROGRESS NOTE ADULT - SUBJECTIVE AND OBJECTIVE BOX
CC. Placement   HPI.  Patient reports that he feels better   offers no other complaints               Constitutional: No fever, fatigue or weight loss.  Skin: No rash.  Eyes: No recent vision problems or eye pain.  ENT: No congestion, ear pain, or sore throat.  Endocrine: No thyroid problems.  Cardiovascular: No chest pain or palpation.  Respiratory: No cough, shortness of breath, congestion, or wheezing.  Gastrointestinal: No abdominal pain, nausea, vomiting, or diarrhea.  Genitourinary: No dysuria.  Musculoskeletal: No joint swelling.  Neurologic: No headache.      Vital Signs Last 24 Hrs  T(C): 35.6 (04-05-23 @ 04:30), Max: 35.8 (04-04-23 @ 21:15)  T(F): 96 (04-05-23 @ 04:30), Max: 96.5 (04-04-23 @ 21:15)  HR: 48 (04-05-23 @ 04:30) (48 - 76)  BP: 93/51 (04-05-23 @ 04:30) (93/51 - 112/80)  BP(mean): --  RR: 18 (04-05-23 @ 04:30) (17 - 18)  SpO2: --        PHYSICAL EXAM-  GENERAL: NAD,    HEAD:  Atraumatic, Normocephalic  EYES: EOMI, PERRLA, conjunctiva and sclera clear  NECK: Supple, No JVD, Normal thyroid  NERVOUS SYSTEM:  Alert & Oriented X3, Moving all extremities  CHEST/LUNG: Clear to percussion bilaterally; No rales, rhonchi, wheezing, or rubs  HEART: Regular rate and rhythm; No murmurs, rubs, or gallops  ABDOMEN: Soft, Nontender, Nondistended; Bowel sounds present  EXTREMITIES:   No clubbing, cyanosis, or edema  SKIN: No rashes or lesions          MEDICATIONS  (STANDING):  diVALproex  milliGRAM(s) Oral daily  diVALproex  milliGRAM(s) Oral daily  diVALproex  milliGRAM(s) Oral at bedtime  diVALproex  milliGRAM(s) Oral at bedtime  lithium CR (ESKALITH-CR) 450 milliGRAM(s) Oral two times a day  melatonin 3 milliGRAM(s) Oral at bedtime  risperiDONE   Tablet 2 milliGRAM(s) Oral two times a day  traZODone 25 milliGRAM(s) Oral daily    MEDICATIONS  (PRN):  traZODone 25 milliGRAM(s) Oral at bedtime PRN breakthrough insomnia         Imaging Personally Reviewed:     [x ] YES  [ ] NO    Consultant(s) Notes Reviewed:  [x ] YES  [ ] NO    Care Discussed with Consultants/Other Providers [x ] YES  [ ] No medical contraindication for discharge

## 2023-04-06 PROCEDURE — 99232 SBSQ HOSP IP/OBS MODERATE 35: CPT

## 2023-04-06 RX ADMIN — RISPERIDONE 2 MILLIGRAM(S): 4 TABLET ORAL at 18:11

## 2023-04-06 RX ADMIN — RISPERIDONE 2 MILLIGRAM(S): 4 TABLET ORAL at 05:27

## 2023-04-06 RX ADMIN — Medication 3 MILLIGRAM(S): at 21:29

## 2023-04-06 RX ADMIN — DIVALPROEX SODIUM 250 MILLIGRAM(S): 500 TABLET, DELAYED RELEASE ORAL at 21:30

## 2023-04-06 RX ADMIN — DIVALPROEX SODIUM 500 MILLIGRAM(S): 500 TABLET, DELAYED RELEASE ORAL at 21:30

## 2023-04-06 RX ADMIN — DIVALPROEX SODIUM 250 MILLIGRAM(S): 500 TABLET, DELAYED RELEASE ORAL at 11:12

## 2023-04-06 RX ADMIN — Medication 25 MILLIGRAM(S): at 19:36

## 2023-04-06 RX ADMIN — DIVALPROEX SODIUM 500 MILLIGRAM(S): 500 TABLET, DELAYED RELEASE ORAL at 11:13

## 2023-04-06 RX ADMIN — LITHIUM CARBONATE 450 MILLIGRAM(S): 300 TABLET, EXTENDED RELEASE ORAL at 18:11

## 2023-04-06 RX ADMIN — LITHIUM CARBONATE 450 MILLIGRAM(S): 300 TABLET, EXTENDED RELEASE ORAL at 05:26

## 2023-04-06 NOTE — PROGRESS NOTE ADULT - SUBJECTIVE AND OBJECTIVE BOX
CC. Placement   HPI.  Patient reports that he feels better   offers no other complaints               Constitutional: No fever, fatigue or weight loss.  Skin: No rash.  Eyes: No recent vision problems or eye pain.  ENT: No congestion, ear pain, or sore throat.  Endocrine: No thyroid problems.  Cardiovascular: No chest pain or palpation.  Respiratory: No cough, shortness of breath, congestion, or wheezing.  Gastrointestinal: No abdominal pain, nausea, vomiting, or diarrhea.  Genitourinary: No dysuria.  Musculoskeletal: No joint swelling.  Neurologic: No headache.      Vital Signs Last 24 Hrs  T(C): 35.4 (06 Apr 2023 04:30), Max: 35.9 (05 Apr 2023 20:19)  T(F): 95.8 (06 Apr 2023 04:30), Max: 96.6 (05 Apr 2023 20:19)  HR: 58 (06 Apr 2023 04:30) (58 - 70)  BP: 117/63 (06 Apr 2023 04:30) (117/63 - 118/66)  BP(mean): --  RR: 18 (06 Apr 2023 04:30) (18 - 18)  SpO2: --            PHYSICAL EXAM-  GENERAL: NAD,    HEAD:  Atraumatic, Normocephalic  EYES: EOMI, PERRLA, conjunctiva and sclera clear  NECK: Supple, No JVD, Normal thyroid  NERVOUS SYSTEM:  Alert & Oriented X3, Moving all extremities  CHEST/LUNG: Clear to percussion bilaterally; No rales, rhonchi, wheezing, or rubs  HEART: Regular rate and rhythm; No murmurs, rubs, or gallops  ABDOMEN: Soft, Nontender, Nondistended; Bowel sounds present  EXTREMITIES:   No clubbing, cyanosis, or edema  SKIN: No rashes or lesions          MEDICATIONS  (STANDING):  diVALproex  milliGRAM(s) Oral daily  diVALproex  milliGRAM(s) Oral daily  diVALproex  milliGRAM(s) Oral at bedtime  diVALproex  milliGRAM(s) Oral at bedtime  lithium CR (ESKALITH-CR) 450 milliGRAM(s) Oral two times a day  melatonin 3 milliGRAM(s) Oral at bedtime  risperiDONE   Tablet 2 milliGRAM(s) Oral two times a day  traZODone 25 milliGRAM(s) Oral daily    MEDICATIONS  (PRN):  traZODone 25 milliGRAM(s) Oral at bedtime PRN breakthrough insomnia         Imaging Personally Reviewed:     [x ] YES  [ ] NO    Consultant(s) Notes Reviewed:  [x ] YES  [ ] NO    Care Discussed with Consultants/Other Providers [x ] YES  [ ] No

## 2023-04-07 PROCEDURE — 99232 SBSQ HOSP IP/OBS MODERATE 35: CPT

## 2023-04-07 RX ADMIN — RISPERIDONE 2 MILLIGRAM(S): 4 TABLET ORAL at 18:25

## 2023-04-07 RX ADMIN — DIVALPROEX SODIUM 500 MILLIGRAM(S): 500 TABLET, DELAYED RELEASE ORAL at 21:06

## 2023-04-07 RX ADMIN — Medication 3 MILLIGRAM(S): at 21:06

## 2023-04-07 RX ADMIN — DIVALPROEX SODIUM 500 MILLIGRAM(S): 500 TABLET, DELAYED RELEASE ORAL at 11:17

## 2023-04-07 RX ADMIN — LITHIUM CARBONATE 450 MILLIGRAM(S): 300 TABLET, EXTENDED RELEASE ORAL at 05:14

## 2023-04-07 RX ADMIN — LITHIUM CARBONATE 450 MILLIGRAM(S): 300 TABLET, EXTENDED RELEASE ORAL at 18:25

## 2023-04-07 RX ADMIN — DIVALPROEX SODIUM 250 MILLIGRAM(S): 500 TABLET, DELAYED RELEASE ORAL at 11:17

## 2023-04-07 RX ADMIN — Medication 25 MILLIGRAM(S): at 18:25

## 2023-04-07 RX ADMIN — DIVALPROEX SODIUM 250 MILLIGRAM(S): 500 TABLET, DELAYED RELEASE ORAL at 21:06

## 2023-04-07 RX ADMIN — RISPERIDONE 2 MILLIGRAM(S): 4 TABLET ORAL at 05:14

## 2023-04-07 NOTE — PROGRESS NOTE ADULT - SUBJECTIVE AND OBJECTIVE BOX
CC. Placement   HPI.  Patient reports that he feels better   offers no other complaints               Constitutional: No fever, fatigue or weight loss.  Skin: No rash.  Eyes: No recent vision problems or eye pain.  ENT: No congestion, ear pain, or sore throat.  Endocrine: No thyroid problems.  Cardiovascular: No chest pain or palpation.  Respiratory: No cough, shortness of breath, congestion, or wheezing.  Gastrointestinal: No abdominal pain, nausea, vomiting, or diarrhea.  Genitourinary: No dysuria.  Musculoskeletal: No joint swelling.  Neurologic: No headache.      Vital Signs Last 24 Hrs  T(C): 35.7 (07 Apr 2023 05:00), Max: 35.9 (06 Apr 2023 22:00)  T(F): 96.2 (07 Apr 2023 05:00), Max: 96.6 (06 Apr 2023 22:00)  HR: 65 (07 Apr 2023 05:00) (65 - 97)  BP: 120/82 (07 Apr 2023 05:00) (117/73 - 120/82)  BP(mean): --  RR: 18 (07 Apr 2023 05:00) (18 - 18)  SpO2: --              PHYSICAL EXAM-  GENERAL: NAD,    HEAD:  Atraumatic, Normocephalic  EYES: EOMI, PERRLA, conjunctiva and sclera clear  NECK: Supple, No JVD, Normal thyroid  NERVOUS SYSTEM:  Alert & Oriented X3, Moving all extremities  CHEST/LUNG: Clear to percussion bilaterally; No rales, rhonchi, wheezing, or rubs  HEART: Regular rate and rhythm; No murmurs, rubs, or gallops  ABDOMEN: Soft, Nontender, Nondistended; Bowel sounds present  EXTREMITIES:   No clubbing, cyanosis, or edema  SKIN: No rashes or lesions          MEDICATIONS  (STANDING):  diVALproex  milliGRAM(s) Oral daily  diVALproex  milliGRAM(s) Oral daily  diVALproex  milliGRAM(s) Oral at bedtime  diVALproex  milliGRAM(s) Oral at bedtime  lithium CR (ESKALITH-CR) 450 milliGRAM(s) Oral two times a day  melatonin 3 milliGRAM(s) Oral at bedtime  risperiDONE   Tablet 2 milliGRAM(s) Oral two times a day  traZODone 25 milliGRAM(s) Oral daily    MEDICATIONS  (PRN):  traZODone 25 milliGRAM(s) Oral at bedtime PRN breakthrough insomnia         Imaging Personally Reviewed:     [x ] YES  [ ] NO    Consultant(s) Notes Reviewed:  [x ] YES  [ ] NO    Care Discussed with Consultants/Other Providers [x ] YES  [ ] No

## 2023-04-08 PROCEDURE — 99232 SBSQ HOSP IP/OBS MODERATE 35: CPT

## 2023-04-08 RX ADMIN — DIVALPROEX SODIUM 500 MILLIGRAM(S): 500 TABLET, DELAYED RELEASE ORAL at 11:53

## 2023-04-08 RX ADMIN — DIVALPROEX SODIUM 250 MILLIGRAM(S): 500 TABLET, DELAYED RELEASE ORAL at 21:15

## 2023-04-08 RX ADMIN — Medication 25 MILLIGRAM(S): at 21:17

## 2023-04-08 RX ADMIN — RISPERIDONE 2 MILLIGRAM(S): 4 TABLET ORAL at 05:07

## 2023-04-08 RX ADMIN — DIVALPROEX SODIUM 250 MILLIGRAM(S): 500 TABLET, DELAYED RELEASE ORAL at 11:53

## 2023-04-08 RX ADMIN — RISPERIDONE 2 MILLIGRAM(S): 4 TABLET ORAL at 17:20

## 2023-04-08 RX ADMIN — LITHIUM CARBONATE 450 MILLIGRAM(S): 300 TABLET, EXTENDED RELEASE ORAL at 17:20

## 2023-04-08 RX ADMIN — LITHIUM CARBONATE 450 MILLIGRAM(S): 300 TABLET, EXTENDED RELEASE ORAL at 05:07

## 2023-04-08 RX ADMIN — Medication 3 MILLIGRAM(S): at 21:14

## 2023-04-08 RX ADMIN — Medication 25 MILLIGRAM(S): at 17:21

## 2023-04-08 RX ADMIN — DIVALPROEX SODIUM 500 MILLIGRAM(S): 500 TABLET, DELAYED RELEASE ORAL at 21:14

## 2023-04-08 NOTE — PROGRESS NOTE ADULT - SUBJECTIVE AND OBJECTIVE BOX
CC. Placement   HPI.  Patient reports that he feels better   offers no other complaints               Constitutional: No fever, fatigue or weight loss.  Skin: No rash.  Eyes: No recent vision problems or eye pain.  ENT: No congestion, ear pain, or sore throat.  Endocrine: No thyroid problems.  Cardiovascular: No chest pain or palpation.  Respiratory: No cough, shortness of breath, congestion, or wheezing.  Gastrointestinal: No abdominal pain, nausea, vomiting, or diarrhea.  Genitourinary: No dysuria.  Musculoskeletal: No joint swelling.  Neurologic: No headache.      Vital Signs Last 24 Hrs  T(C): 36.4 (08 Apr 2023 03:56), Max: 36.4 (07 Apr 2023 13:40)  T(F): 97.5 (08 Apr 2023 03:56), Max: 97.6 (07 Apr 2023 13:40)  HR: 61 (08 Apr 2023 03:56) (61 - 75)  BP: 98/55 (08 Apr 2023 03:56) (98/55 - 120/78)  BP(mean): --  RR: 18 (08 Apr 2023 03:56) (18 - 20)  SpO2: 96% (07 Apr 2023 21:34) (96% - 96%)                  PHYSICAL EXAM-  GENERAL: NAD,    HEAD:  Atraumatic, Normocephalic  EYES: EOMI, PERRLA, conjunctiva and sclera clear  NECK: Supple, No JVD, Normal thyroid  NERVOUS SYSTEM:  Alert & Oriented X3, Moving all extremities  CHEST/LUNG: Clear to percussion bilaterally; No rales, rhonchi, wheezing, or rubs  HEART: Regular rate and rhythm; No murmurs, rubs, or gallops  ABDOMEN: Soft, Nontender, Nondistended; Bowel sounds present  EXTREMITIES:   No clubbing, cyanosis, or edema  SKIN: No rashes or lesions          MEDICATIONS  (STANDING):  diVALproex  milliGRAM(s) Oral daily  diVALproex  milliGRAM(s) Oral daily  diVALproex  milliGRAM(s) Oral at bedtime  diVALproex  milliGRAM(s) Oral at bedtime  lithium CR (ESKALITH-CR) 450 milliGRAM(s) Oral two times a day  melatonin 3 milliGRAM(s) Oral at bedtime  risperiDONE   Tablet 2 milliGRAM(s) Oral two times a day  traZODone 25 milliGRAM(s) Oral daily    MEDICATIONS  (PRN):  traZODone 25 milliGRAM(s) Oral at bedtime PRN breakthrough insomnia         Imaging Personally Reviewed:     [x ] YES  [ ] NO    Consultant(s) Notes Reviewed:  [x ] YES  [ ] NO    Care Discussed with Consultants/Other Providers [x ] YES  [ ] No

## 2023-04-09 PROCEDURE — 99232 SBSQ HOSP IP/OBS MODERATE 35: CPT

## 2023-04-09 RX ADMIN — RISPERIDONE 2 MILLIGRAM(S): 4 TABLET ORAL at 05:55

## 2023-04-09 RX ADMIN — DIVALPROEX SODIUM 500 MILLIGRAM(S): 500 TABLET, DELAYED RELEASE ORAL at 20:21

## 2023-04-09 RX ADMIN — LITHIUM CARBONATE 450 MILLIGRAM(S): 300 TABLET, EXTENDED RELEASE ORAL at 05:55

## 2023-04-09 RX ADMIN — Medication 3 MILLIGRAM(S): at 20:21

## 2023-04-09 RX ADMIN — DIVALPROEX SODIUM 250 MILLIGRAM(S): 500 TABLET, DELAYED RELEASE ORAL at 20:20

## 2023-04-09 RX ADMIN — RISPERIDONE 2 MILLIGRAM(S): 4 TABLET ORAL at 17:01

## 2023-04-09 RX ADMIN — Medication 25 MILLIGRAM(S): at 18:29

## 2023-04-09 RX ADMIN — LITHIUM CARBONATE 450 MILLIGRAM(S): 300 TABLET, EXTENDED RELEASE ORAL at 17:01

## 2023-04-09 RX ADMIN — DIVALPROEX SODIUM 250 MILLIGRAM(S): 500 TABLET, DELAYED RELEASE ORAL at 11:24

## 2023-04-09 RX ADMIN — DIVALPROEX SODIUM 500 MILLIGRAM(S): 500 TABLET, DELAYED RELEASE ORAL at 11:24

## 2023-04-09 NOTE — PROGRESS NOTE ADULT - SUBJECTIVE AND OBJECTIVE BOX
CC. Placement   HPI.  Patient reports that he feels better   offers no other complaints               Constitutional: No fever, fatigue or weight loss.  Skin: No rash.  Eyes: No recent vision problems or eye pain.  ENT: No congestion, ear pain, or sore throat.  Endocrine: No thyroid problems.  Cardiovascular: No chest pain or palpation.  Respiratory: No cough, shortness of breath, congestion, or wheezing.  Gastrointestinal: No abdominal pain, nausea, vomiting, or diarrhea.  Genitourinary: No dysuria.  Musculoskeletal: No joint swelling.  Neurologic: No headache.    Vital Signs Last 24 Hrs  T(C): 35.8 (09 Apr 2023 04:45), Max: 36.8 (08 Apr 2023 13:05)  T(F): 96.4 (09 Apr 2023 04:45), Max: 98.3 (08 Apr 2023 13:05)  HR: 98 (09 Apr 2023 04:45) (70 - 98)  BP: 119/68 (09 Apr 2023 04:45) (118/76 - 128/74)  BP(mean): --  RR: 18 (09 Apr 2023 04:45) (18 - 18)  SpO2: 98% (09 Apr 2023 04:45) (97% - 100%)          PHYSICAL EXAM-  GENERAL: NAD,    HEAD:  Atraumatic, Normocephalic  EYES: EOMI, PERRLA, conjunctiva and sclera clear  NECK: Supple, No JVD, Normal thyroid  NERVOUS SYSTEM:  Alert & Oriented X3, Moving all extremities  CHEST/LUNG: Clear to percussion bilaterally; No rales, rhonchi, wheezing, or rubs  HEART: Regular rate and rhythm; No murmurs, rubs, or gallops  ABDOMEN: Soft, Nontender, Nondistended; Bowel sounds present  EXTREMITIES:   No clubbing, cyanosis, or edema  SKIN: No rashes or lesions          MEDICATIONS  (STANDING):  diVALproex  milliGRAM(s) Oral daily  diVALproex  milliGRAM(s) Oral daily  diVALproex  milliGRAM(s) Oral at bedtime  diVALproex  milliGRAM(s) Oral at bedtime  lithium CR (ESKALITH-CR) 450 milliGRAM(s) Oral two times a day  melatonin 3 milliGRAM(s) Oral at bedtime  risperiDONE   Tablet 2 milliGRAM(s) Oral two times a day  traZODone 25 milliGRAM(s) Oral daily    MEDICATIONS  (PRN):  traZODone 25 milliGRAM(s) Oral at bedtime PRN breakthrough insomnia         Imaging Personally Reviewed:     [x ] YES  [ ] NO    Consultant(s) Notes Reviewed:  [x ] YES  [ ] NO    Care Discussed with Consultants/Other Providers [x ] YES  [ ] No

## 2023-04-10 PROCEDURE — 99232 SBSQ HOSP IP/OBS MODERATE 35: CPT

## 2023-04-10 RX ADMIN — Medication 3 MILLIGRAM(S): at 21:05

## 2023-04-10 RX ADMIN — RISPERIDONE 2 MILLIGRAM(S): 4 TABLET ORAL at 06:26

## 2023-04-10 RX ADMIN — Medication 25 MILLIGRAM(S): at 02:05

## 2023-04-10 RX ADMIN — DIVALPROEX SODIUM 500 MILLIGRAM(S): 500 TABLET, DELAYED RELEASE ORAL at 21:05

## 2023-04-10 RX ADMIN — DIVALPROEX SODIUM 500 MILLIGRAM(S): 500 TABLET, DELAYED RELEASE ORAL at 12:46

## 2023-04-10 RX ADMIN — DIVALPROEX SODIUM 250 MILLIGRAM(S): 500 TABLET, DELAYED RELEASE ORAL at 21:05

## 2023-04-10 RX ADMIN — DIVALPROEX SODIUM 250 MILLIGRAM(S): 500 TABLET, DELAYED RELEASE ORAL at 12:45

## 2023-04-10 RX ADMIN — LITHIUM CARBONATE 450 MILLIGRAM(S): 300 TABLET, EXTENDED RELEASE ORAL at 17:28

## 2023-04-10 RX ADMIN — Medication 25 MILLIGRAM(S): at 21:05

## 2023-04-10 RX ADMIN — LITHIUM CARBONATE 450 MILLIGRAM(S): 300 TABLET, EXTENDED RELEASE ORAL at 06:25

## 2023-04-10 RX ADMIN — RISPERIDONE 2 MILLIGRAM(S): 4 TABLET ORAL at 17:28

## 2023-04-10 NOTE — PROGRESS NOTE ADULT - SUBJECTIVE AND OBJECTIVE BOX
CC. Placement   HPI.  Patient reports that he feels better   offers no other complaints               Constitutional: No fever, fatigue or weight loss.  Skin: No rash.  Eyes: No recent vision problems or eye pain.  ENT: No congestion, ear pain, or sore throat.  Endocrine: No thyroid problems.  Cardiovascular: No chest pain or palpation.  Respiratory: No cough, shortness of breath, congestion, or wheezing.  Gastrointestinal: No abdominal pain, nausea, vomiting, or diarrhea.  Genitourinary: No dysuria.  Musculoskeletal: No joint swelling.  Neurologic: No headache.    Vital Signs Last 24 Hrs  T(C): 36.1 (10 Apr 2023 04:51), Max: 36.1 (10 Apr 2023 04:51)  T(F): 97 (10 Apr 2023 04:51), Max: 97 (10 Apr 2023 04:51)  HR: 76 (10 Apr 2023 04:51) (76 - 88)  BP: 105/56 (10 Apr 2023 04:51) (105/56 - 130/73)  BP(mean): --  RR: 18 (10 Apr 2023 04:51) (18 - 18)  SpO2: --        PHYSICAL EXAM-  GENERAL: NAD,    HEAD:  Atraumatic, Normocephalic  EYES: EOMI, PERRLA, conjunctiva and sclera clear  NECK: Supple, No JVD, Normal thyroid  NERVOUS SYSTEM:  Alert & Oriented X3, Moving all extremities  CHEST/LUNG: Clear to percussion bilaterally; No rales, rhonchi, wheezing, or rubs  HEART: Regular rate and rhythm; No murmurs, rubs, or gallops  ABDOMEN: Soft, Nontender, Nondistended; Bowel sounds present  EXTREMITIES:   No clubbing, cyanosis, or edema  SKIN: No rashes or lesions          MEDICATIONS  (STANDING):  diVALproex  milliGRAM(s) Oral daily  diVALproex  milliGRAM(s) Oral daily  diVALproex  milliGRAM(s) Oral at bedtime  diVALproex  milliGRAM(s) Oral at bedtime  lithium CR (ESKALITH-CR) 450 milliGRAM(s) Oral two times a day  melatonin 3 milliGRAM(s) Oral at bedtime  risperiDONE   Tablet 2 milliGRAM(s) Oral two times a day  traZODone 25 milliGRAM(s) Oral daily    MEDICATIONS  (PRN):  traZODone 25 milliGRAM(s) Oral at bedtime PRN breakthrough insomnia         Imaging Personally Reviewed:     [x ] YES  [ ] NO    Consultant(s) Notes Reviewed:  [x ] YES  [ ] NO    Care Discussed with Consultants/Other Providers [x ] YES  [ ] No

## 2023-04-11 PROCEDURE — 99232 SBSQ HOSP IP/OBS MODERATE 35: CPT

## 2023-04-11 RX ADMIN — DIVALPROEX SODIUM 500 MILLIGRAM(S): 500 TABLET, DELAYED RELEASE ORAL at 21:30

## 2023-04-11 RX ADMIN — RISPERIDONE 2 MILLIGRAM(S): 4 TABLET ORAL at 17:04

## 2023-04-11 RX ADMIN — DIVALPROEX SODIUM 250 MILLIGRAM(S): 500 TABLET, DELAYED RELEASE ORAL at 21:30

## 2023-04-11 RX ADMIN — LITHIUM CARBONATE 450 MILLIGRAM(S): 300 TABLET, EXTENDED RELEASE ORAL at 17:04

## 2023-04-11 RX ADMIN — DIVALPROEX SODIUM 500 MILLIGRAM(S): 500 TABLET, DELAYED RELEASE ORAL at 11:49

## 2023-04-11 RX ADMIN — Medication 3 MILLIGRAM(S): at 21:33

## 2023-04-11 RX ADMIN — DIVALPROEX SODIUM 250 MILLIGRAM(S): 500 TABLET, DELAYED RELEASE ORAL at 11:49

## 2023-04-11 RX ADMIN — LITHIUM CARBONATE 450 MILLIGRAM(S): 300 TABLET, EXTENDED RELEASE ORAL at 05:07

## 2023-04-11 RX ADMIN — RISPERIDONE 2 MILLIGRAM(S): 4 TABLET ORAL at 05:08

## 2023-04-11 NOTE — PROGRESS NOTE ADULT - SUBJECTIVE AND OBJECTIVE BOX
CC. Placement   HPI.  Patient reports that he feels better   offers no other complaints       Constitutional: No fever, fatigue or weight loss.  Skin: No rash.  Eyes: No recent vision problems or eye pain.  ENT: No congestion, ear pain, or sore throat.  Endocrine: No thyroid problems.  Cardiovascular: No chest pain or palpation.  Respiratory: No cough, shortness of breath, congestion, or wheezing.  Gastrointestinal: No abdominal pain, nausea, vomiting, or diarrhea.  Genitourinary: No dysuria.  Musculoskeletal: No joint swelling.  Neurologic: No headache.    Vital Signs Last 24 Hrs  T(C): 36.2 (11 Apr 2023 05:12), Max: 36.2 (11 Apr 2023 05:12)  T(F): 97.1 (11 Apr 2023 05:12), Max: 97.1 (11 Apr 2023 05:12)  HR: 69 (11 Apr 2023 05:12) (59 - 74)  BP: 94/51 (11 Apr 2023 05:12) (94/51 - 100/52)  BP(mean): --  RR: 16 (11 Apr 2023 05:12) (16 - 18)  SpO2: 97% (11 Apr 2023 05:12) (96% - 97%)            PHYSICAL EXAM-  GENERAL: NAD,    HEAD:  Atraumatic, Normocephalic  EYES: EOMI, PERRLA, conjunctiva and sclera clear  NECK: Supple, No JVD, Normal thyroid  NERVOUS SYSTEM:  Alert & Oriented X3, Moving all extremities  CHEST/LUNG: Clear to percussion bilaterally; No rales, rhonchi, wheezing, or rubs  HEART: Regular rate and rhythm; No murmurs, rubs, or gallops  ABDOMEN: Soft, Nontender, Nondistended; Bowel sounds present  EXTREMITIES:   No clubbing, cyanosis, or edema  SKIN: No rashes or lesions          MEDICATIONS  (STANDING):  diVALproex  milliGRAM(s) Oral daily  diVALproex  milliGRAM(s) Oral daily  diVALproex  milliGRAM(s) Oral at bedtime  diVALproex  milliGRAM(s) Oral at bedtime  lithium CR (ESKALITH-CR) 450 milliGRAM(s) Oral two times a day  melatonin 3 milliGRAM(s) Oral at bedtime  risperiDONE   Tablet 2 milliGRAM(s) Oral two times a day  traZODone 25 milliGRAM(s) Oral daily    MEDICATIONS  (PRN):  traZODone 25 milliGRAM(s) Oral at bedtime PRN breakthrough insomnia         Imaging Personally Reviewed:     [x ] YES  [ ] NO    Consultant(s) Notes Reviewed:  [x ] YES  [ ] NO    Care Discussed with Consultants/Other Providers [x ] YES  [ ] No

## 2023-04-12 PROCEDURE — 99231 SBSQ HOSP IP/OBS SF/LOW 25: CPT

## 2023-04-12 RX ADMIN — LITHIUM CARBONATE 450 MILLIGRAM(S): 300 TABLET, EXTENDED RELEASE ORAL at 07:03

## 2023-04-12 RX ADMIN — DIVALPROEX SODIUM 250 MILLIGRAM(S): 500 TABLET, DELAYED RELEASE ORAL at 12:11

## 2023-04-12 RX ADMIN — RISPERIDONE 2 MILLIGRAM(S): 4 TABLET ORAL at 07:03

## 2023-04-12 RX ADMIN — RISPERIDONE 2 MILLIGRAM(S): 4 TABLET ORAL at 18:14

## 2023-04-12 RX ADMIN — DIVALPROEX SODIUM 250 MILLIGRAM(S): 500 TABLET, DELAYED RELEASE ORAL at 22:10

## 2023-04-12 RX ADMIN — LITHIUM CARBONATE 450 MILLIGRAM(S): 300 TABLET, EXTENDED RELEASE ORAL at 18:14

## 2023-04-12 RX ADMIN — DIVALPROEX SODIUM 500 MILLIGRAM(S): 500 TABLET, DELAYED RELEASE ORAL at 12:12

## 2023-04-12 RX ADMIN — Medication 25 MILLIGRAM(S): at 18:14

## 2023-04-12 RX ADMIN — DIVALPROEX SODIUM 500 MILLIGRAM(S): 500 TABLET, DELAYED RELEASE ORAL at 22:11

## 2023-04-12 NOTE — PROGRESS NOTE ADULT - SUBJECTIVE AND OBJECTIVE BOX
Patient is a 22y old  Male who presents with a chief complaint of Placement      MEDICATIONS  (STANDING):  diVALproex  milliGRAM(s) Oral daily  diVALproex  milliGRAM(s) Oral daily  diVALproex  milliGRAM(s) Oral at bedtime  diVALproex  milliGRAM(s) Oral at bedtime  lithium CR (ESKALITH-CR) 450 milliGRAM(s) Oral two times a day  melatonin 3 milliGRAM(s) Oral at bedtime  risperiDONE   Tablet 2 milliGRAM(s) Oral two times a day  traZODone 25 milliGRAM(s) Oral daily    MEDICATIONS  (PRN):  traZODone 25 milliGRAM(s) Oral at bedtime PRN breakthrough insomnia      CAPILLARY BLOOD GLUCOSE        I&O's Summary      PHYSICAL EXAM:  Vital Signs Last 24 Hrs  T(C): 35.9 (12 Apr 2023 05:18), Max: 35.9 (12 Apr 2023 05:18)  T(F): 96.7 (12 Apr 2023 05:18), Max: 96.7 (12 Apr 2023 05:18)  HR: 84 (12 Apr 2023 05:18) (77 - 84)  BP: 113/61 (12 Apr 2023 05:18) (102/52 - 113/61)  BP(mean): --  RR: 16 (12 Apr 2023 05:18) (16 - 16)  SpO2: 96% (11 Apr 2023 20:05) (96% - 96%)        GENERAL: No acute distress, well-developed  HEAD:  Atraumatic, Normocephalic  EYES: EOMI, PERRLA, conjunctiva and sclera clear  NECK: Supple, no lymphadenopathy, no JVD  CHEST/LUNG: CTAB; No wheezes, rales, or rhonchi  HEART: Regular rate and rhythm; No murmurs, rubs, or gallops  ABDOMEN: Soft, non-tender, non-distended; normal bowel sounds, no organomegaly  EXTREMITIES:  2+ peripheral pulses b/l, No clubbing, cyanosis, or edema  NEUROLOGY: A&O x 3, no focal deficits  SKIN: No rashes or lesions    LABS:

## 2023-04-12 NOTE — PROGRESS NOTE ADULT - ASSESSMENT
22 years old male past medical history of bipolar and developmental delay came to emergency room for alleged aggressive and violent behavior as per step father. Evaluated by Psych; Step Father left stated pt needs placement refusing to take child home.     Bipolar disorder and likely developmental disorder- stable  - continue current meds (on depakote, risperidone, and lithium)   - pt has been stable without aggressive behaviour in the hospital  - father states he cant take care of pt    Depressed mood, negative thoughts  - cont w lithium and valproic acid, levels wnl, c/w risperiDONE     - psych consult appreciated    Obesity: Likely multifactorial: excess calories and Psych medication induced     GI PPX: not Indicated   DVT px - pt ambulatory   Full code   DISPO: pending placement/gaurdianship

## 2023-04-13 PROCEDURE — 99231 SBSQ HOSP IP/OBS SF/LOW 25: CPT

## 2023-04-13 RX ADMIN — DIVALPROEX SODIUM 500 MILLIGRAM(S): 500 TABLET, DELAYED RELEASE ORAL at 12:09

## 2023-04-13 RX ADMIN — Medication 25 MILLIGRAM(S): at 19:14

## 2023-04-13 RX ADMIN — DIVALPROEX SODIUM 500 MILLIGRAM(S): 500 TABLET, DELAYED RELEASE ORAL at 21:41

## 2023-04-13 RX ADMIN — DIVALPROEX SODIUM 250 MILLIGRAM(S): 500 TABLET, DELAYED RELEASE ORAL at 12:09

## 2023-04-13 RX ADMIN — RISPERIDONE 2 MILLIGRAM(S): 4 TABLET ORAL at 05:43

## 2023-04-13 RX ADMIN — Medication 3 MILLIGRAM(S): at 21:42

## 2023-04-13 RX ADMIN — DIVALPROEX SODIUM 250 MILLIGRAM(S): 500 TABLET, DELAYED RELEASE ORAL at 21:41

## 2023-04-13 RX ADMIN — RISPERIDONE 2 MILLIGRAM(S): 4 TABLET ORAL at 19:13

## 2023-04-13 RX ADMIN — LITHIUM CARBONATE 450 MILLIGRAM(S): 300 TABLET, EXTENDED RELEASE ORAL at 05:43

## 2023-04-13 RX ADMIN — LITHIUM CARBONATE 450 MILLIGRAM(S): 300 TABLET, EXTENDED RELEASE ORAL at 19:13

## 2023-04-13 NOTE — PROGRESS NOTE ADULT - SUBJECTIVE AND OBJECTIVE BOX
PROGRESS NOTE:   Authored by Hali Otoole MD     Patient is a 22y old  Male who presents with a chief complaint of Placement     MEDICATIONS  (STANDING):  diVALproex  milliGRAM(s) Oral daily  diVALproex  milliGRAM(s) Oral daily  diVALproex  milliGRAM(s) Oral at bedtime  diVALproex  milliGRAM(s) Oral at bedtime  lithium CR (ESKALITH-CR) 450 milliGRAM(s) Oral two times a day  melatonin 3 milliGRAM(s) Oral at bedtime  risperiDONE   Tablet 2 milliGRAM(s) Oral two times a day  traZODone 25 milliGRAM(s) Oral daily    MEDICATIONS  (PRN):  traZODone 25 milliGRAM(s) Oral at bedtime PRN breakthrough insomnia        PHYSICAL EXAM:  Vital Signs Last 24 Hrs  T(C): 35.9 (13 Apr 2023 05:17), Max: 35.9 (12 Apr 2023 21:23)  T(F): 96.7 (13 Apr 2023 05:17), Max: 96.7 (13 Apr 2023 05:17)  HR: 82 (13 Apr 2023 05:17) (82 - 96)  BP: 119/68 (13 Apr 2023 05:17) (117/68 - 119/68)  BP(mean): --  RR: 16 (13 Apr 2023 05:17) (16 - 18)  SpO2: --        GENERAL: No acute distress, well-developed  HEAD:  Atraumatic, Normocephalic  EYES: EOMI, PERRLA, conjunctiva and sclera clear  NECK: Supple, no lymphadenopathy, no JVD  CHEST/LUNG: CTAB; No wheezes, rales, or rhonchi  HEART: Regular rate and rhythm; No murmurs, rubs, or gallops  ABDOMEN: Soft, non-tender, non-distended; normal bowel sounds, no organomegaly  EXTREMITIES:  2+ peripheral pulses b/l, No clubbing, cyanosis, or edema  NEUROLOGY: A&O x 3, no focal deficits  SKIN: No rashes or lesions

## 2023-04-14 PROCEDURE — 99231 SBSQ HOSP IP/OBS SF/LOW 25: CPT

## 2023-04-14 RX ADMIN — DIVALPROEX SODIUM 250 MILLIGRAM(S): 500 TABLET, DELAYED RELEASE ORAL at 21:24

## 2023-04-14 RX ADMIN — DIVALPROEX SODIUM 500 MILLIGRAM(S): 500 TABLET, DELAYED RELEASE ORAL at 21:22

## 2023-04-14 RX ADMIN — LITHIUM CARBONATE 450 MILLIGRAM(S): 300 TABLET, EXTENDED RELEASE ORAL at 18:12

## 2023-04-14 RX ADMIN — Medication 25 MILLIGRAM(S): at 18:13

## 2023-04-14 RX ADMIN — DIVALPROEX SODIUM 500 MILLIGRAM(S): 500 TABLET, DELAYED RELEASE ORAL at 11:06

## 2023-04-14 RX ADMIN — LITHIUM CARBONATE 450 MILLIGRAM(S): 300 TABLET, EXTENDED RELEASE ORAL at 05:49

## 2023-04-14 RX ADMIN — Medication 3 MILLIGRAM(S): at 21:24

## 2023-04-14 RX ADMIN — DIVALPROEX SODIUM 250 MILLIGRAM(S): 500 TABLET, DELAYED RELEASE ORAL at 11:06

## 2023-04-14 RX ADMIN — RISPERIDONE 2 MILLIGRAM(S): 4 TABLET ORAL at 05:50

## 2023-04-14 RX ADMIN — RISPERIDONE 2 MILLIGRAM(S): 4 TABLET ORAL at 18:13

## 2023-04-14 NOTE — PROGRESS NOTE ADULT - SUBJECTIVE AND OBJECTIVE BOX
Patient is a 22y old  Male who presents with a chief complaint of Placement     MEDICATIONS  (STANDING):  diVALproex  milliGRAM(s) Oral at bedtime  diVALproex  milliGRAM(s) Oral at bedtime  diVALproex  milliGRAM(s) Oral daily  diVALproex  milliGRAM(s) Oral daily  lithium CR (ESKALITH-CR) 450 milliGRAM(s) Oral two times a day  melatonin 3 milliGRAM(s) Oral at bedtime  risperiDONE   Tablet 2 milliGRAM(s) Oral two times a day  traZODone 25 milliGRAM(s) Oral daily    MEDICATIONS  (PRN):  traZODone 25 milliGRAM(s) Oral at bedtime PRN breakthrough insomnia      CAPILLARY BLOOD GLUCOSE        I&O's Summary      PHYSICAL EXAM:  Vital Signs Last 24 Hrs  T(C): 35.6 (14 Apr 2023 04:54), Max: 36.4 (13 Apr 2023 21:04)  T(F): 96.1 (14 Apr 2023 04:54), Max: 97.5 (13 Apr 2023 21:04)  HR: 55 (14 Apr 2023 04:54) (55 - 80)  BP: 112/58 (14 Apr 2023 04:54) (112/58 - 123/72)  BP(mean): --  RR: 16 (14 Apr 2023 04:54) (16 - 16)  SpO2: 96% (14 Apr 2023 04:54) (96% - 96%)    Parameters below as of 13 Apr 2023 21:04  Patient On (Oxygen Delivery Method): room air        GENERAL: No acute distress, well-developed  HEAD:  Atraumatic, Normocephalic  EYES: EOMI, PERRLA, conjunctiva and sclera clear  NECK: Supple, no lymphadenopathy, no JVD  CHEST/LUNG: CTAB; No wheezes, rales, or rhonchi  HEART: Regular rate and rhythm; No murmurs, rubs, or gallops  ABDOMEN: Soft, non-tender, non-distended; normal bowel sounds, no organomegaly  EXTREMITIES:  2+ peripheral pulses b/l, No clubbing, cyanosis, or edema  NEUROLOGY: A&O x 3, no focal deficits  SKIN: No rashes or lesions

## 2023-04-15 PROCEDURE — 99231 SBSQ HOSP IP/OBS SF/LOW 25: CPT

## 2023-04-15 RX ADMIN — DIVALPROEX SODIUM 250 MILLIGRAM(S): 500 TABLET, DELAYED RELEASE ORAL at 21:59

## 2023-04-15 RX ADMIN — Medication 3 MILLIGRAM(S): at 21:59

## 2023-04-15 RX ADMIN — RISPERIDONE 2 MILLIGRAM(S): 4 TABLET ORAL at 06:15

## 2023-04-15 RX ADMIN — DIVALPROEX SODIUM 250 MILLIGRAM(S): 500 TABLET, DELAYED RELEASE ORAL at 11:39

## 2023-04-15 RX ADMIN — DIVALPROEX SODIUM 500 MILLIGRAM(S): 500 TABLET, DELAYED RELEASE ORAL at 11:39

## 2023-04-15 RX ADMIN — LITHIUM CARBONATE 450 MILLIGRAM(S): 300 TABLET, EXTENDED RELEASE ORAL at 06:16

## 2023-04-15 RX ADMIN — LITHIUM CARBONATE 450 MILLIGRAM(S): 300 TABLET, EXTENDED RELEASE ORAL at 17:42

## 2023-04-15 RX ADMIN — Medication 25 MILLIGRAM(S): at 01:26

## 2023-04-15 RX ADMIN — RISPERIDONE 2 MILLIGRAM(S): 4 TABLET ORAL at 17:42

## 2023-04-15 RX ADMIN — DIVALPROEX SODIUM 500 MILLIGRAM(S): 500 TABLET, DELAYED RELEASE ORAL at 21:59

## 2023-04-15 NOTE — PROGRESS NOTE ADULT - SUBJECTIVE AND OBJECTIVE BOX
Patient is a 22y old  Male who presents with a chief complaint of Placement      MEDICATIONS  (STANDING):  diVALproex  milliGRAM(s) Oral daily  diVALproex  milliGRAM(s) Oral daily  diVALproex  milliGRAM(s) Oral at bedtime  diVALproex  milliGRAM(s) Oral at bedtime  lithium CR (ESKALITH-CR) 450 milliGRAM(s) Oral two times a day  melatonin 3 milliGRAM(s) Oral at bedtime  risperiDONE   Tablet 2 milliGRAM(s) Oral two times a day  traZODone 25 milliGRAM(s) Oral daily    MEDICATIONS  (PRN):  traZODone 25 milliGRAM(s) Oral at bedtime PRN breakthrough insomnia            PHYSICAL EXAM:  Vital Signs Last 24 Hrs  T(C): 36.4 (15 Apr 2023 04:30), Max: 36.4 (15 Apr 2023 04:30)  T(F): 97.5 (15 Apr 2023 04:30), Max: 97.5 (15 Apr 2023 04:30)  HR: 97 (15 Apr 2023 04:30) (97 - 97)  BP: 94/56 (15 Apr 2023 04:30) (94/56 - 94/56)  BP(mean): --  RR: 95 (15 Apr 2023 04:30) (95 - 95)  SpO2: --        GENERAL: No acute distress, well-developed  HEAD:  Atraumatic, Normocephalic  EYES: EOMI, PERRLA, conjunctiva and sclera clear  NECK: Supple, no lymphadenopathy, no JVD  CHEST/LUNG: CTAB; No wheezes, rales, or rhonchi  HEART: Regular rate and rhythm; No murmurs, rubs, or gallops  ABDOMEN: Soft, non-tender, non-distended; normal bowel sounds, no organomegaly  EXTREMITIES:  2+ peripheral pulses b/l, No clubbing, cyanosis, or edema  NEUROLOGY: A&O x 3, no focal deficits  SKIN: No rashes or lesions

## 2023-04-16 PROCEDURE — 99231 SBSQ HOSP IP/OBS SF/LOW 25: CPT

## 2023-04-16 RX ADMIN — LITHIUM CARBONATE 450 MILLIGRAM(S): 300 TABLET, EXTENDED RELEASE ORAL at 17:29

## 2023-04-16 RX ADMIN — DIVALPROEX SODIUM 250 MILLIGRAM(S): 500 TABLET, DELAYED RELEASE ORAL at 11:48

## 2023-04-16 RX ADMIN — RISPERIDONE 2 MILLIGRAM(S): 4 TABLET ORAL at 17:29

## 2023-04-16 RX ADMIN — DIVALPROEX SODIUM 500 MILLIGRAM(S): 500 TABLET, DELAYED RELEASE ORAL at 21:55

## 2023-04-16 RX ADMIN — Medication 3 MILLIGRAM(S): at 21:56

## 2023-04-16 RX ADMIN — Medication 25 MILLIGRAM(S): at 17:29

## 2023-04-16 RX ADMIN — DIVALPROEX SODIUM 500 MILLIGRAM(S): 500 TABLET, DELAYED RELEASE ORAL at 11:48

## 2023-04-16 RX ADMIN — RISPERIDONE 2 MILLIGRAM(S): 4 TABLET ORAL at 05:49

## 2023-04-16 RX ADMIN — DIVALPROEX SODIUM 250 MILLIGRAM(S): 500 TABLET, DELAYED RELEASE ORAL at 21:55

## 2023-04-16 RX ADMIN — LITHIUM CARBONATE 450 MILLIGRAM(S): 300 TABLET, EXTENDED RELEASE ORAL at 05:49

## 2023-04-16 NOTE — PROGRESS NOTE ADULT - SUBJECTIVE AND OBJECTIVE BOX
Patient is a 22y old  Male who presents with a chief complaint of Placement    MEDICATIONS  (STANDING):  diVALproex  milliGRAM(s) Oral at bedtime  diVALproex  milliGRAM(s) Oral at bedtime  diVALproex  milliGRAM(s) Oral daily  diVALproex  milliGRAM(s) Oral daily  lithium CR (ESKALITH-CR) 450 milliGRAM(s) Oral two times a day  melatonin 3 milliGRAM(s) Oral at bedtime  risperiDONE   Tablet 2 milliGRAM(s) Oral two times a day  traZODone 25 milliGRAM(s) Oral daily    MEDICATIONS  (PRN):  traZODone 25 milliGRAM(s) Oral at bedtime PRN breakthrough insomnia      PHYSICAL EXAM:  Vital Signs Last 24 Hrs  T(C): 36.4 (16 Apr 2023 05:07), Max: 36.4 (16 Apr 2023 05:07)  T(F): 97.6 (16 Apr 2023 05:07), Max: 97.6 (16 Apr 2023 05:07)  HR: 56 (16 Apr 2023 05:07) (56 - 97)  BP: 116/52 (16 Apr 2023 05:07) (116/52 - 165/84)  BP(mean): --  RR: 6 (16 Apr 2023 05:07) (6 - 18)  SpO2: --      GENERAL: No acute distress, well-developed  HEAD:  Atraumatic, Normocephalic  EYES: EOMI, PERRLA, conjunctiva and sclera clear  NECK: Supple, no lymphadenopathy, no JVD  CHEST/LUNG: CTAB; No wheezes, rales, or rhonchi  HEART: Regular rate and rhythm; No murmurs, rubs, or gallops  ABDOMEN: Soft, non-tender, non-distended; normal bowel sounds, no organomegaly  EXTREMITIES:  2+ peripheral pulses b/l, No clubbing, cyanosis, or edema  NEUROLOGY: A&O x 3, no focal deficits  SKIN: No rashes or lesions    LABS:

## 2023-04-17 PROCEDURE — 99231 SBSQ HOSP IP/OBS SF/LOW 25: CPT

## 2023-04-17 RX ADMIN — RISPERIDONE 2 MILLIGRAM(S): 4 TABLET ORAL at 06:21

## 2023-04-17 RX ADMIN — LITHIUM CARBONATE 450 MILLIGRAM(S): 300 TABLET, EXTENDED RELEASE ORAL at 06:21

## 2023-04-17 RX ADMIN — Medication 25 MILLIGRAM(S): at 18:20

## 2023-04-17 RX ADMIN — RISPERIDONE 2 MILLIGRAM(S): 4 TABLET ORAL at 18:20

## 2023-04-17 RX ADMIN — LITHIUM CARBONATE 450 MILLIGRAM(S): 300 TABLET, EXTENDED RELEASE ORAL at 18:20

## 2023-04-17 RX ADMIN — DIVALPROEX SODIUM 250 MILLIGRAM(S): 500 TABLET, DELAYED RELEASE ORAL at 21:46

## 2023-04-17 RX ADMIN — DIVALPROEX SODIUM 250 MILLIGRAM(S): 500 TABLET, DELAYED RELEASE ORAL at 11:05

## 2023-04-17 RX ADMIN — DIVALPROEX SODIUM 500 MILLIGRAM(S): 500 TABLET, DELAYED RELEASE ORAL at 11:05

## 2023-04-17 RX ADMIN — DIVALPROEX SODIUM 500 MILLIGRAM(S): 500 TABLET, DELAYED RELEASE ORAL at 21:46

## 2023-04-17 NOTE — PROGRESS NOTE ADULT - SUBJECTIVE AND OBJECTIVE BOX
Patient is a 22y old  Male who presents with a chief complaint of Placement     MEDICATIONS  (STANDING):  diVALproex  milliGRAM(s) Oral daily  diVALproex  milliGRAM(s) Oral daily  diVALproex  milliGRAM(s) Oral at bedtime  diVALproex  milliGRAM(s) Oral at bedtime  lithium CR (ESKALITH-CR) 450 milliGRAM(s) Oral two times a day  melatonin 3 milliGRAM(s) Oral at bedtime  risperiDONE   Tablet 2 milliGRAM(s) Oral two times a day  traZODone 25 milliGRAM(s) Oral daily    MEDICATIONS  (PRN):  traZODone 25 milliGRAM(s) Oral at bedtime PRN breakthrough insomnia      CAPILLARY BLOOD GLUCOSE        I&O's Summary      PHYSICAL EXAM:  Vital Signs Last 24 Hrs  T(C): 36.3 (17 Apr 2023 05:11), Max: 36.8 (16 Apr 2023 21:07)  T(F): 97.3 (17 Apr 2023 05:11), Max: 98.3 (16 Apr 2023 21:07)  HR: 57 (17 Apr 2023 05:11) (57 - 82)  BP: 105/51 (17 Apr 2023 05:11) (105/51 - 118/65)  BP(mean): --  RR: 16 (17 Apr 2023 05:11) (16 - 16)  SpO2: --        GENERAL: No acute distress, well-developed  HEAD:  Atraumatic, Normocephalic  EYES: EOMI, PERRLA, conjunctiva and sclera clear  NECK: Supple, no lymphadenopathy, no JVD  CHEST/LUNG: CTAB; No wheezes, rales, or rhonchi  HEART: Regular rate and rhythm; No murmurs, rubs, or gallops  ABDOMEN: Soft, non-tender, non-distended; normal bowel sounds, no organomegaly  EXTREMITIES:  2+ peripheral pulses b/l, No clubbing, cyanosis, or edema  NEUROLOGY: A&O x 3, no focal deficits  SKIN: No rashes or lesions    LABS:

## 2023-04-18 PROCEDURE — 99231 SBSQ HOSP IP/OBS SF/LOW 25: CPT

## 2023-04-18 RX ADMIN — Medication 3 MILLIGRAM(S): at 01:03

## 2023-04-18 RX ADMIN — Medication 25 MILLIGRAM(S): at 19:35

## 2023-04-18 RX ADMIN — RISPERIDONE 2 MILLIGRAM(S): 4 TABLET ORAL at 17:45

## 2023-04-18 RX ADMIN — DIVALPROEX SODIUM 500 MILLIGRAM(S): 500 TABLET, DELAYED RELEASE ORAL at 10:53

## 2023-04-18 RX ADMIN — DIVALPROEX SODIUM 500 MILLIGRAM(S): 500 TABLET, DELAYED RELEASE ORAL at 22:09

## 2023-04-18 RX ADMIN — LITHIUM CARBONATE 450 MILLIGRAM(S): 300 TABLET, EXTENDED RELEASE ORAL at 17:45

## 2023-04-18 RX ADMIN — DIVALPROEX SODIUM 250 MILLIGRAM(S): 500 TABLET, DELAYED RELEASE ORAL at 22:09

## 2023-04-18 RX ADMIN — DIVALPROEX SODIUM 250 MILLIGRAM(S): 500 TABLET, DELAYED RELEASE ORAL at 10:52

## 2023-04-18 NOTE — PROGRESS NOTE ADULT - SUBJECTIVE AND OBJECTIVE BOX
Patient is a 22y old  Male who presents with a chief complaint of Placement         MEDICATIONS  (STANDING  diVALproex  milliGRAM(s) Oral at bedtime  diVALproex  milliGRAM(s) Oral at bedtime  diVALproex  milliGRAM(s) Oral daily  diVALproex  milliGRAM(s) Oral daily  lithium CR (ESKALITH-CR) 450 milliGRAM(s) Oral two times a day  melatonin 3 milliGRAM(s) Oral at bedtime  risperiDONE   Tablet 2 milliGRAM(s) Oral two times a day  traZODone 25 milliGRAM(s) Oral daily    MEDICATIONS  (PRN):  traZODone 25 milliGRAM(s) Oral at bedtime PRN breakthrough insomnia      PHYSICAL EXAM:  Vital Signs Last 24 Hrs  T(C): 36.2 (18 Apr 2023 04:36), Max: 36.3 (17 Apr 2023 20:19)  T(F): 97.1 (18 Apr 2023 04:36), Max: 97.3 (17 Apr 2023 20:19)  HR: 75 (18 Apr 2023 04:36) (75 - 86)  BP: 92/50 (18 Apr 2023 04:36) (92/50 - 114/65)  BP(mean): --  RR: 17 (18 Apr 2023 04:36) (16 - 17)  SpO2: 96% (18 Apr 2023 04:36) (96% - 96%)        GENERAL: No acute distress, well-developed  HEAD:  Atraumatic, Normocephalic  EYES: EOMI, PERRLA, conjunctiva and sclera clear  NECK: Supple, no lymphadenopathy, no JVD  CHEST/LUNG: CTAB; No wheezes, rales, or rhonchi  HEART: Regular rate and rhythm; No murmurs, rubs, or gallops  ABDOMEN: Soft, non-tender, non-distended; normal bowel sounds, no organomegaly  EXTREMITIES:  2+ peripheral pulses b/l, No clubbing, cyanosis, or edema  NEUROLOGY: A&O x 2, no focal deficits  SKIN: No rashes or lesions  LABS:

## 2023-04-19 PROCEDURE — 99232 SBSQ HOSP IP/OBS MODERATE 35: CPT

## 2023-04-19 RX ADMIN — LITHIUM CARBONATE 450 MILLIGRAM(S): 300 TABLET, EXTENDED RELEASE ORAL at 06:02

## 2023-04-19 RX ADMIN — Medication 3 MILLIGRAM(S): at 21:45

## 2023-04-19 RX ADMIN — DIVALPROEX SODIUM 500 MILLIGRAM(S): 500 TABLET, DELAYED RELEASE ORAL at 21:45

## 2023-04-19 RX ADMIN — RISPERIDONE 2 MILLIGRAM(S): 4 TABLET ORAL at 06:02

## 2023-04-19 RX ADMIN — DIVALPROEX SODIUM 500 MILLIGRAM(S): 500 TABLET, DELAYED RELEASE ORAL at 12:29

## 2023-04-19 RX ADMIN — DIVALPROEX SODIUM 250 MILLIGRAM(S): 500 TABLET, DELAYED RELEASE ORAL at 21:45

## 2023-04-19 RX ADMIN — LITHIUM CARBONATE 450 MILLIGRAM(S): 300 TABLET, EXTENDED RELEASE ORAL at 17:20

## 2023-04-19 RX ADMIN — RISPERIDONE 2 MILLIGRAM(S): 4 TABLET ORAL at 17:20

## 2023-04-19 RX ADMIN — DIVALPROEX SODIUM 250 MILLIGRAM(S): 500 TABLET, DELAYED RELEASE ORAL at 12:29

## 2023-04-19 RX ADMIN — Medication 25 MILLIGRAM(S): at 18:32

## 2023-04-19 NOTE — PROGRESS NOTE ADULT - SUBJECTIVE AND OBJECTIVE BOX
PHILIP MCCLOUD  22y  Male       History of Present Illness: Patient was seen and examined today. Pt denied any complaints. Refused to engage in physical exam.     Vital Signs Last 24 Hrs  T(C): 35.6 (19 Apr 2023 14:18), Max: 36.3 (18 Apr 2023 21:03)  T(F): 96 (19 Apr 2023 14:18), Max: 97.4 (18 Apr 2023 21:03)  HR: 92 (19 Apr 2023 14:18) (64 - 96)  BP: 118/81 (19 Apr 2023 14:18) (91/51 - 119/82)  BP(mean): --  RR: 18 (19 Apr 2023 14:18) (18 - 18)  SpO2: 100% (18 Apr 2023 21:03) (100% - 100%)    Parameters below as of 18 Apr 2023 21:03  Patient On (Oxygen Delivery Method): room air        PHYSICAL EXAM:  GENERAL: refused to engage in physical exam.           diVALproex  milliGRAM(s) Oral daily  diVALproex  milliGRAM(s) Oral daily  diVALproex  milliGRAM(s) Oral at bedtime  diVALproex  milliGRAM(s) Oral at bedtime  lithium CR (ESKALITH-CR) 450 milliGRAM(s) Oral two times a day  melatonin 3 milliGRAM(s) Oral at bedtime  risperiDONE   Tablet 2 milliGRAM(s) Oral two times a day  traZODone 25 milliGRAM(s) Oral at bedtime PRN  traZODone 25 milliGRAM(s) Oral daily

## 2023-04-20 PROCEDURE — 99232 SBSQ HOSP IP/OBS MODERATE 35: CPT

## 2023-04-20 RX ADMIN — Medication 3 MILLIGRAM(S): at 22:38

## 2023-04-20 RX ADMIN — DIVALPROEX SODIUM 500 MILLIGRAM(S): 500 TABLET, DELAYED RELEASE ORAL at 11:16

## 2023-04-20 RX ADMIN — LITHIUM CARBONATE 450 MILLIGRAM(S): 300 TABLET, EXTENDED RELEASE ORAL at 17:29

## 2023-04-20 RX ADMIN — DIVALPROEX SODIUM 500 MILLIGRAM(S): 500 TABLET, DELAYED RELEASE ORAL at 22:36

## 2023-04-20 RX ADMIN — Medication 25 MILLIGRAM(S): at 22:36

## 2023-04-20 RX ADMIN — RISPERIDONE 2 MILLIGRAM(S): 4 TABLET ORAL at 17:29

## 2023-04-20 RX ADMIN — DIVALPROEX SODIUM 250 MILLIGRAM(S): 500 TABLET, DELAYED RELEASE ORAL at 11:16

## 2023-04-20 RX ADMIN — DIVALPROEX SODIUM 250 MILLIGRAM(S): 500 TABLET, DELAYED RELEASE ORAL at 22:37

## 2023-04-20 RX ADMIN — LITHIUM CARBONATE 450 MILLIGRAM(S): 300 TABLET, EXTENDED RELEASE ORAL at 05:35

## 2023-04-20 RX ADMIN — RISPERIDONE 2 MILLIGRAM(S): 4 TABLET ORAL at 05:35

## 2023-04-20 NOTE — PROGRESS NOTE ADULT - SUBJECTIVE AND OBJECTIVE BOX
PHILIP MCCLOUD  22y  Male       History of Present Illness: Patient was seen and examined today. Pt denied any complaints.       Vital Signs Last 24 Hrs  T(C): 37 (19 Apr 2023 21:00), Max: 37 (19 Apr 2023 21:00)  T(F): 98.6 (19 Apr 2023 21:00), Max: 98.6 (19 Apr 2023 21:00)  HR: 69 (19 Apr 2023 21:00) (69 - 92)  BP: 122/82 (19 Apr 2023 21:00) (118/81 - 122/82)  BP(mean): --  RR: 18 (19 Apr 2023 21:00) (18 - 18)  SpO2: 98% (19 Apr 2023 21:00) (98% - 98%)    Parameters below as of 19 Apr 2023 21:00  Patient On (Oxygen Delivery Method): room air        PHYSICAL EXAM:  GENERAL: NAD, well-groomed, well-developed  HEENT - NC/AT, pupils equal and reactive to light,  ; Moist mucous membranes, Good dentition, No lesions  NECK: Supple, No JVD  CHEST/LUNG: Clear to auscultation bilaterally; No rales, rhonchi, wheezing  HEART: Regular rate and rhythm; No murmurs, rubs, or gallops  ABDOMEN: Soft, Nontender, Nondistended; Bowel sounds present  EXTREMITIES:  2+ Peripheral Pulses, No clubbing, cyanosis, or edema  NEURO:  No Focal deficits, sensory and motor intact        diVALproex  milliGRAM(s) Oral daily  diVALproex  milliGRAM(s) Oral daily  diVALproex  milliGRAM(s) Oral at bedtime  diVALproex  milliGRAM(s) Oral at bedtime  lithium CR (ESKALITH-CR) 450 milliGRAM(s) Oral two times a day  melatonin 3 milliGRAM(s) Oral at bedtime  risperiDONE   Tablet 2 milliGRAM(s) Oral two times a day  traZODone 25 milliGRAM(s) Oral at bedtime PRN  traZODone 25 milliGRAM(s) Oral daily

## 2023-04-21 PROCEDURE — 99232 SBSQ HOSP IP/OBS MODERATE 35: CPT

## 2023-04-21 RX ADMIN — LITHIUM CARBONATE 450 MILLIGRAM(S): 300 TABLET, EXTENDED RELEASE ORAL at 17:09

## 2023-04-21 RX ADMIN — DIVALPROEX SODIUM 250 MILLIGRAM(S): 500 TABLET, DELAYED RELEASE ORAL at 11:47

## 2023-04-21 RX ADMIN — LITHIUM CARBONATE 450 MILLIGRAM(S): 300 TABLET, EXTENDED RELEASE ORAL at 05:25

## 2023-04-21 RX ADMIN — RISPERIDONE 2 MILLIGRAM(S): 4 TABLET ORAL at 17:09

## 2023-04-21 RX ADMIN — RISPERIDONE 2 MILLIGRAM(S): 4 TABLET ORAL at 05:25

## 2023-04-21 RX ADMIN — Medication 3 MILLIGRAM(S): at 21:19

## 2023-04-21 RX ADMIN — DIVALPROEX SODIUM 500 MILLIGRAM(S): 500 TABLET, DELAYED RELEASE ORAL at 11:48

## 2023-04-21 RX ADMIN — Medication 25 MILLIGRAM(S): at 21:20

## 2023-04-21 RX ADMIN — DIVALPROEX SODIUM 500 MILLIGRAM(S): 500 TABLET, DELAYED RELEASE ORAL at 21:19

## 2023-04-21 RX ADMIN — DIVALPROEX SODIUM 250 MILLIGRAM(S): 500 TABLET, DELAYED RELEASE ORAL at 21:19

## 2023-04-21 NOTE — PROGRESS NOTE ADULT - SUBJECTIVE AND OBJECTIVE BOX
PHILIP MCCLOUD  22y  Male       History of Present Illness: Patient was seen and examined today. Pt denied any complaints.       Vital Signs Last 24 Hrs  T(C): 36.1 (21 Apr 2023 13:51), Max: 36.8 (20 Apr 2023 21:03)  T(F): 96.9 (21 Apr 2023 13:51), Max: 98.2 (20 Apr 2023 21:03)  HR: 70 (21 Apr 2023 13:51) (61 - 87)  BP: 115/74 (21 Apr 2023 13:51) (102/53 - 128/81)  BP(mean): --  RR: 18 (21 Apr 2023 13:51) (18 - 18)  SpO2: --        PHYSICAL EXAM:  GENERAL: NAD, well-groomed, well-developed  HEENT - NC/AT, pupils equal and reactive to light,  ; Moist mucous membranes, Good dentition, No lesions  NECK: Supple, No JVD  CHEST/LUNG: Clear to auscultation bilaterally; No rales, rhonchi, wheezing  HEART: Regular rate and rhythm; No murmurs, rubs, or gallops  ABDOMEN: Soft, Nontender, Nondistended; Bowel sounds present  EXTREMITIES:  2+ Peripheral Pulses, No clubbing, cyanosis, or edema  NEURO:  No Focal deficits, sensory and motor intact        diVALproex  milliGRAM(s) Oral daily  diVALproex  milliGRAM(s) Oral daily  diVALproex  milliGRAM(s) Oral at bedtime  diVALproex  milliGRAM(s) Oral at bedtime  lithium CR (ESKALITH-CR) 450 milliGRAM(s) Oral two times a day  melatonin 3 milliGRAM(s) Oral at bedtime  risperiDONE   Tablet 2 milliGRAM(s) Oral two times a day  traZODone 25 milliGRAM(s) Oral at bedtime PRN  traZODone 25 milliGRAM(s) Oral daily

## 2023-04-22 PROCEDURE — 99232 SBSQ HOSP IP/OBS MODERATE 35: CPT

## 2023-04-22 RX ADMIN — LITHIUM CARBONATE 450 MILLIGRAM(S): 300 TABLET, EXTENDED RELEASE ORAL at 18:25

## 2023-04-22 RX ADMIN — DIVALPROEX SODIUM 500 MILLIGRAM(S): 500 TABLET, DELAYED RELEASE ORAL at 11:49

## 2023-04-22 RX ADMIN — Medication 3 MILLIGRAM(S): at 21:38

## 2023-04-22 RX ADMIN — DIVALPROEX SODIUM 250 MILLIGRAM(S): 500 TABLET, DELAYED RELEASE ORAL at 11:48

## 2023-04-22 RX ADMIN — Medication 25 MILLIGRAM(S): at 21:39

## 2023-04-22 RX ADMIN — DIVALPROEX SODIUM 500 MILLIGRAM(S): 500 TABLET, DELAYED RELEASE ORAL at 21:39

## 2023-04-22 RX ADMIN — LITHIUM CARBONATE 450 MILLIGRAM(S): 300 TABLET, EXTENDED RELEASE ORAL at 06:08

## 2023-04-22 RX ADMIN — DIVALPROEX SODIUM 250 MILLIGRAM(S): 500 TABLET, DELAYED RELEASE ORAL at 21:41

## 2023-04-22 RX ADMIN — RISPERIDONE 2 MILLIGRAM(S): 4 TABLET ORAL at 06:08

## 2023-04-22 RX ADMIN — RISPERIDONE 2 MILLIGRAM(S): 4 TABLET ORAL at 18:25

## 2023-04-22 NOTE — PROGRESS NOTE ADULT - SUBJECTIVE AND OBJECTIVE BOX
PHILIP MCCLOUD  22y  Male       History of Present Illness: Patient was seen and examined today. Pt denied any complaints.       Vital Signs Last 24 Hrs  T(C): 35.6 (22 Apr 2023 04:30), Max: 36.4 (21 Apr 2023 21:03)  T(F): 96 (22 Apr 2023 04:30), Max: 97.6 (21 Apr 2023 21:03)  HR: 71 (22 Apr 2023 04:30) (70 - 78)  BP: 108/58 (22 Apr 2023 04:30) (108/58 - 126/77)  BP(mean): --  RR: 18 (22 Apr 2023 04:30) (18 - 18)  SpO2: --        PHYSICAL EXAM:  GENERAL: NAD, well-groomed, well-developed  HEENT - NC/AT, pupils equal and reactive to light,  ; Moist mucous membranes, Good dentition, No lesions  NECK: Supple, No JVD  CHEST/LUNG: Clear to auscultation bilaterally; No rales, rhonchi, wheezing  HEART: Regular rate and rhythm; No murmurs, rubs, or gallops  ABDOMEN: Soft, Nontender, Nondistended; Bowel sounds present  EXTREMITIES:  2+ Peripheral Pulses, No clubbing, cyanosis, or edema  NEURO:  No Focal deficits, sensory and motor intact        diVALproex  milliGRAM(s) Oral daily  diVALproex  milliGRAM(s) Oral daily  diVALproex  milliGRAM(s) Oral at bedtime  diVALproex  milliGRAM(s) Oral at bedtime  lithium CR (ESKALITH-CR) 450 milliGRAM(s) Oral two times a day  melatonin 3 milliGRAM(s) Oral at bedtime  risperiDONE   Tablet 2 milliGRAM(s) Oral two times a day  traZODone 25 milliGRAM(s) Oral at bedtime PRN  traZODone 25 milliGRAM(s) Oral daily

## 2023-04-23 PROCEDURE — 99232 SBSQ HOSP IP/OBS MODERATE 35: CPT

## 2023-04-23 RX ADMIN — LITHIUM CARBONATE 450 MILLIGRAM(S): 300 TABLET, EXTENDED RELEASE ORAL at 17:15

## 2023-04-23 RX ADMIN — DIVALPROEX SODIUM 250 MILLIGRAM(S): 500 TABLET, DELAYED RELEASE ORAL at 21:54

## 2023-04-23 RX ADMIN — Medication 3 MILLIGRAM(S): at 21:48

## 2023-04-23 RX ADMIN — LITHIUM CARBONATE 450 MILLIGRAM(S): 300 TABLET, EXTENDED RELEASE ORAL at 05:54

## 2023-04-23 RX ADMIN — DIVALPROEX SODIUM 500 MILLIGRAM(S): 500 TABLET, DELAYED RELEASE ORAL at 14:53

## 2023-04-23 RX ADMIN — DIVALPROEX SODIUM 500 MILLIGRAM(S): 500 TABLET, DELAYED RELEASE ORAL at 21:54

## 2023-04-23 RX ADMIN — DIVALPROEX SODIUM 250 MILLIGRAM(S): 500 TABLET, DELAYED RELEASE ORAL at 14:53

## 2023-04-23 RX ADMIN — RISPERIDONE 2 MILLIGRAM(S): 4 TABLET ORAL at 17:14

## 2023-04-23 RX ADMIN — RISPERIDONE 2 MILLIGRAM(S): 4 TABLET ORAL at 05:53

## 2023-04-23 RX ADMIN — Medication 25 MILLIGRAM(S): at 17:15

## 2023-04-23 NOTE — PROGRESS NOTE ADULT - SUBJECTIVE AND OBJECTIVE BOX
PHILIP MCCLOUD  22y  Male       History of Present Illness: Patient was seen and examined today. Pt denied any complaints.       Vital Signs Last 24 Hrs  T(C): 35.6 (23 Apr 2023 04:30), Max: 36.2 (22 Apr 2023 13:42)  T(F): 96.1 (23 Apr 2023 04:30), Max: 97.2 (22 Apr 2023 13:42)  HR: 58 (23 Apr 2023 04:30) (58 - 87)  BP: 107/65 (23 Apr 2023 04:30) (107/65 - 126/72)  BP(mean): --  RR: 18 (23 Apr 2023 04:30) (18 - 18)  SpO2: --        PHYSICAL EXAM:  GENERAL: NAD, well-groomed, well-developed  HEENT - NC/AT, pupils equal and reactive to light,  ; Moist mucous membranes, Good dentition, No lesions  NECK: Supple, No JVD  CHEST/LUNG: Clear to auscultation bilaterally; No rales, rhonchi, wheezing  HEART: Regular rate and rhythm; No murmurs, rubs, or gallops  ABDOMEN: Soft, Nontender, Nondistended; Bowel sounds present  EXTREMITIES:  2+ Peripheral Pulses, No clubbing, cyanosis, or edema  NEURO:  No Focal deficits, sensory and motor intact        diVALproex  milliGRAM(s) Oral at bedtime  diVALproex  milliGRAM(s) Oral at bedtime  diVALproex  milliGRAM(s) Oral daily  diVALproex  milliGRAM(s) Oral daily  lithium CR (ESKALITH-CR) 450 milliGRAM(s) Oral two times a day  melatonin 3 milliGRAM(s) Oral at bedtime  risperiDONE   Tablet 2 milliGRAM(s) Oral two times a day  traZODone 25 milliGRAM(s) Oral at bedtime PRN  traZODone 25 milliGRAM(s) Oral daily

## 2023-04-24 PROCEDURE — 99232 SBSQ HOSP IP/OBS MODERATE 35: CPT

## 2023-04-24 RX ADMIN — DIVALPROEX SODIUM 250 MILLIGRAM(S): 500 TABLET, DELAYED RELEASE ORAL at 12:31

## 2023-04-24 RX ADMIN — RISPERIDONE 2 MILLIGRAM(S): 4 TABLET ORAL at 06:04

## 2023-04-24 RX ADMIN — DIVALPROEX SODIUM 250 MILLIGRAM(S): 500 TABLET, DELAYED RELEASE ORAL at 22:10

## 2023-04-24 RX ADMIN — Medication 3 MILLIGRAM(S): at 22:08

## 2023-04-24 RX ADMIN — DIVALPROEX SODIUM 500 MILLIGRAM(S): 500 TABLET, DELAYED RELEASE ORAL at 22:11

## 2023-04-24 RX ADMIN — LITHIUM CARBONATE 450 MILLIGRAM(S): 300 TABLET, EXTENDED RELEASE ORAL at 06:05

## 2023-04-24 RX ADMIN — LITHIUM CARBONATE 450 MILLIGRAM(S): 300 TABLET, EXTENDED RELEASE ORAL at 18:58

## 2023-04-24 RX ADMIN — RISPERIDONE 2 MILLIGRAM(S): 4 TABLET ORAL at 18:58

## 2023-04-24 RX ADMIN — DIVALPROEX SODIUM 500 MILLIGRAM(S): 500 TABLET, DELAYED RELEASE ORAL at 12:31

## 2023-04-24 RX ADMIN — Medication 25 MILLIGRAM(S): at 21:58

## 2023-04-24 NOTE — PROGRESS NOTE ADULT - SUBJECTIVE AND OBJECTIVE BOX
PHILIP MCCLOUD  22y  Male       History of Present Illness: Patient was seen and examined today. Pt denied any complaints.       Vital Signs Last 24 Hrs  T(C): 35.9 (24 Apr 2023 04:30), Max: 35.9 (23 Apr 2023 14:25)  T(F): 96.6 (24 Apr 2023 04:30), Max: 96.7 (23 Apr 2023 14:25)  HR: 50 (24 Apr 2023 04:30) (50 - 78)  BP: 118/75 (24 Apr 2023 04:30) (112/72 - 118/75)  BP(mean): --  RR: 18 (24 Apr 2023 04:30) (18 - 18)  SpO2: --        PHYSICAL EXAM:  GENERAL: NAD, well-groomed, well-developed  HEENT - NC/AT, pupils equal and reactive to light,  ; Moist mucous membranes, Good dentition, No lesions  NECK: Supple, No JVD  CHEST/LUNG: Clear to auscultation bilaterally; No rales, rhonchi, wheezing  HEART: Regular rate and rhythm; No murmurs, rubs, or gallops  ABDOMEN: Soft, Nontender, Nondistended; Bowel sounds present  EXTREMITIES:  2+ Peripheral Pulses, No clubbing, cyanosis, or edema  NEURO:  No Focal deficits, sensory and motor intact        diVALproex  milliGRAM(s) Oral daily  diVALproex  milliGRAM(s) Oral at bedtime  diVALproex  milliGRAM(s) Oral at bedtime  diVALproex  milliGRAM(s) Oral daily  lithium CR (ESKALITH-CR) 450 milliGRAM(s) Oral two times a day  melatonin 3 milliGRAM(s) Oral at bedtime  risperiDONE   Tablet 2 milliGRAM(s) Oral two times a day  traZODone 25 milliGRAM(s) Oral at bedtime PRN  traZODone 25 milliGRAM(s) Oral daily

## 2023-04-24 NOTE — PROGRESS NOTE ADULT - ASSESSMENT
22 years old male past medical history of bipolar and developmental delay came to emergency room for alleged aggressive and violent behavior as per step father. Evaluated by Psych; Step Father left stated pt needs placement refusing to take child home.     Bipolar disorder and likely developmental disorder- stable  - continue current meds (on depakote, risperidone, and lithium)   - pt has been stable without aggressive behaviour in the hospital  - father states he cant take care of pt    Depressed mood, negative thoughts  - cont w lithium and valproic acid, levels wnl, c/w risperiDONE     - psych consult appreciated    Obesity: Likely multifactorial: excess calories and Psych medication induced     GI PPX: not Indicated   DVT px - pt ambulatory   Full code   DISPO: pending group home placement.

## 2023-04-25 PROCEDURE — 99232 SBSQ HOSP IP/OBS MODERATE 35: CPT

## 2023-04-25 RX ADMIN — DIVALPROEX SODIUM 250 MILLIGRAM(S): 500 TABLET, DELAYED RELEASE ORAL at 12:05

## 2023-04-25 RX ADMIN — RISPERIDONE 2 MILLIGRAM(S): 4 TABLET ORAL at 18:21

## 2023-04-25 RX ADMIN — DIVALPROEX SODIUM 250 MILLIGRAM(S): 500 TABLET, DELAYED RELEASE ORAL at 21:50

## 2023-04-25 RX ADMIN — Medication 3 MILLIGRAM(S): at 21:49

## 2023-04-25 RX ADMIN — DIVALPROEX SODIUM 500 MILLIGRAM(S): 500 TABLET, DELAYED RELEASE ORAL at 21:50

## 2023-04-25 RX ADMIN — LITHIUM CARBONATE 450 MILLIGRAM(S): 300 TABLET, EXTENDED RELEASE ORAL at 18:21

## 2023-04-25 RX ADMIN — DIVALPROEX SODIUM 500 MILLIGRAM(S): 500 TABLET, DELAYED RELEASE ORAL at 12:05

## 2023-04-25 RX ADMIN — LITHIUM CARBONATE 450 MILLIGRAM(S): 300 TABLET, EXTENDED RELEASE ORAL at 06:17

## 2023-04-25 RX ADMIN — Medication 25 MILLIGRAM(S): at 18:21

## 2023-04-25 RX ADMIN — RISPERIDONE 2 MILLIGRAM(S): 4 TABLET ORAL at 06:17

## 2023-04-25 NOTE — PROGRESS NOTE ADULT - SUBJECTIVE AND OBJECTIVE BOX
PHILIP MCCLOUD  22y  Male       History of Present Illness: Patient was seen and examined today. Pt denied any complaints.       Vital Signs Last 24 Hrs  T(C): 35.7 (25 Apr 2023 04:30), Max: 37 (24 Apr 2023 20:35)  T(F): 96.3 (25 Apr 2023 04:30), Max: 98.6 (24 Apr 2023 20:35)  HR: 88 (25 Apr 2023 04:30) (64 - 88)  BP: 103/56 (25 Apr 2023 04:30) (92/50 - 123/64)  BP(mean): --  RR: 18 (25 Apr 2023 04:30) (18 - 18)  SpO2: --        PHYSICAL EXAM:  GENERAL: NAD, well-groomed, well-developed  HEENT - NC/AT, pupils equal and reactive to light,  ; Moist mucous membranes, Good dentition, No lesions  NECK: Supple, No JVD  CHEST/LUNG: Clear to auscultation bilaterally; No rales, rhonchi, wheezing  HEART: Regular rate and rhythm; No murmurs, rubs, or gallops  ABDOMEN: Soft, Nontender, Nondistended; Bowel sounds present  EXTREMITIES:  2+ Peripheral Pulses, No clubbing, cyanosis, or edema  NEURO:  No Focal deficits, sensory and motor intact        diVALproex  milliGRAM(s) Oral daily  diVALproex  milliGRAM(s) Oral at bedtime  diVALproex  milliGRAM(s) Oral at bedtime  diVALproex  milliGRAM(s) Oral daily  lithium CR (ESKALITH-CR) 450 milliGRAM(s) Oral two times a day  melatonin 3 milliGRAM(s) Oral at bedtime  risperiDONE   Tablet 2 milliGRAM(s) Oral two times a day  traZODone 25 milliGRAM(s) Oral daily  traZODone 25 milliGRAM(s) Oral at bedtime PRN

## 2023-04-25 NOTE — PROGRESS NOTE ADULT - ASSESSMENT
22 years old male past medical history of bipolar and developmental delay came to emergency room for alleged aggressive and violent behavior as per step father. Evaluated by Psych; Step Father left stated pt needs placement refusing to take child home.     Bipolar disorder and likely developmental disorder- stable  - continue current meds (on depakote, risperidone, and lithium)   - pt has been stable without aggressive behaviour in the hospital  - father states he cant take care of pt    Depressed mood, negative thoughts  - cont w lithium and valproic acid, levels wnl, c/w risperiDONE     - psych consult appreciated    Pending placement in group home facility.     GI PPX: not Indicated   DVT px - pt ambulatory   Full code   DISPO: pending group home placement.

## 2023-04-26 PROCEDURE — 99231 SBSQ HOSP IP/OBS SF/LOW 25: CPT

## 2023-04-26 RX ADMIN — LITHIUM CARBONATE 450 MILLIGRAM(S): 300 TABLET, EXTENDED RELEASE ORAL at 05:06

## 2023-04-26 RX ADMIN — DIVALPROEX SODIUM 500 MILLIGRAM(S): 500 TABLET, DELAYED RELEASE ORAL at 22:11

## 2023-04-26 RX ADMIN — Medication 25 MILLIGRAM(S): at 18:02

## 2023-04-26 RX ADMIN — RISPERIDONE 2 MILLIGRAM(S): 4 TABLET ORAL at 05:05

## 2023-04-26 RX ADMIN — DIVALPROEX SODIUM 250 MILLIGRAM(S): 500 TABLET, DELAYED RELEASE ORAL at 11:05

## 2023-04-26 RX ADMIN — DIVALPROEX SODIUM 500 MILLIGRAM(S): 500 TABLET, DELAYED RELEASE ORAL at 11:05

## 2023-04-26 RX ADMIN — DIVALPROEX SODIUM 250 MILLIGRAM(S): 500 TABLET, DELAYED RELEASE ORAL at 22:11

## 2023-04-26 RX ADMIN — Medication 3 MILLIGRAM(S): at 22:11

## 2023-04-26 RX ADMIN — RISPERIDONE 2 MILLIGRAM(S): 4 TABLET ORAL at 17:36

## 2023-04-26 RX ADMIN — LITHIUM CARBONATE 450 MILLIGRAM(S): 300 TABLET, EXTENDED RELEASE ORAL at 17:36

## 2023-04-26 NOTE — PROGRESS NOTE ADULT - SUBJECTIVE AND OBJECTIVE BOX
PROGRESS NOTE:   Joey Vasques MD  Available on teams    Patient is a 22y old  Male who presents with a chief complaint of Placement (25 Apr 2023 07:19)      SUBJECTIVE / OVERNIGHT EVENTS:  Seen for follow up for placement  Reports no new complaints  Denies fever, chills, fatigue, chest pain, shortness of breath, abdominal pain, nausea/ vomiting or diarrhea    ADDITIONAL REVIEW OF SYSTEMS:    MEDICATIONS  (STANDING):  diVALproex  milliGRAM(s) Oral daily  diVALproex  milliGRAM(s) Oral daily  diVALproex  milliGRAM(s) Oral at bedtime  diVALproex  milliGRAM(s) Oral at bedtime  lithium CR (ESKALITH-CR) 450 milliGRAM(s) Oral two times a day  melatonin 3 milliGRAM(s) Oral at bedtime  risperiDONE   Tablet 2 milliGRAM(s) Oral two times a day  traZODone 25 milliGRAM(s) Oral daily    MEDICATIONS  (PRN):  traZODone 25 milliGRAM(s) Oral at bedtime PRN breakthrough insomnia      CAPILLARY BLOOD GLUCOSE        I&O's Summary      PHYSICAL EXAM:  Vital Signs Last 24 Hrs  T(C): 36.4 (26 Apr 2023 04:00), Max: 36.4 (26 Apr 2023 04:00)  T(F): 97.5 (26 Apr 2023 04:00), Max: 97.5 (26 Apr 2023 04:00)  HR: 65 (26 Apr 2023 04:00) (64 - 65)  BP: 116/62 (26 Apr 2023 04:00) (116/62 - 127/81)  BP(mean): --  RR: 18 (26 Apr 2023 04:00) (18 - 18)  SpO2: 97% (26 Apr 2023 04:00) (97% - 97%)        GENERAL: No acute distress, well-developed  HEAD:  Atraumatic, Normocephalic  EYES: EOMI, PERRLA, conjunctiva and sclera clear  NECK: Supple, no lymphadenopathy, no JVD  CHEST/LUNG: CTAB; No wheezes, rales, or rhonchi  HEART: Regular rate and rhythm; No murmurs, rubs, or gallops  ABDOMEN: Soft, non-tender, non-distended; normal bowel sounds, no organomegaly  EXTREMITIES:  2+ peripheral pulses b/l, No clubbing, cyanosis, or edema  NEUROLOGY: A&O x 3, no focal deficits  SKIN: No rashes or lesions    LABS:                      RADIOLOGY & ADDITIONAL TESTS:  Results Reviewed:   Imaging Personally Reviewed:  Electrocardiogram Personally Reviewed:    COORDINATION OF CARE:  Care Discussed with Consultants/Other Providers [Y/N]:  Prior or Outpatient Records Reviewed [Y/N]:

## 2023-04-26 NOTE — PROGRESS NOTE ADULT - ASSESSMENT
22 years old male past medical history of bipolar and developmental delay came to emergency room for alleged aggressive and violent behavior as per step father. Evaluated by Psych; Step Father left stated pt needs placement refusing to take child home.     Bipolar disorder and likely developmental disorder- stable  - continue current meds (on depakote, risperidone, and lithium)   - pt has been stable without aggressive behaviour in the hospital  - father states he cant take care of pt    Depressed mood, negative thoughts  - cont w lithium and valproic acid, levels wnl, c/w risperiDONE     - psych consult appreciated    GI PPX: not Indicated   DVT px - pt ambulatory   Full code     DISPO: pending group home placement.

## 2023-04-27 PROCEDURE — 99231 SBSQ HOSP IP/OBS SF/LOW 25: CPT

## 2023-04-27 RX ADMIN — RISPERIDONE 2 MILLIGRAM(S): 4 TABLET ORAL at 18:03

## 2023-04-27 RX ADMIN — DIVALPROEX SODIUM 250 MILLIGRAM(S): 500 TABLET, DELAYED RELEASE ORAL at 14:55

## 2023-04-27 RX ADMIN — DIVALPROEX SODIUM 500 MILLIGRAM(S): 500 TABLET, DELAYED RELEASE ORAL at 14:55

## 2023-04-27 RX ADMIN — Medication 25 MILLIGRAM(S): at 18:04

## 2023-04-27 RX ADMIN — DIVALPROEX SODIUM 500 MILLIGRAM(S): 500 TABLET, DELAYED RELEASE ORAL at 21:21

## 2023-04-27 RX ADMIN — DIVALPROEX SODIUM 250 MILLIGRAM(S): 500 TABLET, DELAYED RELEASE ORAL at 21:21

## 2023-04-27 RX ADMIN — LITHIUM CARBONATE 450 MILLIGRAM(S): 300 TABLET, EXTENDED RELEASE ORAL at 18:03

## 2023-04-27 RX ADMIN — LITHIUM CARBONATE 450 MILLIGRAM(S): 300 TABLET, EXTENDED RELEASE ORAL at 05:24

## 2023-04-27 RX ADMIN — Medication 3 MILLIGRAM(S): at 21:21

## 2023-04-27 RX ADMIN — RISPERIDONE 2 MILLIGRAM(S): 4 TABLET ORAL at 05:24

## 2023-04-27 NOTE — PROGRESS NOTE ADULT - SUBJECTIVE AND OBJECTIVE BOX
PROGRESS NOTE:   Joey Vasques MD  Available on teams    Patient is a 22y old  Male who presents with a chief complaint of Placement (25 Apr 2023 07:19)      SUBJECTIVE / OVERNIGHT EVENTS:  Seen for follow up for placement  Reports no new complaints  Denies fever, chills, fatigue, chest pain, shortness of breath, abdominal pain, nausea/ vomiting or diarrhea    ADDITIONAL REVIEW OF SYSTEMS:    MEDICATIONS  (STANDING):  diVALproex  milliGRAM(s) Oral daily  diVALproex  milliGRAM(s) Oral daily  diVALproex  milliGRAM(s) Oral at bedtime  diVALproex  milliGRAM(s) Oral at bedtime  lithium CR (ESKALITH-CR) 450 milliGRAM(s) Oral two times a day  melatonin 3 milliGRAM(s) Oral at bedtime  risperiDONE   Tablet 2 milliGRAM(s) Oral two times a day  traZODone 25 milliGRAM(s) Oral daily    MEDICATIONS  (PRN):  traZODone 25 milliGRAM(s) Oral at bedtime PRN breakthrough insomnia      CAPILLARY BLOOD GLUCOSE        I&O's Summary      PHYSICAL EXAM:  Vital Signs Last 24 Hrs  T(C): 36.3 (27 Apr 2023 05:05), Max: 36.3 (26 Apr 2023 21:24)  T(F): 97.4 (27 Apr 2023 05:05), Max: 97.4 (27 Apr 2023 05:05)  HR: 72 (27 Apr 2023 05:05) (69 - 78)  BP: 91/58 (27 Apr 2023 05:05) (91/58 - 138/70)  BP(mean): --  RR: 18 (27 Apr 2023 05:05) (18 - 18)  SpO2: --        GENERAL: No acute distress, well-developed  HEAD:  Atraumatic, Normocephalic  EYES: EOMI, PERRLA, conjunctiva and sclera clear  NECK: Supple, no lymphadenopathy, no JVD  CHEST/LUNG: CTAB; No wheezes, rales, or rhonchi  HEART: Regular rate and rhythm; No murmurs, rubs, or gallops  ABDOMEN: Soft, non-tender, non-distended; normal bowel sounds, no organomegaly  EXTREMITIES:  2+ peripheral pulses b/l, No clubbing, cyanosis, or edema  NEUROLOGY: A&O x 3, no focal deficits  SKIN: No rashes or lesions    LABS:                      RADIOLOGY & ADDITIONAL TESTS:  Results Reviewed:   Imaging Personally Reviewed:  Electrocardiogram Personally Reviewed:    COORDINATION OF CARE:  Care Discussed with Consultants/Other Providers [Y/N]:  Prior or Outpatient Records Reviewed [Y/N]:

## 2023-04-28 PROCEDURE — 99231 SBSQ HOSP IP/OBS SF/LOW 25: CPT

## 2023-04-28 RX ADMIN — DIVALPROEX SODIUM 500 MILLIGRAM(S): 500 TABLET, DELAYED RELEASE ORAL at 11:03

## 2023-04-28 RX ADMIN — RISPERIDONE 2 MILLIGRAM(S): 4 TABLET ORAL at 05:10

## 2023-04-28 RX ADMIN — DIVALPROEX SODIUM 250 MILLIGRAM(S): 500 TABLET, DELAYED RELEASE ORAL at 21:47

## 2023-04-28 RX ADMIN — Medication 25 MILLIGRAM(S): at 21:46

## 2023-04-28 RX ADMIN — RISPERIDONE 2 MILLIGRAM(S): 4 TABLET ORAL at 17:04

## 2023-04-28 RX ADMIN — DIVALPROEX SODIUM 250 MILLIGRAM(S): 500 TABLET, DELAYED RELEASE ORAL at 11:02

## 2023-04-28 RX ADMIN — DIVALPROEX SODIUM 500 MILLIGRAM(S): 500 TABLET, DELAYED RELEASE ORAL at 21:47

## 2023-04-28 RX ADMIN — LITHIUM CARBONATE 450 MILLIGRAM(S): 300 TABLET, EXTENDED RELEASE ORAL at 05:10

## 2023-04-28 RX ADMIN — LITHIUM CARBONATE 450 MILLIGRAM(S): 300 TABLET, EXTENDED RELEASE ORAL at 17:05

## 2023-04-28 RX ADMIN — Medication 3 MILLIGRAM(S): at 21:46

## 2023-04-29 PROCEDURE — 99231 SBSQ HOSP IP/OBS SF/LOW 25: CPT

## 2023-04-29 RX ADMIN — RISPERIDONE 2 MILLIGRAM(S): 4 TABLET ORAL at 17:16

## 2023-04-29 RX ADMIN — DIVALPROEX SODIUM 500 MILLIGRAM(S): 500 TABLET, DELAYED RELEASE ORAL at 21:19

## 2023-04-29 RX ADMIN — LITHIUM CARBONATE 450 MILLIGRAM(S): 300 TABLET, EXTENDED RELEASE ORAL at 05:43

## 2023-04-29 RX ADMIN — LITHIUM CARBONATE 450 MILLIGRAM(S): 300 TABLET, EXTENDED RELEASE ORAL at 17:16

## 2023-04-29 RX ADMIN — DIVALPROEX SODIUM 250 MILLIGRAM(S): 500 TABLET, DELAYED RELEASE ORAL at 11:28

## 2023-04-29 RX ADMIN — Medication 3 MILLIGRAM(S): at 21:20

## 2023-04-29 RX ADMIN — DIVALPROEX SODIUM 250 MILLIGRAM(S): 500 TABLET, DELAYED RELEASE ORAL at 21:19

## 2023-04-29 RX ADMIN — DIVALPROEX SODIUM 500 MILLIGRAM(S): 500 TABLET, DELAYED RELEASE ORAL at 11:29

## 2023-04-29 RX ADMIN — RISPERIDONE 2 MILLIGRAM(S): 4 TABLET ORAL at 05:43

## 2023-04-29 NOTE — PROGRESS NOTE ADULT - SUBJECTIVE AND OBJECTIVE BOX
PROGRESS NOTE:   Joey Vasques MD  Available on teams    Patient is a 22y old  Male who presents with a chief complaint of Placement (25 Apr 2023 07:19)      SUBJECTIVE / OVERNIGHT EVENTS:  Seen for follow up for placement  Reports no new complaints  Denies fever, chills, fatigue, chest pain, shortness of breath, abdominal pain, nausea/ vomiting or diarrhea    ADDITIONAL REVIEW OF SYSTEMS:    MEDICATIONS  (STANDING):  diVALproex  milliGRAM(s) Oral daily  diVALproex  milliGRAM(s) Oral daily  diVALproex  milliGRAM(s) Oral at bedtime  diVALproex  milliGRAM(s) Oral at bedtime  lithium CR (ESKALITH-CR) 450 milliGRAM(s) Oral two times a day  melatonin 3 milliGRAM(s) Oral at bedtime  risperiDONE   Tablet 2 milliGRAM(s) Oral two times a day  traZODone 25 milliGRAM(s) Oral daily    MEDICATIONS  (PRN):  traZODone 25 milliGRAM(s) Oral at bedtime PRN breakthrough insomnia      CAPILLARY BLOOD GLUCOSE        I&O's Summary      PHYSICAL EXAM:  Vital Signs Last 24 Hrs  T(C): 35.9 (29 Apr 2023 05:00), Max: 35.9 (28 Apr 2023 14:11)  T(F): 96.6 (29 Apr 2023 05:00), Max: 96.7 (28 Apr 2023 14:11)  HR: 64 (29 Apr 2023 05:00) (50 - 64)  BP: 115/74 (29 Apr 2023 05:00) (108/58 - 116/64)  BP(mean): --  RR: 18 (29 Apr 2023 05:00) (18 - 18)  SpO2: 95% (28 Apr 2023 14:11) (95% - 95%)          GENERAL: No acute distress, well-developed  HEAD:  Atraumatic, Normocephalic  EYES: EOMI, PERRLA, conjunctiva and sclera clear  NECK: Supple, no lymphadenopathy, no JVD  CHEST/LUNG: CTAB; No wheezes, rales, or rhonchi  HEART: Regular rate and rhythm; No murmurs, rubs, or gallops  ABDOMEN: Soft, non-tender, non-distended; normal bowel sounds, no organomegaly  EXTREMITIES:  2+ peripheral pulses b/l, No clubbing, cyanosis, or edema  NEUROLOGY: A&O x 3, no focal deficits  SKIN: No rashes or lesions    LABS:                      RADIOLOGY & ADDITIONAL TESTS:  Results Reviewed:   Imaging Personally Reviewed:  Electrocardiogram Personally Reviewed:    COORDINATION OF CARE:  Care Discussed with Consultants/Other Providers [Y/N]:  Prior or Outpatient Records Reviewed [Y/N]:

## 2023-04-30 PROCEDURE — 99231 SBSQ HOSP IP/OBS SF/LOW 25: CPT

## 2023-04-30 RX ADMIN — LITHIUM CARBONATE 450 MILLIGRAM(S): 300 TABLET, EXTENDED RELEASE ORAL at 17:32

## 2023-04-30 RX ADMIN — DIVALPROEX SODIUM 500 MILLIGRAM(S): 500 TABLET, DELAYED RELEASE ORAL at 21:01

## 2023-04-30 RX ADMIN — Medication 3 MILLIGRAM(S): at 21:01

## 2023-04-30 RX ADMIN — DIVALPROEX SODIUM 250 MILLIGRAM(S): 500 TABLET, DELAYED RELEASE ORAL at 21:02

## 2023-04-30 RX ADMIN — Medication 25 MILLIGRAM(S): at 17:31

## 2023-04-30 RX ADMIN — RISPERIDONE 2 MILLIGRAM(S): 4 TABLET ORAL at 17:31

## 2023-04-30 RX ADMIN — DIVALPROEX SODIUM 250 MILLIGRAM(S): 500 TABLET, DELAYED RELEASE ORAL at 11:55

## 2023-04-30 RX ADMIN — DIVALPROEX SODIUM 500 MILLIGRAM(S): 500 TABLET, DELAYED RELEASE ORAL at 11:55

## 2023-04-30 RX ADMIN — RISPERIDONE 2 MILLIGRAM(S): 4 TABLET ORAL at 05:03

## 2023-04-30 RX ADMIN — LITHIUM CARBONATE 450 MILLIGRAM(S): 300 TABLET, EXTENDED RELEASE ORAL at 05:04

## 2023-04-30 NOTE — PROGRESS NOTE ADULT - SUBJECTIVE AND OBJECTIVE BOX
PROGRESS NOTE:   Joey Vasques MD  Available on teams    Patient is a 22y old  Male who presents with a chief complaint of Placement (25 Apr 2023 07:19)      SUBJECTIVE / OVERNIGHT EVENTS:  Seen for follow up for placement  Reports no new complaints  Denies fever, chills, fatigue, chest pain, shortness of breath, abdominal pain, nausea/ vomiting or diarrhea    ADDITIONAL REVIEW OF SYSTEMS:    MEDICATIONS  (STANDING):  diVALproex  milliGRAM(s) Oral daily  diVALproex  milliGRAM(s) Oral daily  diVALproex  milliGRAM(s) Oral at bedtime  diVALproex  milliGRAM(s) Oral at bedtime  lithium CR (ESKALITH-CR) 450 milliGRAM(s) Oral two times a day  melatonin 3 milliGRAM(s) Oral at bedtime  risperiDONE   Tablet 2 milliGRAM(s) Oral two times a day  traZODone 25 milliGRAM(s) Oral daily    MEDICATIONS  (PRN):  traZODone 25 milliGRAM(s) Oral at bedtime PRN breakthrough insomnia      CAPILLARY BLOOD GLUCOSE        I&O's Summary      PHYSICAL EXAM:  Vital Signs Last 24 Hrs  T(C): 36.5 (30 Apr 2023 05:01), Max: 36.5 (30 Apr 2023 05:01)  T(F): 97.7 (30 Apr 2023 05:01), Max: 97.7 (30 Apr 2023 05:01)  HR: 77 (30 Apr 2023 05:01) (69 - 77)  BP: 121/57 (30 Apr 2023 05:01) (121/57 - 129/69)  BP(mean): --  RR: 18 (30 Apr 2023 05:01) (18 - 18)  SpO2: 95% (29 Apr 2023 13:46) (95% - 95%)    Parameters below as of 29 Apr 2023 13:46  Patient On (Oxygen Delivery Method): room air        GENERAL: No acute distress, well-developed  HEAD:  Atraumatic, Normocephalic  EYES: EOMI, PERRLA, conjunctiva and sclera clear  NECK: Supple, no lymphadenopathy, no JVD  CHEST/LUNG: CTAB; No wheezes, rales, or rhonchi  HEART: Regular rate and rhythm; No murmurs, rubs, or gallops  ABDOMEN: Soft, non-tender, non-distended; normal bowel sounds, no organomegaly  EXTREMITIES:  2+ peripheral pulses b/l, No clubbing, cyanosis, or edema  NEUROLOGY: A&O x 3, no focal deficits  SKIN: No rashes or lesions    LABS:                      RADIOLOGY & ADDITIONAL TESTS:  Results Reviewed:   Imaging Personally Reviewed:  Electrocardiogram Personally Reviewed:    COORDINATION OF CARE:  Care Discussed with Consultants/Other Providers [Y/N]:  Prior or Outpatient Records Reviewed [Y/N]:

## 2023-05-01 PROCEDURE — 99231 SBSQ HOSP IP/OBS SF/LOW 25: CPT

## 2023-05-01 RX ADMIN — DIVALPROEX SODIUM 500 MILLIGRAM(S): 500 TABLET, DELAYED RELEASE ORAL at 22:30

## 2023-05-01 RX ADMIN — DIVALPROEX SODIUM 250 MILLIGRAM(S): 500 TABLET, DELAYED RELEASE ORAL at 12:31

## 2023-05-01 RX ADMIN — LITHIUM CARBONATE 450 MILLIGRAM(S): 300 TABLET, EXTENDED RELEASE ORAL at 17:26

## 2023-05-01 RX ADMIN — DIVALPROEX SODIUM 250 MILLIGRAM(S): 500 TABLET, DELAYED RELEASE ORAL at 22:30

## 2023-05-01 RX ADMIN — LITHIUM CARBONATE 450 MILLIGRAM(S): 300 TABLET, EXTENDED RELEASE ORAL at 05:01

## 2023-05-01 RX ADMIN — Medication 25 MILLIGRAM(S): at 17:25

## 2023-05-01 RX ADMIN — RISPERIDONE 2 MILLIGRAM(S): 4 TABLET ORAL at 17:27

## 2023-05-01 RX ADMIN — Medication 3 MILLIGRAM(S): at 22:30

## 2023-05-01 RX ADMIN — RISPERIDONE 2 MILLIGRAM(S): 4 TABLET ORAL at 05:01

## 2023-05-01 RX ADMIN — DIVALPROEX SODIUM 500 MILLIGRAM(S): 500 TABLET, DELAYED RELEASE ORAL at 12:31

## 2023-05-01 NOTE — PROGRESS NOTE ADULT - SUBJECTIVE AND OBJECTIVE BOX
PROGRESS NOTE:   Joey Vasques MD  Available on teams    Patient is a 22y old  Male who presents with a chief complaint of Placement (25 Apr 2023 07:19)      SUBJECTIVE / OVERNIGHT EVENTS:  Seen for follow up for placement  Reports no new complaints  Denies fever, chills, fatigue, chest pain, shortness of breath, abdominal pain, nausea/ vomiting or diarrhea    ADDITIONAL REVIEW OF SYSTEMS:    MEDICATIONS  (STANDING):  diVALproex  milliGRAM(s) Oral daily  diVALproex  milliGRAM(s) Oral daily  diVALproex  milliGRAM(s) Oral at bedtime  diVALproex  milliGRAM(s) Oral at bedtime  lithium CR (ESKALITH-CR) 450 milliGRAM(s) Oral two times a day  melatonin 3 milliGRAM(s) Oral at bedtime  risperiDONE   Tablet 2 milliGRAM(s) Oral two times a day  traZODone 25 milliGRAM(s) Oral daily    MEDICATIONS  (PRN):  traZODone 25 milliGRAM(s) Oral at bedtime PRN breakthrough insomnia      CAPILLARY BLOOD GLUCOSE        I&O's Summary      PHYSICAL EXAM:  Vital Signs Last 24 Hrs  T(C): 36.1 (01 May 2023 05:10), Max: 36.5 (30 Apr 2023 12:46)  T(F): 97 (01 May 2023 05:10), Max: 97.7 (30 Apr 2023 12:46)  HR: 62 (01 May 2023 05:10) (62 - 86)  BP: 94/51 (01 May 2023 05:10) (94/51 - 112/75)  BP(mean): --  RR: 18 (01 May 2023 05:10) (18 - 18)  SpO2: --        GENERAL: No acute distress, well-developed  HEAD:  Atraumatic, Normocephalic  EYES: EOMI, PERRLA, conjunctiva and sclera clear  NECK: Supple, no lymphadenopathy, no JVD  CHEST/LUNG: CTAB; No wheezes, rales, or rhonchi  HEART: Regular rate and rhythm; No murmurs, rubs, or gallops  ABDOMEN: Soft, non-tender, non-distended; normal bowel sounds, no organomegaly  EXTREMITIES:  2+ peripheral pulses b/l, No clubbing, cyanosis, or edema  NEUROLOGY: A&O x 3, no focal deficits  SKIN: No rashes or lesions    LABS:                      RADIOLOGY & ADDITIONAL TESTS:  Results Reviewed:   Imaging Personally Reviewed:  Electrocardiogram Personally Reviewed:    COORDINATION OF CARE:  Care Discussed with Consultants/Other Providers [Y/N]:  Prior or Outpatient Records Reviewed [Y/N]:

## 2023-05-02 PROCEDURE — 99231 SBSQ HOSP IP/OBS SF/LOW 25: CPT

## 2023-05-02 RX ADMIN — LITHIUM CARBONATE 450 MILLIGRAM(S): 300 TABLET, EXTENDED RELEASE ORAL at 18:37

## 2023-05-02 RX ADMIN — RISPERIDONE 2 MILLIGRAM(S): 4 TABLET ORAL at 18:37

## 2023-05-02 RX ADMIN — DIVALPROEX SODIUM 500 MILLIGRAM(S): 500 TABLET, DELAYED RELEASE ORAL at 11:04

## 2023-05-02 RX ADMIN — Medication 25 MILLIGRAM(S): at 18:37

## 2023-05-02 RX ADMIN — RISPERIDONE 2 MILLIGRAM(S): 4 TABLET ORAL at 05:59

## 2023-05-02 RX ADMIN — Medication 3 MILLIGRAM(S): at 21:14

## 2023-05-02 RX ADMIN — DIVALPROEX SODIUM 250 MILLIGRAM(S): 500 TABLET, DELAYED RELEASE ORAL at 11:04

## 2023-05-02 RX ADMIN — LITHIUM CARBONATE 450 MILLIGRAM(S): 300 TABLET, EXTENDED RELEASE ORAL at 05:59

## 2023-05-02 RX ADMIN — DIVALPROEX SODIUM 250 MILLIGRAM(S): 500 TABLET, DELAYED RELEASE ORAL at 21:12

## 2023-05-02 RX ADMIN — DIVALPROEX SODIUM 500 MILLIGRAM(S): 500 TABLET, DELAYED RELEASE ORAL at 21:12

## 2023-05-02 RX ADMIN — Medication 25 MILLIGRAM(S): at 01:18

## 2023-05-03 PROCEDURE — 99231 SBSQ HOSP IP/OBS SF/LOW 25: CPT

## 2023-05-03 RX ADMIN — DIVALPROEX SODIUM 250 MILLIGRAM(S): 500 TABLET, DELAYED RELEASE ORAL at 21:29

## 2023-05-03 RX ADMIN — RISPERIDONE 2 MILLIGRAM(S): 4 TABLET ORAL at 18:49

## 2023-05-03 RX ADMIN — DIVALPROEX SODIUM 500 MILLIGRAM(S): 500 TABLET, DELAYED RELEASE ORAL at 12:43

## 2023-05-03 RX ADMIN — DIVALPROEX SODIUM 250 MILLIGRAM(S): 500 TABLET, DELAYED RELEASE ORAL at 12:43

## 2023-05-03 RX ADMIN — RISPERIDONE 2 MILLIGRAM(S): 4 TABLET ORAL at 05:44

## 2023-05-03 RX ADMIN — Medication 25 MILLIGRAM(S): at 18:49

## 2023-05-03 RX ADMIN — Medication 3 MILLIGRAM(S): at 21:25

## 2023-05-03 RX ADMIN — DIVALPROEX SODIUM 500 MILLIGRAM(S): 500 TABLET, DELAYED RELEASE ORAL at 21:29

## 2023-05-03 RX ADMIN — LITHIUM CARBONATE 450 MILLIGRAM(S): 300 TABLET, EXTENDED RELEASE ORAL at 05:44

## 2023-05-03 RX ADMIN — LITHIUM CARBONATE 450 MILLIGRAM(S): 300 TABLET, EXTENDED RELEASE ORAL at 18:49

## 2023-05-03 NOTE — PROGRESS NOTE ADULT - ASSESSMENT
22 years old male past medical history of bipolar and developmental delay came to emergency room for alleged aggressive and violent behavior as per step father. Evaluated by Psych; Step Father left stated pt needs placement refusing to take child home.     #Bipolar disorder and likely developmental disorder- stable  - continue current meds (on depakote, risperidone, and lithium)   - pt has been stable without aggressive behaviour in the hospital  - father states he cant take care of pt    #Depressed mood, negative thoughts  - cont w lithium and valproic acid, levels wnl, c/w risperiDONE     - psych consult appreciated    GI PPX: not Indicated   DVT px - pt ambulatory   Full code     DISPO: pending group home placement

## 2023-05-03 NOTE — PROGRESS NOTE ADULT - SUBJECTIVE AND OBJECTIVE BOX
Patient is a 22y old  Male who presents with a chief complaint of Placement (25 Apr 2023 07:19)      SUBJECTIVE / OVERNIGHT EVENTS:  Seen for follow up for placement; reports no new complaints; patient appears comfortable and resting.    ADDITIONAL REVIEW OF SYSTEMS:    MEDICATIONS  (STANDING):  diVALproex  milliGRAM(s) Oral daily  diVALproex  milliGRAM(s) Oral daily  diVALproex  milliGRAM(s) Oral at bedtime  diVALproex  milliGRAM(s) Oral at bedtime  lithium CR (ESKALITH-CR) 450 milliGRAM(s) Oral two times a day  melatonin 3 milliGRAM(s) Oral at bedtime  risperiDONE   Tablet 2 milliGRAM(s) Oral two times a day  traZODone 25 milliGRAM(s) Oral daily    MEDICATIONS  (PRN):  traZODone 25 milliGRAM(s) Oral at bedtime PRN breakthrough insomnia      MEDICATIONS  (STANDING):  diVALproex  milliGRAM(s) Oral daily  diVALproex  milliGRAM(s) Oral daily  diVALproex  milliGRAM(s) Oral at bedtime  diVALproex  milliGRAM(s) Oral at bedtime  lithium CR (ESKALITH-CR) 450 milliGRAM(s) Oral two times a day  melatonin 3 milliGRAM(s) Oral at bedtime  risperiDONE   Tablet 2 milliGRAM(s) Oral two times a day  traZODone 25 milliGRAM(s) Oral daily    MEDICATIONS  (PRN):  traZODone 25 milliGRAM(s) Oral at bedtime PRN breakthrough insomnia        I&O's Summary      PHYSICAL EXAM:  Vital Signs Last 24 Hrs  T(C): 36.1 (03 May 2023 06:11), Max: 36.2 (02 May 2023 20:35)  T(F): 97 (03 May 2023 06:11), Max: 97.2 (02 May 2023 20:35)  HR: 60 (03 May 2023 06:11) (60 - 89)  BP: 100/60 (03 May 2023 06:11) (99/53 - 120/75)  BP(mean): --  RR: 16 (03 May 2023 06:11) (16 - 16)  SpO2: --      GENERAL: No acute distress, well-developed  HEAD:  Atraumatic, Normocephalic  EYES: EOMI, PERRLA, conjunctiva and sclera clear  NECK: Supple, no lymphadenopathy, no JVD  CHEST/LUNG: CTAB; No wheezes, rales, or rhonchi  HEART: Regular rate and rhythm; No murmurs, rubs, or gallops  ABDOMEN: Soft, non-tender, non-distended; normal bowel sounds, no organomegaly  EXTREMITIES:  2+ peripheral pulses b/l, No clubbing, cyanosis, or edema  NEUROLOGY: A&O x 3, no focal deficits      LABS:                      RADIOLOGY & ADDITIONAL TESTS:  Results Reviewed:   Imaging Personally Reviewed:  Electrocardiogram Personally Reviewed:    COORDINATION OF CARE:  Care Discussed with Consultants/Other Providers [Y/N]:  Prior or Outpatient Records Reviewed [Y/N]:

## 2023-05-04 PROCEDURE — 99231 SBSQ HOSP IP/OBS SF/LOW 25: CPT

## 2023-05-04 RX ADMIN — Medication 3 MILLIGRAM(S): at 21:27

## 2023-05-04 RX ADMIN — Medication 25 MILLIGRAM(S): at 19:48

## 2023-05-04 RX ADMIN — LITHIUM CARBONATE 450 MILLIGRAM(S): 300 TABLET, EXTENDED RELEASE ORAL at 06:08

## 2023-05-04 RX ADMIN — DIVALPROEX SODIUM 250 MILLIGRAM(S): 500 TABLET, DELAYED RELEASE ORAL at 21:27

## 2023-05-04 RX ADMIN — RISPERIDONE 2 MILLIGRAM(S): 4 TABLET ORAL at 19:48

## 2023-05-04 RX ADMIN — LITHIUM CARBONATE 450 MILLIGRAM(S): 300 TABLET, EXTENDED RELEASE ORAL at 19:49

## 2023-05-04 RX ADMIN — DIVALPROEX SODIUM 250 MILLIGRAM(S): 500 TABLET, DELAYED RELEASE ORAL at 13:17

## 2023-05-04 RX ADMIN — DIVALPROEX SODIUM 500 MILLIGRAM(S): 500 TABLET, DELAYED RELEASE ORAL at 21:26

## 2023-05-04 RX ADMIN — DIVALPROEX SODIUM 500 MILLIGRAM(S): 500 TABLET, DELAYED RELEASE ORAL at 13:17

## 2023-05-04 RX ADMIN — RISPERIDONE 2 MILLIGRAM(S): 4 TABLET ORAL at 06:07

## 2023-05-04 NOTE — PROGRESS NOTE ADULT - SUBJECTIVE AND OBJECTIVE BOX
Patient is a 22y old  Male who presents with a chief complaint of Placement (25 Apr 2023 07:19)      SUBJECTIVE / OVERNIGHT EVENTS:  Seen for follow up for placement; reports no new complaints; patient appears comfortable and resting.    ADDITIONAL REVIEW OF SYSTEMS: All systems were reviewed and otherwise negative    I&O's Summary      PHYSICAL EXAM:  Vital Signs Last 24 Hrs  T(C): 36.1 (04 May 2023 05:53), Max: 36.1 (03 May 2023 13:30)  T(F): 97 (04 May 2023 05:53), Max: 97 (03 May 2023 13:30)  HR: 64 (04 May 2023 05:53) (64 - 94)  BP: 100/60 (04 May 2023 05:53) (100/60 - 132/84)  BP(mean): --  RR: 16 (04 May 2023 05:53) (16 - 18)  SpO2: 97% (03 May 2023 13:30) (97% - 97%)    GENERAL: No acute distress, well-developed  HEAD:  Atraumatic, Normocephalic  EYES: EOMI, PERRLA, conjunctiva and sclera clear  NECK: Supple, no lymphadenopathy, no JVD  CHEST/LUNG: CTAB; No wheezes, rales, or rhonchi  HEART: Regular rate and rhythm; No murmurs, rubs, or gallops  ABDOMEN: Soft, non-tender, non-distended; normal bowel sounds, no organomegaly  EXTREMITIES:  2+ peripheral pulses b/l, No clubbing, cyanosis, or edema  NEUROLOGY: A&O x 3, no focal deficits      LABS:                      RADIOLOGY & ADDITIONAL TESTS:  Results Reviewed:   Imaging Personally Reviewed:  Electrocardiogram Personally Reviewed:    COORDINATION OF CARE:  Care Discussed with Consultants/Other Providers [Y/N]:  Prior or Outpatient Records Reviewed [Y/N]:

## 2023-05-05 PROCEDURE — 99231 SBSQ HOSP IP/OBS SF/LOW 25: CPT

## 2023-05-05 RX ADMIN — DIVALPROEX SODIUM 500 MILLIGRAM(S): 500 TABLET, DELAYED RELEASE ORAL at 21:37

## 2023-05-05 RX ADMIN — DIVALPROEX SODIUM 500 MILLIGRAM(S): 500 TABLET, DELAYED RELEASE ORAL at 12:56

## 2023-05-05 RX ADMIN — LITHIUM CARBONATE 450 MILLIGRAM(S): 300 TABLET, EXTENDED RELEASE ORAL at 05:29

## 2023-05-05 RX ADMIN — RISPERIDONE 2 MILLIGRAM(S): 4 TABLET ORAL at 05:29

## 2023-05-05 RX ADMIN — DIVALPROEX SODIUM 250 MILLIGRAM(S): 500 TABLET, DELAYED RELEASE ORAL at 12:56

## 2023-05-05 RX ADMIN — Medication 3 MILLIGRAM(S): at 21:35

## 2023-05-05 RX ADMIN — DIVALPROEX SODIUM 250 MILLIGRAM(S): 500 TABLET, DELAYED RELEASE ORAL at 21:37

## 2023-05-05 RX ADMIN — RISPERIDONE 2 MILLIGRAM(S): 4 TABLET ORAL at 17:59

## 2023-05-05 RX ADMIN — LITHIUM CARBONATE 450 MILLIGRAM(S): 300 TABLET, EXTENDED RELEASE ORAL at 18:01

## 2023-05-05 RX ADMIN — Medication 25 MILLIGRAM(S): at 18:10

## 2023-05-05 NOTE — PROGRESS NOTE ADULT - SUBJECTIVE AND OBJECTIVE BOX
Patient is a 22y old  Male who presents with a chief complaint of Placement (25 Apr 2023 07:19)      SUBJECTIVE / OVERNIGHT EVENTS:  Seen for follow up for placement; reports no new complaints; patient appears comfortable and resting.    ADDITIONAL REVIEW OF SYSTEMS: All systems were reviewed and otherwise negative    I&O's Summary      PHYSICAL EXAM:  Vital Signs Last 24 Hrs  T(C): 36.1 (05 May 2023 05:16), Max: 36.1 (05 May 2023 05:16)  T(F): 96.9 (05 May 2023 05:16), Max: 96.9 (05 May 2023 05:16)  HR: 67 (05 May 2023 05:16) (67 - 101)  BP: 112/75 (05 May 2023 05:16) (111/68 - 112/75)  BP(mean): --  RR: 16 (05 May 2023 05:16) (16 - 16)  SpO2: 98% (04 May 2023 21:03) (98% - 98%)    GENERAL: No acute distress, well-developed  HEAD:  Atraumatic, Normocephalic  EYES: EOMI, PERRLA, conjunctiva and sclera clear  NECK: Supple, no lymphadenopathy, no JVD  CHEST/LUNG: CTAB; No wheezes, rales, or rhonchi  HEART: Regular rate and rhythm; No murmurs, rubs, or gallops  ABDOMEN: Soft, non-tender, non-distended; normal bowel sounds, no organomegaly  EXTREMITIES:  2+ peripheral pulses b/l, No clubbing, cyanosis, or edema  NEUROLOGY: A&O x 3, no focal deficits      LABS:                      RADIOLOGY & ADDITIONAL TESTS:  Results Reviewed:   Imaging Personally Reviewed:  Electrocardiogram Personally Reviewed:    COORDINATION OF CARE:  Care Discussed with Consultants/Other Providers [Y/N]:  Prior or Outpatient Records Reviewed [Y/N]:

## 2023-05-06 PROCEDURE — 99231 SBSQ HOSP IP/OBS SF/LOW 25: CPT

## 2023-05-06 RX ADMIN — DIVALPROEX SODIUM 500 MILLIGRAM(S): 500 TABLET, DELAYED RELEASE ORAL at 13:48

## 2023-05-06 RX ADMIN — LITHIUM CARBONATE 450 MILLIGRAM(S): 300 TABLET, EXTENDED RELEASE ORAL at 18:04

## 2023-05-06 RX ADMIN — DIVALPROEX SODIUM 500 MILLIGRAM(S): 500 TABLET, DELAYED RELEASE ORAL at 21:34

## 2023-05-06 RX ADMIN — DIVALPROEX SODIUM 250 MILLIGRAM(S): 500 TABLET, DELAYED RELEASE ORAL at 21:34

## 2023-05-06 RX ADMIN — RISPERIDONE 2 MILLIGRAM(S): 4 TABLET ORAL at 18:03

## 2023-05-06 RX ADMIN — Medication 3 MILLIGRAM(S): at 21:33

## 2023-05-06 RX ADMIN — RISPERIDONE 2 MILLIGRAM(S): 4 TABLET ORAL at 05:38

## 2023-05-06 RX ADMIN — DIVALPROEX SODIUM 250 MILLIGRAM(S): 500 TABLET, DELAYED RELEASE ORAL at 13:48

## 2023-05-06 RX ADMIN — LITHIUM CARBONATE 450 MILLIGRAM(S): 300 TABLET, EXTENDED RELEASE ORAL at 05:38

## 2023-05-06 RX ADMIN — Medication 25 MILLIGRAM(S): at 18:03

## 2023-05-06 NOTE — PROGRESS NOTE ADULT - SUBJECTIVE AND OBJECTIVE BOX
Patient is a 22y old  Male who presents with a chief complaint of Placement (25 Apr 2023 07:19)      SUBJECTIVE / OVERNIGHT EVENTS:  Seen for follow up for placement; reports no new complaints; patient appears comfortable and resting.    ADDITIONAL REVIEW OF SYSTEMS: All systems were reviewed and otherwise negative    I&O's Summary      PHYSICAL EXAM:  Vital Signs Last 24 Hrs  T(C): 36.1 (06 May 2023 05:23), Max: 36.1 (05 May 2023 21:09)  T(F): 96.9 (06 May 2023 05:23), Max: 97 (05 May 2023 21:09)  HR: 58 (06 May 2023 05:23) (58 - 74)  BP: 124/65 (06 May 2023 05:23) (115/56 - 124/65)  BP(mean): --  RR: 16 (06 May 2023 05:23) (16 - 18)  SpO2: --    GENERAL: No acute distress, well-developed  HEAD:  Atraumatic, Normocephalic  EYES: EOMI, PERRLA, conjunctiva and sclera clear  NECK: Supple, no lymphadenopathy, no JVD  CHEST/LUNG: CTAB; No wheezes, rales, or rhonchi  HEART: Regular rate and rhythm; No murmurs, rubs, or gallops  ABDOMEN: Soft, non-tender, non-distended; normal bowel sounds, no organomegaly  EXTREMITIES:  2+ peripheral pulses b/l, No clubbing, cyanosis, or edema  NEUROLOGY: A&O x 3, no focal deficits      LABS:                      RADIOLOGY & ADDITIONAL TESTS:  Results Reviewed:   Imaging Personally Reviewed:  Electrocardiogram Personally Reviewed:    COORDINATION OF CARE:  Care Discussed with Consultants/Other Providers [Y/N]:  Prior or Outpatient Records Reviewed [Y/N]:

## 2023-05-07 PROCEDURE — 99231 SBSQ HOSP IP/OBS SF/LOW 25: CPT

## 2023-05-07 RX ADMIN — DIVALPROEX SODIUM 500 MILLIGRAM(S): 500 TABLET, DELAYED RELEASE ORAL at 21:35

## 2023-05-07 RX ADMIN — RISPERIDONE 2 MILLIGRAM(S): 4 TABLET ORAL at 05:31

## 2023-05-07 RX ADMIN — DIVALPROEX SODIUM 250 MILLIGRAM(S): 500 TABLET, DELAYED RELEASE ORAL at 21:35

## 2023-05-07 RX ADMIN — RISPERIDONE 2 MILLIGRAM(S): 4 TABLET ORAL at 18:40

## 2023-05-07 RX ADMIN — LITHIUM CARBONATE 450 MILLIGRAM(S): 300 TABLET, EXTENDED RELEASE ORAL at 18:40

## 2023-05-07 RX ADMIN — LITHIUM CARBONATE 450 MILLIGRAM(S): 300 TABLET, EXTENDED RELEASE ORAL at 05:31

## 2023-05-07 RX ADMIN — DIVALPROEX SODIUM 250 MILLIGRAM(S): 500 TABLET, DELAYED RELEASE ORAL at 11:00

## 2023-05-07 RX ADMIN — Medication 3 MILLIGRAM(S): at 21:35

## 2023-05-07 RX ADMIN — DIVALPROEX SODIUM 500 MILLIGRAM(S): 500 TABLET, DELAYED RELEASE ORAL at 11:00

## 2023-05-07 RX ADMIN — Medication 25 MILLIGRAM(S): at 18:40

## 2023-05-07 NOTE — PROGRESS NOTE ADULT - SUBJECTIVE AND OBJECTIVE BOX
Patient is a 22y old  Male who presents with a chief complaint of Placement (25 Apr 2023 07:19)      SUBJECTIVE / OVERNIGHT EVENTS:  Seen for follow up for placement; reports no new complaints; patient appears comfortable and resting.    ADDITIONAL REVIEW OF SYSTEMS: All systems were reviewed and otherwise negative    I&O's Summary      PHYSICAL EXAM:  Vital Signs Last 24 Hrs  T(C): 36.2 (07 May 2023 04:45), Max: 36.2 (07 May 2023 04:45)  T(F): 97.1 (07 May 2023 04:45), Max: 97.1 (07 May 2023 04:45)  HR: 53 (07 May 2023 04:45) (53 - 104)  BP: 96/59 (07 May 2023 04:45) (96/59 - 121/73)  BP(mean): --  RR: 16 (07 May 2023 04:45) (16 - 16)  SpO2: 97% (06 May 2023 20:05) (97% - 97%)  GENERAL: No acute distress, well-developed  HEAD:  Atraumatic, Normocephalic  EYES: EOMI, PERRLA, conjunctiva and sclera clear  NECK: Supple, no lymphadenopathy, no JVD  CHEST/LUNG: CTAB; No wheezes, rales, or rhonchi  HEART: Regular rate and rhythm; No murmurs, rubs, or gallops  ABDOMEN: Soft, non-tender, non-distended; normal bowel sounds, no organomegaly  EXTREMITIES:  2+ peripheral pulses b/l, No clubbing, cyanosis, or edema  NEUROLOGY: A&O x 3, no focal deficits      LABS:                      RADIOLOGY & ADDITIONAL TESTS:  Results Reviewed:   Imaging Personally Reviewed:  Electrocardiogram Personally Reviewed:    COORDINATION OF CARE:  Care Discussed with Consultants/Other Providers [Y/N]:  Prior or Outpatient Records Reviewed [Y/N]:

## 2023-05-07 NOTE — PROGRESS NOTE ADULT - ASSESSMENT
22 years old male past medical history of bipolar and developmental delay came to emergency room for alleged aggressive and violent behavior as per step father. Evaluated by Psych; Step Father left stated pt needs placement refusing to take child home.     #Bipolar disorder and likely developmental disorder- stable  - continue current meds (on depakote, risperidone, and lithium)   - pt has been stable without aggressive behaviour in the hospital  - father states he cant take care of pt    #Depressed mood, negative thoughts  - cont w lithium and valproic acid, levels wnl, c/w risperiDONE     - psych consult appreciated    #Routine care  -patient appears slightly pale  -will get CBC and CMP    GI PPX: not Indicated   DVT px - pt ambulatory   Full code     DISPO: pending group home placement

## 2023-05-08 LAB
ALBUMIN SERPL ELPH-MCNC: 4.1 G/DL — SIGNIFICANT CHANGE UP (ref 3.5–5.2)
ALP SERPL-CCNC: 73 U/L — SIGNIFICANT CHANGE UP (ref 30–115)
ALT FLD-CCNC: 30 U/L — SIGNIFICANT CHANGE UP (ref 13–38)
ANION GAP SERPL CALC-SCNC: 13 MMOL/L — SIGNIFICANT CHANGE UP (ref 7–14)
AST SERPL-CCNC: 29 U/L — SIGNIFICANT CHANGE UP (ref 13–38)
BILIRUB SERPL-MCNC: 0.2 MG/DL — SIGNIFICANT CHANGE UP (ref 0.2–1.2)
BUN SERPL-MCNC: 7 MG/DL — LOW (ref 10–20)
CALCIUM SERPL-MCNC: 9.2 MG/DL — SIGNIFICANT CHANGE UP (ref 8.4–10.5)
CHLORIDE SERPL-SCNC: 106 MMOL/L — SIGNIFICANT CHANGE UP (ref 98–110)
CO2 SERPL-SCNC: 23 MMOL/L — SIGNIFICANT CHANGE UP (ref 17–32)
CREAT SERPL-MCNC: 0.7 MG/DL — SIGNIFICANT CHANGE UP (ref 0.7–1.5)
EGFR: 135 ML/MIN/1.73M2 — SIGNIFICANT CHANGE UP
GLUCOSE SERPL-MCNC: 96 MG/DL — SIGNIFICANT CHANGE UP (ref 70–99)
HCT VFR BLD CALC: 37 % — LOW (ref 42–52)
HGB BLD-MCNC: 12.7 G/DL — LOW (ref 14–18)
MCHC RBC-ENTMCNC: 30 PG — SIGNIFICANT CHANGE UP (ref 27–31)
MCHC RBC-ENTMCNC: 34.3 G/DL — SIGNIFICANT CHANGE UP (ref 32–37)
MCV RBC AUTO: 87.5 FL — SIGNIFICANT CHANGE UP (ref 80–94)
NRBC # BLD: 0 /100 WBCS — SIGNIFICANT CHANGE UP (ref 0–0)
PLATELET # BLD AUTO: 210 K/UL — SIGNIFICANT CHANGE UP (ref 130–400)
PMV BLD: 9.8 FL — SIGNIFICANT CHANGE UP (ref 7.4–10.4)
POTASSIUM SERPL-MCNC: 4.1 MMOL/L — SIGNIFICANT CHANGE UP (ref 3.5–5)
POTASSIUM SERPL-SCNC: 4.1 MMOL/L — SIGNIFICANT CHANGE UP (ref 3.5–5)
PROT SERPL-MCNC: 6.5 G/DL — SIGNIFICANT CHANGE UP (ref 6–8)
RBC # BLD: 4.23 M/UL — LOW (ref 4.7–6.1)
RBC # FLD: 12.3 % — SIGNIFICANT CHANGE UP (ref 11.5–14.5)
SODIUM SERPL-SCNC: 142 MMOL/L — SIGNIFICANT CHANGE UP (ref 135–146)
WBC # BLD: 7.98 K/UL — SIGNIFICANT CHANGE UP (ref 4.8–10.8)
WBC # FLD AUTO: 7.98 K/UL — SIGNIFICANT CHANGE UP (ref 4.8–10.8)

## 2023-05-08 PROCEDURE — 99231 SBSQ HOSP IP/OBS SF/LOW 25: CPT

## 2023-05-08 RX ADMIN — RISPERIDONE 2 MILLIGRAM(S): 4 TABLET ORAL at 05:13

## 2023-05-08 RX ADMIN — DIVALPROEX SODIUM 500 MILLIGRAM(S): 500 TABLET, DELAYED RELEASE ORAL at 12:26

## 2023-05-08 RX ADMIN — Medication 25 MILLIGRAM(S): at 18:29

## 2023-05-08 RX ADMIN — Medication 3 MILLIGRAM(S): at 22:00

## 2023-05-08 RX ADMIN — LITHIUM CARBONATE 450 MILLIGRAM(S): 300 TABLET, EXTENDED RELEASE ORAL at 05:13

## 2023-05-08 RX ADMIN — LITHIUM CARBONATE 450 MILLIGRAM(S): 300 TABLET, EXTENDED RELEASE ORAL at 17:06

## 2023-05-08 RX ADMIN — RISPERIDONE 2 MILLIGRAM(S): 4 TABLET ORAL at 17:06

## 2023-05-08 RX ADMIN — DIVALPROEX SODIUM 250 MILLIGRAM(S): 500 TABLET, DELAYED RELEASE ORAL at 22:01

## 2023-05-08 RX ADMIN — DIVALPROEX SODIUM 250 MILLIGRAM(S): 500 TABLET, DELAYED RELEASE ORAL at 12:26

## 2023-05-08 RX ADMIN — DIVALPROEX SODIUM 500 MILLIGRAM(S): 500 TABLET, DELAYED RELEASE ORAL at 22:01

## 2023-05-08 NOTE — PROGRESS NOTE ADULT - SUBJECTIVE AND OBJECTIVE BOX
Patient is a 22y old  Male who presents with a chief complaint of Placement (25 Apr 2023 07:19)      SUBJECTIVE / OVERNIGHT EVENTS:  Seen for follow up for placement; reports no new complaints; patient appears comfortable and resting.    ADDITIONAL REVIEW OF SYSTEMS: All systems were reviewed and otherwise negative    I&O's Summary      PHYSICAL EXAM:  Vital Signs Last 24 Hrs  T(C): 35.9 (08 May 2023 04:35), Max: 35.9 (08 May 2023 04:35)  T(F): 96.7 (08 May 2023 04:35), Max: 96.7 (08 May 2023 04:35)  HR: 62 (08 May 2023 04:35) (62 - 118)  BP: 100/57 (08 May 2023 04:35) (100/57 - 134/73)  BP(mean): --  RR: 18 (08 May 2023 04:35) (18 - 18)  SpO2: 98% (07 May 2023 20:05) (98% - 98%)  GENERAL: No acute distress, well-developed  HEAD:  Atraumatic, Normocephalic  EYES: EOMI, PERRLA, conjunctiva and sclera clear  NECK: Supple, no lymphadenopathy, no JVD  CHEST/LUNG: CTAB; No wheezes, rales, or rhonchi  HEART: Regular rate and rhythm; No murmurs, rubs, or gallops  ABDOMEN: Soft, non-tender, non-distended; normal bowel sounds, no organomegaly  EXTREMITIES:  2+ peripheral pulses b/l, No clubbing, cyanosis, or edema  NEUROLOGY: A&O x 3, no focal deficits      LABS:                      RADIOLOGY & ADDITIONAL TESTS:  Results Reviewed:   Imaging Personally Reviewed:  Electrocardiogram Personally Reviewed:    COORDINATION OF CARE:  Care Discussed with Consultants/Other Providers [Y/N]:  Prior or Outpatient Records Reviewed [Y/N]:

## 2023-05-09 PROCEDURE — 99231 SBSQ HOSP IP/OBS SF/LOW 25: CPT

## 2023-05-09 RX ADMIN — DIVALPROEX SODIUM 250 MILLIGRAM(S): 500 TABLET, DELAYED RELEASE ORAL at 11:14

## 2023-05-09 RX ADMIN — DIVALPROEX SODIUM 500 MILLIGRAM(S): 500 TABLET, DELAYED RELEASE ORAL at 21:04

## 2023-05-09 RX ADMIN — Medication 3 MILLIGRAM(S): at 21:05

## 2023-05-09 RX ADMIN — DIVALPROEX SODIUM 500 MILLIGRAM(S): 500 TABLET, DELAYED RELEASE ORAL at 11:14

## 2023-05-09 RX ADMIN — LITHIUM CARBONATE 450 MILLIGRAM(S): 300 TABLET, EXTENDED RELEASE ORAL at 05:21

## 2023-05-09 RX ADMIN — Medication 25 MILLIGRAM(S): at 18:33

## 2023-05-09 RX ADMIN — RISPERIDONE 2 MILLIGRAM(S): 4 TABLET ORAL at 05:21

## 2023-05-09 RX ADMIN — LITHIUM CARBONATE 450 MILLIGRAM(S): 300 TABLET, EXTENDED RELEASE ORAL at 17:09

## 2023-05-09 RX ADMIN — RISPERIDONE 2 MILLIGRAM(S): 4 TABLET ORAL at 17:09

## 2023-05-09 RX ADMIN — DIVALPROEX SODIUM 250 MILLIGRAM(S): 500 TABLET, DELAYED RELEASE ORAL at 21:05

## 2023-05-09 NOTE — PROGRESS NOTE ADULT - SUBJECTIVE AND OBJECTIVE BOX
Patient is a 22y old  Male who presents with a chief complaint of Placement (25 Apr 2023 07:19)      SUBJECTIVE / OVERNIGHT EVENTS:  Seen for follow up for placement; reports no new complaints; patient appears comfortable and resting.    ADDITIONAL REVIEW OF SYSTEMS: All systems were reviewed and otherwise negative    I&O's Summary      PHYSICAL EXAM:  Vital Signs Last 24 Hrs  T(C): 35.9 (09 May 2023 04:45), Max: 36 (08 May 2023 13:35)  T(F): 96.7 (09 May 2023 04:45), Max: 96.8 (08 May 2023 13:35)  HR: 71 (09 May 2023 04:45) (71 - 107)  BP: 116/58 (09 May 2023 04:45) (116/58 - 134/75)  BP(mean): --  RR: 18 (09 May 2023 04:45) (18 - 18)  SpO2: 96% (08 May 2023 20:05) (96% - 96%)    GENERAL: No acute distress, well-developed  HEAD:  Atraumatic, Normocephalic  EYES: EOMI, PERRLA, conjunctiva and sclera clear  NECK: Supple, no lymphadenopathy, no JVD  CHEST/LUNG: CTAB; No wheezes, rales, or rhonchi  HEART: Regular rate and rhythm; No murmurs, rubs, or gallops  ABDOMEN: Soft, non-tender, non-distended; normal bowel sounds, no organomegaly  EXTREMITIES:  2+ peripheral pulses b/l, No clubbing, cyanosis, or edema  NEUROLOGY: A&O x 3, no focal deficits      LABS:                      RADIOLOGY & ADDITIONAL TESTS:  Results Reviewed:   Imaging Personally Reviewed:  Electrocardiogram Personally Reviewed:    COORDINATION OF CARE:  Care Discussed with Consultants/Other Providers [Y/N]:  Prior or Outpatient Records Reviewed [Y/N]:

## 2023-05-10 PROCEDURE — 99231 SBSQ HOSP IP/OBS SF/LOW 25: CPT

## 2023-05-10 RX ADMIN — DIVALPROEX SODIUM 500 MILLIGRAM(S): 500 TABLET, DELAYED RELEASE ORAL at 11:57

## 2023-05-10 RX ADMIN — Medication 3 MILLIGRAM(S): at 21:01

## 2023-05-10 RX ADMIN — DIVALPROEX SODIUM 500 MILLIGRAM(S): 500 TABLET, DELAYED RELEASE ORAL at 21:01

## 2023-05-10 RX ADMIN — RISPERIDONE 2 MILLIGRAM(S): 4 TABLET ORAL at 05:48

## 2023-05-10 RX ADMIN — RISPERIDONE 2 MILLIGRAM(S): 4 TABLET ORAL at 17:05

## 2023-05-10 RX ADMIN — DIVALPROEX SODIUM 250 MILLIGRAM(S): 500 TABLET, DELAYED RELEASE ORAL at 21:01

## 2023-05-10 RX ADMIN — LITHIUM CARBONATE 450 MILLIGRAM(S): 300 TABLET, EXTENDED RELEASE ORAL at 05:48

## 2023-05-10 RX ADMIN — DIVALPROEX SODIUM 250 MILLIGRAM(S): 500 TABLET, DELAYED RELEASE ORAL at 11:57

## 2023-05-10 RX ADMIN — Medication 25 MILLIGRAM(S): at 18:17

## 2023-05-10 RX ADMIN — LITHIUM CARBONATE 450 MILLIGRAM(S): 300 TABLET, EXTENDED RELEASE ORAL at 17:05

## 2023-05-10 NOTE — PROGRESS NOTE ADULT - SUBJECTIVE AND OBJECTIVE BOX
PROGRESS NOTE:   Joey Vasques MD  Available on teams    Patient is a 22y old  Male who presents with a chief complaint of Placement (09 May 2023 09:09)      SUBJECTIVE / OVERNIGHT EVENTS:  Reports no new complaints  Denies fever, chills, fatigue, chest pain, shortness of breath, abdominal pain, nausea/ vomiting or diarrhea    ADDITIONAL REVIEW OF SYSTEMS:    MEDICATIONS  (STANDING):  diVALproex  milliGRAM(s) Oral daily  diVALproex  milliGRAM(s) Oral at bedtime  diVALproex  milliGRAM(s) Oral at bedtime  diVALproex  milliGRAM(s) Oral daily  lithium CR (ESKALITH-CR) 450 milliGRAM(s) Oral two times a day  melatonin 3 milliGRAM(s) Oral at bedtime  risperiDONE   Tablet 2 milliGRAM(s) Oral two times a day  traZODone 25 milliGRAM(s) Oral daily    MEDICATIONS  (PRN):  traZODone 25 milliGRAM(s) Oral at bedtime PRN breakthrough insomnia      CAPILLARY BLOOD GLUCOSE        I&O's Summary      PHYSICAL EXAM:  Vital Signs Last 24 Hrs  T(C): 35.7 (10 May 2023 04:34), Max: 35.9 (09 May 2023 21:26)  T(F): 96.3 (10 May 2023 04:34), Max: 96.7 (09 May 2023 21:26)  HR: 72 (10 May 2023 04:34) (64 - 82)  BP: 100/56 (10 May 2023 04:34) (100/56 - 137/74)  BP(mean): --  RR: 18 (10 May 2023 04:34) (18 - 18)  SpO2: 96% (09 May 2023 13:35) (96% - 96%)        GENERAL: No acute distress, well-developed  HEAD:  Atraumatic, Normocephalic  EYES: EOMI, PERRLA, conjunctiva and sclera clear  NECK: Supple, no lymphadenopathy, no JVD  CHEST/LUNG: CTAB; No wheezes, rales, or rhonchi  HEART: Regular rate and rhythm; No murmurs, rubs, or gallops  ABDOMEN: Soft, non-tender, non-distended; normal bowel sounds, no organomegaly  EXTREMITIES:  2+ peripheral pulses b/l, No clubbing, cyanosis, or edema  NEUROLOGY: A&O x 3, no focal deficits  SKIN: No rashes or lesions    LABS:                      RADIOLOGY & ADDITIONAL TESTS:  Results Reviewed:   Imaging Personally Reviewed:  Electrocardiogram Personally Reviewed:    COORDINATION OF CARE:  Care Discussed with Consultants/Other Providers [Y/N]:  Prior or Outpatient Records Reviewed [Y/N]:

## 2023-05-11 PROCEDURE — 99231 SBSQ HOSP IP/OBS SF/LOW 25: CPT

## 2023-05-11 RX ADMIN — DIVALPROEX SODIUM 500 MILLIGRAM(S): 500 TABLET, DELAYED RELEASE ORAL at 21:27

## 2023-05-11 RX ADMIN — LITHIUM CARBONATE 450 MILLIGRAM(S): 300 TABLET, EXTENDED RELEASE ORAL at 06:17

## 2023-05-11 RX ADMIN — Medication 25 MILLIGRAM(S): at 21:26

## 2023-05-11 RX ADMIN — RISPERIDONE 2 MILLIGRAM(S): 4 TABLET ORAL at 17:02

## 2023-05-11 RX ADMIN — RISPERIDONE 2 MILLIGRAM(S): 4 TABLET ORAL at 06:17

## 2023-05-11 RX ADMIN — LITHIUM CARBONATE 450 MILLIGRAM(S): 300 TABLET, EXTENDED RELEASE ORAL at 17:02

## 2023-05-11 RX ADMIN — DIVALPROEX SODIUM 250 MILLIGRAM(S): 500 TABLET, DELAYED RELEASE ORAL at 10:28

## 2023-05-11 RX ADMIN — DIVALPROEX SODIUM 500 MILLIGRAM(S): 500 TABLET, DELAYED RELEASE ORAL at 10:28

## 2023-05-11 RX ADMIN — Medication 3 MILLIGRAM(S): at 21:26

## 2023-05-11 RX ADMIN — DIVALPROEX SODIUM 250 MILLIGRAM(S): 500 TABLET, DELAYED RELEASE ORAL at 21:27

## 2023-05-11 NOTE — PROGRESS NOTE ADULT - SUBJECTIVE AND OBJECTIVE BOX
PROGRESS NOTE:   Joey Vasques MD  Available on teams    Patient is a 22y old  Male who presents with a chief complaint of Placement (09 May 2023 09:09)      SUBJECTIVE / OVERNIGHT EVENTS:  Reports no new complaints  Denies fever, chills, fatigue, chest pain, shortness of breath, abdominal pain, nausea/ vomiting or diarrhea    ADDITIONAL REVIEW OF SYSTEMS:    MEDICATIONS  (STANDING):  diVALproex  milliGRAM(s) Oral daily  diVALproex  milliGRAM(s) Oral at bedtime  diVALproex  milliGRAM(s) Oral at bedtime  diVALproex  milliGRAM(s) Oral daily  lithium CR (ESKALITH-CR) 450 milliGRAM(s) Oral two times a day  melatonin 3 milliGRAM(s) Oral at bedtime  risperiDONE   Tablet 2 milliGRAM(s) Oral two times a day  traZODone 25 milliGRAM(s) Oral daily    MEDICATIONS  (PRN):  traZODone 25 milliGRAM(s) Oral at bedtime PRN breakthrough insomnia      CAPILLARY BLOOD GLUCOSE        I&O's Summary      PHYSICAL EXAM:  Vital Signs Last 24 Hrs  T(C): 35.9 (11 May 2023 04:16), Max: 36.8 (10 May 2023 20:48)  T(F): 96.7 (11 May 2023 04:16), Max: 98.2 (10 May 2023 20:48)  HR: 63 (11 May 2023 04:16) (63 - 80)  BP: 98/54 (11 May 2023 04:16) (98/54 - 135/78)  BP(mean): --  RR: 18 (11 May 2023 04:16) (18 - 18)  SpO2: 99% (10 May 2023 20:48) (95% - 99%)    Parameters below as of 10 May 2023 20:48  Patient On (Oxygen Delivery Method): room air      GENERAL: No acute distress, well-developed  HEAD:  Atraumatic, Normocephalic  EYES: EOMI, PERRLA, conjunctiva and sclera clear  NECK: Supple, no lymphadenopathy, no JVD  CHEST/LUNG: CTAB; No wheezes, rales, or rhonchi  HEART: Regular rate and rhythm; No murmurs, rubs, or gallops  ABDOMEN: Soft, non-tender, non-distended; normal bowel sounds, no organomegaly  EXTREMITIES:  2+ peripheral pulses b/l, No clubbing, cyanosis, or edema  NEUROLOGY: A&O x 3, no focal deficits  SKIN: No rashes or lesions    LABS:                      RADIOLOGY & ADDITIONAL TESTS:  Results Reviewed:   Imaging Personally Reviewed:  Electrocardiogram Personally Reviewed:    COORDINATION OF CARE:  Care Discussed with Consultants/Other Providers [Y/N]:  Prior or Outpatient Records Reviewed [Y/N]:

## 2023-05-12 PROCEDURE — 99232 SBSQ HOSP IP/OBS MODERATE 35: CPT

## 2023-05-12 RX ADMIN — DIVALPROEX SODIUM 250 MILLIGRAM(S): 500 TABLET, DELAYED RELEASE ORAL at 11:26

## 2023-05-12 RX ADMIN — DIVALPROEX SODIUM 250 MILLIGRAM(S): 500 TABLET, DELAYED RELEASE ORAL at 21:06

## 2023-05-12 RX ADMIN — RISPERIDONE 2 MILLIGRAM(S): 4 TABLET ORAL at 05:08

## 2023-05-12 RX ADMIN — LITHIUM CARBONATE 450 MILLIGRAM(S): 300 TABLET, EXTENDED RELEASE ORAL at 18:26

## 2023-05-12 RX ADMIN — RISPERIDONE 2 MILLIGRAM(S): 4 TABLET ORAL at 18:26

## 2023-05-12 RX ADMIN — Medication 25 MILLIGRAM(S): at 18:26

## 2023-05-12 RX ADMIN — Medication 3 MILLIGRAM(S): at 21:06

## 2023-05-12 RX ADMIN — DIVALPROEX SODIUM 500 MILLIGRAM(S): 500 TABLET, DELAYED RELEASE ORAL at 11:26

## 2023-05-12 RX ADMIN — DIVALPROEX SODIUM 500 MILLIGRAM(S): 500 TABLET, DELAYED RELEASE ORAL at 21:06

## 2023-05-12 RX ADMIN — LITHIUM CARBONATE 450 MILLIGRAM(S): 300 TABLET, EXTENDED RELEASE ORAL at 05:08

## 2023-05-12 NOTE — PROGRESS NOTE ADULT - SUBJECTIVE AND OBJECTIVE BOX
PROGRESS NOTE:   Joey Vasques MD  Available on teams    Patient is a 22y old  Male who presents with a chief complaint of Placement (09 May 2023 09:09)      SUBJECTIVE / OVERNIGHT EVENTS:  Reports no new complaints  Denies fever, chills, fatigue, chest pain, shortness of breath, abdominal pain, nausea/ vomiting or diarrhea    ADDITIONAL REVIEW OF SYSTEMS:    MEDICATIONS  (STANDING):  diVALproex  milliGRAM(s) Oral daily  diVALproex  milliGRAM(s) Oral at bedtime  diVALproex  milliGRAM(s) Oral at bedtime  diVALproex  milliGRAM(s) Oral daily  lithium CR (ESKALITH-CR) 450 milliGRAM(s) Oral two times a day  melatonin 3 milliGRAM(s) Oral at bedtime  risperiDONE   Tablet 2 milliGRAM(s) Oral two times a day  traZODone 25 milliGRAM(s) Oral daily    MEDICATIONS  (PRN):  traZODone 25 milliGRAM(s) Oral at bedtime PRN breakthrough insomnia      CAPILLARY BLOOD GLUCOSE        I&O's Summary      PHYSICAL EXAM:  Vital Signs Last 24 Hrs  T(C): 36 (12 May 2023 04:00), Max: 36 (12 May 2023 04:00)  T(F): 96.8 (12 May 2023 04:00), Max: 96.8 (12 May 2023 04:00)  HR: 61 (12 May 2023 04:00) (61 - 94)  BP: 96/60 (12 May 2023 04:00) (96/60 - 109/79)  BP(mean): --  RR: 18 (12 May 2023 04:00) (18 - 18)  SpO2: 95% (12 May 2023 04:00) (95% - 95%)          GENERAL: No acute distress, well-developed  HEAD:  Atraumatic, Normocephalic  EYES: EOMI, PERRLA, conjunctiva and sclera clear  NECK: Supple, no lymphadenopathy, no JVD  CHEST/LUNG: CTAB; No wheezes, rales, or rhonchi  HEART: Regular rate and rhythm; No murmurs, rubs, or gallops  ABDOMEN: Soft, non-tender, non-distended; normal bowel sounds, no organomegaly  EXTREMITIES:  2+ peripheral pulses b/l, No clubbing, cyanosis, or edema  NEUROLOGY: A&O x 3, no focal deficits  SKIN: No rashes or lesions    LABS:                      RADIOLOGY & ADDITIONAL TESTS:  Results Reviewed:   Imaging Personally Reviewed:  Electrocardiogram Personally Reviewed:    COORDINATION OF CARE:  Care Discussed with Consultants/Other Providers [Y/N]:  Prior or Outpatient Records Reviewed [Y/N]:

## 2023-05-13 PROCEDURE — 99232 SBSQ HOSP IP/OBS MODERATE 35: CPT

## 2023-05-13 RX ADMIN — DIVALPROEX SODIUM 500 MILLIGRAM(S): 500 TABLET, DELAYED RELEASE ORAL at 12:44

## 2023-05-13 RX ADMIN — RISPERIDONE 2 MILLIGRAM(S): 4 TABLET ORAL at 18:01

## 2023-05-13 RX ADMIN — RISPERIDONE 2 MILLIGRAM(S): 4 TABLET ORAL at 05:35

## 2023-05-13 RX ADMIN — DIVALPROEX SODIUM 250 MILLIGRAM(S): 500 TABLET, DELAYED RELEASE ORAL at 22:12

## 2023-05-13 RX ADMIN — DIVALPROEX SODIUM 500 MILLIGRAM(S): 500 TABLET, DELAYED RELEASE ORAL at 22:13

## 2023-05-13 RX ADMIN — DIVALPROEX SODIUM 250 MILLIGRAM(S): 500 TABLET, DELAYED RELEASE ORAL at 12:43

## 2023-05-13 RX ADMIN — Medication 3 MILLIGRAM(S): at 22:13

## 2023-05-13 RX ADMIN — LITHIUM CARBONATE 450 MILLIGRAM(S): 300 TABLET, EXTENDED RELEASE ORAL at 18:01

## 2023-05-13 RX ADMIN — LITHIUM CARBONATE 450 MILLIGRAM(S): 300 TABLET, EXTENDED RELEASE ORAL at 05:35

## 2023-05-13 RX ADMIN — Medication 25 MILLIGRAM(S): at 18:01

## 2023-05-13 NOTE — PROGRESS NOTE ADULT - SUBJECTIVE AND OBJECTIVE BOX
PROGRESS NOTE:   Joey Vasques MD  Available on teams    Patient is a 22y old  Male who presents with a chief complaint of Placement (09 May 2023 09:09)      SUBJECTIVE / OVERNIGHT EVENTS:  Reports no new complaints  Denies fever, chills, fatigue, chest pain, shortness of breath, abdominal pain, nausea/ vomiting or diarrhea    ADDITIONAL REVIEW OF SYSTEMS:    MEDICATIONS  (STANDING):  diVALproex  milliGRAM(s) Oral daily  diVALproex  milliGRAM(s) Oral at bedtime  diVALproex  milliGRAM(s) Oral at bedtime  diVALproex  milliGRAM(s) Oral daily  lithium CR (ESKALITH-CR) 450 milliGRAM(s) Oral two times a day  melatonin 3 milliGRAM(s) Oral at bedtime  risperiDONE   Tablet 2 milliGRAM(s) Oral two times a day  traZODone 25 milliGRAM(s) Oral daily    MEDICATIONS  (PRN):  traZODone 25 milliGRAM(s) Oral at bedtime PRN breakthrough insomnia      CAPILLARY BLOOD GLUCOSE        I&O's Summary      PHYSICAL EXAM:  Vital Signs Last 24 Hrs  Vital Signs Last 24 Hrs  T(C): 36.4 (13 May 2023 04:47), Max: 36.4 (12 May 2023 20:30)  T(F): 97.5 (13 May 2023 04:47), Max: 97.5 (12 May 2023 20:30)  HR: 74 (13 May 2023 04:47) (70 - 74)  BP: 100/66 (13 May 2023 04:47) (100/66 - 116/85)  BP(mean): --  RR: 18 (13 May 2023 04:47) (18 - 18)  SpO2: 97% (13 May 2023 04:47) (97% - 98%)        GENERAL: No acute distress, well-developed  HEAD:  Atraumatic, Normocephalic  EYES: EOMI, PERRLA, conjunctiva and sclera clear  NECK: Supple, no lymphadenopathy, no JVD  CHEST/LUNG: CTAB; No wheezes, rales, or rhonchi  HEART: Regular rate and rhythm; No murmurs, rubs, or gallops  ABDOMEN: Soft, non-tender, non-distended; normal bowel sounds, no organomegaly  EXTREMITIES:  2+ peripheral pulses b/l, No clubbing, cyanosis, or edema  NEUROLOGY: A&O x 3, no focal deficits  SKIN: No rashes or lesions    LABS:                      RADIOLOGY & ADDITIONAL TESTS:  Results Reviewed:   Imaging Personally Reviewed:  Electrocardiogram Personally Reviewed:    COORDINATION OF CARE:  Care Discussed with Consultants/Other Providers [Y/N]:  Prior or Outpatient Records Reviewed [Y/N]:

## 2023-05-14 PROCEDURE — 99231 SBSQ HOSP IP/OBS SF/LOW 25: CPT

## 2023-05-14 RX ADMIN — DIVALPROEX SODIUM 250 MILLIGRAM(S): 500 TABLET, DELAYED RELEASE ORAL at 21:51

## 2023-05-14 RX ADMIN — Medication 25 MILLIGRAM(S): at 21:51

## 2023-05-14 RX ADMIN — DIVALPROEX SODIUM 500 MILLIGRAM(S): 500 TABLET, DELAYED RELEASE ORAL at 12:14

## 2023-05-14 RX ADMIN — Medication 3 MILLIGRAM(S): at 21:51

## 2023-05-14 RX ADMIN — DIVALPROEX SODIUM 500 MILLIGRAM(S): 500 TABLET, DELAYED RELEASE ORAL at 21:51

## 2023-05-14 RX ADMIN — RISPERIDONE 2 MILLIGRAM(S): 4 TABLET ORAL at 05:10

## 2023-05-14 RX ADMIN — RISPERIDONE 2 MILLIGRAM(S): 4 TABLET ORAL at 18:18

## 2023-05-14 RX ADMIN — LITHIUM CARBONATE 450 MILLIGRAM(S): 300 TABLET, EXTENDED RELEASE ORAL at 18:18

## 2023-05-14 RX ADMIN — DIVALPROEX SODIUM 250 MILLIGRAM(S): 500 TABLET, DELAYED RELEASE ORAL at 12:14

## 2023-05-14 RX ADMIN — LITHIUM CARBONATE 450 MILLIGRAM(S): 300 TABLET, EXTENDED RELEASE ORAL at 05:11

## 2023-05-14 RX ADMIN — Medication 25 MILLIGRAM(S): at 18:18

## 2023-05-14 NOTE — PROGRESS NOTE ADULT - SUBJECTIVE AND OBJECTIVE BOX
PROGRESS NOTE:   Joey Vasques MD  Available on teams    Patient is a 22y old  Male who presents with a chief complaint of Placement (09 May 2023 09:09)      SUBJECTIVE / OVERNIGHT EVENTS:  Reports no new complaints  Denies fever, chills, fatigue, chest pain, shortness of breath, abdominal pain, nausea/ vomiting or diarrhea    ADDITIONAL REVIEW OF SYSTEMS:    MEDICATIONS  (STANDING):  diVALproex  milliGRAM(s) Oral daily  diVALproex  milliGRAM(s) Oral at bedtime  diVALproex  milliGRAM(s) Oral at bedtime  diVALproex  milliGRAM(s) Oral daily  lithium CR (ESKALITH-CR) 450 milliGRAM(s) Oral two times a day  melatonin 3 milliGRAM(s) Oral at bedtime  risperiDONE   Tablet 2 milliGRAM(s) Oral two times a day  traZODone 25 milliGRAM(s) Oral daily    MEDICATIONS  (PRN):  traZODone 25 milliGRAM(s) Oral at bedtime PRN breakthrough insomnia      CAPILLARY BLOOD GLUCOSE        I&O's Summary      PHYSICAL EXAM:  Vital Signs Last 24 Hrs  Vital Signs Last 24 Hrs  T(C): 36.6 (13 May 2023 21:06), Max: 36.6 (13 May 2023 21:06)  T(F): 97.8 (13 May 2023 21:06), Max: 97.8 (13 May 2023 21:06)  HR: 75 (13 May 2023 21:06) (75 - 75)  BP: 115/57 (13 May 2023 21:06) (115/57 - 115/57)  BP(mean): --  RR: 18 (13 May 2023 21:06) (18 - 18)  SpO2: --          GENERAL: No acute distress, well-developed  HEAD:  Atraumatic, Normocephalic  EYES: EOMI, PERRLA, conjunctiva and sclera clear  NECK: Supple, no lymphadenopathy, no JVD  CHEST/LUNG: CTAB; No wheezes, rales, or rhonchi  HEART: Regular rate and rhythm; No murmurs, rubs, or gallops  ABDOMEN: Soft, non-tender, non-distended; normal bowel sounds, no organomegaly  EXTREMITIES:  2+ peripheral pulses b/l, No clubbing, cyanosis, or edema  NEUROLOGY: A&O x 3, no focal deficits  SKIN: No rashes or lesions    LABS:                      RADIOLOGY & ADDITIONAL TESTS:  Results Reviewed:   Imaging Personally Reviewed:  Electrocardiogram Personally Reviewed:    COORDINATION OF CARE:  Care Discussed with Consultants/Other Providers [Y/N]:  Prior or Outpatient Records Reviewed [Y/N]:

## 2023-05-15 PROCEDURE — 99232 SBSQ HOSP IP/OBS MODERATE 35: CPT

## 2023-05-15 RX ADMIN — DIVALPROEX SODIUM 250 MILLIGRAM(S): 500 TABLET, DELAYED RELEASE ORAL at 11:42

## 2023-05-15 RX ADMIN — DIVALPROEX SODIUM 500 MILLIGRAM(S): 500 TABLET, DELAYED RELEASE ORAL at 11:42

## 2023-05-15 RX ADMIN — Medication 3 MILLIGRAM(S): at 21:33

## 2023-05-15 RX ADMIN — LITHIUM CARBONATE 450 MILLIGRAM(S): 300 TABLET, EXTENDED RELEASE ORAL at 05:47

## 2023-05-15 RX ADMIN — RISPERIDONE 2 MILLIGRAM(S): 4 TABLET ORAL at 17:48

## 2023-05-15 RX ADMIN — Medication 25 MILLIGRAM(S): at 19:18

## 2023-05-15 RX ADMIN — LITHIUM CARBONATE 450 MILLIGRAM(S): 300 TABLET, EXTENDED RELEASE ORAL at 17:48

## 2023-05-15 RX ADMIN — RISPERIDONE 2 MILLIGRAM(S): 4 TABLET ORAL at 05:47

## 2023-05-15 NOTE — PROGRESS NOTE ADULT - SUBJECTIVE AND OBJECTIVE BOX
PROGRESS NOTE:   Joey Vasques MD  Available on teams    Patient is a 22y old  Male who presents with a chief complaint of Placement (09 May 2023 09:09)      SUBJECTIVE / OVERNIGHT EVENTS:  Reports no new complaints  Denies fever, chills, fatigue, chest pain, shortness of breath, abdominal pain, nausea/ vomiting or diarrhea    ADDITIONAL REVIEW OF SYSTEMS:    MEDICATIONS  (STANDING):  diVALproex  milliGRAM(s) Oral daily  diVALproex  milliGRAM(s) Oral at bedtime  diVALproex  milliGRAM(s) Oral at bedtime  diVALproex  milliGRAM(s) Oral daily  lithium CR (ESKALITH-CR) 450 milliGRAM(s) Oral two times a day  melatonin 3 milliGRAM(s) Oral at bedtime  risperiDONE   Tablet 2 milliGRAM(s) Oral two times a day  traZODone 25 milliGRAM(s) Oral daily    MEDICATIONS  (PRN):  traZODone 25 milliGRAM(s) Oral at bedtime PRN breakthrough insomnia      CAPILLARY BLOOD GLUCOSE        I&O's Summary      PHYSICAL EXAM:  Vital Signs Last 24 Hrs  T(C): 36.6 (15 May 2023 04:42), Max: 36.6 (15 May 2023 04:42)  T(F): 97.9 (15 May 2023 04:42), Max: 97.9 (15 May 2023 04:42)  HR: 60 (15 May 2023 04:42) (57 - 73)  BP: 110/64 (15 May 2023 04:42) (110/64 - 120/67)  BP(mean): --  RR: 16 (15 May 2023 04:42) (16 - 18)  SpO2: 99% (14 May 2023 14:00) (99% - 99%)        GENERAL: No acute distress, well-developed  HEAD:  Atraumatic, Normocephalic  EYES: EOMI, PERRLA, conjunctiva and sclera clear  NECK: Supple, no lymphadenopathy, no JVD  CHEST/LUNG: CTAB; No wheezes, rales, or rhonchi  HEART: Regular rate and rhythm; No murmurs, rubs, or gallops  ABDOMEN: Soft, non-tender, non-distended; normal bowel sounds, no organomegaly  EXTREMITIES:  2+ peripheral pulses b/l, No clubbing, cyanosis, or edema  NEUROLOGY: A&O x 3, no focal deficits  SKIN: No rashes or lesions    LABS:                      RADIOLOGY & ADDITIONAL TESTS:  Results Reviewed:   Imaging Personally Reviewed:  Electrocardiogram Personally Reviewed:    COORDINATION OF CARE:  Care Discussed with Consultants/Other Providers [Y/N]:  Prior or Outpatient Records Reviewed [Y/N]:

## 2023-05-16 PROCEDURE — 99231 SBSQ HOSP IP/OBS SF/LOW 25: CPT

## 2023-05-16 RX ADMIN — DIVALPROEX SODIUM 500 MILLIGRAM(S): 500 TABLET, DELAYED RELEASE ORAL at 00:21

## 2023-05-16 RX ADMIN — DIVALPROEX SODIUM 500 MILLIGRAM(S): 500 TABLET, DELAYED RELEASE ORAL at 12:08

## 2023-05-16 RX ADMIN — DIVALPROEX SODIUM 500 MILLIGRAM(S): 500 TABLET, DELAYED RELEASE ORAL at 21:30

## 2023-05-16 RX ADMIN — RISPERIDONE 2 MILLIGRAM(S): 4 TABLET ORAL at 05:28

## 2023-05-16 RX ADMIN — LITHIUM CARBONATE 450 MILLIGRAM(S): 300 TABLET, EXTENDED RELEASE ORAL at 17:06

## 2023-05-16 RX ADMIN — RISPERIDONE 2 MILLIGRAM(S): 4 TABLET ORAL at 17:06

## 2023-05-16 RX ADMIN — Medication 3 MILLIGRAM(S): at 21:30

## 2023-05-16 RX ADMIN — LITHIUM CARBONATE 450 MILLIGRAM(S): 300 TABLET, EXTENDED RELEASE ORAL at 05:28

## 2023-05-16 RX ADMIN — DIVALPROEX SODIUM 250 MILLIGRAM(S): 500 TABLET, DELAYED RELEASE ORAL at 12:08

## 2023-05-16 RX ADMIN — DIVALPROEX SODIUM 250 MILLIGRAM(S): 500 TABLET, DELAYED RELEASE ORAL at 00:22

## 2023-05-16 RX ADMIN — DIVALPROEX SODIUM 250 MILLIGRAM(S): 500 TABLET, DELAYED RELEASE ORAL at 21:30

## 2023-05-16 NOTE — PROGRESS NOTE ADULT - SUBJECTIVE AND OBJECTIVE BOX
PROGRESS NOTE:   Joey Vasques MD  Available on teams    Patient is a 22y old  Male who presents with a chief complaint of Placement (09 May 2023 09:09)      SUBJECTIVE / OVERNIGHT EVENTS:  Reports no new complaints  Denies fever, chills, fatigue, chest pain, shortness of breath, abdominal pain, nausea/ vomiting or diarrhea    ADDITIONAL REVIEW OF SYSTEMS:    MEDICATIONS  (STANDING):  diVALproex  milliGRAM(s) Oral daily  diVALproex  milliGRAM(s) Oral at bedtime  diVALproex  milliGRAM(s) Oral at bedtime  diVALproex  milliGRAM(s) Oral daily  lithium CR (ESKALITH-CR) 450 milliGRAM(s) Oral two times a day  melatonin 3 milliGRAM(s) Oral at bedtime  risperiDONE   Tablet 2 milliGRAM(s) Oral two times a day  traZODone 25 milliGRAM(s) Oral daily    MEDICATIONS  (PRN):  traZODone 25 milliGRAM(s) Oral at bedtime PRN breakthrough insomnia      CAPILLARY BLOOD GLUCOSE        I&O's Summary      PHYSICAL EXAM:  Vital Signs Last 24 Hrs  T(C): 36.3 (16 May 2023 05:44), Max: 36.3 (16 May 2023 05:44)  T(F): 97.3 (16 May 2023 05:44), Max: 97.3 (16 May 2023 05:44)  HR: 73 (16 May 2023 05:44) (59 - 73)  BP: 101/56 (16 May 2023 05:44) (101/56 - 112/68)  BP(mean): --  RR: 16 (16 May 2023 05:44) (16 - 16)  SpO2: --          GENERAL: No acute distress, well-developed  HEAD:  Atraumatic, Normocephalic  EYES: EOMI, PERRLA, conjunctiva and sclera clear  NECK: Supple, no lymphadenopathy, no JVD  CHEST/LUNG: CTAB; No wheezes, rales, or rhonchi  HEART: Regular rate and rhythm; No murmurs, rubs, or gallops  ABDOMEN: Soft, non-tender, non-distended; normal bowel sounds, no organomegaly  EXTREMITIES:  2+ peripheral pulses b/l, No clubbing, cyanosis, or edema  NEUROLOGY: A&O x 3, no focal deficits  SKIN: No rashes or lesions    LABS:                      RADIOLOGY & ADDITIONAL TESTS:  Results Reviewed:   Imaging Personally Reviewed:  Electrocardiogram Personally Reviewed:    COORDINATION OF CARE:  Care Discussed with Consultants/Other Providers [Y/N]:  Prior or Outpatient Records Reviewed [Y/N]:

## 2023-05-17 PROCEDURE — 99231 SBSQ HOSP IP/OBS SF/LOW 25: CPT

## 2023-05-17 RX ADMIN — RISPERIDONE 2 MILLIGRAM(S): 4 TABLET ORAL at 05:42

## 2023-05-17 RX ADMIN — LITHIUM CARBONATE 450 MILLIGRAM(S): 300 TABLET, EXTENDED RELEASE ORAL at 17:51

## 2023-05-17 RX ADMIN — DIVALPROEX SODIUM 500 MILLIGRAM(S): 500 TABLET, DELAYED RELEASE ORAL at 21:28

## 2023-05-17 RX ADMIN — Medication 25 MILLIGRAM(S): at 21:27

## 2023-05-17 RX ADMIN — LITHIUM CARBONATE 450 MILLIGRAM(S): 300 TABLET, EXTENDED RELEASE ORAL at 05:42

## 2023-05-17 RX ADMIN — Medication 3 MILLIGRAM(S): at 21:28

## 2023-05-17 RX ADMIN — RISPERIDONE 2 MILLIGRAM(S): 4 TABLET ORAL at 17:52

## 2023-05-17 RX ADMIN — DIVALPROEX SODIUM 250 MILLIGRAM(S): 500 TABLET, DELAYED RELEASE ORAL at 11:28

## 2023-05-17 RX ADMIN — DIVALPROEX SODIUM 250 MILLIGRAM(S): 500 TABLET, DELAYED RELEASE ORAL at 21:28

## 2023-05-17 RX ADMIN — DIVALPROEX SODIUM 500 MILLIGRAM(S): 500 TABLET, DELAYED RELEASE ORAL at 11:28

## 2023-05-17 NOTE — PROGRESS NOTE ADULT - SUBJECTIVE AND OBJECTIVE BOX
SUBJECTIVE:    Patient is a 22y old Male who presents with a chief complaint of Placement (16 May 2023 12:45)    Currently admitted to medicine with the primary diagnosis of Encounter for social work intervention       Today is hospital day 253d. This morning he is resting comfortably in bed and reports no new issues or overnight events.     ROS:   CONSTITUTIONAL: No weakness, fevers or chills   EYES/ENT: No visual changes; No vertigo or throat pain   NECK: No pain or stiffness   RESPIRATORY: No cough, wheezing, hemoptysis; No shortness of breath   CARDIOVASCULAR: No chest pain or palpitations   GASTROINTESTINAL: No abdominal or epigastric pain. No nausea, vomiting, or hematemesis; No diarrhea or constipation. No melena or hematochezia.  GENITOURINARY: No dysuria, frequency or hematuria  NEUROLOGICAL: No numbness or weakness        PAST MEDICAL & SURGICAL HISTORY  History of developmental delay    Bipolar disorder    No significant past surgical history      SOCIAL HISTORY:    ALLERGIES:  No Known Allergies    MEDICATIONS:  STANDING MEDICATIONS  diVALproex  milliGRAM(s) Oral daily  diVALproex  milliGRAM(s) Oral daily  diVALproex  milliGRAM(s) Oral at bedtime  diVALproex  milliGRAM(s) Oral at bedtime  lithium CR (ESKALITH-CR) 450 milliGRAM(s) Oral two times a day  melatonin 3 milliGRAM(s) Oral at bedtime  risperiDONE   Tablet 2 milliGRAM(s) Oral two times a day  traZODone 25 milliGRAM(s) Oral daily    PRN MEDICATIONS  traZODone 25 milliGRAM(s) Oral at bedtime PRN    VITALS:   T(F): 97.3  HR: 76  BP: 111/67  RR: 16  SpO2: --    LABS:  Negative for smoking/alcohol/drug use.                         RADIOLOGY:    PHYSICAL EXAM:  GEN: No acute distress  HEENT: normocephalic, atraumatic, aniceteric  LUNGS: bl breath sounds   HEART: S1/S2 present. RRR, no murmurs  ABD: Soft, non-tender, non-distended. Bowel sounds present  EXT: no edema       ASSESSMENT AND PLAN:    22 years old male past medical history of bipolar and developmental delay came to emergency room for alleged aggressive and violent behavior as per step father. Evaluated by Psych; Step Father left stated pt needs placement refusing to take child home.     #Bipolar disorder and likely developmental disorder- stable  - continue current meds (on depakote, risperidone, and lithium)   - pt has been stable without aggressive behaviour in the hospital  - father states he cant take care of pt    #Depressed mood, negative thoughts  - cont w lithium and valproic acid, levels wnl, c/w risperiDONE     - psych consult appreciated    #Routine care  -patient appeared - cbc noted with stable Hb    GI PPX: not Indicated   DVT px - pt ambulatory   Full code     DISPO: pending group home placement

## 2023-05-18 PROCEDURE — 99231 SBSQ HOSP IP/OBS SF/LOW 25: CPT

## 2023-05-18 RX ADMIN — LITHIUM CARBONATE 450 MILLIGRAM(S): 300 TABLET, EXTENDED RELEASE ORAL at 18:09

## 2023-05-18 RX ADMIN — DIVALPROEX SODIUM 500 MILLIGRAM(S): 500 TABLET, DELAYED RELEASE ORAL at 21:15

## 2023-05-18 RX ADMIN — DIVALPROEX SODIUM 500 MILLIGRAM(S): 500 TABLET, DELAYED RELEASE ORAL at 11:30

## 2023-05-18 RX ADMIN — DIVALPROEX SODIUM 250 MILLIGRAM(S): 500 TABLET, DELAYED RELEASE ORAL at 11:30

## 2023-05-18 RX ADMIN — Medication 25 MILLIGRAM(S): at 18:09

## 2023-05-18 RX ADMIN — DIVALPROEX SODIUM 250 MILLIGRAM(S): 500 TABLET, DELAYED RELEASE ORAL at 21:15

## 2023-05-18 RX ADMIN — RISPERIDONE 2 MILLIGRAM(S): 4 TABLET ORAL at 06:03

## 2023-05-18 RX ADMIN — RISPERIDONE 2 MILLIGRAM(S): 4 TABLET ORAL at 18:09

## 2023-05-18 RX ADMIN — LITHIUM CARBONATE 450 MILLIGRAM(S): 300 TABLET, EXTENDED RELEASE ORAL at 06:02

## 2023-05-18 RX ADMIN — Medication 3 MILLIGRAM(S): at 21:15

## 2023-05-18 NOTE — PROGRESS NOTE ADULT - SUBJECTIVE AND OBJECTIVE BOX
SUBJECTIVE:    Patient is a 22y old Male who presents with a chief complaint of Placement (17 May 2023 08:19)    Currently admitted to medicine with the primary diagnosis of Encounter for social work intervention       Today is hospital day 254d. This morning he is resting comfortably in bed and reports no new issues or overnight events.     ROS:   CONSTITUTIONAL: No weakness, fevers or chills   EYES/ENT: No visual changes; No vertigo or throat pain   NECK: No pain or stiffness   RESPIRATORY: No cough, wheezing, hemoptysis; No shortness of breath   CARDIOVASCULAR: No chest pain or palpitations   GASTROINTESTINAL: No abdominal or epigastric pain. No nausea, vomiting, or hematemesis; No diarrhea or constipation. No melena or hematochezia.  GENITOURINARY: No dysuria, frequency or hematuria  NEUROLOGICAL: No numbness or weakness      PAST MEDICAL & SURGICAL HISTORY  History of developmental delay    Bipolar disorder    No significant past surgical history      SOCIAL HISTORY:    ALLERGIES:  No Known Allergies    MEDICATIONS:  STANDING MEDICATIONS  diVALproex  milliGRAM(s) Oral at bedtime  diVALproex  milliGRAM(s) Oral at bedtime  diVALproex  milliGRAM(s) Oral daily  diVALproex  milliGRAM(s) Oral daily  lithium CR (ESKALITH-CR) 450 milliGRAM(s) Oral two times a day  melatonin 3 milliGRAM(s) Oral at bedtime  risperiDONE   Tablet 2 milliGRAM(s) Oral two times a day  traZODone 25 milliGRAM(s) Oral daily    PRN MEDICATIONS  traZODone 25 milliGRAM(s) Oral at bedtime PRN    VITALS:   T(F): 96.9  HR: 59  BP: 98/64  RR: 16  SpO2: --    LABS:    RADIOLOGY:    PHYSICAL EXAM:  GEN: No acute distress  HEENT: normocephalic, atraumatic, aniceteric  LUNGS:bl breath sounds  HEART: S1/S2 present. RRR, no murmurs  ABD: Soft, non-tender, non-distended. Bowel sounds present  EXT: no edema        ASSESSMENT AND PLAN:    22 years old male past medical history of bipolar and developmental delay came to emergency room for alleged aggressive and violent behavior as per step father. Evaluated by Psych; Step Father left stated pt needs placement refusing to take child home.     #Bipolar disorder and likely developmental disorder- stable  - continue current meds (on depakote, risperidone, and lithium)   - pt has been stable without aggressive behaviour in the hospital  - father states he cant take care of pt    #Depressed mood, negative thoughts  - cont w lithium and valproic acid, levels wnl, c/w risperiDONE     - psych consult appreciated    #Routine care  -patient appeared - cbc noted with stable Hb    GI PPX: not Indicated   DVT px - pt ambulatory   Full code     DISPO: pending group home placement

## 2023-05-19 PROCEDURE — 99233 SBSQ HOSP IP/OBS HIGH 50: CPT

## 2023-05-19 RX ADMIN — DIVALPROEX SODIUM 500 MILLIGRAM(S): 500 TABLET, DELAYED RELEASE ORAL at 21:01

## 2023-05-19 RX ADMIN — LITHIUM CARBONATE 450 MILLIGRAM(S): 300 TABLET, EXTENDED RELEASE ORAL at 05:37

## 2023-05-19 RX ADMIN — RISPERIDONE 2 MILLIGRAM(S): 4 TABLET ORAL at 17:35

## 2023-05-19 RX ADMIN — Medication 25 MILLIGRAM(S): at 17:35

## 2023-05-19 RX ADMIN — DIVALPROEX SODIUM 500 MILLIGRAM(S): 500 TABLET, DELAYED RELEASE ORAL at 11:06

## 2023-05-19 RX ADMIN — DIVALPROEX SODIUM 250 MILLIGRAM(S): 500 TABLET, DELAYED RELEASE ORAL at 21:01

## 2023-05-19 RX ADMIN — Medication 3 MILLIGRAM(S): at 21:03

## 2023-05-19 RX ADMIN — LITHIUM CARBONATE 450 MILLIGRAM(S): 300 TABLET, EXTENDED RELEASE ORAL at 17:35

## 2023-05-19 RX ADMIN — Medication 25 MILLIGRAM(S): at 17:58

## 2023-05-19 RX ADMIN — RISPERIDONE 2 MILLIGRAM(S): 4 TABLET ORAL at 05:37

## 2023-05-19 RX ADMIN — DIVALPROEX SODIUM 250 MILLIGRAM(S): 500 TABLET, DELAYED RELEASE ORAL at 11:06

## 2023-05-19 NOTE — PROGRESS NOTE ADULT - SUBJECTIVE AND OBJECTIVE BOX
SUBJECTIVE:    Patient is a 22y old Male who presents with a chief complaint of Placement (17 May 2023 08:19)    Currently admitted to medicine with the primary diagnosis of Encounter for social work intervention       Today is hospital day 255d. This morning he is resting comfortably in bed and reports no new issues or overnight events.     ROS:   CONSTITUTIONAL: No weakness, fevers or chills   EYES/ENT: No visual changes; No vertigo or throat pain   NECK: No pain or stiffness   RESPIRATORY: No cough, wheezing, hemoptysis; No shortness of breath   CARDIOVASCULAR: No chest pain or palpitations   GASTROINTESTINAL: No abdominal or epigastric pain. No nausea, vomiting, or hematemesis; No diarrhea or constipation. No melena or hematochezia.  GENITOURINARY: No dysuria, frequency or hematuria  NEUROLOGICAL: No numbness or weakness      PAST MEDICAL & SURGICAL HISTORY  History of developmental delay    Bipolar disorder    No significant past surgical history      SOCIAL HISTORY:    ALLERGIES:  No Known Allergies    MEDICATIONS:  STANDING MEDICATIONS  diVALproex  milliGRAM(s) Oral at bedtime  diVALproex  milliGRAM(s) Oral at bedtime  diVALproex  milliGRAM(s) Oral daily  diVALproex  milliGRAM(s) Oral daily  lithium CR (ESKALITH-CR) 450 milliGRAM(s) Oral two times a day  melatonin 3 milliGRAM(s) Oral at bedtime  risperiDONE   Tablet 2 milliGRAM(s) Oral two times a day  traZODone 25 milliGRAM(s) Oral daily    PRN MEDICATIONS  traZODone 25 milliGRAM(s) Oral at bedtime PRN    VITALS:   T(F): 96.9  HR: 59  BP: 98/64  RR: 16  SpO2: --    LABS:    RADIOLOGY:    PHYSICAL EXAM:  GEN: No acute distress  HEENT: normocephalic, atraumatic, aniceteric  LUNGS:bl breath sounds  HEART: S1/S2 present. RRR, no murmurs  ABD: Soft, non-tender, non-distended. Bowel sounds present  EXT: no edema        ASSESSMENT AND PLAN:    22 years old male past medical history of bipolar and developmental delay came to emergency room for alleged aggressive and violent behavior as per step father. Evaluated by Psych; Step Father left stated pt needs placement refusing to take child home.     #Bipolar disorder and likely developmental disorder- stable  - continue current meds (on depakote, risperidone, and lithium)   - pt has been stable without aggressive behaviour in the hospital  - father states he cant take care of pt    #Depressed mood, negative thoughts  - cont w lithium and valproic acid, levels wnl, c/w risperiDONE     - psych consult appreciated    #Routine care  -patient appeared - cbc noted with stable Hb    GI PPX: not Indicated   DVT px - pt ambulatory   Full code     DISPO: pending group home placement

## 2023-05-20 PROCEDURE — 99233 SBSQ HOSP IP/OBS HIGH 50: CPT

## 2023-05-20 RX ADMIN — RISPERIDONE 2 MILLIGRAM(S): 4 TABLET ORAL at 17:15

## 2023-05-20 RX ADMIN — LITHIUM CARBONATE 450 MILLIGRAM(S): 300 TABLET, EXTENDED RELEASE ORAL at 17:16

## 2023-05-20 RX ADMIN — RISPERIDONE 2 MILLIGRAM(S): 4 TABLET ORAL at 05:40

## 2023-05-20 RX ADMIN — DIVALPROEX SODIUM 250 MILLIGRAM(S): 500 TABLET, DELAYED RELEASE ORAL at 20:46

## 2023-05-20 RX ADMIN — DIVALPROEX SODIUM 500 MILLIGRAM(S): 500 TABLET, DELAYED RELEASE ORAL at 11:07

## 2023-05-20 RX ADMIN — LITHIUM CARBONATE 450 MILLIGRAM(S): 300 TABLET, EXTENDED RELEASE ORAL at 05:40

## 2023-05-20 RX ADMIN — DIVALPROEX SODIUM 500 MILLIGRAM(S): 500 TABLET, DELAYED RELEASE ORAL at 20:46

## 2023-05-20 RX ADMIN — Medication 3 MILLIGRAM(S): at 20:46

## 2023-05-20 RX ADMIN — DIVALPROEX SODIUM 250 MILLIGRAM(S): 500 TABLET, DELAYED RELEASE ORAL at 11:07

## 2023-05-20 RX ADMIN — Medication 25 MILLIGRAM(S): at 20:47

## 2023-05-20 NOTE — PROGRESS NOTE ADULT - SUBJECTIVE AND OBJECTIVE BOX
SUBJECTIVE:    Patient is a 22y old Male who presents with a chief complaint of Placement (17 May 2023 08:19)    Currently admitted to medicine with the primary diagnosis of Encounter for social work intervention       Today is hospital day 256d. This morning he is resting comfortably in bed and reports no new issues or overnight events. Very talkative    ROS:   CONSTITUTIONAL: No weakness, fevers or chills   EYES/ENT: No visual changes; No vertigo or throat pain   NECK: No pain or stiffness   RESPIRATORY: No cough, wheezing, hemoptysis; No shortness of breath   CARDIOVASCULAR: No chest pain or palpitations   GASTROINTESTINAL: No abdominal or epigastric pain. No nausea, vomiting, or hematemesis; No diarrhea or constipation. No melena or hematochezia.  GENITOURINARY: No dysuria, frequency or hematuria  NEUROLOGICAL: No numbness or weakness      PAST MEDICAL & SURGICAL HISTORY  History of developmental delay    Bipolar disorder    No significant past surgical history      SOCIAL HISTORY:    ALLERGIES:  No Known Allergies    MEDICATIONS:  STANDING MEDICATIONS  diVALproex  milliGRAM(s) Oral at bedtime  diVALproex  milliGRAM(s) Oral at bedtime  diVALproex  milliGRAM(s) Oral daily  diVALproex  milliGRAM(s) Oral daily  lithium CR (ESKALITH-CR) 450 milliGRAM(s) Oral two times a day  melatonin 3 milliGRAM(s) Oral at bedtime  risperiDONE   Tablet 2 milliGRAM(s) Oral two times a day  traZODone 25 milliGRAM(s) Oral daily    PRN MEDICATIONS  traZODone 25 milliGRAM(s) Oral at bedtime PRN    VITALS:   T(F): 96.9  HR: 59  BP: 98/64  RR: 16  SpO2: --    LABS:    RADIOLOGY:    PHYSICAL EXAM:  GEN: No acute distress  HEENT: normocephalic, atraumatic, aniceteric  LUNGS:bl breath sounds  HEART: S1/S2 present. RRR, no murmurs  ABD: Soft, non-tender, non-distended. Bowel sounds present  EXT: no edema        ASSESSMENT AND PLAN:    22 years old male past medical history of bipolar and developmental delay came to emergency room for alleged aggressive and violent behavior as per step father. Evaluated by Psych; Step Father left stated pt needs placement refusing to take child home.     #Bipolar disorder and likely developmental disorder- stable  - continue current meds (on depakote, risperidone, and lithium)   - pt has been stable without aggressive behaviour in the hospital  - father states he cant take care of pt    #Depressed mood, negative thoughts  - cont w lithium and valproic acid, levels wnl, c/w risperiDONE     - psych consult appreciated    #Routine care  -patient appeared - cbc noted with stable Hb    GI PPX: not Indicated   DVT px - pt ambulatory   Full code     DISPO: pending group home placement

## 2023-05-21 PROCEDURE — 99233 SBSQ HOSP IP/OBS HIGH 50: CPT

## 2023-05-21 RX ADMIN — DIVALPROEX SODIUM 500 MILLIGRAM(S): 500 TABLET, DELAYED RELEASE ORAL at 21:20

## 2023-05-21 RX ADMIN — DIVALPROEX SODIUM 250 MILLIGRAM(S): 500 TABLET, DELAYED RELEASE ORAL at 21:20

## 2023-05-21 RX ADMIN — RISPERIDONE 2 MILLIGRAM(S): 4 TABLET ORAL at 17:07

## 2023-05-21 RX ADMIN — LITHIUM CARBONATE 450 MILLIGRAM(S): 300 TABLET, EXTENDED RELEASE ORAL at 17:08

## 2023-05-21 RX ADMIN — DIVALPROEX SODIUM 500 MILLIGRAM(S): 500 TABLET, DELAYED RELEASE ORAL at 11:09

## 2023-05-21 RX ADMIN — Medication 3 MILLIGRAM(S): at 21:20

## 2023-05-21 RX ADMIN — DIVALPROEX SODIUM 250 MILLIGRAM(S): 500 TABLET, DELAYED RELEASE ORAL at 11:09

## 2023-05-21 RX ADMIN — LITHIUM CARBONATE 450 MILLIGRAM(S): 300 TABLET, EXTENDED RELEASE ORAL at 05:37

## 2023-05-21 RX ADMIN — Medication 25 MILLIGRAM(S): at 21:19

## 2023-05-21 RX ADMIN — RISPERIDONE 2 MILLIGRAM(S): 4 TABLET ORAL at 05:37

## 2023-05-21 NOTE — PROGRESS NOTE ADULT - SUBJECTIVE AND OBJECTIVE BOX
SUBJECTIVE:    Patient is a 22y old Male who presents with a chief complaint of Placement (17 May 2023 08:19)    Currently admitted to medicine with the primary diagnosis of inability to take care of himself requiring placement.       Today is hospital day 257d. This morning he is resting comfortably in bed and reports no new issues or overnight events. Very talkative    ROS:   CONSTITUTIONAL: No weakness, fevers or chills   EYES/ENT: No visual changes; No vertigo or throat pain   NECK: No pain or stiffness   RESPIRATORY: No cough, wheezing, hemoptysis; No shortness of breath   CARDIOVASCULAR: No chest pain or palpitations   GASTROINTESTINAL: No abdominal or epigastric pain. No nausea, vomiting, or hematemesis; No diarrhea or constipation. No melena or hematochezia.  GENITOURINARY: No dysuria, frequency or hematuria  NEUROLOGICAL: No numbness or weakness      PAST MEDICAL & SURGICAL HISTORY  History of developmental delay    Bipolar disorder    No significant past surgical history      SOCIAL HISTORY:    ALLERGIES:  No Known Allergies    MEDICATIONS:  STANDING MEDICATIONS  diVALproex  milliGRAM(s) Oral at bedtime  diVALproex  milliGRAM(s) Oral at bedtime  diVALproex  milliGRAM(s) Oral daily  diVALproex  milliGRAM(s) Oral daily  lithium CR (ESKALITH-CR) 450 milliGRAM(s) Oral two times a day  melatonin 3 milliGRAM(s) Oral at bedtime  risperiDONE   Tablet 2 milliGRAM(s) Oral two times a day  traZODone 25 milliGRAM(s) Oral daily    PRN MEDICATIONS  traZODone 25 milliGRAM(s) Oral at bedtime PRN    VITALS:   T(F): 96.9  HR: 59  BP: 98/64  RR: 16  SpO2: --    LABS:    RADIOLOGY:    PHYSICAL EXAM:  GEN: No acute distress  HEENT: normocephalic, atraumatic, aniceteric  LUNGS:bl breath sounds  HEART: S1/S2 present. RRR, no murmurs  ABD: Soft, non-tender, non-distended. Bowel sounds present  EXT: no edema        ASSESSMENT AND PLAN:    22 years old male past medical history of bipolar and developmental delay came to emergency room for alleged aggressive and violent behavior as per step father. Evaluated by Psych; Step Father left stated pt needs placement refusing to take child home.     #Bipolar disorder and likely developmental disorder- stable  - continue current meds (on depakote, risperidone, and lithium)   - pt has been stable without aggressive behaviour in the hospital  - father states he cant take care of pt  - awaiting placement to a group home    #Depressed mood, negative thoughts  - cont w lithium and valproic acid, levels wnl, c/w risperidone   - psych recs appreciated    #normocytic anemia  - Hb stable  - can obtain labs only if indicated    GI PPX: not Indicated   DVT px - pt ambulatory   Full code     DISPO: pending group home placement

## 2023-05-22 PROCEDURE — 99233 SBSQ HOSP IP/OBS HIGH 50: CPT

## 2023-05-22 RX ADMIN — RISPERIDONE 2 MILLIGRAM(S): 4 TABLET ORAL at 18:05

## 2023-05-22 RX ADMIN — Medication 25 MILLIGRAM(S): at 18:05

## 2023-05-22 RX ADMIN — Medication 3 MILLIGRAM(S): at 21:11

## 2023-05-22 RX ADMIN — DIVALPROEX SODIUM 250 MILLIGRAM(S): 500 TABLET, DELAYED RELEASE ORAL at 11:45

## 2023-05-22 RX ADMIN — DIVALPROEX SODIUM 250 MILLIGRAM(S): 500 TABLET, DELAYED RELEASE ORAL at 21:11

## 2023-05-22 RX ADMIN — LITHIUM CARBONATE 450 MILLIGRAM(S): 300 TABLET, EXTENDED RELEASE ORAL at 18:05

## 2023-05-22 RX ADMIN — LITHIUM CARBONATE 450 MILLIGRAM(S): 300 TABLET, EXTENDED RELEASE ORAL at 05:08

## 2023-05-22 RX ADMIN — DIVALPROEX SODIUM 500 MILLIGRAM(S): 500 TABLET, DELAYED RELEASE ORAL at 21:11

## 2023-05-22 RX ADMIN — RISPERIDONE 2 MILLIGRAM(S): 4 TABLET ORAL at 05:08

## 2023-05-22 RX ADMIN — DIVALPROEX SODIUM 500 MILLIGRAM(S): 500 TABLET, DELAYED RELEASE ORAL at 11:45

## 2023-05-22 NOTE — PROGRESS NOTE ADULT - SUBJECTIVE AND OBJECTIVE BOX
SUBJECTIVE:    Patient is a 22y old Male who presents with a chief complaint of Placement (17 May 2023 08:19)    Currently admitted to medicine with the primary diagnosis of inability to take care of himself requiring placement.       Today is hospital day 258 d. This morning he is resting comfortably in bed and reports no new issues or overnight events. Very talkative    ROS:   CONSTITUTIONAL: No weakness, fevers or chills   EYES/ENT: No visual changes; No vertigo or throat pain   NECK: No pain or stiffness   RESPIRATORY: No cough, wheezing, hemoptysis; No shortness of breath   CARDIOVASCULAR: No chest pain or palpitations   GASTROINTESTINAL: No abdominal or epigastric pain. No nausea, vomiting, or hematemesis; No diarrhea or constipation. No melena or hematochezia.  GENITOURINARY: No dysuria, frequency or hematuria  NEUROLOGICAL: No numbness or weakness      PAST MEDICAL & SURGICAL HISTORY  History of developmental delay    Bipolar disorder    No significant past surgical history      SOCIAL HISTORY:    ALLERGIES:  No Known Allergies    MEDICATIONS:  STANDING MEDICATIONS  diVALproex  milliGRAM(s) Oral at bedtime  diVALproex  milliGRAM(s) Oral at bedtime  diVALproex  milliGRAM(s) Oral daily  diVALproex  milliGRAM(s) Oral daily  lithium CR (ESKALITH-CR) 450 milliGRAM(s) Oral two times a day  melatonin 3 milliGRAM(s) Oral at bedtime  risperiDONE   Tablet 2 milliGRAM(s) Oral two times a day  traZODone 25 milliGRAM(s) Oral daily    PRN MEDICATIONS  traZODone 25 milliGRAM(s) Oral at bedtime PRN    VITALS:   T(F): 96.9  HR: 59  BP: 98/64  RR: 16  SpO2: --    LABS:    RADIOLOGY:    PHYSICAL EXAM:  GEN: No acute distress  HEENT: normocephalic, atraumatic, aniceteric  LUNGS:bl breath sounds  HEART: S1/S2 present. RRR, no murmurs  ABD: Soft, non-tender, non-distended. Bowel sounds present  EXT: no edema        ASSESSMENT AND PLAN:    22 years old male past medical history of bipolar and developmental delay came to emergency room for alleged aggressive and violent behavior as per step father. Evaluated by Psych; Step Father left stated pt needs placement refusing to take child home.     #Bipolar disorder and likely developmental disorder- stable  - continue current meds (on depakote, risperidone, and lithium)   - pt has been stable without aggressive behaviour in the hospital  - father states he cant take care of pt  - awaiting placement to a group home    #Depressed mood, negative thoughts  - cont w lithium and valproic acid, levels wnl, c/w risperidone   - psych recs appreciated    #normocytic anemia  - Hb stable  - can obtain labs only if indicated    GI PPX: not Indicated   DVT px - pt ambulatory   Full code     DISPO: pending group home placement

## 2023-05-23 PROCEDURE — 99233 SBSQ HOSP IP/OBS HIGH 50: CPT

## 2023-05-23 RX ADMIN — DIVALPROEX SODIUM 250 MILLIGRAM(S): 500 TABLET, DELAYED RELEASE ORAL at 12:28

## 2023-05-23 RX ADMIN — LITHIUM CARBONATE 450 MILLIGRAM(S): 300 TABLET, EXTENDED RELEASE ORAL at 05:11

## 2023-05-23 RX ADMIN — LITHIUM CARBONATE 450 MILLIGRAM(S): 300 TABLET, EXTENDED RELEASE ORAL at 17:31

## 2023-05-23 RX ADMIN — RISPERIDONE 2 MILLIGRAM(S): 4 TABLET ORAL at 17:31

## 2023-05-23 RX ADMIN — DIVALPROEX SODIUM 500 MILLIGRAM(S): 500 TABLET, DELAYED RELEASE ORAL at 21:04

## 2023-05-23 RX ADMIN — RISPERIDONE 2 MILLIGRAM(S): 4 TABLET ORAL at 05:11

## 2023-05-23 RX ADMIN — DIVALPROEX SODIUM 250 MILLIGRAM(S): 500 TABLET, DELAYED RELEASE ORAL at 21:04

## 2023-05-23 RX ADMIN — DIVALPROEX SODIUM 500 MILLIGRAM(S): 500 TABLET, DELAYED RELEASE ORAL at 12:29

## 2023-05-23 RX ADMIN — Medication 25 MILLIGRAM(S): at 19:01

## 2023-05-23 RX ADMIN — Medication 3 MILLIGRAM(S): at 21:04

## 2023-05-23 NOTE — PROGRESS NOTE ADULT - SUBJECTIVE AND OBJECTIVE BOX
SUBJECTIVE:    Patient is a 22y old Male who presents with a chief complaint of Placement (17 May 2023 08:19)    Currently admitted to medicine with the primary diagnosis of inability to take care of himself requiring placement.       Today is hospital day 259 d. This morning he is resting comfortably in bed and reports no new issues or overnight events. Very talkative    ROS:   CONSTITUTIONAL: No weakness, fevers or chills   EYES/ENT: No visual changes; No vertigo or throat pain   NECK: No pain or stiffness   RESPIRATORY: No cough, wheezing, hemoptysis; No shortness of breath   CARDIOVASCULAR: No chest pain or palpitations   GASTROINTESTINAL: No abdominal or epigastric pain. No nausea, vomiting, or hematemesis; No diarrhea or constipation. No melena or hematochezia.  GENITOURINARY: No dysuria, frequency or hematuria  NEUROLOGICAL: No numbness or weakness      PAST MEDICAL & SURGICAL HISTORY  History of developmental delay    Bipolar disorder    No significant past surgical history      SOCIAL HISTORY:    ALLERGIES:  No Known Allergies    MEDICATIONS:  STANDING MEDICATIONS  diVALproex  milliGRAM(s) Oral at bedtime  diVALproex  milliGRAM(s) Oral at bedtime  diVALproex  milliGRAM(s) Oral daily  diVALproex  milliGRAM(s) Oral daily  lithium CR (ESKALITH-CR) 450 milliGRAM(s) Oral two times a day  melatonin 3 milliGRAM(s) Oral at bedtime  risperiDONE   Tablet 2 milliGRAM(s) Oral two times a day  traZODone 25 milliGRAM(s) Oral daily    PRN MEDICATIONS  traZODone 25 milliGRAM(s) Oral at bedtime PRN    VITALS:   T(F): 96.9  HR: 59  BP: 98/64  RR: 16  SpO2: --    LABS:    RADIOLOGY:    PHYSICAL EXAM:  GEN: No acute distress  HEENT: normocephalic, atraumatic, aniceteric  LUNGS:bl breath sounds  HEART: S1/S2 present. RRR, no murmurs  ABD: Soft, non-tender, non-distended. Bowel sounds present  EXT: no edema

## 2023-05-23 NOTE — PROGRESS NOTE ADULT - ASSESSMENT
ASSESSMENT AND PLAN:    22 years old male past medical history of bipolar and developmental delay came to emergency room for alleged aggressive and violent behavior as per step father. Evaluated by Psych; Step Father left stated pt needs placement refusing to take child home.     #Bipolar disorder and likely developmental disorder- stable  - continue current meds (on depakote, risperidone, and lithium)   - pt has been stable without aggressive behaviour in the hospital  - father states he cant take care of pt  - awaiting placement to a group home    #Depressed mood, negative thoughts  - cont w lithium and valproic acid, levels wnl, c/w risperidone   - psych recs appreciated    #normocytic anemia  - Hb stable  - can obtain labs only if indicated    GI PPX: not Indicated   DVT px - pt ambulatory   Full code     DISPO: pending group home placement

## 2023-05-24 PROCEDURE — 99232 SBSQ HOSP IP/OBS MODERATE 35: CPT

## 2023-05-24 RX ADMIN — Medication 25 MILLIGRAM(S): at 21:49

## 2023-05-24 RX ADMIN — RISPERIDONE 2 MILLIGRAM(S): 4 TABLET ORAL at 17:57

## 2023-05-24 RX ADMIN — DIVALPROEX SODIUM 500 MILLIGRAM(S): 500 TABLET, DELAYED RELEASE ORAL at 12:22

## 2023-05-24 RX ADMIN — LITHIUM CARBONATE 450 MILLIGRAM(S): 300 TABLET, EXTENDED RELEASE ORAL at 05:19

## 2023-05-24 RX ADMIN — DIVALPROEX SODIUM 500 MILLIGRAM(S): 500 TABLET, DELAYED RELEASE ORAL at 21:50

## 2023-05-24 RX ADMIN — DIVALPROEX SODIUM 250 MILLIGRAM(S): 500 TABLET, DELAYED RELEASE ORAL at 21:50

## 2023-05-24 RX ADMIN — LITHIUM CARBONATE 450 MILLIGRAM(S): 300 TABLET, EXTENDED RELEASE ORAL at 17:56

## 2023-05-24 RX ADMIN — RISPERIDONE 2 MILLIGRAM(S): 4 TABLET ORAL at 05:19

## 2023-05-24 RX ADMIN — DIVALPROEX SODIUM 250 MILLIGRAM(S): 500 TABLET, DELAYED RELEASE ORAL at 12:22

## 2023-05-24 RX ADMIN — Medication 3 MILLIGRAM(S): at 21:49

## 2023-05-24 NOTE — PROGRESS NOTE ADULT - SUBJECTIVE AND OBJECTIVE BOX
SUBJECTIVE:    Seen and examined patient at bedside.   No acute issue  pending placement      ROS:   CONSTITUTIONAL: No weakness, fevers or chills   EYES/ENT: No visual changes; No vertigo or throat pain   NECK: No pain or stiffness   RESPIRATORY: No cough, wheezing, hemoptysis; No shortness of breath   CARDIOVASCULAR: No chest pain or palpitations   GASTROINTESTINAL: No abdominal or epigastric pain. No nausea, vomiting, or hematemesis; No diarrhea or constipation. No melena or hematochezia.  GENITOURINARY: No dysuria, frequency or hematuria  NEUROLOGICAL: No numbness or weakness      PAST MEDICAL & SURGICAL HISTORY  History of developmental delay    Bipolar disorder    No significant past surgical history      SOCIAL HISTORY:    ALLERGIES:  No Known Allergies    MEDICATIONS:  STANDING MEDICATIONS  diVALproex  milliGRAM(s) Oral at bedtime  diVALproex  milliGRAM(s) Oral at bedtime  diVALproex  milliGRAM(s) Oral daily  diVALproex  milliGRAM(s) Oral daily  lithium CR (ESKALITH-CR) 450 milliGRAM(s) Oral two times a day  melatonin 3 milliGRAM(s) Oral at bedtime  risperiDONE   Tablet 2 milliGRAM(s) Oral two times a day  traZODone 25 milliGRAM(s) Oral daily    PRN MEDICATIONS  traZODone 25 milliGRAM(s) Oral at bedtime PRN    VITALS:   Vital Signs Last 24 Hrs  T(C): 35.8 (24 May 2023 13:48), Max: 35.9 (24 May 2023 05:10)  T(F): 96.5 (24 May 2023 13:48), Max: 96.7 (24 May 2023 05:10)  HR: 95 (24 May 2023 13:48) (72 - 95)  BP: 125/71 (24 May 2023 13:48) (103/60 - 125/71)  BP(mean): --  RR: 16 (24 May 2023 13:48) (16 - 16)  SpO2: --    Parameters below as of 24 May 2023 13:48  Patient On (Oxygen Delivery Method): room air      LABS:      RADIOLOGY:    PHYSICAL EXAM:  GEN: No acute distress  HEENT: normocephalic, atraumatic, aniceteric  LUNGS:bl breath sounds  HEART: S1/S2 present. RRR, no murmurs  ABD: Soft, non-tender, non-distended. Bowel sounds present  EXT: no edema

## 2023-05-24 NOTE — PROGRESS NOTE ADULT - ASSESSMENT
22 years old male past medical history of bipolar and developmental delay came to emergency room for alleged aggressive and violent behavior as per step father. Evaluated by Psych; Step Father left stated pt needs placement refusing to take child home.     #Bipolar disorder and likely developmental disorder- stable  - continue current meds (on Depakote, risperidone, and lithium)   - pt has been stable without aggressive behaviour in the hospital  - father states he cant take care of pt  - awaiting placement to a group home    #Depressed mood  - cont w lithium and valproic acid, levels wnl, c/w risperidone   - psych recs appreciated    #normocytic anemia  - Hb stable  - can obtain labs only if indicated    GI PPX: not Indicated   DVT px - pt ambulatory   Full code     DISPO: pending group home placement

## 2023-05-25 PROCEDURE — 99232 SBSQ HOSP IP/OBS MODERATE 35: CPT

## 2023-05-25 RX ADMIN — DIVALPROEX SODIUM 500 MILLIGRAM(S): 500 TABLET, DELAYED RELEASE ORAL at 12:00

## 2023-05-25 RX ADMIN — DIVALPROEX SODIUM 500 MILLIGRAM(S): 500 TABLET, DELAYED RELEASE ORAL at 21:37

## 2023-05-25 RX ADMIN — LITHIUM CARBONATE 450 MILLIGRAM(S): 300 TABLET, EXTENDED RELEASE ORAL at 05:49

## 2023-05-25 RX ADMIN — DIVALPROEX SODIUM 250 MILLIGRAM(S): 500 TABLET, DELAYED RELEASE ORAL at 11:59

## 2023-05-25 RX ADMIN — Medication 3 MILLIGRAM(S): at 21:37

## 2023-05-25 RX ADMIN — DIVALPROEX SODIUM 250 MILLIGRAM(S): 500 TABLET, DELAYED RELEASE ORAL at 21:37

## 2023-05-25 RX ADMIN — Medication 25 MILLIGRAM(S): at 18:19

## 2023-05-25 RX ADMIN — RISPERIDONE 2 MILLIGRAM(S): 4 TABLET ORAL at 18:17

## 2023-05-25 RX ADMIN — RISPERIDONE 2 MILLIGRAM(S): 4 TABLET ORAL at 05:48

## 2023-05-25 RX ADMIN — LITHIUM CARBONATE 450 MILLIGRAM(S): 300 TABLET, EXTENDED RELEASE ORAL at 18:17

## 2023-05-26 PROCEDURE — 99232 SBSQ HOSP IP/OBS MODERATE 35: CPT

## 2023-05-26 RX ADMIN — DIVALPROEX SODIUM 500 MILLIGRAM(S): 500 TABLET, DELAYED RELEASE ORAL at 21:25

## 2023-05-26 RX ADMIN — LITHIUM CARBONATE 450 MILLIGRAM(S): 300 TABLET, EXTENDED RELEASE ORAL at 18:44

## 2023-05-26 RX ADMIN — RISPERIDONE 2 MILLIGRAM(S): 4 TABLET ORAL at 18:44

## 2023-05-26 RX ADMIN — DIVALPROEX SODIUM 250 MILLIGRAM(S): 500 TABLET, DELAYED RELEASE ORAL at 11:33

## 2023-05-26 RX ADMIN — DIVALPROEX SODIUM 250 MILLIGRAM(S): 500 TABLET, DELAYED RELEASE ORAL at 21:25

## 2023-05-26 RX ADMIN — DIVALPROEX SODIUM 500 MILLIGRAM(S): 500 TABLET, DELAYED RELEASE ORAL at 11:33

## 2023-05-26 RX ADMIN — Medication 25 MILLIGRAM(S): at 18:44

## 2023-05-26 RX ADMIN — RISPERIDONE 2 MILLIGRAM(S): 4 TABLET ORAL at 05:12

## 2023-05-26 RX ADMIN — Medication 3 MILLIGRAM(S): at 21:25

## 2023-05-26 RX ADMIN — LITHIUM CARBONATE 450 MILLIGRAM(S): 300 TABLET, EXTENDED RELEASE ORAL at 05:12

## 2023-05-27 PROCEDURE — 99232 SBSQ HOSP IP/OBS MODERATE 35: CPT

## 2023-05-27 RX ADMIN — DIVALPROEX SODIUM 500 MILLIGRAM(S): 500 TABLET, DELAYED RELEASE ORAL at 12:29

## 2023-05-27 RX ADMIN — LITHIUM CARBONATE 450 MILLIGRAM(S): 300 TABLET, EXTENDED RELEASE ORAL at 18:03

## 2023-05-27 RX ADMIN — Medication 25 MILLIGRAM(S): at 18:03

## 2023-05-27 RX ADMIN — LITHIUM CARBONATE 450 MILLIGRAM(S): 300 TABLET, EXTENDED RELEASE ORAL at 05:07

## 2023-05-27 RX ADMIN — DIVALPROEX SODIUM 250 MILLIGRAM(S): 500 TABLET, DELAYED RELEASE ORAL at 21:22

## 2023-05-27 RX ADMIN — RISPERIDONE 2 MILLIGRAM(S): 4 TABLET ORAL at 18:02

## 2023-05-27 RX ADMIN — RISPERIDONE 2 MILLIGRAM(S): 4 TABLET ORAL at 05:06

## 2023-05-27 RX ADMIN — DIVALPROEX SODIUM 250 MILLIGRAM(S): 500 TABLET, DELAYED RELEASE ORAL at 12:29

## 2023-05-27 RX ADMIN — Medication 3 MILLIGRAM(S): at 21:22

## 2023-05-27 RX ADMIN — DIVALPROEX SODIUM 500 MILLIGRAM(S): 500 TABLET, DELAYED RELEASE ORAL at 21:22

## 2023-05-27 NOTE — PROGRESS NOTE ADULT - SUBJECTIVE AND OBJECTIVE BOX
SUBJECTIVE:  Seen and examined patient at bedside.   No acute issue  pending placement      ROS:   CONSTITUTIONAL: No weakness, fevers or chills   EYES/ENT: No visual changes; No vertigo or throat pain   NECK: No pain or stiffness   RESPIRATORY: No cough, wheezing, hemoptysis; No shortness of breath   CARDIOVASCULAR: No chest pain or palpitations   GASTROINTESTINAL: No abdominal or epigastric pain. No nausea, vomiting, or hematemesis; No diarrhea or constipation. No melena or hematochezia.  GENITOURINARY: No dysuria, frequency or hematuria  NEUROLOGICAL: No numbness or weakness      PAST MEDICAL & SURGICAL HISTORY  History of developmental delay    Bipolar disorder    No significant past surgical history      SOCIAL HISTORY:    ALLERGIES:  No Known Allergies    MEDICATIONS:  STANDING MEDICATIONS  diVALproex  milliGRAM(s) Oral at bedtime  diVALproex  milliGRAM(s) Oral at bedtime  diVALproex  milliGRAM(s) Oral daily  diVALproex  milliGRAM(s) Oral daily  lithium CR (ESKALITH-CR) 450 milliGRAM(s) Oral two times a day  melatonin 3 milliGRAM(s) Oral at bedtime  risperiDONE   Tablet 2 milliGRAM(s) Oral two times a day  traZODone 25 milliGRAM(s) Oral daily    PRN MEDICATIONS  traZODone 25 milliGRAM(s) Oral at bedtime PRN    VITALS:   Vital Signs Last 24 Hrs  T(C): 36.2 (27 May 2023 04:50), Max: 36.6 (26 May 2023 20:57)  T(F): 97.1 (27 May 2023 04:50), Max: 97.8 (26 May 2023 20:57)  HR: 69 (27 May 2023 04:50) (52 - 77)  BP: 113/71 (27 May 2023 04:50) (113/71 - 133/69)  BP(mean): --  RR: 16 (27 May 2023 04:50) (16 - 16)  SpO2: 98% (26 May 2023 20:57) (98% - 98%)    LABS:      RADIOLOGY:    PHYSICAL EXAM:  GEN: No acute distress  HEENT: normocephalic, atraumatic, aniceteric  LUNGS:bl breath sounds  HEART: S1/S2 present. RRR, no murmurs  ABD: Soft, non-tender, non-distended. Bowel sounds present  EXT: no edema

## 2023-05-28 PROCEDURE — 99232 SBSQ HOSP IP/OBS MODERATE 35: CPT

## 2023-05-28 RX ADMIN — DIVALPROEX SODIUM 250 MILLIGRAM(S): 500 TABLET, DELAYED RELEASE ORAL at 11:45

## 2023-05-28 RX ADMIN — DIVALPROEX SODIUM 250 MILLIGRAM(S): 500 TABLET, DELAYED RELEASE ORAL at 22:08

## 2023-05-28 RX ADMIN — DIVALPROEX SODIUM 500 MILLIGRAM(S): 500 TABLET, DELAYED RELEASE ORAL at 22:07

## 2023-05-28 RX ADMIN — Medication 3 MILLIGRAM(S): at 22:09

## 2023-05-28 RX ADMIN — LITHIUM CARBONATE 450 MILLIGRAM(S): 300 TABLET, EXTENDED RELEASE ORAL at 05:48

## 2023-05-28 RX ADMIN — RISPERIDONE 2 MILLIGRAM(S): 4 TABLET ORAL at 18:11

## 2023-05-28 RX ADMIN — DIVALPROEX SODIUM 500 MILLIGRAM(S): 500 TABLET, DELAYED RELEASE ORAL at 11:45

## 2023-05-28 RX ADMIN — Medication 25 MILLIGRAM(S): at 18:11

## 2023-05-28 RX ADMIN — RISPERIDONE 2 MILLIGRAM(S): 4 TABLET ORAL at 05:48

## 2023-05-28 RX ADMIN — LITHIUM CARBONATE 450 MILLIGRAM(S): 300 TABLET, EXTENDED RELEASE ORAL at 18:11

## 2023-05-29 PROCEDURE — 99232 SBSQ HOSP IP/OBS MODERATE 35: CPT

## 2023-05-29 RX ADMIN — LITHIUM CARBONATE 450 MILLIGRAM(S): 300 TABLET, EXTENDED RELEASE ORAL at 06:06

## 2023-05-29 RX ADMIN — DIVALPROEX SODIUM 500 MILLIGRAM(S): 500 TABLET, DELAYED RELEASE ORAL at 21:01

## 2023-05-29 RX ADMIN — DIVALPROEX SODIUM 250 MILLIGRAM(S): 500 TABLET, DELAYED RELEASE ORAL at 21:04

## 2023-05-29 RX ADMIN — RISPERIDONE 2 MILLIGRAM(S): 4 TABLET ORAL at 17:49

## 2023-05-29 RX ADMIN — DIVALPROEX SODIUM 500 MILLIGRAM(S): 500 TABLET, DELAYED RELEASE ORAL at 11:34

## 2023-05-29 RX ADMIN — Medication 3 MILLIGRAM(S): at 21:01

## 2023-05-29 RX ADMIN — Medication 25 MILLIGRAM(S): at 17:50

## 2023-05-29 RX ADMIN — DIVALPROEX SODIUM 250 MILLIGRAM(S): 500 TABLET, DELAYED RELEASE ORAL at 11:34

## 2023-05-29 RX ADMIN — LITHIUM CARBONATE 450 MILLIGRAM(S): 300 TABLET, EXTENDED RELEASE ORAL at 17:50

## 2023-05-29 RX ADMIN — RISPERIDONE 2 MILLIGRAM(S): 4 TABLET ORAL at 06:06

## 2023-05-29 NOTE — PROGRESS NOTE ADULT - SUBJECTIVE AND OBJECTIVE BOX
SUBJECTIVE:  Seen and examined patient at bedside.   No acute issue  pending placement      ROS:   CONSTITUTIONAL: No weakness, fevers or chills   EYES/ENT: No visual changes; No vertigo or throat pain   NECK: No pain or stiffness   RESPIRATORY: No cough, wheezing, hemoptysis; No shortness of breath   CARDIOVASCULAR: No chest pain or palpitations   GASTROINTESTINAL: No abdominal or epigastric pain. No nausea, vomiting, or hematemesis; No diarrhea or constipation. No melena or hematochezia.  GENITOURINARY: No dysuria, frequency or hematuria  NEUROLOGICAL: No numbness or weakness      PAST MEDICAL & SURGICAL HISTORY  History of developmental delay    Bipolar disorder    No significant past surgical history      SOCIAL HISTORY:    ALLERGIES:  No Known Allergies    MEDICATIONS:  STANDING MEDICATIONS  diVALproex  milliGRAM(s) Oral at bedtime  diVALproex  milliGRAM(s) Oral at bedtime  diVALproex  milliGRAM(s) Oral daily  diVALproex  milliGRAM(s) Oral daily  lithium CR (ESKALITH-CR) 450 milliGRAM(s) Oral two times a day  melatonin 3 milliGRAM(s) Oral at bedtime  risperiDONE   Tablet 2 milliGRAM(s) Oral two times a day  traZODone 25 milliGRAM(s) Oral daily    PRN MEDICATIONS  traZODone 25 milliGRAM(s) Oral at bedtime PRN    VITALS:   Vital Signs Last 24 Hrs  T(C): 35.8 (29 May 2023 04:59), Max: 35.8 (28 May 2023 13:49)  T(F): 96.4 (29 May 2023 04:59), Max: 96.4 (28 May 2023 13:49)  HR: 50 (29 May 2023 04:59) (50 - 79)  BP: 108/65 (29 May 2023 04:59) (108/65 - 127/67)  BP(mean): --  RR: 18 (29 May 2023 04:59) (18 - 18)  SpO2: --    LABS:      RADIOLOGY:    PHYSICAL EXAM:  GEN: No acute distress  HEENT: normocephalic, atraumatic, aniceteric  LUNGS:bl breath sounds  HEART: S1/S2 present. RRR, no murmurs  ABD: Soft, non-tender, non-distended. Bowel sounds present  EXT: no edema

## 2023-05-30 PROCEDURE — 99232 SBSQ HOSP IP/OBS MODERATE 35: CPT

## 2023-05-30 RX ADMIN — LITHIUM CARBONATE 450 MILLIGRAM(S): 300 TABLET, EXTENDED RELEASE ORAL at 05:07

## 2023-05-30 RX ADMIN — DIVALPROEX SODIUM 500 MILLIGRAM(S): 500 TABLET, DELAYED RELEASE ORAL at 21:18

## 2023-05-30 RX ADMIN — LITHIUM CARBONATE 450 MILLIGRAM(S): 300 TABLET, EXTENDED RELEASE ORAL at 17:09

## 2023-05-30 RX ADMIN — DIVALPROEX SODIUM 500 MILLIGRAM(S): 500 TABLET, DELAYED RELEASE ORAL at 12:28

## 2023-05-30 RX ADMIN — RISPERIDONE 2 MILLIGRAM(S): 4 TABLET ORAL at 05:07

## 2023-05-30 RX ADMIN — Medication 3 MILLIGRAM(S): at 21:18

## 2023-05-30 RX ADMIN — Medication 25 MILLIGRAM(S): at 18:05

## 2023-05-30 RX ADMIN — RISPERIDONE 2 MILLIGRAM(S): 4 TABLET ORAL at 17:09

## 2023-05-30 RX ADMIN — DIVALPROEX SODIUM 250 MILLIGRAM(S): 500 TABLET, DELAYED RELEASE ORAL at 12:28

## 2023-05-30 RX ADMIN — DIVALPROEX SODIUM 250 MILLIGRAM(S): 500 TABLET, DELAYED RELEASE ORAL at 21:18

## 2023-05-31 PROCEDURE — 99231 SBSQ HOSP IP/OBS SF/LOW 25: CPT

## 2023-05-31 RX ADMIN — RISPERIDONE 2 MILLIGRAM(S): 4 TABLET ORAL at 06:33

## 2023-05-31 RX ADMIN — Medication 3 MILLIGRAM(S): at 21:45

## 2023-05-31 RX ADMIN — DIVALPROEX SODIUM 500 MILLIGRAM(S): 500 TABLET, DELAYED RELEASE ORAL at 11:42

## 2023-05-31 RX ADMIN — Medication 25 MILLIGRAM(S): at 19:17

## 2023-05-31 RX ADMIN — RISPERIDONE 2 MILLIGRAM(S): 4 TABLET ORAL at 19:18

## 2023-05-31 RX ADMIN — LITHIUM CARBONATE 450 MILLIGRAM(S): 300 TABLET, EXTENDED RELEASE ORAL at 06:32

## 2023-05-31 RX ADMIN — LITHIUM CARBONATE 450 MILLIGRAM(S): 300 TABLET, EXTENDED RELEASE ORAL at 19:17

## 2023-05-31 RX ADMIN — DIVALPROEX SODIUM 250 MILLIGRAM(S): 500 TABLET, DELAYED RELEASE ORAL at 11:42

## 2023-05-31 RX ADMIN — DIVALPROEX SODIUM 250 MILLIGRAM(S): 500 TABLET, DELAYED RELEASE ORAL at 21:45

## 2023-05-31 RX ADMIN — DIVALPROEX SODIUM 500 MILLIGRAM(S): 500 TABLET, DELAYED RELEASE ORAL at 21:45

## 2023-06-01 PROCEDURE — 99231 SBSQ HOSP IP/OBS SF/LOW 25: CPT

## 2023-06-01 RX ADMIN — DIVALPROEX SODIUM 500 MILLIGRAM(S): 500 TABLET, DELAYED RELEASE ORAL at 11:04

## 2023-06-01 RX ADMIN — RISPERIDONE 2 MILLIGRAM(S): 4 TABLET ORAL at 05:35

## 2023-06-01 RX ADMIN — DIVALPROEX SODIUM 250 MILLIGRAM(S): 500 TABLET, DELAYED RELEASE ORAL at 11:04

## 2023-06-01 RX ADMIN — DIVALPROEX SODIUM 500 MILLIGRAM(S): 500 TABLET, DELAYED RELEASE ORAL at 21:01

## 2023-06-01 RX ADMIN — LITHIUM CARBONATE 450 MILLIGRAM(S): 300 TABLET, EXTENDED RELEASE ORAL at 17:47

## 2023-06-01 RX ADMIN — Medication 3 MILLIGRAM(S): at 21:01

## 2023-06-01 RX ADMIN — RISPERIDONE 2 MILLIGRAM(S): 4 TABLET ORAL at 17:47

## 2023-06-01 RX ADMIN — Medication 25 MILLIGRAM(S): at 17:47

## 2023-06-01 RX ADMIN — DIVALPROEX SODIUM 250 MILLIGRAM(S): 500 TABLET, DELAYED RELEASE ORAL at 21:01

## 2023-06-01 RX ADMIN — LITHIUM CARBONATE 450 MILLIGRAM(S): 300 TABLET, EXTENDED RELEASE ORAL at 05:35

## 2023-06-01 NOTE — PROGRESS NOTE ADULT - SUBJECTIVE AND OBJECTIVE BOX
SUBJECTIVE:  Seen and examined patient at bedside. No acute issue; patient offers no complaints.     ROS:   CONSTITUTIONAL: No weakness, fevers or chills   EYES/ENT: No visual changes; No vertigo or throat pain   NECK: No pain or stiffness   RESPIRATORY: No cough, wheezing, hemoptysis; No shortness of breath   CARDIOVASCULAR: No chest pain or palpitations   GASTROINTESTINAL: No abdominal or epigastric pain. No nausea, vomiting, or hematemesis; No diarrhea or constipation. No melena or hematochezia.  GENITOURINARY: No dysuria, frequency or hematuria  NEUROLOGICAL: No numbness or weakness      PAST MEDICAL & SURGICAL HISTORY  History of developmental delay    Bipolar disorder    No significant past surgical history      SOCIAL HISTORY:    ALLERGIES:  No Known Allergies    MEDICATIONS:  MEDICATIONS  (STANDING):  diVALproex  milliGRAM(s) Oral at bedtime  diVALproex  milliGRAM(s) Oral at bedtime  diVALproex  milliGRAM(s) Oral daily  diVALproex  milliGRAM(s) Oral daily  lithium CR (ESKALITH-CR) 450 milliGRAM(s) Oral two times a day  melatonin 3 milliGRAM(s) Oral at bedtime  risperiDONE   Tablet 2 milliGRAM(s) Oral two times a day  traZODone 25 milliGRAM(s) Oral daily    MEDICATIONS  (PRN):  traZODone 25 milliGRAM(s) Oral at bedtime PRN breakthrough insomnia      VITALS:   Vital Signs Last 24 Hrs  T(C): 35.9 (31 May 2023 21:09), Max: 35.9 (31 May 2023 21:09)  T(F): 96.6 (31 May 2023 21:09), Max: 96.6 (31 May 2023 21:09)  HR: 75 (01 Jun 2023 05:29) (75 - 85)  BP: 118/71 (01 Jun 2023 05:29) (117/80 - 123/71)  BP(mean): --  RR: 16 (01 Jun 2023 05:29) (16 - 18)  SpO2: --    LABS:      RADIOLOGY:    PHYSICAL EXAM:  GEN: No acute distress  HEENT: normocephalic, atraumatic, aniceteric  LUNGS:bl breath sounds  HEART: S1/S2 present. RRR, no murmurs  ABD: Soft, non-tender, non-distended. Bowel sounds present  EXT: no edema

## 2023-06-02 PROCEDURE — 99231 SBSQ HOSP IP/OBS SF/LOW 25: CPT

## 2023-06-02 RX ADMIN — LITHIUM CARBONATE 450 MILLIGRAM(S): 300 TABLET, EXTENDED RELEASE ORAL at 05:30

## 2023-06-02 RX ADMIN — Medication 3 MILLIGRAM(S): at 22:01

## 2023-06-02 RX ADMIN — Medication 25 MILLIGRAM(S): at 18:13

## 2023-06-02 RX ADMIN — DIVALPROEX SODIUM 250 MILLIGRAM(S): 500 TABLET, DELAYED RELEASE ORAL at 12:26

## 2023-06-02 RX ADMIN — DIVALPROEX SODIUM 250 MILLIGRAM(S): 500 TABLET, DELAYED RELEASE ORAL at 22:01

## 2023-06-02 RX ADMIN — DIVALPROEX SODIUM 500 MILLIGRAM(S): 500 TABLET, DELAYED RELEASE ORAL at 22:02

## 2023-06-02 RX ADMIN — RISPERIDONE 2 MILLIGRAM(S): 4 TABLET ORAL at 18:14

## 2023-06-02 RX ADMIN — DIVALPROEX SODIUM 500 MILLIGRAM(S): 500 TABLET, DELAYED RELEASE ORAL at 12:26

## 2023-06-02 RX ADMIN — RISPERIDONE 2 MILLIGRAM(S): 4 TABLET ORAL at 05:30

## 2023-06-02 RX ADMIN — LITHIUM CARBONATE 450 MILLIGRAM(S): 300 TABLET, EXTENDED RELEASE ORAL at 18:13

## 2023-06-02 NOTE — PROGRESS NOTE ADULT - SUBJECTIVE AND OBJECTIVE BOX
SUBJECTIVE:  Seen and examined patient at bedside. No acute issue; patient offers no complaints.     ROS:   CONSTITUTIONAL: No weakness, fevers or chills   EYES/ENT: No visual changes; No vertigo or throat pain   NECK: No pain or stiffness   RESPIRATORY: No cough, wheezing, hemoptysis; No shortness of breath   CARDIOVASCULAR: No chest pain or palpitations   GASTROINTESTINAL: No abdominal or epigastric pain. No nausea, vomiting, or hematemesis; No diarrhea or constipation. No melena or hematochezia.  GENITOURINARY: No dysuria, frequency or hematuria  NEUROLOGICAL: No numbness or weakness      PAST MEDICAL & SURGICAL HISTORY  History of developmental delay    Bipolar disorder    No significant past surgical history      SOCIAL HISTORY:    ALLERGIES:  No Known Allergies    MEDICATIONS:  MEDICATIONS  (STANDING):  diVALproex  milliGRAM(s) Oral at bedtime  diVALproex  milliGRAM(s) Oral at bedtime  diVALproex  milliGRAM(s) Oral daily  diVALproex  milliGRAM(s) Oral daily  lithium CR (ESKALITH-CR) 450 milliGRAM(s) Oral two times a day  melatonin 3 milliGRAM(s) Oral at bedtime  risperiDONE   Tablet 2 milliGRAM(s) Oral two times a day  traZODone 25 milliGRAM(s) Oral daily    MEDICATIONS  (PRN):  traZODone 25 milliGRAM(s) Oral at bedtime PRN breakthrough insomnia        VITALS:   Vital Signs Last 24 Hrs  T(C): 35.6 (01 Jun 2023 21:58), Max: 35.6 (01 Jun 2023 21:58)  T(F): 96.1 (01 Jun 2023 21:58), Max: 96.1 (01 Jun 2023 21:58)  HR: 71 (01 Jun 2023 21:58) (71 - 71)  BP: 87/52 (01 Jun 2023 21:58) (87/52 - 87/52)  BP(mean): --  RR: 16 (01 Jun 2023 21:58) (16 - 16)  SpO2: 97% (01 Jun 2023 21:58) (97% - 97%)      LABS:      RADIOLOGY:    PHYSICAL EXAM:  GEN: No acute distress  HEENT: normocephalic, atraumatic, aniceteric  LUNGS:bl breath sounds  HEART: S1/S2 present. RRR, no murmurs  ABD: Soft, non-tender, non-distended. Bowel sounds present  EXT: no edema

## 2023-06-03 PROCEDURE — 99231 SBSQ HOSP IP/OBS SF/LOW 25: CPT

## 2023-06-03 RX ADMIN — DIVALPROEX SODIUM 250 MILLIGRAM(S): 500 TABLET, DELAYED RELEASE ORAL at 21:48

## 2023-06-03 RX ADMIN — DIVALPROEX SODIUM 250 MILLIGRAM(S): 500 TABLET, DELAYED RELEASE ORAL at 12:39

## 2023-06-03 RX ADMIN — LITHIUM CARBONATE 450 MILLIGRAM(S): 300 TABLET, EXTENDED RELEASE ORAL at 18:18

## 2023-06-03 RX ADMIN — Medication 25 MILLIGRAM(S): at 18:18

## 2023-06-03 RX ADMIN — LITHIUM CARBONATE 450 MILLIGRAM(S): 300 TABLET, EXTENDED RELEASE ORAL at 06:28

## 2023-06-03 RX ADMIN — DIVALPROEX SODIUM 500 MILLIGRAM(S): 500 TABLET, DELAYED RELEASE ORAL at 12:39

## 2023-06-03 RX ADMIN — RISPERIDONE 2 MILLIGRAM(S): 4 TABLET ORAL at 18:18

## 2023-06-03 RX ADMIN — DIVALPROEX SODIUM 500 MILLIGRAM(S): 500 TABLET, DELAYED RELEASE ORAL at 21:48

## 2023-06-03 RX ADMIN — Medication 3 MILLIGRAM(S): at 21:49

## 2023-06-03 RX ADMIN — RISPERIDONE 2 MILLIGRAM(S): 4 TABLET ORAL at 06:28

## 2023-06-03 RX ADMIN — Medication 25 MILLIGRAM(S): at 21:49

## 2023-06-03 NOTE — PROGRESS NOTE ADULT - SUBJECTIVE AND OBJECTIVE BOX
SUBJECTIVE:  Seen and examined patient at bedside. No acute issue; patient offers no complaints.     ROS:   CONSTITUTIONAL: No weakness, fevers or chills   EYES/ENT: No visual changes; No vertigo or throat pain   NECK: No pain or stiffness   RESPIRATORY: No cough, wheezing, hemoptysis; No shortness of breath   CARDIOVASCULAR: No chest pain or palpitations   GASTROINTESTINAL: No abdominal or epigastric pain. No nausea, vomiting, or hematemesis; No diarrhea or constipation. No melena or hematochezia.  GENITOURINARY: No dysuria, frequency or hematuria  NEUROLOGICAL: No numbness or weakness      PAST MEDICAL & SURGICAL HISTORY  History of developmental delay    Bipolar disorder    No significant past surgical history      SOCIAL HISTORY:    ALLERGIES:  No Known Allergies    MEDICATIONS:  MEDICATIONS  (STANDING):  diVALproex  milliGRAM(s) Oral at bedtime  diVALproex  milliGRAM(s) Oral at bedtime  diVALproex  milliGRAM(s) Oral daily  diVALproex  milliGRAM(s) Oral daily  lithium CR (ESKALITH-CR) 450 milliGRAM(s) Oral two times a day  melatonin 3 milliGRAM(s) Oral at bedtime  risperiDONE   Tablet 2 milliGRAM(s) Oral two times a day  traZODone 25 milliGRAM(s) Oral daily    MEDICATIONS  (PRN):  traZODone 25 milliGRAM(s) Oral at bedtime PRN breakthrough insomnia        VITALS:   Vital Signs Last 24 Hrs  T(C): 35.6 (03 Jun 2023 05:06), Max: 35.7 (02 Jun 2023 14:30)  T(F): 96 (03 Jun 2023 05:06), Max: 96.3 (02 Jun 2023 14:30)  HR: 67 (03 Jun 2023 05:06) (67 - 81)  BP: 109/63 (03 Jun 2023 05:06) (109/63 - 129/83)  BP(mean): --  RR: 18 (03 Jun 2023 05:06) (16 - 18)  SpO2: --      LABS:      RADIOLOGY:    PHYSICAL EXAM:  GEN: No acute distress  HEENT: normocephalic, atraumatic, aniceteric  LUNGS:bl breath sounds  HEART: S1/S2 present. RRR, no murmurs  ABD: Soft, non-tender, non-distended. Bowel sounds present  EXT: no edema

## 2023-06-04 PROCEDURE — 99231 SBSQ HOSP IP/OBS SF/LOW 25: CPT

## 2023-06-04 RX ADMIN — DIVALPROEX SODIUM 250 MILLIGRAM(S): 500 TABLET, DELAYED RELEASE ORAL at 11:56

## 2023-06-04 RX ADMIN — DIVALPROEX SODIUM 500 MILLIGRAM(S): 500 TABLET, DELAYED RELEASE ORAL at 11:56

## 2023-06-04 RX ADMIN — DIVALPROEX SODIUM 500 MILLIGRAM(S): 500 TABLET, DELAYED RELEASE ORAL at 21:22

## 2023-06-04 RX ADMIN — RISPERIDONE 2 MILLIGRAM(S): 4 TABLET ORAL at 05:17

## 2023-06-04 RX ADMIN — LITHIUM CARBONATE 450 MILLIGRAM(S): 300 TABLET, EXTENDED RELEASE ORAL at 05:17

## 2023-06-04 RX ADMIN — Medication 3 MILLIGRAM(S): at 21:22

## 2023-06-04 RX ADMIN — Medication 25 MILLIGRAM(S): at 21:23

## 2023-06-04 RX ADMIN — RISPERIDONE 2 MILLIGRAM(S): 4 TABLET ORAL at 17:07

## 2023-06-04 RX ADMIN — LITHIUM CARBONATE 450 MILLIGRAM(S): 300 TABLET, EXTENDED RELEASE ORAL at 17:07

## 2023-06-04 RX ADMIN — DIVALPROEX SODIUM 250 MILLIGRAM(S): 500 TABLET, DELAYED RELEASE ORAL at 21:22

## 2023-06-04 NOTE — PROGRESS NOTE ADULT - SUBJECTIVE AND OBJECTIVE BOX
SUBJECTIVE:  Seen and examined patient at bedside. No acute issue; patient offers no complaints.     ROS:   CONSTITUTIONAL: No weakness, fevers or chills   EYES/ENT: No visual changes; No vertigo or throat pain   NECK: No pain or stiffness   RESPIRATORY: No cough, wheezing, hemoptysis; No shortness of breath   CARDIOVASCULAR: No chest pain or palpitations   GASTROINTESTINAL: No abdominal or epigastric pain. No nausea, vomiting, or hematemesis; No diarrhea or constipation. No melena or hematochezia.  GENITOURINARY: No dysuria, frequency or hematuria  NEUROLOGICAL: No numbness or weakness      PAST MEDICAL & SURGICAL HISTORY  History of developmental delay    Bipolar disorder    No significant past surgical history      SOCIAL HISTORY:    ALLERGIES:  No Known Allergies    MEDICATIONS:  MEDICATIONS  (STANDING):  diVALproex  milliGRAM(s) Oral at bedtime  diVALproex  milliGRAM(s) Oral at bedtime  diVALproex  milliGRAM(s) Oral daily  diVALproex  milliGRAM(s) Oral daily  lithium CR (ESKALITH-CR) 450 milliGRAM(s) Oral two times a day  melatonin 3 milliGRAM(s) Oral at bedtime  risperiDONE   Tablet 2 milliGRAM(s) Oral two times a day  traZODone 25 milliGRAM(s) Oral daily    MEDICATIONS  (PRN):  traZODone 25 milliGRAM(s) Oral at bedtime PRN breakthrough insomnia        VITALS:   Vital Signs Last 24 Hrs  T(C): 35.6 (04 Jun 2023 04:00), Max: 37 (03 Jun 2023 21:02)  T(F): 96 (04 Jun 2023 04:00), Max: 98.6 (03 Jun 2023 21:02)  HR: 70 (04 Jun 2023 04:00) (70 - 79)  BP: 116/77 (04 Jun 2023 04:00) (116/77 - 117/72)  BP(mean): 86 (04 Jun 2023 04:00) (86 - 86)  RR: 18 (04 Jun 2023 04:00) (18 - 18)  SpO2: 98% (04 Jun 2023 04:00) (98% - 99%)    Parameters below as of 04 Jun 2023 04:00  Patient On (Oxygen Delivery Method): room air          LABS:      RADIOLOGY:    PHYSICAL EXAM:  GEN: No acute distress  HEENT: normocephalic, atraumatic, aniceteric  LUNGS:bl breath sounds  HEART: S1/S2 present. RRR, no murmurs  ABD: Soft, non-tender, non-distended. Bowel sounds present  EXT: no edema

## 2023-06-05 PROCEDURE — 99231 SBSQ HOSP IP/OBS SF/LOW 25: CPT

## 2023-06-05 RX ADMIN — DIVALPROEX SODIUM 500 MILLIGRAM(S): 500 TABLET, DELAYED RELEASE ORAL at 11:46

## 2023-06-05 RX ADMIN — LITHIUM CARBONATE 450 MILLIGRAM(S): 300 TABLET, EXTENDED RELEASE ORAL at 17:32

## 2023-06-05 RX ADMIN — DIVALPROEX SODIUM 500 MILLIGRAM(S): 500 TABLET, DELAYED RELEASE ORAL at 21:41

## 2023-06-05 RX ADMIN — LITHIUM CARBONATE 450 MILLIGRAM(S): 300 TABLET, EXTENDED RELEASE ORAL at 05:14

## 2023-06-05 RX ADMIN — RISPERIDONE 2 MILLIGRAM(S): 4 TABLET ORAL at 17:32

## 2023-06-05 RX ADMIN — RISPERIDONE 2 MILLIGRAM(S): 4 TABLET ORAL at 05:14

## 2023-06-05 RX ADMIN — DIVALPROEX SODIUM 250 MILLIGRAM(S): 500 TABLET, DELAYED RELEASE ORAL at 11:45

## 2023-06-05 RX ADMIN — Medication 3 MILLIGRAM(S): at 21:42

## 2023-06-05 RX ADMIN — DIVALPROEX SODIUM 250 MILLIGRAM(S): 500 TABLET, DELAYED RELEASE ORAL at 21:41

## 2023-06-05 RX ADMIN — Medication 25 MILLIGRAM(S): at 18:06

## 2023-06-05 NOTE — PROGRESS NOTE ADULT - SUBJECTIVE AND OBJECTIVE BOX
SUBJECTIVE:  Seen and examined patient at bedside. No acute issue; patient offers no complaints.     ROS:   CONSTITUTIONAL: No weakness, fevers or chills   EYES/ENT: No visual changes; No vertigo or throat pain   NECK: No pain or stiffness   RESPIRATORY: No cough, wheezing, hemoptysis; No shortness of breath   CARDIOVASCULAR: No chest pain or palpitations   GASTROINTESTINAL: No abdominal or epigastric pain. No nausea, vomiting, or hematemesis; No diarrhea or constipation. No melena or hematochezia.  GENITOURINARY: No dysuria, frequency or hematuria  NEUROLOGICAL: No numbness or weakness      PAST MEDICAL & SURGICAL HISTORY  History of developmental delay    Bipolar disorder    No significant past surgical history      SOCIAL HISTORY:    ALLERGIES:  No Known Allergies    MEDICATIONS:  MEDICATIONS  (STANDING):  diVALproex  milliGRAM(s) Oral daily  diVALproex  milliGRAM(s) Oral daily  diVALproex  milliGRAM(s) Oral at bedtime  diVALproex  milliGRAM(s) Oral at bedtime  lithium CR (ESKALITH-CR) 450 milliGRAM(s) Oral two times a day  melatonin 3 milliGRAM(s) Oral at bedtime  risperiDONE   Tablet 2 milliGRAM(s) Oral two times a day  traZODone 25 milliGRAM(s) Oral daily    MEDICATIONS  (PRN):  traZODone 25 milliGRAM(s) Oral at bedtime PRN breakthrough insomnia        VITALS:   Vital Signs Last 24 Hrs  T(C): 36.4 (05 Jun 2023 04:55), Max: 36.4 (05 Jun 2023 04:55)  T(F): 97.6 (05 Jun 2023 04:55), Max: 97.6 (05 Jun 2023 04:55)  HR: 61 (05 Jun 2023 04:55) (61 - 79)  BP: 109/65 (05 Jun 2023 04:55) (109/65 - 127/76)  BP(mean): --  RR: 18 (05 Jun 2023 04:55) (18 - 18)  SpO2: 98% (04 Jun 2023 20:42) (98% - 98%)      LABS:      RADIOLOGY:    PHYSICAL EXAM:  GEN: No acute distress  HEENT: normocephalic, atraumatic, aniceteric  LUNGS:bl breath sounds  HEART: S1/S2 present. RRR, no murmurs  ABD: Soft, non-tender, non-distended. Bowel sounds present  EXT: no edema

## 2023-06-06 PROCEDURE — 99231 SBSQ HOSP IP/OBS SF/LOW 25: CPT

## 2023-06-06 RX ADMIN — Medication 3 MILLIGRAM(S): at 21:14

## 2023-06-06 RX ADMIN — RISPERIDONE 2 MILLIGRAM(S): 4 TABLET ORAL at 17:14

## 2023-06-06 RX ADMIN — LITHIUM CARBONATE 450 MILLIGRAM(S): 300 TABLET, EXTENDED RELEASE ORAL at 05:33

## 2023-06-06 RX ADMIN — Medication 25 MILLIGRAM(S): at 21:13

## 2023-06-06 RX ADMIN — DIVALPROEX SODIUM 250 MILLIGRAM(S): 500 TABLET, DELAYED RELEASE ORAL at 11:51

## 2023-06-06 RX ADMIN — DIVALPROEX SODIUM 500 MILLIGRAM(S): 500 TABLET, DELAYED RELEASE ORAL at 11:51

## 2023-06-06 RX ADMIN — RISPERIDONE 2 MILLIGRAM(S): 4 TABLET ORAL at 05:33

## 2023-06-06 RX ADMIN — DIVALPROEX SODIUM 500 MILLIGRAM(S): 500 TABLET, DELAYED RELEASE ORAL at 21:13

## 2023-06-06 RX ADMIN — DIVALPROEX SODIUM 250 MILLIGRAM(S): 500 TABLET, DELAYED RELEASE ORAL at 21:13

## 2023-06-06 RX ADMIN — LITHIUM CARBONATE 450 MILLIGRAM(S): 300 TABLET, EXTENDED RELEASE ORAL at 17:14

## 2023-06-06 NOTE — PROGRESS NOTE ADULT - SUBJECTIVE AND OBJECTIVE BOX
SUBJECTIVE:  Seen and examined patient at bedside. No acute issue; patient offers no complaints.     ROS:   CONSTITUTIONAL: No weakness, fevers or chills   EYES/ENT: No visual changes; No vertigo or throat pain   NECK: No pain or stiffness   RESPIRATORY: No cough, wheezing, hemoptysis; No shortness of breath   CARDIOVASCULAR: No chest pain or palpitations   GASTROINTESTINAL: No abdominal or epigastric pain. No nausea, vomiting, or hematemesis; No diarrhea or constipation. No melena or hematochezia.  GENITOURINARY: No dysuria, frequency or hematuria  NEUROLOGICAL: No numbness or weakness      PAST MEDICAL & SURGICAL HISTORY  History of developmental delay    Bipolar disorder    No significant past surgical history      SOCIAL HISTORY:    ALLERGIES:  No Known Allergies    MEDICATIONS:  MEDICATIONS  (STANDING):  diVALproex  milliGRAM(s) Oral daily  diVALproex  milliGRAM(s) Oral daily  diVALproex  milliGRAM(s) Oral at bedtime  diVALproex  milliGRAM(s) Oral at bedtime  lithium CR (ESKALITH-CR) 450 milliGRAM(s) Oral two times a day  melatonin 3 milliGRAM(s) Oral at bedtime  risperiDONE   Tablet 2 milliGRAM(s) Oral two times a day  traZODone 25 milliGRAM(s) Oral daily    MEDICATIONS  (PRN):  traZODone 25 milliGRAM(s) Oral at bedtime PRN breakthrough insomnia        VITALS:   Vital Signs Last 24 Hrs  T(C): 36.3 (06 Jun 2023 05:30), Max: 36.3 (06 Jun 2023 05:30)  T(F): 97.4 (06 Jun 2023 05:30), Max: 97.4 (06 Jun 2023 05:30)  HR: 71 (06 Jun 2023 05:30) (71 - 89)  BP: 127/75 (06 Jun 2023 05:30) (122/60 - 130/70)  BP(mean): --  RR: 18 (06 Jun 2023 05:30) (18 - 18)  SpO2: 99% (05 Jun 2023 20:40) (99% - 99%)      LABS:      RADIOLOGY:    PHYSICAL EXAM:  GEN: No acute distress  HEENT: normocephalic, atraumatic, aniceteric  LUNGS:bl breath sounds  HEART: S1/S2 present. RRR, no murmurs  ABD: Soft, non-tender, non-distended. Bowel sounds present  EXT: no edema

## 2023-06-07 PROCEDURE — 99232 SBSQ HOSP IP/OBS MODERATE 35: CPT

## 2023-06-07 RX ADMIN — RISPERIDONE 2 MILLIGRAM(S): 4 TABLET ORAL at 17:46

## 2023-06-07 RX ADMIN — Medication 25 MILLIGRAM(S): at 17:48

## 2023-06-07 RX ADMIN — RISPERIDONE 2 MILLIGRAM(S): 4 TABLET ORAL at 05:17

## 2023-06-07 RX ADMIN — LITHIUM CARBONATE 450 MILLIGRAM(S): 300 TABLET, EXTENDED RELEASE ORAL at 05:17

## 2023-06-07 RX ADMIN — DIVALPROEX SODIUM 500 MILLIGRAM(S): 500 TABLET, DELAYED RELEASE ORAL at 11:56

## 2023-06-07 RX ADMIN — DIVALPROEX SODIUM 500 MILLIGRAM(S): 500 TABLET, DELAYED RELEASE ORAL at 21:18

## 2023-06-07 RX ADMIN — DIVALPROEX SODIUM 250 MILLIGRAM(S): 500 TABLET, DELAYED RELEASE ORAL at 11:56

## 2023-06-07 RX ADMIN — DIVALPROEX SODIUM 250 MILLIGRAM(S): 500 TABLET, DELAYED RELEASE ORAL at 21:18

## 2023-06-07 RX ADMIN — LITHIUM CARBONATE 450 MILLIGRAM(S): 300 TABLET, EXTENDED RELEASE ORAL at 17:47

## 2023-06-07 RX ADMIN — Medication 3 MILLIGRAM(S): at 21:18

## 2023-06-08 PROCEDURE — 99232 SBSQ HOSP IP/OBS MODERATE 35: CPT

## 2023-06-08 RX ADMIN — LITHIUM CARBONATE 450 MILLIGRAM(S): 300 TABLET, EXTENDED RELEASE ORAL at 19:10

## 2023-06-08 RX ADMIN — DIVALPROEX SODIUM 250 MILLIGRAM(S): 500 TABLET, DELAYED RELEASE ORAL at 11:43

## 2023-06-08 RX ADMIN — DIVALPROEX SODIUM 250 MILLIGRAM(S): 500 TABLET, DELAYED RELEASE ORAL at 21:20

## 2023-06-08 RX ADMIN — Medication 25 MILLIGRAM(S): at 21:20

## 2023-06-08 RX ADMIN — RISPERIDONE 2 MILLIGRAM(S): 4 TABLET ORAL at 05:53

## 2023-06-08 RX ADMIN — DIVALPROEX SODIUM 500 MILLIGRAM(S): 500 TABLET, DELAYED RELEASE ORAL at 21:20

## 2023-06-08 RX ADMIN — Medication 3 MILLIGRAM(S): at 21:20

## 2023-06-08 RX ADMIN — DIVALPROEX SODIUM 500 MILLIGRAM(S): 500 TABLET, DELAYED RELEASE ORAL at 11:43

## 2023-06-08 RX ADMIN — RISPERIDONE 2 MILLIGRAM(S): 4 TABLET ORAL at 19:10

## 2023-06-08 RX ADMIN — LITHIUM CARBONATE 450 MILLIGRAM(S): 300 TABLET, EXTENDED RELEASE ORAL at 05:53

## 2023-06-08 NOTE — PROGRESS NOTE ADULT - SUBJECTIVE AND OBJECTIVE BOX
SUBJECTIVE:  Seen and examined patient at bedside. No acute issue; patient offers no complaints.     ROS:   CONSTITUTIONAL: No weakness, fevers or chills   EYES/ENT: No visual changes; No vertigo or throat pain   NECK: No pain or stiffness   RESPIRATORY: No cough, wheezing, hemoptysis; No shortness of breath   CARDIOVASCULAR: No chest pain or palpitations   GASTROINTESTINAL: No abdominal or epigastric pain. No nausea, vomiting, or hematemesis; No diarrhea or constipation. No melena or hematochezia.  GENITOURINARY: No dysuria, frequency or hematuria  NEUROLOGICAL: No numbness or weakness      PAST MEDICAL & SURGICAL HISTORY  History of developmental delay    Bipolar disorder    No significant past surgical history      SOCIAL HISTORY:    ALLERGIES:  No Known Allergies    MEDICATIONS:  MEDICATIONS  (STANDING):  diVALproex  milliGRAM(s) Oral daily  diVALproex  milliGRAM(s) Oral daily  diVALproex  milliGRAM(s) Oral at bedtime  diVALproex  milliGRAM(s) Oral at bedtime  lithium CR (ESKALITH-CR) 450 milliGRAM(s) Oral two times a day  melatonin 3 milliGRAM(s) Oral at bedtime  risperiDONE   Tablet 2 milliGRAM(s) Oral two times a day  traZODone 25 milliGRAM(s) Oral daily    MEDICATIONS  (PRN):  traZODone 25 milliGRAM(s) Oral at bedtime PRN breakthrough insomnia        VITALS:   Vital Signs Last 24 Hrs  T(C): 36.2 (08 Jun 2023 13:48), Max: 36.2 (08 Jun 2023 05:12)  T(F): 97.1 (08 Jun 2023 13:48), Max: 97.2 (08 Jun 2023 05:12)  HR: 68 (08 Jun 2023 13:48) (68 - 85)  BP: 85/59 (08 Jun 2023 13:48) (85/59 - 122/64)  BP(mean): --  RR: 18 (08 Jun 2023 13:48) (18 - 18)  SpO2: 99% (08 Jun 2023 05:12) (98% - 99%)      LABS:      RADIOLOGY:    PHYSICAL EXAM:  GEN: No acute distress  HEENT: normocephalic, atraumatic, aniceteric  LUNGS:bl breath sounds  HEART: S1/S2 present. RRR, no murmurs  ABD: Soft, non-tender, non-distended. Bowel sounds present  EXT: no edema

## 2023-06-09 PROCEDURE — 99231 SBSQ HOSP IP/OBS SF/LOW 25: CPT

## 2023-06-09 RX ADMIN — DIVALPROEX SODIUM 250 MILLIGRAM(S): 500 TABLET, DELAYED RELEASE ORAL at 12:43

## 2023-06-09 RX ADMIN — RISPERIDONE 2 MILLIGRAM(S): 4 TABLET ORAL at 05:34

## 2023-06-09 RX ADMIN — DIVALPROEX SODIUM 250 MILLIGRAM(S): 500 TABLET, DELAYED RELEASE ORAL at 20:49

## 2023-06-09 RX ADMIN — LITHIUM CARBONATE 450 MILLIGRAM(S): 300 TABLET, EXTENDED RELEASE ORAL at 05:34

## 2023-06-09 RX ADMIN — LITHIUM CARBONATE 450 MILLIGRAM(S): 300 TABLET, EXTENDED RELEASE ORAL at 17:43

## 2023-06-09 RX ADMIN — DIVALPROEX SODIUM 500 MILLIGRAM(S): 500 TABLET, DELAYED RELEASE ORAL at 20:48

## 2023-06-09 RX ADMIN — DIVALPROEX SODIUM 500 MILLIGRAM(S): 500 TABLET, DELAYED RELEASE ORAL at 12:43

## 2023-06-09 RX ADMIN — RISPERIDONE 2 MILLIGRAM(S): 4 TABLET ORAL at 17:44

## 2023-06-09 RX ADMIN — Medication 3 MILLIGRAM(S): at 20:48

## 2023-06-09 RX ADMIN — Medication 25 MILLIGRAM(S): at 20:47

## 2023-06-09 NOTE — PROGRESS NOTE ADULT - SUBJECTIVE AND OBJECTIVE BOX
Patient is a 22y old  Male who presents with a chief complaint of Placement (08 Jun 2023 13:55)      OVERNIGHT EVENTS:  remained stable  denies fever, chills, syncope, seizure, headache, cough, SOB, abd pain, N/V/D, urinary symptoms, legs swelling,     SUBJECTIVE / INTERVAL HPI: Patient seen and examined at bedside.     VITAL SIGNS:  Vital Signs Last 24 Hrs  T(C): 35.7 (09 Jun 2023 14:08), Max: 36.2 (08 Jun 2023 20:00)  T(F): 96.3 (09 Jun 2023 14:08), Max: 97.1 (08 Jun 2023 20:00)  HR: 113 (09 Jun 2023 14:08) (53 - 113)  BP: 136/82 (09 Jun 2023 14:08) (96/54 - 136/82)  BP(mean): --  RR: 18 (09 Jun 2023 14:08) (18 - 18)  SpO2: 99% (09 Jun 2023 05:18) (72% - 99%)        PHYSICAL EXAM:    General: WDWN  HEENT: NC/AT; PERRL, clear conjunctiva  Neck: supple  Cardiovascular: +S1/S2; RRR  Respiratory: CTA b/l; no W/R/R  Gastrointestinal: soft, NT/ND; +BSx4  Extremities: WWP; 2+ peripheral pulses; no edema   Neurological: AAOx3; no focal deficits    MEDICATIONS:  MEDICATIONS  (STANDING):  diVALproex  milliGRAM(s) Oral at bedtime  diVALproex  milliGRAM(s) Oral at bedtime  diVALproex  milliGRAM(s) Oral daily  diVALproex  milliGRAM(s) Oral daily  lithium CR (ESKALITH-CR) 450 milliGRAM(s) Oral two times a day  melatonin 3 milliGRAM(s) Oral at bedtime  risperiDONE   Tablet 2 milliGRAM(s) Oral two times a day  traZODone 25 milliGRAM(s) Oral daily    MEDICATIONS  (PRN):  traZODone 25 milliGRAM(s) Oral at bedtime PRN breakthrough insomnia      ALLERGIES:  Allergies    No Known Allergies    Intolerances        LABS:              CAPILLARY BLOOD GLUCOSE          RADIOLOGY & ADDITIONAL TESTS: Reviewed.    ASSESSMENT:    PLAN:

## 2023-06-10 PROCEDURE — 99232 SBSQ HOSP IP/OBS MODERATE 35: CPT

## 2023-06-10 RX ORDER — ACETAMINOPHEN 500 MG
650 TABLET ORAL EVERY 6 HOURS
Refills: 0 | Status: DISCONTINUED | OUTPATIENT
Start: 2023-06-10 | End: 2023-06-29

## 2023-06-10 RX ADMIN — RISPERIDONE 2 MILLIGRAM(S): 4 TABLET ORAL at 17:35

## 2023-06-10 RX ADMIN — Medication 650 MILLIGRAM(S): at 10:49

## 2023-06-10 RX ADMIN — DIVALPROEX SODIUM 250 MILLIGRAM(S): 500 TABLET, DELAYED RELEASE ORAL at 12:24

## 2023-06-10 RX ADMIN — Medication 25 MILLIGRAM(S): at 17:35

## 2023-06-10 RX ADMIN — DIVALPROEX SODIUM 250 MILLIGRAM(S): 500 TABLET, DELAYED RELEASE ORAL at 21:08

## 2023-06-10 RX ADMIN — DIVALPROEX SODIUM 500 MILLIGRAM(S): 500 TABLET, DELAYED RELEASE ORAL at 21:08

## 2023-06-10 RX ADMIN — DIVALPROEX SODIUM 500 MILLIGRAM(S): 500 TABLET, DELAYED RELEASE ORAL at 12:24

## 2023-06-10 RX ADMIN — LITHIUM CARBONATE 450 MILLIGRAM(S): 300 TABLET, EXTENDED RELEASE ORAL at 17:36

## 2023-06-10 RX ADMIN — RISPERIDONE 2 MILLIGRAM(S): 4 TABLET ORAL at 05:18

## 2023-06-10 RX ADMIN — LITHIUM CARBONATE 450 MILLIGRAM(S): 300 TABLET, EXTENDED RELEASE ORAL at 05:18

## 2023-06-10 RX ADMIN — Medication 650 MILLIGRAM(S): at 12:20

## 2023-06-10 RX ADMIN — Medication 3 MILLIGRAM(S): at 21:09

## 2023-06-11 PROCEDURE — 99232 SBSQ HOSP IP/OBS MODERATE 35: CPT

## 2023-06-11 RX ADMIN — DIVALPROEX SODIUM 500 MILLIGRAM(S): 500 TABLET, DELAYED RELEASE ORAL at 21:47

## 2023-06-11 RX ADMIN — DIVALPROEX SODIUM 250 MILLIGRAM(S): 500 TABLET, DELAYED RELEASE ORAL at 12:13

## 2023-06-11 RX ADMIN — Medication 3 MILLIGRAM(S): at 21:48

## 2023-06-11 RX ADMIN — DIVALPROEX SODIUM 250 MILLIGRAM(S): 500 TABLET, DELAYED RELEASE ORAL at 21:48

## 2023-06-11 RX ADMIN — RISPERIDONE 2 MILLIGRAM(S): 4 TABLET ORAL at 05:34

## 2023-06-11 RX ADMIN — RISPERIDONE 2 MILLIGRAM(S): 4 TABLET ORAL at 17:25

## 2023-06-11 RX ADMIN — DIVALPROEX SODIUM 500 MILLIGRAM(S): 500 TABLET, DELAYED RELEASE ORAL at 12:14

## 2023-06-11 RX ADMIN — LITHIUM CARBONATE 450 MILLIGRAM(S): 300 TABLET, EXTENDED RELEASE ORAL at 17:25

## 2023-06-11 RX ADMIN — LITHIUM CARBONATE 450 MILLIGRAM(S): 300 TABLET, EXTENDED RELEASE ORAL at 05:34

## 2023-06-11 RX ADMIN — Medication 25 MILLIGRAM(S): at 17:25

## 2023-06-12 PROCEDURE — 99232 SBSQ HOSP IP/OBS MODERATE 35: CPT

## 2023-06-12 RX ADMIN — Medication 3 MILLIGRAM(S): at 21:18

## 2023-06-12 RX ADMIN — LITHIUM CARBONATE 450 MILLIGRAM(S): 300 TABLET, EXTENDED RELEASE ORAL at 17:00

## 2023-06-12 RX ADMIN — Medication 25 MILLIGRAM(S): at 21:17

## 2023-06-12 RX ADMIN — RISPERIDONE 2 MILLIGRAM(S): 4 TABLET ORAL at 05:37

## 2023-06-12 RX ADMIN — DIVALPROEX SODIUM 500 MILLIGRAM(S): 500 TABLET, DELAYED RELEASE ORAL at 21:18

## 2023-06-12 RX ADMIN — DIVALPROEX SODIUM 250 MILLIGRAM(S): 500 TABLET, DELAYED RELEASE ORAL at 21:18

## 2023-06-12 RX ADMIN — DIVALPROEX SODIUM 500 MILLIGRAM(S): 500 TABLET, DELAYED RELEASE ORAL at 12:48

## 2023-06-12 RX ADMIN — LITHIUM CARBONATE 450 MILLIGRAM(S): 300 TABLET, EXTENDED RELEASE ORAL at 05:37

## 2023-06-12 RX ADMIN — RISPERIDONE 2 MILLIGRAM(S): 4 TABLET ORAL at 17:00

## 2023-06-12 RX ADMIN — DIVALPROEX SODIUM 250 MILLIGRAM(S): 500 TABLET, DELAYED RELEASE ORAL at 12:48

## 2023-06-12 NOTE — PROGRESS NOTE ADULT - SUBJECTIVE AND OBJECTIVE BOX
MEDICINE ATTENDING PROGRESS NOTE  22 years old male past medical history of bipolar and developmental delay came to emergency room for alleged aggressive and violent behavior as per step father. Evaluated by Psych; Step Father left stated pt needs placement refusing to take child home.     Interval/Overnight events:  No acute complaints  Vitals stable. exam unchanged  Labs and imaging reviewed     ROS:   General: denies fever, chills, insomnia, depression, weight change  Skin: denies itch, jaundice   Resp: denies cough, pleuritic chest pain, wheezing   CV: denies PND  GI: denies N/V/D constipation   : denies d/c, itching, hematuria, dysuria   EXT: denies pain, swelling, erythema   Musculoskeletal: denies low back pain, joint pain, swelling   Neuro: denies headache, weakness, syncope    MEDICATIONS  (STANDING):  diVALproex  milliGRAM(s) Oral at bedtime  diVALproex  milliGRAM(s) Oral at bedtime  diVALproex  milliGRAM(s) Oral daily  diVALproex  milliGRAM(s) Oral daily  lithium CR (ESKALITH-CR) 450 milliGRAM(s) Oral two times a day  melatonin 3 milliGRAM(s) Oral at bedtime  risperiDONE   Tablet 2 milliGRAM(s) Oral two times a day  traZODone 25 milliGRAM(s) Oral daily    MEDICATIONS  (PRN):  acetaminophen     Tablet .. 650 milliGRAM(s) Oral every 6 hours PRN Mild Pain (1 - 3)  traZODone 25 milliGRAM(s) Oral at bedtime PRN breakthrough insomnia    VITALS  T(F): 96.8 (06-12-23 @ 05:00), Max: 96.9 (06-11-23 @ 21:03)  HR: 55 (06-12-23 @ 05:00) (55 - 78)  BP: 90/55 (06-12-23 @ 05:00) (90/55 - 120/72)  RR: 18 (06-12-23 @ 05:00) (18 - 18)  SpO2: --    PHYSICAL EXAM  Gen- NAD, AAOx3  HEENT- no pallor, anicteric, moist mucous membranes  CVS- S1/S2, no m/r/g  Chest- CTA b/l no w/r/c, no use of accessory muscles, good inspiratory effort, speaking in full sentences  Abd- soft, nt, nd  Ext- no edema, pulses intact b/l  Neuro-CN II-XII intact;    LABS/IMAGING

## 2023-06-13 PROCEDURE — 99232 SBSQ HOSP IP/OBS MODERATE 35: CPT

## 2023-06-13 RX ADMIN — RISPERIDONE 2 MILLIGRAM(S): 4 TABLET ORAL at 05:11

## 2023-06-13 RX ADMIN — LITHIUM CARBONATE 450 MILLIGRAM(S): 300 TABLET, EXTENDED RELEASE ORAL at 17:52

## 2023-06-13 RX ADMIN — RISPERIDONE 2 MILLIGRAM(S): 4 TABLET ORAL at 17:52

## 2023-06-13 RX ADMIN — DIVALPROEX SODIUM 250 MILLIGRAM(S): 500 TABLET, DELAYED RELEASE ORAL at 21:35

## 2023-06-13 RX ADMIN — DIVALPROEX SODIUM 500 MILLIGRAM(S): 500 TABLET, DELAYED RELEASE ORAL at 21:35

## 2023-06-13 RX ADMIN — Medication 3 MILLIGRAM(S): at 21:35

## 2023-06-13 RX ADMIN — DIVALPROEX SODIUM 250 MILLIGRAM(S): 500 TABLET, DELAYED RELEASE ORAL at 12:23

## 2023-06-13 RX ADMIN — LITHIUM CARBONATE 450 MILLIGRAM(S): 300 TABLET, EXTENDED RELEASE ORAL at 05:11

## 2023-06-13 RX ADMIN — DIVALPROEX SODIUM 500 MILLIGRAM(S): 500 TABLET, DELAYED RELEASE ORAL at 12:23

## 2023-06-13 RX ADMIN — Medication 25 MILLIGRAM(S): at 19:10

## 2023-06-13 NOTE — PROGRESS NOTE ADULT - SUBJECTIVE AND OBJECTIVE BOX
MEDICINE ATTENDING PROGRESS NOTE  22 years old male past medical history of bipolar and developmental delay came to emergency room for alleged aggressive and violent behavior as per step father. Evaluated by Psych; Step Father left stated pt needs placement refusing to take child home.     Interval/Overnight events:  No acute complaints  Vitals stable. exam unchanged  Labs and imaging reviewed   Medically stable to discharge    ROS:   General: denies fever, chills, insomnia, depression, weight change  Skin: denies itch, jaundice   Resp: denies cough, pleuritic chest pain, wheezing   CV: denies PND  GI: denies N/V/D constipation   : denies d/c, itching, hematuria, dysuria   EXT: denies pain, swelling, erythema   Musculoskeletal: denies low back pain, joint pain, swelling   Neuro: denies headache, weakness, syncope    MEDICATIONS  (STANDING):  diVALproex  milliGRAM(s) Oral daily  diVALproex  milliGRAM(s) Oral daily  diVALproex  milliGRAM(s) Oral at bedtime  diVALproex  milliGRAM(s) Oral at bedtime  lithium CR (ESKALITH-CR) 450 milliGRAM(s) Oral two times a day  melatonin 3 milliGRAM(s) Oral at bedtime  risperiDONE   Tablet 2 milliGRAM(s) Oral two times a day  traZODone 25 milliGRAM(s) Oral daily    MEDICATIONS  (PRN):  acetaminophen     Tablet .. 650 milliGRAM(s) Oral every 6 hours PRN Mild Pain (1 - 3)  traZODone 25 milliGRAM(s) Oral at bedtime PRN breakthrough insomnia    VITALS  T(F): 96.9 (06-13-23 @ 05:00), Max: 98.3 (06-12-23 @ 21:01)  HR: 68 (06-13-23 @ 05:00) (64 - 76)  BP: 110/58 (06-13-23 @ 05:00) (110/58 - 129/80)  RR: 18 (06-13-23 @ 05:00) (18 - 18)  SpO2: 98% (06-13-23 @ 05:00) (98% - 98%)    PHYSICAL EXAM  Gen- NAD, AAOx3  HEENT- no pallor, anicteric, moist mucous membranes  CVS- S1/S2, no m/r/g  Chest- CTA b/l no w/r/c, no use of accessory muscles, good inspiratory effort, speaking in full sentences  Abd- soft, nt, nd  Ext- no edema, pulses intact b/l  Neuro-CN II-XII intact;    LABS/IMAGING

## 2023-06-14 PROCEDURE — 99231 SBSQ HOSP IP/OBS SF/LOW 25: CPT

## 2023-06-14 RX ADMIN — LITHIUM CARBONATE 450 MILLIGRAM(S): 300 TABLET, EXTENDED RELEASE ORAL at 17:24

## 2023-06-14 RX ADMIN — Medication 25 MILLIGRAM(S): at 17:24

## 2023-06-14 RX ADMIN — DIVALPROEX SODIUM 250 MILLIGRAM(S): 500 TABLET, DELAYED RELEASE ORAL at 21:22

## 2023-06-14 RX ADMIN — RISPERIDONE 2 MILLIGRAM(S): 4 TABLET ORAL at 05:34

## 2023-06-14 RX ADMIN — LITHIUM CARBONATE 450 MILLIGRAM(S): 300 TABLET, EXTENDED RELEASE ORAL at 05:34

## 2023-06-14 RX ADMIN — DIVALPROEX SODIUM 500 MILLIGRAM(S): 500 TABLET, DELAYED RELEASE ORAL at 12:06

## 2023-06-14 RX ADMIN — DIVALPROEX SODIUM 250 MILLIGRAM(S): 500 TABLET, DELAYED RELEASE ORAL at 12:06

## 2023-06-14 RX ADMIN — DIVALPROEX SODIUM 500 MILLIGRAM(S): 500 TABLET, DELAYED RELEASE ORAL at 21:22

## 2023-06-14 RX ADMIN — Medication 3 MILLIGRAM(S): at 21:23

## 2023-06-14 RX ADMIN — RISPERIDONE 2 MILLIGRAM(S): 4 TABLET ORAL at 17:24

## 2023-06-14 NOTE — PROGRESS NOTE ADULT - SUBJECTIVE AND OBJECTIVE BOX
SUBJECTIVE:  Seen and examined patient at bedside. No acute issue; patient offers no complaints.     ROS:   CONSTITUTIONAL: No weakness, fevers or chills   EYES/ENT: No visual changes; No vertigo or throat pain   NECK: No pain or stiffness   RESPIRATORY: No cough, wheezing, hemoptysis; No shortness of breath   CARDIOVASCULAR: No chest pain or palpitations   GASTROINTESTINAL: No abdominal or epigastric pain. No nausea, vomiting, or hematemesis; No diarrhea or constipation. No melena or hematochezia.  GENITOURINARY: No dysuria, frequency or hematuria  NEUROLOGICAL: No numbness or weakness      PAST MEDICAL & SURGICAL HISTORY  History of developmental delay    Bipolar disorder    No significant past surgical history      SOCIAL HISTORY:    ALLERGIES:  No Known Allergies    MEDICATIONS:  MEDICATIONS  (STANDING):  diVALproex  milliGRAM(s) Oral at bedtime  diVALproex  milliGRAM(s) Oral at bedtime  diVALproex  milliGRAM(s) Oral daily  diVALproex  milliGRAM(s) Oral daily  lithium CR (ESKALITH-CR) 450 milliGRAM(s) Oral two times a day  melatonin 3 milliGRAM(s) Oral at bedtime  risperiDONE   Tablet 2 milliGRAM(s) Oral two times a day  traZODone 25 milliGRAM(s) Oral daily    MEDICATIONS  (PRN):  acetaminophen     Tablet .. 650 milliGRAM(s) Oral every 6 hours PRN Mild Pain (1 - 3)  traZODone 25 milliGRAM(s) Oral at bedtime PRN breakthrough insomnia        VITALS:   Vital Signs Last 24 Hrs  T(C): 35.7 (14 Jun 2023 05:22), Max: 35.9 (13 Jun 2023 17:54)  T(F): 96.2 (14 Jun 2023 05:22), Max: 96.7 (13 Jun 2023 17:54)  HR: 68 (14 Jun 2023 05:22) (63 - 78)  BP: 92/50 (14 Jun 2023 05:22) (92/50 - 128/80)  BP(mean): --  RR: 18 (14 Jun 2023 05:22) (18 - 18)  SpO2: --      LABS:      RADIOLOGY:    PHYSICAL EXAM:  GEN: No acute distress  HEENT: normocephalic, atraumatic, aniceteric  LUNGS:bl breath sounds  HEART: S1/S2 present. RRR, no murmurs  ABD: Soft, non-tender, non-distended. Bowel sounds present  EXT: no edema

## 2023-06-15 PROCEDURE — 99231 SBSQ HOSP IP/OBS SF/LOW 25: CPT

## 2023-06-15 RX ADMIN — DIVALPROEX SODIUM 250 MILLIGRAM(S): 500 TABLET, DELAYED RELEASE ORAL at 22:05

## 2023-06-15 RX ADMIN — LITHIUM CARBONATE 450 MILLIGRAM(S): 300 TABLET, EXTENDED RELEASE ORAL at 17:37

## 2023-06-15 RX ADMIN — DIVALPROEX SODIUM 250 MILLIGRAM(S): 500 TABLET, DELAYED RELEASE ORAL at 11:20

## 2023-06-15 RX ADMIN — Medication 25 MILLIGRAM(S): at 20:52

## 2023-06-15 RX ADMIN — DIVALPROEX SODIUM 500 MILLIGRAM(S): 500 TABLET, DELAYED RELEASE ORAL at 22:07

## 2023-06-15 RX ADMIN — LITHIUM CARBONATE 450 MILLIGRAM(S): 300 TABLET, EXTENDED RELEASE ORAL at 05:37

## 2023-06-15 RX ADMIN — Medication 3 MILLIGRAM(S): at 22:05

## 2023-06-15 RX ADMIN — RISPERIDONE 2 MILLIGRAM(S): 4 TABLET ORAL at 17:37

## 2023-06-15 RX ADMIN — RISPERIDONE 2 MILLIGRAM(S): 4 TABLET ORAL at 05:37

## 2023-06-15 RX ADMIN — DIVALPROEX SODIUM 500 MILLIGRAM(S): 500 TABLET, DELAYED RELEASE ORAL at 11:20

## 2023-06-15 NOTE — PROGRESS NOTE ADULT - SUBJECTIVE AND OBJECTIVE BOX
SUBJECTIVE:  Seen and examined patient at bedside. No acute issue; patient offers no complaints.     ROS:   CONSTITUTIONAL: No weakness, fevers or chills   EYES/ENT: No visual changes; No vertigo or throat pain   NECK: No pain or stiffness   RESPIRATORY: No cough, wheezing, hemoptysis; No shortness of breath   CARDIOVASCULAR: No chest pain or palpitations   GASTROINTESTINAL: No abdominal or epigastric pain. No nausea, vomiting, or hematemesis; No diarrhea or constipation. No melena or hematochezia.  GENITOURINARY: No dysuria, frequency or hematuria  NEUROLOGICAL: No numbness or weakness      PAST MEDICAL & SURGICAL HISTORY  History of developmental delay    Bipolar disorder    No significant past surgical history      SOCIAL HISTORY:    ALLERGIES:  No Known Allergies    MEDICATIONS:  MEDICATIONS  (STANDING):  diVALproex  milliGRAM(s) Oral at bedtime  diVALproex  milliGRAM(s) Oral at bedtime  diVALproex  milliGRAM(s) Oral daily  diVALproex  milliGRAM(s) Oral daily  lithium CR (ESKALITH-CR) 450 milliGRAM(s) Oral two times a day  melatonin 3 milliGRAM(s) Oral at bedtime  risperiDONE   Tablet 2 milliGRAM(s) Oral two times a day  traZODone 25 milliGRAM(s) Oral daily    MEDICATIONS  (PRN):  acetaminophen     Tablet .. 650 milliGRAM(s) Oral every 6 hours PRN Mild Pain (1 - 3)  traZODone 25 milliGRAM(s) Oral at bedtime PRN breakthrough insomnia        VITALS:   Vital Signs Last 24 Hrs  T(C): 35.6 (15 Mt 2023 05:09), Max: 36.8 (14 Jun 2023 21:18)  T(F): 96 (15 Mt 2023 05:09), Max: 98.3 (14 Jun 2023 21:18)  HR: 74 (15 Mt 2023 05:09) (68 - 76)  BP: 104/65 (15 Mt 2023 05:09) (101/58 - 114/73)  BP(mean): --  RR: 18 (15 Mt 2023 05:09) (18 - 18)  SpO2: --      LABS:      RADIOLOGY:    PHYSICAL EXAM:  GEN: No acute distress  HEENT: normocephalic, atraumatic, aniceteric  LUNGS:bl breath sounds  HEART: S1/S2 present. RRR, no murmurs  ABD: Soft, non-tender, non-distended. Bowel sounds present  EXT: no edema

## 2023-06-16 PROCEDURE — 99231 SBSQ HOSP IP/OBS SF/LOW 25: CPT

## 2023-06-16 RX ADMIN — LITHIUM CARBONATE 450 MILLIGRAM(S): 300 TABLET, EXTENDED RELEASE ORAL at 18:01

## 2023-06-16 RX ADMIN — Medication 25 MILLIGRAM(S): at 18:02

## 2023-06-16 RX ADMIN — DIVALPROEX SODIUM 500 MILLIGRAM(S): 500 TABLET, DELAYED RELEASE ORAL at 11:20

## 2023-06-16 RX ADMIN — RISPERIDONE 2 MILLIGRAM(S): 4 TABLET ORAL at 06:49

## 2023-06-16 RX ADMIN — RISPERIDONE 2 MILLIGRAM(S): 4 TABLET ORAL at 18:01

## 2023-06-16 RX ADMIN — DIVALPROEX SODIUM 500 MILLIGRAM(S): 500 TABLET, DELAYED RELEASE ORAL at 21:12

## 2023-06-16 RX ADMIN — DIVALPROEX SODIUM 250 MILLIGRAM(S): 500 TABLET, DELAYED RELEASE ORAL at 21:12

## 2023-06-16 RX ADMIN — Medication 3 MILLIGRAM(S): at 21:11

## 2023-06-16 RX ADMIN — LITHIUM CARBONATE 450 MILLIGRAM(S): 300 TABLET, EXTENDED RELEASE ORAL at 06:49

## 2023-06-16 RX ADMIN — DIVALPROEX SODIUM 250 MILLIGRAM(S): 500 TABLET, DELAYED RELEASE ORAL at 11:19

## 2023-06-16 NOTE — PROGRESS NOTE ADULT - SUBJECTIVE AND OBJECTIVE BOX
SUBJECTIVE:  Seen and examined patient at bedside. No acute issue; patient offers no complaints.     ROS:   CONSTITUTIONAL: No weakness, fevers or chills   EYES/ENT: No visual changes; No vertigo or throat pain   NECK: No pain or stiffness   RESPIRATORY: No cough, wheezing, hemoptysis; No shortness of breath   CARDIOVASCULAR: No chest pain or palpitations   GASTROINTESTINAL: No abdominal or epigastric pain. No nausea, vomiting, or hematemesis; No diarrhea or constipation. No melena or hematochezia.  GENITOURINARY: No dysuria, frequency or hematuria  NEUROLOGICAL: No numbness or weakness      PAST MEDICAL & SURGICAL HISTORY  History of developmental delay    Bipolar disorder    No significant past surgical history      SOCIAL HISTORY:    ALLERGIES:  No Known Allergies    MEDICATIONS:  MEDICATIONS  (STANDING):  diVALproex  milliGRAM(s) Oral at bedtime  diVALproex  milliGRAM(s) Oral at bedtime  diVALproex  milliGRAM(s) Oral daily  diVALproex  milliGRAM(s) Oral daily  lithium CR (ESKALITH-CR) 450 milliGRAM(s) Oral two times a day  melatonin 3 milliGRAM(s) Oral at bedtime  risperiDONE   Tablet 2 milliGRAM(s) Oral two times a day  traZODone 25 milliGRAM(s) Oral daily    MEDICATIONS  (PRN):  acetaminophen     Tablet .. 650 milliGRAM(s) Oral every 6 hours PRN Mild Pain (1 - 3)  traZODone 25 milliGRAM(s) Oral at bedtime PRN breakthrough insomnia        VITALS:   Vital Signs Last 24 Hrs  T(C): 36.4 (16 Jun 2023 05:02), Max: 36.4 (16 Jun 2023 05:02)  T(F): 97.6 (16 Jun 2023 05:02), Max: 97.6 (16 Jun 2023 05:02)  HR: 60 (16 Jun 2023 05:02) (60 - 87)  BP: 98/56 (16 Jun 2023 05:02) (98/56 - 128/71)  BP(mean): --  RR: 18 (16 Jun 2023 05:02) (18 - 18)  SpO2: 97% (15 Mt 2023 20:30) (97% - 100%)      LABS:      RADIOLOGY:    PHYSICAL EXAM:  GEN: No acute distress  HEENT: normocephalic, atraumatic, aniceteric  LUNGS:bl breath sounds  HEART: S1/S2 present. RRR, no murmurs  ABD: Soft, non-tender, non-distended. Bowel sounds present  EXT: no edema

## 2023-06-17 PROCEDURE — 99231 SBSQ HOSP IP/OBS SF/LOW 25: CPT

## 2023-06-17 RX ADMIN — LITHIUM CARBONATE 450 MILLIGRAM(S): 300 TABLET, EXTENDED RELEASE ORAL at 17:08

## 2023-06-17 RX ADMIN — DIVALPROEX SODIUM 250 MILLIGRAM(S): 500 TABLET, DELAYED RELEASE ORAL at 21:28

## 2023-06-17 RX ADMIN — DIVALPROEX SODIUM 250 MILLIGRAM(S): 500 TABLET, DELAYED RELEASE ORAL at 11:03

## 2023-06-17 RX ADMIN — RISPERIDONE 2 MILLIGRAM(S): 4 TABLET ORAL at 17:07

## 2023-06-17 RX ADMIN — DIVALPROEX SODIUM 500 MILLIGRAM(S): 500 TABLET, DELAYED RELEASE ORAL at 21:27

## 2023-06-17 RX ADMIN — LITHIUM CARBONATE 450 MILLIGRAM(S): 300 TABLET, EXTENDED RELEASE ORAL at 04:54

## 2023-06-17 RX ADMIN — RISPERIDONE 2 MILLIGRAM(S): 4 TABLET ORAL at 04:54

## 2023-06-17 RX ADMIN — DIVALPROEX SODIUM 500 MILLIGRAM(S): 500 TABLET, DELAYED RELEASE ORAL at 11:04

## 2023-06-17 RX ADMIN — Medication 3 MILLIGRAM(S): at 21:28

## 2023-06-17 RX ADMIN — Medication 25 MILLIGRAM(S): at 21:27

## 2023-06-17 NOTE — PROGRESS NOTE ADULT - SUBJECTIVE AND OBJECTIVE BOX
SUBJECTIVE:  Seen and examined patient at bedside. No acute issue; patient offers no complaints.     ROS:   CONSTITUTIONAL: No weakness, fevers or chills   EYES/ENT: No visual changes; No vertigo or throat pain   NECK: No pain or stiffness   RESPIRATORY: No cough, wheezing, hemoptysis; No shortness of breath   CARDIOVASCULAR: No chest pain or palpitations   GASTROINTESTINAL: No abdominal or epigastric pain. No nausea, vomiting, or hematemesis; No diarrhea or constipation. No melena or hematochezia.  GENITOURINARY: No dysuria, frequency or hematuria  NEUROLOGICAL: No numbness or weakness      PAST MEDICAL & SURGICAL HISTORY  History of developmental delay    Bipolar disorder    No significant past surgical history      SOCIAL HISTORY:    ALLERGIES:  No Known Allergies    MEDICATIONS:  MEDICATIONS  (STANDING):  diVALproex  milliGRAM(s) Oral at bedtime  diVALproex  milliGRAM(s) Oral at bedtime  diVALproex  milliGRAM(s) Oral daily  diVALproex  milliGRAM(s) Oral daily  lithium CR (ESKALITH-CR) 450 milliGRAM(s) Oral two times a day  melatonin 3 milliGRAM(s) Oral at bedtime  risperiDONE   Tablet 2 milliGRAM(s) Oral two times a day  traZODone 25 milliGRAM(s) Oral daily    MEDICATIONS  (PRN):  acetaminophen     Tablet .. 650 milliGRAM(s) Oral every 6 hours PRN Mild Pain (1 - 3)  traZODone 25 milliGRAM(s) Oral at bedtime PRN breakthrough insomnia        VITALS:   Vital Signs Last 24 Hrs  T(C): 36.6 (17 Jun 2023 06:35), Max: 36.6 (16 Jun 2023 22:26)  T(F): 97.9 (17 Jun 2023 06:35), Max: 97.9 (16 Jun 2023 22:26)  HR: 61 (17 Jun 2023 06:35) (54 - 67)  BP: 91/54 (17 Jun 2023 06:35) (91/54 - 126/69)  BP(mean): --  RR: 18 (17 Jun 2023 06:35) (18 - 18)  SpO2: 98% (16 Jun 2023 14:02) (98% - 98%)    LABS:      RADIOLOGY:    PHYSICAL EXAM:  GEN: No acute distress  HEENT: normocephalic, atraumatic, aniceteric  LUNGS:bl breath sounds  HEART: S1/S2 present. RRR, no murmurs  ABD: Soft, non-tender, non-distended. Bowel sounds present  EXT: no edema

## 2023-06-18 PROCEDURE — 99231 SBSQ HOSP IP/OBS SF/LOW 25: CPT

## 2023-06-18 RX ADMIN — LITHIUM CARBONATE 450 MILLIGRAM(S): 300 TABLET, EXTENDED RELEASE ORAL at 05:56

## 2023-06-18 RX ADMIN — DIVALPROEX SODIUM 500 MILLIGRAM(S): 500 TABLET, DELAYED RELEASE ORAL at 11:37

## 2023-06-18 RX ADMIN — DIVALPROEX SODIUM 250 MILLIGRAM(S): 500 TABLET, DELAYED RELEASE ORAL at 21:43

## 2023-06-18 RX ADMIN — RISPERIDONE 2 MILLIGRAM(S): 4 TABLET ORAL at 05:56

## 2023-06-18 RX ADMIN — DIVALPROEX SODIUM 500 MILLIGRAM(S): 500 TABLET, DELAYED RELEASE ORAL at 21:43

## 2023-06-18 RX ADMIN — Medication 3 MILLIGRAM(S): at 21:43

## 2023-06-18 RX ADMIN — RISPERIDONE 2 MILLIGRAM(S): 4 TABLET ORAL at 17:58

## 2023-06-18 RX ADMIN — LITHIUM CARBONATE 450 MILLIGRAM(S): 300 TABLET, EXTENDED RELEASE ORAL at 17:41

## 2023-06-18 RX ADMIN — DIVALPROEX SODIUM 250 MILLIGRAM(S): 500 TABLET, DELAYED RELEASE ORAL at 11:37

## 2023-06-18 NOTE — PROGRESS NOTE ADULT - SUBJECTIVE AND OBJECTIVE BOX
SUBJECTIVE:  Seen and examined patient at bedside. No acute issue; patient offers no complaints.     ROS:   CONSTITUTIONAL: No weakness, fevers or chills   EYES/ENT: No visual changes; No vertigo or throat pain   NECK: No pain or stiffness   RESPIRATORY: No cough, wheezing, hemoptysis; No shortness of breath   CARDIOVASCULAR: No chest pain or palpitations   GASTROINTESTINAL: No abdominal or epigastric pain. No nausea, vomiting, or hematemesis; No diarrhea or constipation. No melena or hematochezia.  GENITOURINARY: No dysuria, frequency or hematuria  NEUROLOGICAL: No numbness or weakness      PAST MEDICAL & SURGICAL HISTORY  History of developmental delay    Bipolar disorder    No significant past surgical history      SOCIAL HISTORY:    ALLERGIES:  No Known Allergies    MEDICATIONS:  MEDICATIONS  (STANDING):  diVALproex  milliGRAM(s) Oral at bedtime  diVALproex  milliGRAM(s) Oral at bedtime  diVALproex  milliGRAM(s) Oral daily  diVALproex  milliGRAM(s) Oral daily  lithium CR (ESKALITH-CR) 450 milliGRAM(s) Oral two times a day  melatonin 3 milliGRAM(s) Oral at bedtime  risperiDONE   Tablet 2 milliGRAM(s) Oral two times a day  traZODone 25 milliGRAM(s) Oral daily    MEDICATIONS  (PRN):  acetaminophen     Tablet .. 650 milliGRAM(s) Oral every 6 hours PRN Mild Pain (1 - 3)  traZODone 25 milliGRAM(s) Oral at bedtime PRN breakthrough insomnia        VITALS:   Vital Signs Last 24 Hrs  T(C): 36.4 (18 Jun 2023 04:45), Max: 36.8 (17 Jun 2023 21:00)  T(F): 97.5 (18 Jun 2023 04:45), Max: 98.2 (17 Jun 2023 21:00)  HR: 53 (18 Jun 2023 04:45) (53 - 89)  BP: 109/66 (18 Jun 2023 04:45) (103/69 - 122/77)  BP(mean): --  RR: 18 (18 Jun 2023 04:45) (18 - 18)  SpO2: --      LABS:      RADIOLOGY:    PHYSICAL EXAM:  GEN: No acute distress  HEENT: normocephalic, atraumatic, aniceteric  LUNGS:bl breath sounds  HEART: S1/S2 present. RRR, no murmurs  ABD: Soft, non-tender, non-distended. Bowel sounds present  EXT: no edema

## 2023-06-19 PROCEDURE — 99231 SBSQ HOSP IP/OBS SF/LOW 25: CPT

## 2023-06-19 RX ADMIN — DIVALPROEX SODIUM 250 MILLIGRAM(S): 500 TABLET, DELAYED RELEASE ORAL at 12:32

## 2023-06-19 RX ADMIN — LITHIUM CARBONATE 450 MILLIGRAM(S): 300 TABLET, EXTENDED RELEASE ORAL at 05:16

## 2023-06-19 RX ADMIN — RISPERIDONE 2 MILLIGRAM(S): 4 TABLET ORAL at 17:18

## 2023-06-19 RX ADMIN — DIVALPROEX SODIUM 500 MILLIGRAM(S): 500 TABLET, DELAYED RELEASE ORAL at 21:21

## 2023-06-19 RX ADMIN — Medication 3 MILLIGRAM(S): at 21:14

## 2023-06-19 RX ADMIN — DIVALPROEX SODIUM 250 MILLIGRAM(S): 500 TABLET, DELAYED RELEASE ORAL at 21:21

## 2023-06-19 RX ADMIN — DIVALPROEX SODIUM 500 MILLIGRAM(S): 500 TABLET, DELAYED RELEASE ORAL at 12:32

## 2023-06-19 RX ADMIN — RISPERIDONE 2 MILLIGRAM(S): 4 TABLET ORAL at 05:16

## 2023-06-19 RX ADMIN — LITHIUM CARBONATE 450 MILLIGRAM(S): 300 TABLET, EXTENDED RELEASE ORAL at 17:18

## 2023-06-19 NOTE — PROGRESS NOTE ADULT - SUBJECTIVE AND OBJECTIVE BOX
SUBJECTIVE:  Seen and examined patient at bedside. No acute issue; patient offers no complaints.     ROS:   CONSTITUTIONAL: No weakness, fevers or chills   EYES/ENT: No visual changes; No vertigo or throat pain   NECK: No pain or stiffness   RESPIRATORY: No cough, wheezing, hemoptysis; No shortness of breath   CARDIOVASCULAR: No chest pain or palpitations   GASTROINTESTINAL: No abdominal or epigastric pain. No nausea, vomiting, or hematemesis; No diarrhea or constipation. No melena or hematochezia.  GENITOURINARY: No dysuria, frequency or hematuria  NEUROLOGICAL: No numbness or weakness      PAST MEDICAL & SURGICAL HISTORY  History of developmental delay    Bipolar disorder    No significant past surgical history      SOCIAL HISTORY:    ALLERGIES:  No Known Allergies    MEDICATIONS:  MEDICATIONS  (STANDING):  diVALproex  milliGRAM(s) Oral at bedtime  diVALproex  milliGRAM(s) Oral at bedtime  diVALproex  milliGRAM(s) Oral daily  diVALproex  milliGRAM(s) Oral daily  lithium CR (ESKALITH-CR) 450 milliGRAM(s) Oral two times a day  melatonin 3 milliGRAM(s) Oral at bedtime  risperiDONE   Tablet 2 milliGRAM(s) Oral two times a day  traZODone 25 milliGRAM(s) Oral daily    MEDICATIONS  (PRN):  acetaminophen     Tablet .. 650 milliGRAM(s) Oral every 6 hours PRN Mild Pain (1 - 3)  traZODone 25 milliGRAM(s) Oral at bedtime PRN breakthrough insomnia        VITALS:   Vital Signs Last 24 Hrs  T(C): 36.2 (19 Jun 2023 04:35), Max: 36.9 (18 Jun 2023 14:23)  T(F): 97.1 (19 Jun 2023 04:35), Max: 98.5 (18 Jun 2023 14:23)  HR: 68 (19 Jun 2023 04:35) (68 - 79)  BP: 104/66 (19 Jun 2023 04:35) (104/66 - 111/79)  BP(mean): --  RR: 18 (19 Jun 2023 04:35) (18 - 18)  SpO2: --      LABS:      RADIOLOGY:    PHYSICAL EXAM:  GEN: No acute distress  HEENT: normocephalic, atraumatic, aniceteric  LUNGS:bl breath sounds  HEART: S1/S2 present. RRR, no murmurs  ABD: Soft, non-tender, non-distended. Bowel sounds present  EXT: no edema

## 2023-06-20 PROCEDURE — 99231 SBSQ HOSP IP/OBS SF/LOW 25: CPT

## 2023-06-20 RX ADMIN — LITHIUM CARBONATE 450 MILLIGRAM(S): 300 TABLET, EXTENDED RELEASE ORAL at 05:30

## 2023-06-20 RX ADMIN — DIVALPROEX SODIUM 500 MILLIGRAM(S): 500 TABLET, DELAYED RELEASE ORAL at 21:21

## 2023-06-20 RX ADMIN — Medication 25 MILLIGRAM(S): at 21:20

## 2023-06-20 RX ADMIN — RISPERIDONE 2 MILLIGRAM(S): 4 TABLET ORAL at 05:30

## 2023-06-20 RX ADMIN — DIVALPROEX SODIUM 500 MILLIGRAM(S): 500 TABLET, DELAYED RELEASE ORAL at 11:26

## 2023-06-20 RX ADMIN — RISPERIDONE 2 MILLIGRAM(S): 4 TABLET ORAL at 17:09

## 2023-06-20 RX ADMIN — LITHIUM CARBONATE 450 MILLIGRAM(S): 300 TABLET, EXTENDED RELEASE ORAL at 17:09

## 2023-06-20 RX ADMIN — DIVALPROEX SODIUM 250 MILLIGRAM(S): 500 TABLET, DELAYED RELEASE ORAL at 11:26

## 2023-06-20 RX ADMIN — Medication 3 MILLIGRAM(S): at 21:22

## 2023-06-20 RX ADMIN — DIVALPROEX SODIUM 250 MILLIGRAM(S): 500 TABLET, DELAYED RELEASE ORAL at 21:21

## 2023-06-20 NOTE — PROGRESS NOTE ADULT - SUBJECTIVE AND OBJECTIVE BOX
SUBJECTIVE:  Seen and examined patient at bedside. No acute issue; patient offers no complaints.     ROS:   CONSTITUTIONAL: No weakness, fevers or chills   EYES/ENT: No visual changes; No vertigo or throat pain   NECK: No pain or stiffness   RESPIRATORY: No cough, wheezing, hemoptysis; No shortness of breath   CARDIOVASCULAR: No chest pain or palpitations   GASTROINTESTINAL: No abdominal or epigastric pain. No nausea, vomiting, or hematemesis; No diarrhea or constipation. No melena or hematochezia.  GENITOURINARY: No dysuria, frequency or hematuria  NEUROLOGICAL: No numbness or weakness      PAST MEDICAL & SURGICAL HISTORY  History of developmental delay    Bipolar disorder    No significant past surgical history      SOCIAL HISTORY:    ALLERGIES:  No Known Allergies    MEDICATIONS:  MEDICATIONS  (STANDING):  diVALproex  milliGRAM(s) Oral at bedtime  diVALproex  milliGRAM(s) Oral at bedtime  diVALproex  milliGRAM(s) Oral daily  diVALproex  milliGRAM(s) Oral daily  lithium CR (ESKALITH-CR) 450 milliGRAM(s) Oral two times a day  melatonin 3 milliGRAM(s) Oral at bedtime  risperiDONE   Tablet 2 milliGRAM(s) Oral two times a day  traZODone 25 milliGRAM(s) Oral daily    MEDICATIONS  (PRN):  acetaminophen     Tablet .. 650 milliGRAM(s) Oral every 6 hours PRN Mild Pain (1 - 3)  traZODone 25 milliGRAM(s) Oral at bedtime PRN breakthrough insomnia        VITALS:   Vital Signs Last 24 Hrs  T(C): 35.8 (20 Jun 2023 05:02), Max: 35.8 (20 Jun 2023 05:02)  T(F): 96.5 (20 Jun 2023 05:02), Max: 96.5 (20 Jun 2023 05:02)  HR: 90 (20 Jun 2023 05:02) (78 - 90)  BP: 102/66 (20 Jun 2023 05:02) (102/66 - 120/68)  BP(mean): --  RR: 18 (20 Jun 2023 05:02) (16 - 18)  SpO2: 97% (19 Jun 2023 13:50) (97% - 97%)    Parameters below as of 19 Jun 2023 13:50  Patient On (Oxygen Delivery Method): room air    LABS:      RADIOLOGY:    PHYSICAL EXAM:  GEN: No acute distress  HEENT: normocephalic, atraumatic, aniceteric  LUNGS:bl breath sounds  HEART: S1/S2 present. RRR, no murmurs  ABD: Soft, non-tender, non-distended. Bowel sounds present  EXT: no edema

## 2023-06-21 PROCEDURE — 99231 SBSQ HOSP IP/OBS SF/LOW 25: CPT

## 2023-06-21 RX ORDER — TUBERCULIN PURIFIED PROTEIN DERIVATIVE 5 [IU]/.1ML
5 INJECTION, SOLUTION INTRADERMAL ONCE
Refills: 0 | Status: COMPLETED | OUTPATIENT
Start: 2023-06-21 | End: 2023-06-23

## 2023-06-21 RX ADMIN — DIVALPROEX SODIUM 250 MILLIGRAM(S): 500 TABLET, DELAYED RELEASE ORAL at 11:17

## 2023-06-21 RX ADMIN — RISPERIDONE 2 MILLIGRAM(S): 4 TABLET ORAL at 17:46

## 2023-06-21 RX ADMIN — RISPERIDONE 2 MILLIGRAM(S): 4 TABLET ORAL at 05:38

## 2023-06-21 RX ADMIN — Medication 25 MILLIGRAM(S): at 18:11

## 2023-06-21 RX ADMIN — LITHIUM CARBONATE 450 MILLIGRAM(S): 300 TABLET, EXTENDED RELEASE ORAL at 17:46

## 2023-06-21 RX ADMIN — DIVALPROEX SODIUM 250 MILLIGRAM(S): 500 TABLET, DELAYED RELEASE ORAL at 21:04

## 2023-06-21 RX ADMIN — Medication 3 MILLIGRAM(S): at 21:03

## 2023-06-21 RX ADMIN — LITHIUM CARBONATE 450 MILLIGRAM(S): 300 TABLET, EXTENDED RELEASE ORAL at 05:39

## 2023-06-21 RX ADMIN — DIVALPROEX SODIUM 500 MILLIGRAM(S): 500 TABLET, DELAYED RELEASE ORAL at 11:18

## 2023-06-21 RX ADMIN — DIVALPROEX SODIUM 500 MILLIGRAM(S): 500 TABLET, DELAYED RELEASE ORAL at 21:03

## 2023-06-21 NOTE — PROGRESS NOTE ADULT - SUBJECTIVE AND OBJECTIVE BOX
Patient is a 22y old  Male who presents with a chief complaint of Placement (20 Jun 2023 10:50)      MEDICATIONS  (STANDING):  diVALproex  milliGRAM(s) Oral daily  diVALproex  milliGRAM(s) Oral at bedtime  diVALproex  milliGRAM(s) Oral at bedtime  diVALproex  milliGRAM(s) Oral daily  lithium CR (ESKALITH-CR) 450 milliGRAM(s) Oral two times a day  melatonin 3 milliGRAM(s) Oral at bedtime  risperiDONE   Tablet 2 milliGRAM(s) Oral two times a day  traZODone 25 milliGRAM(s) Oral daily    MEDICATIONS  (PRN):  acetaminophen     Tablet .. 650 milliGRAM(s) Oral every 6 hours PRN Mild Pain (1 - 3)  traZODone 25 milliGRAM(s) Oral at bedtime PRN breakthrough insomnia    PHYSICAL EXAM:  Vital Signs Last 24 Hrs  T(C): 35.9 (21 Jun 2023 04:52), Max: 35.9 (21 Jun 2023 04:52)  T(F): 96.6 (21 Jun 2023 04:52), Max: 96.6 (21 Jun 2023 04:52)  HR: 74 (21 Jun 2023 04:52) (56 - 74)  BP: 91/54 (21 Jun 2023 04:52) (91/54 - 123/71)  BP(mean): --  RR: 18 (21 Jun 2023 04:52) (16 - 18)  SpO2: 100% (20 Jun 2023 13:36) (100% - 100%)    Parameters below as of 20 Jun 2023 13:36  Patient On (Oxygen Delivery Method): room air      GENERAL: No acute distress, well-developed  HEAD:  Atraumatic, Normocephalic  EYES: EOMI, PERRLA, conjunctiva and sclera clear  NECK: Supple, no lymphadenopathy, no JVD  CHEST/LUNG: CTAB; No wheezes, rales, or rhonchi  HEART: Regular rate and rhythm; No murmurs, rubs, or gallops  ABDOMEN: Soft, non-tender, non-distended; normal bowel sounds, no organomegaly  EXTREMITIES:  2+ peripheral pulses b/l, No clubbing, cyanosis, or edema  NEUROLOGY: A&O x 1-2, no focal deficits  SKIN: No rashes or lesions

## 2023-06-22 PROCEDURE — 99231 SBSQ HOSP IP/OBS SF/LOW 25: CPT

## 2023-06-22 RX ADMIN — RISPERIDONE 2 MILLIGRAM(S): 4 TABLET ORAL at 17:30

## 2023-06-22 RX ADMIN — DIVALPROEX SODIUM 250 MILLIGRAM(S): 500 TABLET, DELAYED RELEASE ORAL at 21:12

## 2023-06-22 RX ADMIN — Medication 25 MILLIGRAM(S): at 18:11

## 2023-06-22 RX ADMIN — Medication 25 MILLIGRAM(S): at 17:30

## 2023-06-22 RX ADMIN — DIVALPROEX SODIUM 500 MILLIGRAM(S): 500 TABLET, DELAYED RELEASE ORAL at 11:56

## 2023-06-22 RX ADMIN — Medication 3 MILLIGRAM(S): at 21:12

## 2023-06-22 RX ADMIN — LITHIUM CARBONATE 450 MILLIGRAM(S): 300 TABLET, EXTENDED RELEASE ORAL at 17:30

## 2023-06-22 RX ADMIN — RISPERIDONE 2 MILLIGRAM(S): 4 TABLET ORAL at 05:08

## 2023-06-22 RX ADMIN — DIVALPROEX SODIUM 500 MILLIGRAM(S): 500 TABLET, DELAYED RELEASE ORAL at 21:12

## 2023-06-22 RX ADMIN — DIVALPROEX SODIUM 250 MILLIGRAM(S): 500 TABLET, DELAYED RELEASE ORAL at 11:56

## 2023-06-22 RX ADMIN — LITHIUM CARBONATE 450 MILLIGRAM(S): 300 TABLET, EXTENDED RELEASE ORAL at 05:08

## 2023-06-22 NOTE — PROGRESS NOTE ADULT - SUBJECTIVE AND OBJECTIVE BOX
Patient is a 22y old  Male who presents with a chief complaint of Placement       MEDICATIONS  (STANDING):  diVALproex  milliGRAM(s) Oral daily  diVALproex  milliGRAM(s) Oral at bedtime  diVALproex  milliGRAM(s) Oral at bedtime  diVALproex  milliGRAM(s) Oral daily  lithium CR (ESKALITH-CR) 450 milliGRAM(s) Oral two times a day  melatonin 3 milliGRAM(s) Oral at bedtime  PPD  5 Tuberculin Unit(s) Injectable 5 Unit(s) IntraDermal once  risperiDONE   Tablet 2 milliGRAM(s) Oral two times a day  traZODone 25 milliGRAM(s) Oral daily    MEDICATIONS  (PRN):  acetaminophen     Tablet .. 650 milliGRAM(s) Oral every 6 hours PRN Mild Pain (1 - 3)  traZODone 25 milliGRAM(s) Oral at bedtime PRN breakthrough insomnia      CAPILLARY BLOOD GLUCOSE        I&O's Summary      PHYSICAL EXAM:  Vital Signs Last 24 Hrs  T(C): 36.2 (22 Jun 2023 04:45), Max: 36.2 (22 Jun 2023 04:45)  T(F): 97.1 (22 Jun 2023 04:45), Max: 97.1 (22 Jun 2023 04:45)  HR: 72 (22 Jun 2023 04:45) (72 - 74)  BP: 90/63 (22 Jun 2023 04:45) (90/63 - 120/88)  BP(mean): --  RR: 18 (22 Jun 2023 04:45) (16 - 18)  SpO2: 99% (22 Jun 2023 04:45) (98% - 99%)    Parameters below as of 21 Jun 2023 14:21  Patient On (Oxygen Delivery Method): room air        GENERAL: No acute distress, well-developed  HEAD:  Atraumatic, Normocephalic  EYES: EOMI, PERRLA, conjunctiva and sclera clear  NECK: Supple, no lymphadenopathy, no JVD  CHEST/LUNG: CTAB; No wheezes, rales, or rhonchi  HEART: Regular rate and rhythm; No murmurs, rubs, or gallops  ABDOMEN: Soft, non-tender, non-distended; normal bowel sounds, no organomegaly  EXTREMITIES:  2+ peripheral pulses b/l, No clubbing, cyanosis, or edema  NEUROLOGY: A&O x 1-2, no focal deficits  SKIN: No rashes or lesions  LABS:              RADIOLOGY & ADDITIONAL TESTS:  Results Reviewed:   Imaging Personally Reviewed:  Electrocardiogram Personally Reviewed:    COORDINATION OF CARE:  Care Discussed with Consultants/Other Providers [Y/N]:  Prior or Outpatient Records Reviewed [Y/N]:

## 2023-06-22 NOTE — PROGRESS NOTE ADULT - ASSESSMENT
22 years old male past medical history of bipolar and developmental delay came to emergency room for alleged aggressive and violent behavior as per step father. Evaluated by Psych; Step Father left stated pt needs placement refusing to take child home.     #Bipolar disorder and likely developmental disorder- stable  - continue current meds (on Depakote, risperidone, and lithium)   - pt has been stable without aggressive behaviour in the hospital  - father states he cant take care of pt  - awaiting placement to a group home    #Depressed mood  - cont w lithium, valproic acid and risperidone   - psych recs appreciated    #normocytic anemia  - Hb stable  - can obtain labs only if indicated    GI PPX: not Indicated   DVT px - pt ambulatory   Full code     DISPO: pending group home placement

## 2023-06-23 PROCEDURE — 99231 SBSQ HOSP IP/OBS SF/LOW 25: CPT

## 2023-06-23 RX ADMIN — DIVALPROEX SODIUM 500 MILLIGRAM(S): 500 TABLET, DELAYED RELEASE ORAL at 11:58

## 2023-06-23 RX ADMIN — DIVALPROEX SODIUM 250 MILLIGRAM(S): 500 TABLET, DELAYED RELEASE ORAL at 21:31

## 2023-06-23 RX ADMIN — DIVALPROEX SODIUM 500 MILLIGRAM(S): 500 TABLET, DELAYED RELEASE ORAL at 21:31

## 2023-06-23 RX ADMIN — LITHIUM CARBONATE 450 MILLIGRAM(S): 300 TABLET, EXTENDED RELEASE ORAL at 17:59

## 2023-06-23 RX ADMIN — Medication 3 MILLIGRAM(S): at 21:32

## 2023-06-23 RX ADMIN — Medication 25 MILLIGRAM(S): at 21:31

## 2023-06-23 RX ADMIN — LITHIUM CARBONATE 450 MILLIGRAM(S): 300 TABLET, EXTENDED RELEASE ORAL at 05:26

## 2023-06-23 RX ADMIN — DIVALPROEX SODIUM 250 MILLIGRAM(S): 500 TABLET, DELAYED RELEASE ORAL at 11:58

## 2023-06-23 RX ADMIN — RISPERIDONE 2 MILLIGRAM(S): 4 TABLET ORAL at 05:26

## 2023-06-23 RX ADMIN — RISPERIDONE 2 MILLIGRAM(S): 4 TABLET ORAL at 17:59

## 2023-06-23 RX ADMIN — TUBERCULIN PURIFIED PROTEIN DERIVATIVE 5 UNIT(S): 5 INJECTION, SOLUTION INTRADERMAL at 15:11

## 2023-06-23 NOTE — PROGRESS NOTE ADULT - SUBJECTIVE AND OBJECTIVE BOX
Patient is a 22y old  Male who presents with a chief complaint of Placement (22 Jun 2023 10:32)        MEDICATIONS  (STANDING):  diVALproex  milliGRAM(s) Oral at bedtime  diVALproex  milliGRAM(s) Oral at bedtime  diVALproex  milliGRAM(s) Oral daily  diVALproex  milliGRAM(s) Oral daily  lithium CR (ESKALITH-CR) 450 milliGRAM(s) Oral two times a day  melatonin 3 milliGRAM(s) Oral at bedtime  PPD  5 Tuberculin Unit(s) Injectable 5 Unit(s) IntraDermal once  risperiDONE   Tablet 2 milliGRAM(s) Oral two times a day  traZODone 25 milliGRAM(s) Oral daily    MEDICATIONS  (PRN):  acetaminophen     Tablet .. 650 milliGRAM(s) Oral every 6 hours PRN Mild Pain (1 - 3)  traZODone 25 milliGRAM(s) Oral at bedtime PRN breakthrough insomnia        PHYSICAL EXAM:  Vital Signs Last 24 Hrs  T(C): 35.7 (23 Jun 2023 05:06), Max: 36.8 (22 Jun 2023 21:12)  T(F): 96.3 (23 Jun 2023 05:06), Max: 98.3 (22 Jun 2023 21:12)  HR: 72 (23 Jun 2023 05:06) (72 - 87)  BP: 111/65 (23 Jun 2023 05:06) (111/65 - 133/87)  BP(mean): --  RR: 18 (23 Jun 2023 05:06) (18 - 18)  SpO2: 99% (22 Jun 2023 21:12) (99% - 99%)        GENERAL: No acute distress, well-developed  HEAD:  Atraumatic, Normocephalic  EYES: EOMI, PERRLA, conjunctiva and sclera clear  NECK: Supple, no lymphadenopathy, no JVD  CHEST/LUNG: CTAB; No wheezes, rales, or rhonchi  HEART: Regular rate and rhythm; No murmurs, rubs, or gallops  ABDOMEN: Soft, non-tender, non-distended; normal bowel sounds, no organomegaly  EXTREMITIES:  2+ peripheral pulses b/l, No clubbing, cyanosis, or edema  NEUROLOGY: A&O x 1-2, no focal deficits  SKIN: No rashes or lesions    LABS:

## 2023-06-23 NOTE — PROGRESS NOTE ADULT - ASSESSMENT
22 years old male past medical history of bipolar and developmental delay came to emergency room for alleged aggressive and violent behavior as per step father. Evaluated by Psych; Step Father left stated pt needs placement refusing to take child home.     #Bipolar disorder and likely developmental disorder- stable  - continue current meds (on Depakote, risperidone, and lithium)   - pt has been stable without aggressive behaviour in the hospital  - father states he cant take care of pt  - awaiting placement to a group home    #Depressed mood  - cont w lithium, valproic acid and risperidone   **Check Levels if pt becomes obtunded/lethargic***  - psych recs appreciated    #normocytic anemia  - Hb stable  - can obtain labs only if indicated    GI PPX: not Indicated   DVT px - pt ambulatory   Full code     DISPO: pending group home placement

## 2023-06-24 PROCEDURE — 99231 SBSQ HOSP IP/OBS SF/LOW 25: CPT

## 2023-06-24 RX ADMIN — LITHIUM CARBONATE 450 MILLIGRAM(S): 300 TABLET, EXTENDED RELEASE ORAL at 05:37

## 2023-06-24 RX ADMIN — DIVALPROEX SODIUM 500 MILLIGRAM(S): 500 TABLET, DELAYED RELEASE ORAL at 12:55

## 2023-06-24 RX ADMIN — DIVALPROEX SODIUM 250 MILLIGRAM(S): 500 TABLET, DELAYED RELEASE ORAL at 12:55

## 2023-06-24 RX ADMIN — DIVALPROEX SODIUM 250 MILLIGRAM(S): 500 TABLET, DELAYED RELEASE ORAL at 21:19

## 2023-06-24 RX ADMIN — RISPERIDONE 2 MILLIGRAM(S): 4 TABLET ORAL at 17:23

## 2023-06-24 RX ADMIN — LITHIUM CARBONATE 450 MILLIGRAM(S): 300 TABLET, EXTENDED RELEASE ORAL at 17:23

## 2023-06-24 RX ADMIN — Medication 3 MILLIGRAM(S): at 21:19

## 2023-06-24 RX ADMIN — Medication 25 MILLIGRAM(S): at 20:05

## 2023-06-24 RX ADMIN — DIVALPROEX SODIUM 500 MILLIGRAM(S): 500 TABLET, DELAYED RELEASE ORAL at 21:19

## 2023-06-24 RX ADMIN — RISPERIDONE 2 MILLIGRAM(S): 4 TABLET ORAL at 05:37

## 2023-06-24 NOTE — PROGRESS NOTE ADULT - SUBJECTIVE AND OBJECTIVE BOX
Patient is a 22y old  Male who presents with a chief complaint of Placement (23 Jun 2023 08:38)        MEDICATIONS  (STANDING):  diVALproex  milliGRAM(s) Oral daily  diVALproex  milliGRAM(s) Oral daily  diVALproex  milliGRAM(s) Oral at bedtime  diVALproex  milliGRAM(s) Oral at bedtime  lithium CR (ESKALITH-CR) 450 milliGRAM(s) Oral two times a day  melatonin 3 milliGRAM(s) Oral at bedtime  risperiDONE   Tablet 2 milliGRAM(s) Oral two times a day  traZODone 25 milliGRAM(s) Oral daily    MEDICATIONS  (PRN):  acetaminophen     Tablet .. 650 milliGRAM(s) Oral every 6 hours PRN Mild Pain (1 - 3)  traZODone 25 milliGRAM(s) Oral at bedtime PRN breakthrough insomnia        PHYSICAL EXAM:  Vital Signs Last 24 Hrs  T(C): 37.6 (24 Jun 2023 04:58), Max: 37.6 (24 Jun 2023 04:58)  T(F): 99.6 (24 Jun 2023 04:58), Max: 99.6 (24 Jun 2023 04:58)  HR: 70 (24 Jun 2023 04:58) (70 - 88)  BP: 96/56 (24 Jun 2023 04:58) (95/59 - 96/56)  BP(mean): --  RR: 18 (24 Jun 2023 04:58) (18 - 18)  SpO2: --        GENERAL: No acute distress, well-developed  HEAD:  Atraumatic, Normocephalic  EYES: EOMI, PERRLA, conjunctiva and sclera clear  NECK: Supple, no lymphadenopathy, no JVD  CHEST/LUNG: CTAB; No wheezes, rales, or rhonchi  HEART: Regular rate and rhythm; No murmurs, rubs, or gallops  ABDOMEN: Soft, non-tender, non-distended; normal bowel sounds, no organomegaly  EXTREMITIES:  2+ peripheral pulses b/l, No clubbing, cyanosis, or edema  NEUROLOGY: A&O x 1-2, no focal deficits  SKIN: No rashes or lesions    LABS:

## 2023-06-25 PROCEDURE — 99231 SBSQ HOSP IP/OBS SF/LOW 25: CPT

## 2023-06-25 RX ADMIN — DIVALPROEX SODIUM 500 MILLIGRAM(S): 500 TABLET, DELAYED RELEASE ORAL at 11:52

## 2023-06-25 RX ADMIN — LITHIUM CARBONATE 450 MILLIGRAM(S): 300 TABLET, EXTENDED RELEASE ORAL at 06:08

## 2023-06-25 RX ADMIN — TUBERCULIN PURIFIED PROTEIN DERIVATIVE 5 UNIT(S): 5 INJECTION, SOLUTION INTRADERMAL at 17:43

## 2023-06-25 RX ADMIN — RISPERIDONE 2 MILLIGRAM(S): 4 TABLET ORAL at 17:34

## 2023-06-25 RX ADMIN — LITHIUM CARBONATE 450 MILLIGRAM(S): 300 TABLET, EXTENDED RELEASE ORAL at 17:33

## 2023-06-25 RX ADMIN — RISPERIDONE 2 MILLIGRAM(S): 4 TABLET ORAL at 06:08

## 2023-06-25 RX ADMIN — DIVALPROEX SODIUM 250 MILLIGRAM(S): 500 TABLET, DELAYED RELEASE ORAL at 11:52

## 2023-06-25 RX ADMIN — DIVALPROEX SODIUM 250 MILLIGRAM(S): 500 TABLET, DELAYED RELEASE ORAL at 21:48

## 2023-06-25 RX ADMIN — Medication 3 MILLIGRAM(S): at 21:48

## 2023-06-25 RX ADMIN — DIVALPROEX SODIUM 500 MILLIGRAM(S): 500 TABLET, DELAYED RELEASE ORAL at 21:49

## 2023-06-25 NOTE — PROGRESS NOTE ADULT - SUBJECTIVE AND OBJECTIVE BOX
Patient is a 22y old  Male who presents with a chief complaint of Placement       MEDICATIONS  (STANDING):  diVALproex  milliGRAM(s) Oral daily  diVALproex  milliGRAM(s) Oral at bedtime  diVALproex  milliGRAM(s) Oral at bedtime  diVALproex  milliGRAM(s) Oral daily  lithium CR (ESKALITH-CR) 450 milliGRAM(s) Oral two times a day  melatonin 3 milliGRAM(s) Oral at bedtime  risperiDONE   Tablet 2 milliGRAM(s) Oral two times a day  traZODone 25 milliGRAM(s) Oral daily    MEDICATIONS  (PRN):  acetaminophen     Tablet .. 650 milliGRAM(s) Oral every 6 hours PRN Mild Pain (1 - 3)  traZODone 25 milliGRAM(s) Oral at bedtime PRN breakthrough insomnia      CAPILLARY BLOOD GLUCOSE    I&O's Summary      PHYSICAL EXAM:  Vital Signs Last 24 Hrs  T(C): 36.1 (25 Jun 2023 05:25), Max: 36.6 (24 Jun 2023 21:03)  T(F): 97 (25 Jun 2023 05:25), Max: 97.9 (24 Jun 2023 21:03)  HR: 59 (25 Jun 2023 05:25) (59 - 88)  BP: 108/56 (25 Jun 2023 05:25) (108/56 - 117/72)  BP(mean): --  RR: 18 (25 Jun 2023 05:25) (18 - 18)  SpO2: 100% (24 Jun 2023 21:03) (100% - 100%)    Parameters below as of 24 Jun 2023 21:03  Patient On (Oxygen Delivery Method): room air        GENERAL: No acute distress, well-developed  HEAD:  Atraumatic, Normocephalic  EYES: EOMI, PERRLA, conjunctiva and sclera clear  NECK: Supple, no lymphadenopathy, no JVD  CHEST/LUNG: CTAB; No wheezes, rales, or rhonchi  HEART: Regular rate and rhythm; No murmurs, rubs, or gallops  ABDOMEN: Soft, non-tender, non-distended; normal bowel sounds, no organomegaly  EXTREMITIES:  2+ peripheral pulses b/l, No clubbing, cyanosis, or edema  NEUROLOGY: A&O x 3, no focal deficits  SKIN: No rashes or lesions    LABS:

## 2023-06-25 NOTE — CHART NOTE - NSCHARTNOTESELECT_GEN_ALL_CORE
Event Note
RD Limited/Event Note
Event Note
Nutrition - RD Limited Note/Event Note
Nutrition - RD/Event Note
Nutrition - RD/Event Note
RD Limited Note/Event Note
RRT/Event Note

## 2023-06-25 NOTE — CHART NOTE - NSCHARTNOTEFT_GEN_A_CORE
Brief Nutrition Note      Pt is 21 year old male with hx of bipolar disorder with developmental delay presents with aggressive behavior family requesting placement. Pt remains on a regular diet with adequate po intake of >75% of all meals. No new labs. RD to continue to monitor tolerance to po diet, labs/meds, NFPF and f/u as needed within 14 days.
Brief Nutrition Note      Pt is 22 year old male with hx of bipolar disorder with developmental delay presents with aggressive behavior family requesting placement. Pt remains on a regular diet with adequate po intake of >75% of all meals. No new labs. Pt found ambulating on unit.  no recent labs drawn  MEDICATIONS  (STANDING):  diVALproex  milliGRAM(s) Oral daily  diVALproex  milliGRAM(s) Oral daily  diVALproex  milliGRAM(s) Oral at bedtime  diVALproex  milliGRAM(s) Oral at bedtime  lithium CR (ESKALITH-CR) 450 milliGRAM(s) Oral two times a day  risperiDONE   Tablet 2 milliGRAM(s) Oral two times a day    MEDICATIONS  (PRN):  hydrOXYzine hydrochloride 25 milliGRAM(s) Oral at bedtime PRN insomnia      most recent weight  2/21: 107.2 kgs, weight continues to gradually trend up from admission. Continue to encourage ambulating on the unit.  RD to monitor tolerance to po diet, labs/meds, NFPF and f/u as needed within 10-14 days
Brief Nutrition Note      Pt is 22 year old male with hx of bipolar disorder with developmental delay presents with aggressive behavior family requesting placement. Pt remains on a regular diet with adequate po intake of >75% of all meals. No new labs. RD obtained weight via bedscale of 106 kgs vs 107.2 kgs on 2/21/23. Pt is encouraged to ambulate on the unit to aid with weight management. Pt offers no dietary concerns at this time, enjoys most of his meals.    03-17    139  |  102  |  8<L>  ----------------------------<  108<H>  4.2   |  24  |  0.8    Ca    10.2      17 Mar 2023 08:56  Phos  4.7     03-17  Mg     2.0     03-17    TPro  7.9  /  Alb  4.8  /  TBili  0.3  /  DBili  x   /  AST  29  /  ALT  39<H>  /  AlkPhos  85  03-17                          13.7   8.83  )-----------( 263      ( 17 Mar 2023 08:56 )             40.1      MEDICATIONS  (STANDING):  diVALproex  milliGRAM(s) Oral daily  diVALproex  milliGRAM(s) Oral daily  diVALproex  milliGRAM(s) Oral at bedtime  diVALproex  milliGRAM(s) Oral at bedtime  lithium CR (ESKALITH-CR) 450 milliGRAM(s) Oral two times a day  melatonin 3 milliGRAM(s) Oral at bedtime  risperiDONE   Tablet 2 milliGRAM(s) Oral two times a day  traZODone 25 milliGRAM(s) Oral daily    MEDICATIONS  (PRN):  traZODone 25 milliGRAM(s) Oral at bedtime PRN breakthrough insomnia      Maintain on present diet order. RD to monitor tolerance to po diet, labs/meds, NFPF and f/u as needed within 14 days  low risk
Brief Nutrition Note      Pt is 22 year old male with hx of bipolar disorder with developmental delay presents with aggressive behavior family requesting placement. Pt remains on a regular diet with adequate po intake of >75% of all meals. No new labs. RD obtained weight via bedscale of 107 kgs vs 106 kgs on 3/17/23. Pt is encouraged to ambulate on the unit to aid with weight management. Pt offers no dietary concerns at this time, enjoys most of his meals.    no new labs    MEDICATIONS  (STANDING):  diVALproex  milliGRAM(s) Oral daily  diVALproex  milliGRAM(s) Oral at bedtime  diVALproex  milliGRAM(s) Oral at bedtime  diVALproex  milliGRAM(s) Oral daily  lithium CR (ESKALITH-CR) 450 milliGRAM(s) Oral two times a day  melatonin 3 milliGRAM(s) Oral at bedtime  risperiDONE   Tablet 2 milliGRAM(s) Oral two times a day  traZODone 25 milliGRAM(s) Oral daily    MEDICATIONS  (PRN):  traZODone 25 milliGRAM(s) Oral at bedtime PRN breakthrough insomnia      Maintain on present diet order. RD to monitor tolerance to po diet, labs/meds, NFPF and f/u as needed within 14 days  Low risk
Brief Nutrition Note    Per H&P, pt is a 22 year old male with PMHx of bipolar and developmental delay comes to emergency room for alleged aggressive and violent behavior as per step father.  Evaluated by Psych Tele in ED - cleared no need for inpatient evaluation. Step Father left stating needs placement refusing to take child home.     Per Hospitalist note on 11/25, DISPO: pending placement.    Weight is 94.8 KG; BMI 33.8 (obese range). Current diet order is regular. Pt continue to have good appetite. Consuming % of meals provided in-house. No chewing or swallowing difficulties noted. No nausea or vomiting reported. Skin is intact; no pressure injuries noted. No edema noted per flowsheet. Last BM not documented; no GI s/s per flowsheet.    Pt assessed to be at low nutrition risk; will f/u in 10 days.    RD to remain available: Chitra Farah [phone]
Brief Nutrition Note    Pt is 21 year old male with hx of bipolar disorder with developmental delay presents with aggressive behavior family requesting placement. Pt remains on a regular diet with adequate po intake of >75% of all meals. weight upon admission was 93.6 kgs vs 99 kgs today. No new labs. RD to continue to monitor tolerance to po diet, labs/meds, NFPF and f/u as needed within 14 days
Brief Nutrition Note:      Pt is 22 year old male with hx of bipolar disorder with developmental delay presents with aggressive behavior family requesting placement. Pt remains on a regular diet with adequate po intake of >75% of all meals. No new labs. Pt found ambulating on unit    MEDICATIONS  (STANDING):  diVALproex  milliGRAM(s) Oral daily  diVALproex  milliGRAM(s) Oral daily  diVALproex  milliGRAM(s) Oral at bedtime  diVALproex  milliGRAM(s) Oral at bedtime  lithium CR (ESKALITH-CR) 450 milliGRAM(s) Oral two times a day  risperiDONE   Tablet 2 milliGRAM(s) Oral two times a day    MEDICATIONS  (PRN):  hydrOXYzine hydrochloride 25 milliGRAM(s) Oral at bedtime PRN insomnia        Last weight taken 1/6/23 via bedscale of 103 kgs, Gradual increase since admission of ~20#. Daily ambulation on unit encouraged. RD to continue to monitor tolerance to po diet, labs/meds, NFPF and f/u as needed within 14 days.
Brief Nutrition Note:    22 years old male past medical history of bipolar and developmental delay came to emergency room for alleged aggressive and violent behavior as per step father. Evaluated by Psych; Step Father left stated pt needs placement refusing to take child home.   Bipolar disorder and likely developmental disorder- stable    patient with ongoing adequate appetite/ PO intake during admission  consuming % of meal trays on average   No new labs available    MEDICATIONS  (STANDING):  diVALproex  milliGRAM(s) Oral daily  diVALproex  milliGRAM(s) Oral daily  diVALproex  milliGRAM(s) Oral at bedtime  diVALproex  milliGRAM(s) Oral at bedtime  lithium CR (ESKALITH-CR) 450 milliGRAM(s) Oral two times a day  risperiDONE   Tablet 2 milliGRAM(s) Oral two times a day    MEDICATIONS  (PRN):  hydrOXYzine hydrochloride 25 milliGRAM(s) Oral at bedtime PRN insomnia    167.6cm  94.8kg  33.8kg/m2  Last weight taken 1/6/23 via bedscale of 103 kgs, Gradual increase since admission of ~20#    RD to continue to monitor tolerance to po diet, labs/meds, NFPF and f/u as needed within 14 days.
Called in by RN stating patient is having tremors   Saw the patient at bedside   Patient is having his Lunch  Denies any acute symptoms    He is upset that he is still on the group chat with his father   Patient has action tremors   Tremors are likely secondary to nervousness   Will monitor
Limited Note:    22 years old male past medical history of bipolar and developmental delay came to emergency room for alleged aggressive and violent behavior as per step father. Evaluated by Psych; Step Father left stated pt needs placement refusing to take child home.   Pending placement    Diet: Regular since admission 9/6/22  patient consuming >75% of meals ongoing, no trouble chewing/swallowing  no concerns with meals. overall good appetite/ PO intake meeting >75% estimated energy needs     Anthropometrics:  167.6cm  94.8kg admission weight   33.8kg/m2    current admission weights (kg):   107.2 2/21/23  103 1/6/23  94.8 9/13/22  patient with gradual weight gain since admission, suspect due to psych meds and decrease in ambulation   LABS: No new labs available since 3/17     MEDICATIONS  (STANDING):  diVALproex  milliGRAM(s) Oral daily  diVALproex  milliGRAM(s) Oral daily  diVALproex  milliGRAM(s) Oral at bedtime  diVALproex  milliGRAM(s) Oral at bedtime  lithium CR (ESKALITH-CR) 450 milliGRAM(s) Oral two times a day  melatonin 3 milliGRAM(s) Oral at bedtime  risperiDONE   Tablet 2 milliGRAM(s) Oral two times a day  traZODone 25 milliGRAM(s) Oral daily    MEDICATIONS  (PRN):  traZODone 25 milliGRAM(s) Oral at bedtime PRN breakthrough insomnia      continue regular diet/thin liquids   encourage ambulation  RD to monitor tolerance to po diet, labs/meds, NFPF  No nutrition risk, will follow up within 14days
Limited Note:    Medical:  22 years old male past medical history of bipolar and developmental delay came to emergency room for alleged aggressive and violent behavior as per step father. Evaluated by Psych; Step Father left stated pt needs placement refusing to take child home.   DISPO: pending placement    Subjective Information:   Pt w adequate appetite / PO intake during admission, completing % of meal trays on average throughout.    Diet Order:  Regular    Labs:  1/21 RBC 4.59 (L)    Appearance: well nourished  Cognition:  94.8kg  167.6cm  BMI: 33.8kg/m2  no weight loss    Skin: No pressure injuries  Edema:    Continue current diet order at this time    No nutrition interventions warranted at this time  patient is not at nutrition risk, will follow up PRN/ if consulted again  Fadia Freitas, #[phone] or via TEAMS
PPD placed on right forearm on 6/23/23, to be read within 48-72 hours.
Patient is 23 yo male with PMHx of bipolar and developmental delay - came to emergency room for alleged aggressive and violent behavior as per step father. Evaluated by Psych; Step father left stated pt needed placement refusing to take child home.    Patient continues on regular diet - noted with good PO intake (%) of meals.     Dispo: pending group home placement.     Patient is at low nutrition risk at this time. RD to f/u in 10-14 days or PRN    RD to remain available: Pat Sheth, SULEIMAN x [phone] or via Teams
Per H&P, pt is a 20 y/o male with PMHx of bipolar and developmental delay. Came to emergency room for alleged aggressive and violent behavior as per stepfather. Evaluated by Psych; Step Father left stated pt needs placement refusing to take child home.     Per MD note on 11/11, pending placement.    Dosing weight is 94.8 KG; BMI 33.8 (obese range). Current active diet order is regular. Pt has good appetite; consuming % of meals provided in-house. No chewing or swallowing difficulties noted. No nausea or vomiting reported. Skin is intact; no pressure injuries. No edema noted. Last BM not documented per flowsheet.     Pt assessed to be at low nutrition risk. Will follow up in 10 days or c/s RD prn.    RD to remain available: [id number]
RD f/u on Patient's PO intake. Patient in previous days with good PO intake >75% of meals. This morning, patient had decreased PO intake at breakfast meal per PCA. He reports not liking the scrambled eggs and he does not eat pork. RD to communicate patient's preference to kitchen staff. No other concerns at this time. Patient's EMR reviewed including labs, medications. Patient continues to await placement. He is at low nutrition risk at this time.     RD to f/u in 7-10 days or PRN.     RD to remain available: [phone]
placed BH consult and spoke w/ psychiatrist on-call today, who agrees to assess pt
Brief Nutrition Note      Pt is 21 year old male with hx of bipolar disorder with developmental delay presents with aggressive behavior family requesting placement. Pt remains on a regular diet with adequate po intake of >75% of all meals. No new labs.    MEDICATIONS  (STANDING):  diVALproex  milliGRAM(s) Oral daily  diVALproex  milliGRAM(s) Oral daily  diVALproex  milliGRAM(s) Oral at bedtime  diVALproex  milliGRAM(s) Oral at bedtime  lithium CR (ESKALITH-CR) 450 milliGRAM(s) Oral two times a day  risperiDONE   Tablet 2 milliGRAM(s) Oral two times a day    MEDICATIONS  (PRN):  hydrOXYzine hydrochloride 25 milliGRAM(s) Oral at bedtime PRN insomnia      Today's weight taken via bedscale is 103 kgs, Gradual increase since admission of ~20#. Encourage daily ambulation on unit.  RD to continue to monitor tolerance to po diet, labs/meds, NFPF and f/u as needed within 14 days.
Limited Note:    22 years old male past medical history of bipolar and developmental delay came to emergency room for alleged aggressive and violent behavior as per step father. Evaluated by Psych; Step Father left stated pt needs placement refusing to take child home.   Pending placement ongoing    Diet: Regular since admission 9/6/22  patient consuming >75% of meals ongoing 3x/daily, no trouble chewing/swallowing  no concerns with meals. overall good appetite/ PO intake meeting >75% estimated energy needs     Anthropometrics:  167.6cm  94.8kg admission weight   33.8kg/m2    current admission weights (kg):   107.2 2/21/23  103 1/6/23  94.8 9/13/22  patient with gradual weight gain since admission  as previous mentioned suspect due to psych meds and decrease in ambulation        MEDICATIONS  (STANDING):  diVALproex  milliGRAM(s) Oral at bedtime  diVALproex  milliGRAM(s) Oral at bedtime  diVALproex  milliGRAM(s) Oral daily  diVALproex  milliGRAM(s) Oral daily  lithium CR (ESKALITH-CR) 450 milliGRAM(s) Oral two times a day  melatonin 3 milliGRAM(s) Oral at bedtime  risperiDONE   Tablet 2 milliGRAM(s) Oral two times a day  traZODone 25 milliGRAM(s) Oral daily    MEDICATIONS  (PRN):  acetaminophen     Tablet .. 650 milliGRAM(s) Oral every 6 hours PRN Mild Pain (1 - 3)  traZODone 25 milliGRAM(s) Oral at bedtime PRN breakthrough insomnia      continue regular diet/thin liquids   No nutrition risk, will follow up within 14days
PPD on right forearm read on 6/25/2023 and is negative with 0 mm induration. Dr. Santos notified and aware.
RD screen related to length of stay. 23 yo patient here for placement related to pmhx of bipolar disorder, developmental delay with alleged aggressive and violent behavior. Pt on regular diet, good PO intake, % of meals. Meds and labs reviewed. No nutrition interventions indicated at this time, will re-screen x 10 days or follow-up PRN.

## 2023-06-26 PROCEDURE — 99231 SBSQ HOSP IP/OBS SF/LOW 25: CPT

## 2023-06-26 RX ORDER — DIVALPROEX SODIUM 500 MG/1
1 TABLET, DELAYED RELEASE ORAL
Qty: 30 | Refills: 1
Start: 2023-06-26 | End: 2023-08-24

## 2023-06-26 RX ORDER — TRAZODONE HCL 50 MG
25 TABLET ORAL
Qty: 0 | Refills: 0 | DISCHARGE

## 2023-06-26 RX ORDER — DIVALPROEX SODIUM 500 MG/1
0 TABLET, DELAYED RELEASE ORAL
Qty: 0 | Refills: 0 | DISCHARGE

## 2023-06-26 RX ORDER — RISPERIDONE 4 MG/1
1 TABLET ORAL
Qty: 0 | Refills: 0 | DISCHARGE

## 2023-06-26 RX ORDER — RISPERIDONE 4 MG/1
1 TABLET ORAL
Qty: 60 | Refills: 1
Start: 2023-06-26 | End: 2023-08-24

## 2023-06-26 RX ORDER — LANOLIN ALCOHOL/MO/W.PET/CERES
1 CREAM (GRAM) TOPICAL
Qty: 30 | Refills: 1
Start: 2023-06-26 | End: 2023-08-24

## 2023-06-26 RX ORDER — LITHIUM CARBONATE 300 MG/1
450 TABLET, EXTENDED RELEASE ORAL
Qty: 0 | Refills: 0 | DISCHARGE

## 2023-06-26 RX ORDER — LITHIUM CARBONATE 300 MG/1
1 TABLET, EXTENDED RELEASE ORAL
Qty: 60 | Refills: 1
Start: 2023-06-26 | End: 2023-08-24

## 2023-06-26 RX ADMIN — Medication 3 MILLIGRAM(S): at 21:19

## 2023-06-26 RX ADMIN — LITHIUM CARBONATE 450 MILLIGRAM(S): 300 TABLET, EXTENDED RELEASE ORAL at 05:27

## 2023-06-26 RX ADMIN — DIVALPROEX SODIUM 250 MILLIGRAM(S): 500 TABLET, DELAYED RELEASE ORAL at 12:12

## 2023-06-26 RX ADMIN — DIVALPROEX SODIUM 500 MILLIGRAM(S): 500 TABLET, DELAYED RELEASE ORAL at 12:13

## 2023-06-26 RX ADMIN — DIVALPROEX SODIUM 500 MILLIGRAM(S): 500 TABLET, DELAYED RELEASE ORAL at 21:18

## 2023-06-26 RX ADMIN — DIVALPROEX SODIUM 250 MILLIGRAM(S): 500 TABLET, DELAYED RELEASE ORAL at 21:19

## 2023-06-26 RX ADMIN — RISPERIDONE 2 MILLIGRAM(S): 4 TABLET ORAL at 17:21

## 2023-06-26 RX ADMIN — Medication 25 MILLIGRAM(S): at 21:18

## 2023-06-26 RX ADMIN — LITHIUM CARBONATE 450 MILLIGRAM(S): 300 TABLET, EXTENDED RELEASE ORAL at 17:21

## 2023-06-26 RX ADMIN — RISPERIDONE 2 MILLIGRAM(S): 4 TABLET ORAL at 05:27

## 2023-06-26 NOTE — PROGRESS NOTE ADULT - ASSESSMENT
22 years old male past medical history of bipolar and developmental delay came to emergency room for alleged aggressive and violent behavior as per step father. Evaluated by Psych; Step Father left stated pt needs placement refusing to take child home.     Bipolar disorder and likely developmental disorder- stable  - continue current meds (on Depakote, risperidone, and lithium)   - pt has been stable without aggressive behaviour in the hospital  - father states he cant take care of pt  - awaiting placement to a group home    Depressed mood  - cont w lithium, valproic acid and risperidone   **Check Levels if pt becomes obtunded/lethargic***  - psych recs appreciated    normocytic anemia  - Hb stable  - can obtain labs only if indicated    GI PPX: not Indicated   DVT px - pt ambulatory   Full code     DISPO: pending group home placement  Possibly this week, PPD resulted as negative

## 2023-06-26 NOTE — PROGRESS NOTE ADULT - SUBJECTIVE AND OBJECTIVE BOX
Patient is a 22y old  Male who presents with a chief complaint of Placement      MEDICATIONS  (STANDING):  diVALproex  milliGRAM(s) Oral at bedtime  diVALproex  milliGRAM(s) Oral at bedtime  diVALproex  milliGRAM(s) Oral daily  diVALproex  milliGRAM(s) Oral daily  lithium CR (ESKALITH-CR) 450 milliGRAM(s) Oral two times a day  melatonin 3 milliGRAM(s) Oral at bedtime  risperiDONE   Tablet 2 milliGRAM(s) Oral two times a day  traZODone 25 milliGRAM(s) Oral daily    MEDICATIONS  (PRN):  acetaminophen     Tablet .. 650 milliGRAM(s) Oral every 6 hours PRN Mild Pain (1 - 3)  traZODone 25 milliGRAM(s) Oral at bedtime PRN breakthrough insomnia      PHYSICAL EXAM:  Vital Signs Last 24 Hrs  T(C): 35.6 (26 Jun 2023 04:59), Max: 36.5 (25 Jun 2023 21:03)  T(F): 96 (26 Jun 2023 04:59), Max: 97.7 (25 Jun 2023 21:03)  HR: 57 (26 Jun 2023 04:59) (57 - 83)  BP: 103/55 (26 Jun 2023 04:59) (103/55 - 117/79)  BP(mean): --  RR: 18 (26 Jun 2023 04:59) (18 - 18)  SpO2: 97% (26 Jun 2023 04:59) (97% - 99%)    Parameters below as of 25 Jun 2023 21:03  Patient On (Oxygen Delivery Method): room air        GENERAL: No acute distress, well-developed  HEAD:  Atraumatic, Normocephalic  EYES: EOMI, PERRLA, conjunctiva and sclera clear  NECK: Supple, no lymphadenopathy, no JVD  CHEST/LUNG: CTAB; No wheezes, rales, or rhonchi  HEART: Regular rate and rhythm; No murmurs, rubs, or gallops  ABDOMEN: Soft, non-tender, non-distended; normal bowel sounds, no organomegaly  EXTREMITIES:  2+ peripheral pulses b/l, No clubbing, cyanosis, or edema  NEUROLOGY: A&O X 1-2no focal deficits  SKIN: No rashes or lesions    LABS:

## 2023-06-27 PROCEDURE — 99231 SBSQ HOSP IP/OBS SF/LOW 25: CPT

## 2023-06-27 RX ADMIN — DIVALPROEX SODIUM 250 MILLIGRAM(S): 500 TABLET, DELAYED RELEASE ORAL at 12:27

## 2023-06-27 RX ADMIN — LITHIUM CARBONATE 450 MILLIGRAM(S): 300 TABLET, EXTENDED RELEASE ORAL at 18:03

## 2023-06-27 RX ADMIN — Medication 25 MILLIGRAM(S): at 22:00

## 2023-06-27 RX ADMIN — RISPERIDONE 2 MILLIGRAM(S): 4 TABLET ORAL at 18:03

## 2023-06-27 RX ADMIN — Medication 3 MILLIGRAM(S): at 22:01

## 2023-06-27 RX ADMIN — DIVALPROEX SODIUM 500 MILLIGRAM(S): 500 TABLET, DELAYED RELEASE ORAL at 22:00

## 2023-06-27 RX ADMIN — RISPERIDONE 2 MILLIGRAM(S): 4 TABLET ORAL at 05:16

## 2023-06-27 RX ADMIN — LITHIUM CARBONATE 450 MILLIGRAM(S): 300 TABLET, EXTENDED RELEASE ORAL at 05:16

## 2023-06-27 RX ADMIN — DIVALPROEX SODIUM 500 MILLIGRAM(S): 500 TABLET, DELAYED RELEASE ORAL at 12:27

## 2023-06-27 RX ADMIN — DIVALPROEX SODIUM 250 MILLIGRAM(S): 500 TABLET, DELAYED RELEASE ORAL at 22:00

## 2023-06-27 NOTE — PROGRESS NOTE ADULT - SUBJECTIVE AND OBJECTIVE BOX
Patient is a 22y old  Male who presents with a chief complaint of Placement       MEDICATIONS  (STANDING):  diVALproex  milliGRAM(s) Oral at bedtime  diVALproex  milliGRAM(s) Oral at bedtime  diVALproex  milliGRAM(s) Oral daily  diVALproex  milliGRAM(s) Oral daily  lithium CR (ESKALITH-CR) 450 milliGRAM(s) Oral two times a day  melatonin 3 milliGRAM(s) Oral at bedtime  risperiDONE   Tablet 2 milliGRAM(s) Oral two times a day  traZODone 25 milliGRAM(s) Oral daily    MEDICATIONS  (PRN):  acetaminophen     Tablet .. 650 milliGRAM(s) Oral every 6 hours PRN Mild Pain (1 - 3)  traZODone 25 milliGRAM(s) Oral at bedtime PRN breakthrough insomnia      PHYSICAL EXAM:  Vital Signs Last 24 Hrs  T(C): 35.7 (27 Jun 2023 05:06), Max: 36.1 (26 Jun 2023 14:52)  T(F): 96.2 (27 Jun 2023 05:06), Max: 97 (26 Jun 2023 14:52)  HR: 88 (27 Jun 2023 05:06) (66 - 88)  BP: 120/78 (27 Jun 2023 05:06) (112/72 - 120/80)  BP(mean): --  RR: 18 (27 Jun 2023 05:06) (18 - 18)  SpO2: 99% (27 Jun 2023 05:06) (98% - 99%)        GENERAL: No acute distress, well-developed  HEAD:  Atraumatic, Normocephalic  EYES:conjunctiva and sclera clear  NECK: Supple, no lymphadenopathy, no JVD  CHEST/LUNG: CTAB; No wheezes, rales, or rhonchi  HEART: Regular rate and rhythm; No murmurs, rubs, or gallops  ABDOMEN: Soft, non-tender, non-distended; normal bowel sounds, no organomegaly  EXTREMITIES:  2+ peripheral pulses b/l, No clubbing, cyanosis, or edema  NEUROLOGY: A&O x 1-2, no focal deficits  SKIN: No rashes or lesions    LABS:

## 2023-06-27 NOTE — PROGRESS NOTE ADULT - ASSESSMENT
22 years old male past medical history of bipolar and developmental delay came to emergency room for alleged aggressive and violent behavior as per step father. Evaluated by Psych; Step Father left stated pt needs placement refusing to take child home.     Bipolar disorder and likely developmental disorder- stable  - continue current meds (on Depakote, risperidone, and lithium)   - pt has been stable without aggressive behaviour in the hospital  - father states he cant take care of pt  - awaiting placement to a group home    Depressed mood  - cont w lithium, valproic acid and risperidone   **Check Levels if pt becomes obtunded/lethargic***  - psych recs appreciated    normocytic anemia  - Hb stable  - can obtain labs only if indicated    GI PPX: not Indicated   DVT px - pt ambulatory   Full code     DISPO: pending group home placement, likely Thursday   All meds sent to [facility], 30days supply with 1 refill   PPD resulted as negative

## 2023-06-28 LAB — SARS-COV-2 RNA SPEC QL NAA+PROBE: SIGNIFICANT CHANGE UP

## 2023-06-28 PROCEDURE — 99231 SBSQ HOSP IP/OBS SF/LOW 25: CPT

## 2023-06-28 RX ADMIN — DIVALPROEX SODIUM 250 MILLIGRAM(S): 500 TABLET, DELAYED RELEASE ORAL at 21:14

## 2023-06-28 RX ADMIN — RISPERIDONE 2 MILLIGRAM(S): 4 TABLET ORAL at 05:57

## 2023-06-28 RX ADMIN — DIVALPROEX SODIUM 500 MILLIGRAM(S): 500 TABLET, DELAYED RELEASE ORAL at 11:33

## 2023-06-28 RX ADMIN — Medication 3 MILLIGRAM(S): at 21:14

## 2023-06-28 RX ADMIN — LITHIUM CARBONATE 450 MILLIGRAM(S): 300 TABLET, EXTENDED RELEASE ORAL at 05:57

## 2023-06-28 RX ADMIN — LITHIUM CARBONATE 450 MILLIGRAM(S): 300 TABLET, EXTENDED RELEASE ORAL at 17:04

## 2023-06-28 RX ADMIN — DIVALPROEX SODIUM 250 MILLIGRAM(S): 500 TABLET, DELAYED RELEASE ORAL at 11:32

## 2023-06-28 RX ADMIN — RISPERIDONE 2 MILLIGRAM(S): 4 TABLET ORAL at 17:05

## 2023-06-28 RX ADMIN — Medication 25 MILLIGRAM(S): at 21:14

## 2023-06-28 RX ADMIN — DIVALPROEX SODIUM 500 MILLIGRAM(S): 500 TABLET, DELAYED RELEASE ORAL at 21:14

## 2023-06-28 NOTE — PROGRESS NOTE ADULT - SUBJECTIVE AND OBJECTIVE BOX
Patient is a 22y old  Male who presents with a chief complaint of Placement     Patient was seen and examined at bedside; patient has no complaints, appears comfortable.     PHYSICAL EXAM:  Vital Signs Last 24 Hrs  T(C): 36.2 (28 Jun 2023 05:53), Max: 36.2 (28 Jun 2023 05:53)  T(F): 97.2 (28 Jun 2023 05:53), Max: 97.2 (28 Jun 2023 05:53)  HR: 60 (28 Jun 2023 05:53) (60 - 97)  BP: 117/75 (28 Jun 2023 05:53) (94/52 - 127/83)  BP(mean): --  RR: 18 (28 Jun 2023 05:53) (18 - 18)  SpO2: 98% (27 Jun 2023 20:38) (98% - 98%)      GENERAL: No acute distress, well-developed  HEAD:  Atraumatic, Normocephalic  EYES:conjunctiva and sclera clear  NECK: Supple, no lymphadenopathy, no JVD  CHEST/LUNG: CTAB; No wheezes, rales, or rhonchi  HEART: Regular rate and rhythm; No murmurs, rubs, or gallops  ABDOMEN: Soft, non-tender, non-distended; normal bowel sounds, no organomegaly  EXTREMITIES:  2+ peripheral pulses b/l, No clubbing, cyanosis, or edema  NEUROLOGY: A&O x 1-2, no focal deficits  SKIN: No rashes or lesions    LABS:    MEDICATIONS  (STANDING):  diVALproex  milliGRAM(s) Oral daily  diVALproex  milliGRAM(s) Oral daily  diVALproex  milliGRAM(s) Oral at bedtime  diVALproex  milliGRAM(s) Oral at bedtime  lithium CR (ESKALITH-CR) 450 milliGRAM(s) Oral two times a day  melatonin 3 milliGRAM(s) Oral at bedtime  risperiDONE   Tablet 2 milliGRAM(s) Oral two times a day  traZODone 25 milliGRAM(s) Oral daily    MEDICATIONS  (PRN):  acetaminophen     Tablet .. 650 milliGRAM(s) Oral every 6 hours PRN Mild Pain (1 - 3)  traZODone 25 milliGRAM(s) Oral at bedtime PRN breakthrough insomnia

## 2023-06-28 NOTE — PROGRESS NOTE ADULT - ASSESSMENT
22 years old male past medical history of bipolar and developmental delay came to emergency room for alleged aggressive and violent behavior as per step father. Evaluated by Psych; Step Father left stated pt needs placement refusing to take child home.     Bipolar disorder and likely developmental disorder- stable  - continue current meds (on Depakote, risperidone, and lithium)   - pt has been stable without aggressive behaviour in the hospital  - father states he cant take care of pt  - awaiting placement to a group home    Depressed mood  - cont w lithium, valproic acid and risperidone   **Check Levels if pt becomes obtunded/lethargic***  - psych recs appreciated    normocytic anemia  - Hb stable  - can obtain labs only if indicated    GI PPX: not Indicated   DVT px - pt ambulatory   Full code     DISPO: pending group home placement, likely Thursday   All meds sent to [facility], 30days supply with 1 refill   PPD resulted as negative

## 2023-06-29 ENCOUNTER — TRANSCRIPTION ENCOUNTER (OUTPATIENT)
Age: 23
End: 2023-06-29

## 2023-06-29 VITALS
TEMPERATURE: 97 F | RESPIRATION RATE: 18 BRPM | HEART RATE: 61 BPM | SYSTOLIC BLOOD PRESSURE: 108 MMHG | DIASTOLIC BLOOD PRESSURE: 62 MMHG

## 2023-06-29 PROBLEM — F31.9 BIPOLAR DISORDER, UNSPECIFIED: Chronic | Status: ACTIVE | Noted: 2022-09-07

## 2023-06-29 PROBLEM — Z87.898 PERSONAL HISTORY OF OTHER SPECIFIED CONDITIONS: Chronic | Status: ACTIVE | Noted: 2022-09-07

## 2023-06-29 PROCEDURE — 99239 HOSP IP/OBS DSCHRG MGMT >30: CPT

## 2023-06-29 RX ADMIN — DIVALPROEX SODIUM 500 MILLIGRAM(S): 500 TABLET, DELAYED RELEASE ORAL at 11:22

## 2023-06-29 RX ADMIN — DIVALPROEX SODIUM 250 MILLIGRAM(S): 500 TABLET, DELAYED RELEASE ORAL at 11:22

## 2023-06-29 RX ADMIN — LITHIUM CARBONATE 450 MILLIGRAM(S): 300 TABLET, EXTENDED RELEASE ORAL at 06:04

## 2023-06-29 RX ADMIN — RISPERIDONE 2 MILLIGRAM(S): 4 TABLET ORAL at 06:04

## 2023-06-29 NOTE — PROGRESS NOTE ADULT - ASSESSMENT
22 years old male past medical history of bipolar and developmental delay came to emergency room for alleged aggressive and violent behavior as per step father. Evaluated by Psych; Step Father left stated pt needs placement refusing to take child home.     Bipolar disorder and likely developmental disorder- stable  - continue current meds (on Depakote, risperidone, and lithium)   - pt has been stable without aggressive behaviour in the hospital  - father states he cant take care of pt  - awaiting placement to a group home    Depressed mood  - cont w lithium, valproic acid and risperidone   **Check Levels if pt becomes obtunded/lethargic***  - psych recs appreciated    normocytic anemia  - Hb stable  - can obtain labs only if indicated    GI PPX: not Indicated   DVT px - pt ambulatory   Full code     DISPO: dc to group home today  All meds sent to [facility], 30days supply with 1 refill   PPD resulted as negative

## 2023-06-29 NOTE — PROGRESS NOTE ADULT - PROVIDER SPECIALTY LIST ADULT
Hospitalist
Internal Medicine
Hospitalist
Internal Medicine
Hospitalist
Internal Medicine

## 2023-06-29 NOTE — DISCHARGE NOTE NURSING/CASE MANAGEMENT/SOCIAL WORK - PATIENT PORTAL LINK FT
You can access the FollowMyHealth Patient Portal offered by [facility] by registering at the following website: [email or web address]. By joining [employer] [employer]’s FollowMyHealth portal, you will also be able to view your health information using other applications (apps) compatible with our system.

## 2023-06-29 NOTE — PROGRESS NOTE ADULT - SUBJECTIVE AND OBJECTIVE BOX
Patient is a 22y old  Male who presents with a chief complaint of Placement     Patient was seen and examined at bedside; patient has no complaints, appears comfortable. Patient aware about dc planned for today.    PHYSICAL EXAM:  Vital Signs Last 24 Hrs  T(C): 35.9 (29 Jun 2023 05:24), Max: 36.6 (28 Jun 2023 21:15)  T(F): 96.7 (29 Jun 2023 05:24), Max: 97.8 (28 Jun 2023 21:15)  HR: 61 (29 Jun 2023 05:24) (61 - 93)  BP: 108/62 (29 Jun 2023 05:24) (108/62 - 124/74)  BP(mean): --  RR: 18 (29 Jun 2023 05:24) (18 - 18)  SpO2: 98% (28 Jun 2023 21:15) (98% - 98%)    Parameters below as of 28 Jun 2023 21:15  Patient On (Oxygen Delivery Method): room air    GENERAL: No acute distress, well-developed  HEAD:  Atraumatic, Normocephalic  EYES:conjunctiva and sclera clear  NECK: Supple, no lymphadenopathy, no JVD  CHEST/LUNG: CTAB; No wheezes, rales, or rhonchi  HEART: Regular rate and rhythm; No murmurs, rubs, or gallops  ABDOMEN: Soft, non-tender, non-distended; normal bowel sounds, no organomegaly  EXTREMITIES:  2+ peripheral pulses b/l, No clubbing, cyanosis, or edema  NEUROLOGY: A&O x 1-2, no focal deficits  SKIN: No rashes or lesions    LABS:    MEDICATIONS  (STANDING):  diVALproex  milliGRAM(s) Oral daily  diVALproex  milliGRAM(s) Oral daily  diVALproex  milliGRAM(s) Oral at bedtime  diVALproex  milliGRAM(s) Oral at bedtime  lithium CR (ESKALITH-CR) 450 milliGRAM(s) Oral two times a day  melatonin 3 milliGRAM(s) Oral at bedtime  risperiDONE   Tablet 2 milliGRAM(s) Oral two times a day  traZODone 25 milliGRAM(s) Oral daily    MEDICATIONS  (PRN):  acetaminophen     Tablet .. 650 milliGRAM(s) Oral every 6 hours PRN Mild Pain (1 - 3)  traZODone 25 milliGRAM(s) Oral at bedtime PRN breakthrough insomnia

## 2023-06-29 NOTE — DISCHARGE NOTE NURSING/CASE MANAGEMENT/SOCIAL WORK - NSDCPEFALRISK_GEN_ALL_CORE
For information on Fall & Injury Prevention, visit: [email or web address] OR  [email or web address] OR  https://www.cdc.gov/steadi/patient.html

## 2023-07-13 DIAGNOSIS — D64.9 ANEMIA, UNSPECIFIED: ICD-10-CM

## 2023-07-13 DIAGNOSIS — R45.6 VIOLENT BEHAVIOR: ICD-10-CM

## 2023-07-13 DIAGNOSIS — G47.00 INSOMNIA, UNSPECIFIED: ICD-10-CM

## 2023-07-13 DIAGNOSIS — R45.0 NERVOUSNESS: ICD-10-CM

## 2023-07-13 DIAGNOSIS — R25.1 TREMOR, UNSPECIFIED: ICD-10-CM

## 2023-07-13 DIAGNOSIS — F79 UNSPECIFIED INTELLECTUAL DISABILITIES: ICD-10-CM

## 2023-07-13 DIAGNOSIS — T43.596A UNDERDOSING OF OTHER ANTIPSYCHOTICS AND NEUROLEPTICS, INITIAL ENCOUNTER: ICD-10-CM

## 2023-07-13 DIAGNOSIS — R62.50 UNSPECIFIED LACK OF EXPECTED NORMAL PHYSIOLOGICAL DEVELOPMENT IN CHILDHOOD: ICD-10-CM

## 2023-07-13 DIAGNOSIS — Z91.148 PATIENT'S OTHER NONCOMPLIANCE WITH MEDICATION REGIMEN FOR OTHER REASON: ICD-10-CM

## 2023-07-13 DIAGNOSIS — F31.9 BIPOLAR DISORDER, UNSPECIFIED: ICD-10-CM

## 2023-07-14 ENCOUNTER — EMERGENCY (EMERGENCY)
Facility: HOSPITAL | Age: 23
LOS: 0 days | Discharge: HOME | End: 2023-07-14
Attending: STUDENT IN AN ORGANIZED HEALTH CARE EDUCATION/TRAINING PROGRAM
Payer: COMMERCIAL

## 2023-07-14 VITALS
HEART RATE: 73 BPM | OXYGEN SATURATION: 99 % | WEIGHT: 230.38 LBS | DIASTOLIC BLOOD PRESSURE: 60 MMHG | SYSTOLIC BLOOD PRESSURE: 122 MMHG | RESPIRATION RATE: 17 BRPM | TEMPERATURE: 98 F

## 2023-07-14 DIAGNOSIS — F41.1 GENERALIZED ANXIETY DISORDER: ICD-10-CM
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
## 2023-07-14 DIAGNOSIS — Q21.10 ATRIAL SEPTAL DEFECT, UNSPECIFIED: ICD-10-CM

## 2023-07-14 DIAGNOSIS — F79 UNSPECIFIED INTELLECTUAL DISABILITIES: ICD-10-CM

## 2023-07-14 DIAGNOSIS — F90.9 ATTENTION-DEFICIT HYPERACTIVITY DISORDER, UNSPECIFIED TYPE: ICD-10-CM

## 2023-07-14 DIAGNOSIS — Z04.1 ENCOUNTER FOR EXAMINATION AND OBSERVATION FOLLOWING TRANSPORT ACCIDENT: ICD-10-CM
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
## 2023-07-14 DIAGNOSIS — F25.9 SCHIZOAFFECTIVE DISORDER, UNSPECIFIED: ICD-10-CM

## 2023-07-14 PROCEDURE — 99283 EMERGENCY DEPT VISIT LOW MDM: CPT

## 2023-07-14 PROCEDURE — 99282 EMERGENCY DEPT VISIT SF MDM: CPT
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
## 2023-07-14 NOTE — ED PEDIATRIC NURSE NOTE - HIV OFFER
Shirlene,   Can you call mother. I can not call to give medical advice frequently. Will need apt. See PCP in the meantime.   Opt out
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
## 2023-07-14 NOTE — ED PROVIDER NOTE - PROGRESS NOTE DETAILS
AY: Group home staff memebr Twila was given detailed return precautions and advised to return to the emergency department if any new symptoms developed, symptoms worsened or for any concerns. The group home staff member was offered the opportunity to ask questions and verbalized that they understand the diagnosis and discharge instructions.
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
## 2023-07-14 NOTE — ED PROVIDER NOTE - PHYSICAL EXAMINATION
Physical Exam    Vital Signs: I have reviewed the initial vital signs.  Constitutional: appears stated age, no acute distress  Head: NCAT  Eyes: Sclera clear, EOMI.  Cardiovascular: S1 and S2, regular rate, regular rhythm, well-perfused extremities, radial pulses equal and 2+, pedal pulses 2+ and equal  Respiratory: unlabored respiratory effort, clear to auscultation bilaterally no wheezing, rales, or rhonchi  Gastrointestinal:  abdomen soft, non-tender, no pulsatile mass, normal bowl sounds  Musculoskeletal: supple neck, no lower extremity edema, no midline tenderness, no seatbelt sign, no extremity tenderness or deformities  Integumentary: warm, dry, no rash  Neurologic: Awake, alert, oriented. CN II-XII intact, 5/5 strength at upper and lower extremities, no pronator drift, steady gait and tandem gait

## 2023-07-14 NOTE — ED PROVIDER NOTE - PATIENT PORTAL LINK FT
You can access the FollowMyHealth Patient Portal offered by [facility] by registering at the following website: [email or web address]. By joining [employer]’s FollowMyHealth portal, you will also be able to view your health information using other applications (apps) compatible with our system.

## 2023-10-30 DIAGNOSIS — G47.00 INSOMNIA, UNSPECIFIED: ICD-10-CM

## 2023-10-30 RX ORDER — GLUCOSAMINE HCL/CHONDROITIN SU 500-400 MG
3 CAPSULE ORAL
Refills: 0 | Status: ACTIVE | COMMUNITY

## 2023-10-30 RX ORDER — LITHIUM CARBONATE 450 MG/1
450 TABLET ORAL TWICE DAILY
Refills: 0 | Status: ACTIVE | COMMUNITY

## 2023-10-30 RX ORDER — RISPERIDONE 2 MG/1
2 TABLET ORAL TWICE DAILY
Refills: 0 | Status: ACTIVE | COMMUNITY

## 2023-10-30 RX ORDER — TRAZODONE HYDROCHLORIDE 50 MG/1
50 TABLET ORAL
Refills: 0 | Status: ACTIVE | COMMUNITY

## 2023-11-01 ENCOUNTER — OUTPATIENT (OUTPATIENT)
Dept: OUTPATIENT SERVICES | Facility: HOSPITAL | Age: 23
LOS: 1 days | End: 2023-11-01
Payer: MEDICAID

## 2023-11-01 ENCOUNTER — APPOINTMENT (OUTPATIENT)
Dept: PEDIATRIC DEVELOPMENTAL SERVICES | Facility: CLINIC | Age: 23
End: 2023-11-01
Payer: MEDICAID

## 2023-11-01 VITALS
HEART RATE: 65 BPM | HEIGHT: 71 IN | OXYGEN SATURATION: 99 % | DIASTOLIC BLOOD PRESSURE: 75 MMHG | BODY MASS INDEX: 30.57 KG/M2 | TEMPERATURE: 96.6 F | WEIGHT: 218.38 LBS | SYSTOLIC BLOOD PRESSURE: 112 MMHG

## 2023-11-01 DIAGNOSIS — F31.60 BIPOLAR DISORDER, CURRENT EPISODE MIXED, UNSPECIFIED: ICD-10-CM

## 2023-11-01 DIAGNOSIS — Z23 ENCOUNTER FOR IMMUNIZATION: ICD-10-CM

## 2023-11-01 DIAGNOSIS — R62.50 UNSPECIFIED LACK OF EXPECTED NORMAL PHYSIOLOGICAL DEVELOPMENT IN CHILDHOOD: ICD-10-CM

## 2023-11-01 DIAGNOSIS — Z00.00 ENCOUNTER FOR GENERAL ADULT MEDICAL EXAMINATION WITHOUT ABNORMAL FINDINGS: ICD-10-CM

## 2023-11-01 DIAGNOSIS — Z78.9 OTHER SPECIFIED HEALTH STATUS: ICD-10-CM

## 2023-11-01 PROCEDURE — 90471 IMMUNIZATION ADMIN: CPT | Mod: 25

## 2023-11-01 PROCEDURE — 99214 OFFICE O/P EST MOD 30 MIN: CPT

## 2023-11-01 PROCEDURE — 99214 OFFICE O/P EST MOD 30 MIN: CPT | Mod: 25,GC

## 2023-11-01 PROCEDURE — 90715 TDAP VACCINE 7 YRS/> IM: CPT

## 2023-11-01 PROCEDURE — 90686 IIV4 VACC NO PRSV 0.5 ML IM: CPT

## 2023-11-08 DIAGNOSIS — R62.50 UNSPECIFIED LACK OF EXPECTED NORMAL PHYSIOLOGICAL DEVELOPMENT IN CHILDHOOD: ICD-10-CM

## 2023-11-08 DIAGNOSIS — Z00.00 ENCOUNTER FOR GENERAL ADULT MEDICAL EXAMINATION WITHOUT ABNORMAL FINDINGS: ICD-10-CM

## 2023-11-08 DIAGNOSIS — Z23 ENCOUNTER FOR IMMUNIZATION: ICD-10-CM

## 2023-11-08 DIAGNOSIS — F31.60 BIPOLAR DISORDER, CURRENT EPISODE MIXED, UNSPECIFIED: ICD-10-CM

## 2024-04-20 ENCOUNTER — OUTPATIENT (OUTPATIENT)
Dept: OUTPATIENT SERVICES | Facility: HOSPITAL | Age: 24
LOS: 1 days | End: 2024-04-20

## 2024-04-20 DIAGNOSIS — Z00.00 ENCOUNTER FOR GENERAL ADULT MEDICAL EXAMINATION WITHOUT ABNORMAL FINDINGS: ICD-10-CM

## 2024-04-21 DIAGNOSIS — Z00.00 ENCOUNTER FOR GENERAL ADULT MEDICAL EXAMINATION WITHOUT ABNORMAL FINDINGS: ICD-10-CM

## 2024-04-22 LAB
25(OH)D3 SERPL-MCNC: 11 NG/ML
ALBUMIN SERPL ELPH-MCNC: 4.9 G/DL
ALP BLD-CCNC: 77 U/L
ALT SERPL-CCNC: <5 U/L
ANION GAP SERPL CALC-SCNC: 14 MMOL/L
AST SERPL-CCNC: 10 U/L
BILIRUB SERPL-MCNC: 0.5 MG/DL
BUN SERPL-MCNC: 9 MG/DL
CALCIUM SERPL-MCNC: 10.5 MG/DL
CHLORIDE SERPL-SCNC: 103 MMOL/L
CO2 SERPL-SCNC: 25 MMOL/L
CREAT SERPL-MCNC: 0.8 MG/DL
EGFR: 129 ML/MIN/1.73M2
GLUCOSE SERPL-MCNC: 106 MG/DL
HCT VFR BLD CALC: 40.5 %
HGB BLD-MCNC: 13.5 G/DL
MCHC RBC-ENTMCNC: 29.2 PG
MCHC RBC-ENTMCNC: 33.3 G/DL
MCV RBC AUTO: 87.7 FL
PLATELET # BLD AUTO: 294 K/UL
PMV BLD AUTO: 0 /100 WBCS
PMV BLD: 10.7 FL
POTASSIUM SERPL-SCNC: 4.6 MMOL/L
PROT SERPL-MCNC: 8 G/DL
RBC # BLD: 4.62 M/UL
RBC # FLD: 12 %
SODIUM SERPL-SCNC: 142 MMOL/L
TSH SERPL-ACNC: 2.06 UIU/ML
WBC # FLD AUTO: 6.59 K/UL

## 2024-04-29 RX ORDER — ERGOCALCIFEROL 1.25 MG/1
1.25 MG CAPSULE ORAL
Qty: 13 | Refills: 1 | Status: ACTIVE | COMMUNITY
Start: 2024-04-22 | End: 1900-01-01

## 2024-04-30 PROBLEM — Z00.00 ENCOUNTER FOR PREVENTIVE HEALTH EXAMINATION: Status: ACTIVE | Noted: 2024-04-30

## 2024-05-09 ENCOUNTER — OUTPATIENT (OUTPATIENT)
Dept: OUTPATIENT SERVICES | Facility: HOSPITAL | Age: 24
LOS: 1 days | End: 2024-05-09
Payer: MEDICAID

## 2024-05-09 ENCOUNTER — APPOINTMENT (OUTPATIENT)
Dept: PEDIATRIC DEVELOPMENTAL SERVICES | Facility: CLINIC | Age: 24
End: 2024-05-09
Payer: MEDICAID

## 2024-05-09 VITALS
SYSTOLIC BLOOD PRESSURE: 102 MMHG | HEIGHT: 71 IN | HEART RATE: 64 BPM | TEMPERATURE: 97.4 F | WEIGHT: 199 LBS | DIASTOLIC BLOOD PRESSURE: 71 MMHG | OXYGEN SATURATION: 99 % | BODY MASS INDEX: 27.86 KG/M2

## 2024-05-09 DIAGNOSIS — Z00.00 ENCOUNTER FOR GENERAL ADULT MEDICAL EXAMINATION W/OUT ABNORMAL FINDINGS: ICD-10-CM

## 2024-05-09 DIAGNOSIS — E55.9 VITAMIN D DEFICIENCY, UNSPECIFIED: ICD-10-CM

## 2024-05-09 DIAGNOSIS — Z00.00 ENCOUNTER FOR GENERAL ADULT MEDICAL EXAMINATION WITHOUT ABNORMAL FINDINGS: ICD-10-CM

## 2024-05-09 PROCEDURE — 99213 OFFICE O/P EST LOW 20 MIN: CPT | Mod: GC

## 2024-05-09 PROCEDURE — 99213 OFFICE O/P EST LOW 20 MIN: CPT

## 2024-05-09 PROCEDURE — G2211 COMPLEX E/M VISIT ADD ON: CPT | Mod: NC,1L

## 2024-05-09 RX ORDER — DIVALPROEX SODIUM 500 1/1
500 TABLET, EXTENDED RELEASE ORAL
Refills: 0 | Status: ACTIVE | COMMUNITY

## 2024-05-09 RX ORDER — ACETAMINOPHEN 325 MG/1
325 TABLET ORAL EVERY 6 HOURS
Qty: 240 | Refills: 0 | Status: ACTIVE | COMMUNITY
Start: 2024-05-09 | End: 1900-01-01

## 2024-05-09 RX ORDER — BACITRACIN ZINC 500 [USP'U]/G
500 OINTMENT TOPICAL TWICE DAILY
Qty: 3.5 | Refills: 0 | Status: ACTIVE | COMMUNITY
Start: 2024-05-09 | End: 1900-01-01

## 2024-05-09 NOTE — ASSESSMENT
[FreeTextEntry1] : 23 year old male patient known to have bipolar disease and developmental delay presented to [facility] care  #Bipolar disorder #Developmental delay -follows with psych -on Risperidone, Lithium, Divalproex, Trazodone, and Melatonin  #hypovitamitanosis D - 25-OH low - already on 9 weeks vitamin D  #misc - acetaminophen prn for pain - bacitracin prn for cuts/scrapes  #HCM -Flu shot Nov 2023 -Tdap Nov 2023 -EKG done and reviewed, NSR -f/u in 6 months with repeat blood work

## 2024-05-09 NOTE — END OF VISIT
[] : Resident [FreeTextEntry3] : Pt. here for follow up.  Had Tdap 11/1/23 and flu vaccine 11/1/23.  Blood work 4/20/24 25-OH Vit. D 11 (started on ergocalciferol 1.25mg weekly on 4/22/24), TSH 2.06.  Medication renewed.  Blood work ordered.  RTC 6 months.

## 2024-05-09 NOTE — HISTORY OF PRESENT ILLNESS
[FreeTextEntry1] : Pt. here for follow up [de-identified] : 23-year-old male patient known to have bipolar disease and developmental delay presented to establish new visit. Accompanied by medical care coordinator Javed. Patient denies any pain, no complaints, and describes his mood as happy. He enjoys playing [email or web address].

## 2024-05-10 DIAGNOSIS — E55.9 VITAMIN D DEFICIENCY, UNSPECIFIED: ICD-10-CM

## 2024-06-25 ENCOUNTER — OUTPATIENT (OUTPATIENT)
Dept: OUTPATIENT SERVICES | Facility: HOSPITAL | Age: 24
LOS: 1 days | End: 2024-06-25
Payer: MEDICAID

## 2024-06-25 ENCOUNTER — APPOINTMENT (OUTPATIENT)
Dept: OPHTHALMOLOGY | Facility: CLINIC | Age: 24
End: 2024-06-25
Payer: MEDICAID

## 2024-06-25 DIAGNOSIS — H50.10 UNSPECIFIED EXOTROPIA: ICD-10-CM

## 2024-06-25 DIAGNOSIS — H53.8 OTHER VISUAL DISTURBANCES: ICD-10-CM

## 2024-06-25 DIAGNOSIS — H52.10 MYOPIA, UNSPECIFIED EYE: ICD-10-CM

## 2024-06-25 PROCEDURE — 92004 COMPRE OPH EXAM NEW PT 1/>: CPT

## 2024-09-26 ENCOUNTER — APPOINTMENT (OUTPATIENT)
Dept: PEDIATRIC DEVELOPMENTAL SERVICES | Facility: CLINIC | Age: 24
End: 2024-09-26
Payer: MEDICAID

## 2024-09-26 ENCOUNTER — OUTPATIENT (OUTPATIENT)
Dept: OUTPATIENT SERVICES | Facility: HOSPITAL | Age: 24
LOS: 1 days | End: 2024-09-26
Payer: MEDICAID

## 2024-09-26 VITALS
OXYGEN SATURATION: 100 % | DIASTOLIC BLOOD PRESSURE: 76 MMHG | SYSTOLIC BLOOD PRESSURE: 119 MMHG | BODY MASS INDEX: 27.72 KG/M2 | HEIGHT: 71 IN | WEIGHT: 198 LBS | TEMPERATURE: 97.8 F | HEART RATE: 83 BPM

## 2024-09-26 DIAGNOSIS — Z23 ENCOUNTER FOR IMMUNIZATION: ICD-10-CM

## 2024-09-26 DIAGNOSIS — Z00.00 ENCOUNTER FOR GENERAL ADULT MEDICAL EXAMINATION WITHOUT ABNORMAL FINDINGS: ICD-10-CM

## 2024-09-26 DIAGNOSIS — E55.9 VITAMIN D DEFICIENCY, UNSPECIFIED: ICD-10-CM

## 2024-09-26 PROCEDURE — 99214 OFFICE O/P EST MOD 30 MIN: CPT | Mod: 25

## 2024-09-26 PROCEDURE — 90471 IMMUNIZATION ADMIN: CPT

## 2024-09-26 PROCEDURE — 99213 OFFICE O/P EST LOW 20 MIN: CPT | Mod: GC

## 2024-09-26 PROCEDURE — 90656 IIV3 VACC NO PRSV 0.5 ML IM: CPT

## 2024-09-26 NOTE — HISTORY OF PRESENT ILLNESS
[FreeTextEntry1] : Pt. here for follow up [de-identified] : 24-year-old male patient known to have bipolar disease and developmental delay presented to establish new visit. Accompanied by rashi Mattson. Patient denies any pain, no complaints, and describes his mood as happy.

## 2024-09-26 NOTE — END OF VISIT
[] : Resident [FreeTextEntry3] : Pt. here for follow up.  Had Tdap 11/1/23 and flu vaccine given today.  Pt. did not complete blood work ordered 5/9/24, will add MMR/varicella titer, Quantiferon-TB, Hep.B/C to blood work (for volunteer service), advised staff to complete blood work.  Seen ophthal. 6/25/24.  Medication renewed.  Follow ophthal. 12/17/24.  RTC 6 months.

## 2024-09-26 NOTE — ASSESSMENT
[FreeTextEntry1] : 24 year old male patient known to have bipolar disease and developmental delay presented for follow up  #Bipolar disorder #Developmental delay -follows with psych -on Risperidone, Lithium, Divalproex, Trazodone, and Melatonin  #Vit D deficiency - 25-OH low - already on 9 weeks vitamin D  #HCM - Vaccination UTD: Flu shot given today, Tdap Nov 2023 - Ophthalmo seen on 6/25/24 - f/u in 6 months with repeat blood work

## 2024-09-27 ENCOUNTER — OUTPATIENT (OUTPATIENT)
Dept: OUTPATIENT SERVICES | Facility: HOSPITAL | Age: 24
LOS: 1 days | End: 2024-09-27

## 2024-09-27 ENCOUNTER — OUTPATIENT (OUTPATIENT)
Dept: OUTPATIENT SERVICES | Facility: HOSPITAL | Age: 24
LOS: 1 days | End: 2024-09-27
Payer: MEDICAID

## 2024-09-27 DIAGNOSIS — Z00.00 ENCOUNTER FOR GENERAL ADULT MEDICAL EXAMINATION WITHOUT ABNORMAL FINDINGS: ICD-10-CM

## 2024-09-27 LAB
HCV AB SER QL: NONREACTIVE
HCV S/CO RATIO: 0.05 COI

## 2024-09-27 PROCEDURE — 82306 VITAMIN D 25 HYDROXY: CPT

## 2024-09-27 PROCEDURE — 86735 MUMPS ANTIBODY: CPT

## 2024-09-27 PROCEDURE — 84443 ASSAY THYROID STIM HORMONE: CPT

## 2024-09-27 PROCEDURE — 86480 TB TEST CELL IMMUN MEASURE: CPT

## 2024-09-27 PROCEDURE — 86762 RUBELLA ANTIBODY: CPT

## 2024-09-27 PROCEDURE — 85027 COMPLETE CBC AUTOMATED: CPT

## 2024-09-27 PROCEDURE — 86706 HEP B SURFACE ANTIBODY: CPT

## 2024-09-27 PROCEDURE — 83036 HEMOGLOBIN GLYCOSYLATED A1C: CPT

## 2024-09-27 PROCEDURE — 80061 LIPID PANEL: CPT

## 2024-09-27 PROCEDURE — 86787 VARICELLA-ZOSTER ANTIBODY: CPT

## 2024-09-27 PROCEDURE — 80053 COMPREHEN METABOLIC PANEL: CPT

## 2024-09-27 PROCEDURE — 86765 RUBEOLA ANTIBODY: CPT

## 2024-09-27 PROCEDURE — 86803 HEPATITIS C AB TEST: CPT

## 2024-09-28 DIAGNOSIS — Z00.00 ENCOUNTER FOR GENERAL ADULT MEDICAL EXAMINATION WITHOUT ABNORMAL FINDINGS: ICD-10-CM

## 2024-09-30 LAB
HBV SURFACE AB SER QL: REACTIVE
MEV IGG FLD QL IA: 12.2 AU/ML
MEV IGG+IGM SER-IMP: NEGATIVE
MUV AB SER-ACNC: NEGATIVE
MUV IGG SER QL IA: <5 AU/ML
RUBV IGG FLD-ACNC: 0.7 INDEX
RUBV IGG SER-IMP: NEGATIVE
VZV AB TITR SER: NEGATIVE
VZV IGG SER IF-ACNC: 0.33 S/CO

## 2024-10-02 LAB
M TB IFN-G BLD-IMP: NEGATIVE
QUANTIFERON TB PLUS MITOGEN MINUS NIL: 0.72 IU/ML
QUANTIFERON TB PLUS NIL: 0.04 IU/ML
QUANTIFERON TB PLUS TB1 MINUS NIL: 0.02 IU/ML
QUANTIFERON TB PLUS TB2 MINUS NIL: 0.03 IU/ML

## 2024-10-03 DIAGNOSIS — Z23 ENCOUNTER FOR IMMUNIZATION: ICD-10-CM

## 2024-10-03 DIAGNOSIS — E55.9 VITAMIN D DEFICIENCY, UNSPECIFIED: ICD-10-CM

## 2024-10-15 ENCOUNTER — APPOINTMENT (OUTPATIENT)
Dept: INTERNAL MEDICINE | Facility: CLINIC | Age: 24
End: 2024-10-15

## 2024-10-15 ENCOUNTER — APPOINTMENT (OUTPATIENT)
Dept: PEDIATRIC DEVELOPMENTAL SERVICES | Facility: CLINIC | Age: 24
End: 2024-10-15

## 2024-10-15 ENCOUNTER — OUTPATIENT (OUTPATIENT)
Dept: OUTPATIENT SERVICES | Facility: HOSPITAL | Age: 24
LOS: 1 days | End: 2024-10-15

## 2024-10-15 DIAGNOSIS — Z00.00 ENCOUNTER FOR GENERAL ADULT MEDICAL EXAMINATION WITHOUT ABNORMAL FINDINGS: ICD-10-CM

## 2024-10-15 DIAGNOSIS — Z23 ENCOUNTER FOR IMMUNIZATION: ICD-10-CM

## 2024-11-18 ENCOUNTER — OUTPATIENT (OUTPATIENT)
Dept: OUTPATIENT SERVICES | Facility: HOSPITAL | Age: 24
LOS: 1 days | End: 2024-11-18
Payer: MEDICAID

## 2024-11-18 ENCOUNTER — APPOINTMENT (OUTPATIENT)
Dept: PEDIATRIC DEVELOPMENTAL SERVICES | Facility: CLINIC | Age: 24
End: 2024-11-18
Payer: MEDICAID

## 2024-11-18 VITALS
BODY MASS INDEX: 26.78 KG/M2 | SYSTOLIC BLOOD PRESSURE: 112 MMHG | WEIGHT: 191.31 LBS | DIASTOLIC BLOOD PRESSURE: 74 MMHG | OXYGEN SATURATION: 97 % | TEMPERATURE: 97.2 F | HEART RATE: 64 BPM | HEIGHT: 71 IN

## 2024-11-18 DIAGNOSIS — E55.9 VITAMIN D DEFICIENCY, UNSPECIFIED: ICD-10-CM

## 2024-11-18 DIAGNOSIS — Z00.00 ENCOUNTER FOR GENERAL ADULT MEDICAL EXAMINATION WITHOUT ABNORMAL FINDINGS: ICD-10-CM

## 2024-11-18 DIAGNOSIS — Z00.00 ENCOUNTER FOR GENERAL ADULT MEDICAL EXAMINATION W/OUT ABNORMAL FINDINGS: ICD-10-CM

## 2024-11-18 PROCEDURE — 99214 OFFICE O/P EST MOD 30 MIN: CPT

## 2024-11-18 PROCEDURE — 99214 OFFICE O/P EST MOD 30 MIN: CPT | Mod: GC

## 2024-11-21 DIAGNOSIS — Z00.00 ENCOUNTER FOR GENERAL ADULT MEDICAL EXAMINATION WITHOUT ABNORMAL FINDINGS: ICD-10-CM

## 2024-11-21 DIAGNOSIS — E55.9 VITAMIN D DEFICIENCY, UNSPECIFIED: ICD-10-CM

## 2024-11-21 NOTE — PROGRESS NOTE ADULT - REASON FOR ADMISSION
Placement ,DirectAddress_Unknown,[email or web address] ,[email or web address]_Unknown,[email or web address]

## 2024-12-16 NOTE — BH CONSULTATION LIAISON PROGRESS NOTE - NSBHMSEGROOM_PSY_A_CORE
Preoperative Evaluation  [facility]  [address] [place], [place] [address], [address]  [facility] [address]  Phone: [phone]  Primary Provider: Donny Chapman Dodgeville Reynolds County General Memorial Hospitalkell    [facility] website verification:  done on September 11, 2019 score of 530   [email or web address]

## 2024-12-17 ENCOUNTER — OUTPATIENT (OUTPATIENT)
Dept: OUTPATIENT SERVICES | Facility: HOSPITAL | Age: 24
LOS: 1 days | End: 2024-12-17

## 2024-12-17 ENCOUNTER — APPOINTMENT (OUTPATIENT)
Dept: OPHTHALMOLOGY | Facility: CLINIC | Age: 24
End: 2024-12-17

## 2024-12-17 DIAGNOSIS — H53.8 OTHER VISUAL DISTURBANCES: ICD-10-CM

## 2024-12-17 PROCEDURE — 99213 OFFICE O/P EST LOW 20 MIN: CPT

## 2025-01-08 ENCOUNTER — APPOINTMENT (OUTPATIENT)
Dept: INTERNAL MEDICINE | Facility: CLINIC | Age: 25
End: 2025-01-08

## 2025-01-14 DIAGNOSIS — Z23 ENCOUNTER FOR IMMUNIZATION: ICD-10-CM

## 2025-01-15 ENCOUNTER — APPOINTMENT (OUTPATIENT)
Dept: INTERNAL MEDICINE | Facility: CLINIC | Age: 25
End: 2025-01-15

## 2025-01-15 ENCOUNTER — OUTPATIENT (OUTPATIENT)
Dept: OUTPATIENT SERVICES | Facility: HOSPITAL | Age: 25
LOS: 1 days | End: 2025-01-15
Payer: MEDICAID

## 2025-01-15 ENCOUNTER — MED ADMIN CHARGE (OUTPATIENT)
Age: 25
End: 2025-01-15

## 2025-01-15 DIAGNOSIS — Z00.00 ENCOUNTER FOR GENERAL ADULT MEDICAL EXAMINATION WITHOUT ABNORMAL FINDINGS: ICD-10-CM

## 2025-01-15 PROCEDURE — 96372 THER/PROPH/DIAG INJ SC/IM: CPT

## 2025-01-15 PROCEDURE — 90472 IMMUNIZATION ADMIN EACH ADD: CPT

## 2025-01-15 PROCEDURE — 90471 IMMUNIZATION ADMIN: CPT

## 2025-01-21 DIAGNOSIS — Z23 ENCOUNTER FOR IMMUNIZATION: ICD-10-CM

## 2025-03-18 ENCOUNTER — OUTPATIENT (OUTPATIENT)
Dept: OUTPATIENT SERVICES | Facility: HOSPITAL | Age: 25
LOS: 1 days | End: 2025-03-18
Payer: MEDICAID

## 2025-03-18 DIAGNOSIS — Z00.00 ENCOUNTER FOR GENERAL ADULT MEDICAL EXAMINATION WITHOUT ABNORMAL FINDINGS: ICD-10-CM

## 2025-03-18 PROCEDURE — 82306 VITAMIN D 25 HYDROXY: CPT

## 2025-03-18 PROCEDURE — 84443 ASSAY THYROID STIM HORMONE: CPT

## 2025-03-18 PROCEDURE — 80053 COMPREHEN METABOLIC PANEL: CPT

## 2025-03-18 PROCEDURE — 80061 LIPID PANEL: CPT

## 2025-03-18 PROCEDURE — 83036 HEMOGLOBIN GLYCOSYLATED A1C: CPT

## 2025-03-19 DIAGNOSIS — Z00.00 ENCOUNTER FOR GENERAL ADULT MEDICAL EXAMINATION WITHOUT ABNORMAL FINDINGS: ICD-10-CM

## 2025-03-26 ENCOUNTER — OUTPATIENT (OUTPATIENT)
Dept: OUTPATIENT SERVICES | Facility: HOSPITAL | Age: 25
LOS: 1 days | End: 2025-03-26
Payer: MEDICAID

## 2025-03-26 ENCOUNTER — APPOINTMENT (OUTPATIENT)
Dept: PEDIATRIC DEVELOPMENTAL SERVICES | Facility: CLINIC | Age: 25
End: 2025-03-26
Payer: MEDICAID

## 2025-03-26 VITALS
TEMPERATURE: 97.1 F | OXYGEN SATURATION: 97 % | DIASTOLIC BLOOD PRESSURE: 65 MMHG | WEIGHT: 185.38 LBS | BODY MASS INDEX: 25.95 KG/M2 | SYSTOLIC BLOOD PRESSURE: 101 MMHG | HEART RATE: 73 BPM | HEIGHT: 71 IN

## 2025-03-26 DIAGNOSIS — Z00.00 ENCOUNTER FOR GENERAL ADULT MEDICAL EXAMINATION WITHOUT ABNORMAL FINDINGS: ICD-10-CM

## 2025-03-26 DIAGNOSIS — E55.9 VITAMIN D DEFICIENCY, UNSPECIFIED: ICD-10-CM

## 2025-03-26 DIAGNOSIS — R62.50 UNSPECIFIED LACK OF EXPECTED NORMAL PHYSIOLOGICAL DEVELOPMENT IN CHILDHOOD: ICD-10-CM

## 2025-03-26 DIAGNOSIS — Z23 ENCOUNTER FOR IMMUNIZATION: ICD-10-CM

## 2025-03-26 PROCEDURE — 99214 OFFICE O/P EST MOD 30 MIN: CPT | Mod: GC

## 2025-03-26 PROCEDURE — 90471 IMMUNIZATION ADMIN: CPT

## 2025-03-26 PROCEDURE — 99214 OFFICE O/P EST MOD 30 MIN: CPT | Mod: 25

## 2025-09-03 ENCOUNTER — OUTPATIENT (OUTPATIENT)
Dept: OUTPATIENT SERVICES | Facility: HOSPITAL | Age: 25
LOS: 1 days | End: 2025-09-03
Payer: MEDICAID

## 2025-09-03 DIAGNOSIS — Z23 ENCOUNTER FOR IMMUNIZATION: ICD-10-CM

## 2025-09-03 DIAGNOSIS — Z00.00 ENCOUNTER FOR GENERAL ADULT MEDICAL EXAMINATION WITHOUT ABNORMAL FINDINGS: ICD-10-CM

## 2025-09-03 PROCEDURE — 85025 COMPLETE CBC W/AUTO DIFF WBC: CPT

## 2025-09-03 PROCEDURE — 86787 VARICELLA-ZOSTER ANTIBODY: CPT

## 2025-09-03 PROCEDURE — 82306 VITAMIN D 25 HYDROXY: CPT

## 2025-09-03 PROCEDURE — 86735 MUMPS ANTIBODY: CPT

## 2025-09-03 PROCEDURE — 86765 RUBEOLA ANTIBODY: CPT

## 2025-09-03 PROCEDURE — 36415 COLL VENOUS BLD VENIPUNCTURE: CPT

## 2025-09-03 PROCEDURE — 86762 RUBELLA ANTIBODY: CPT

## 2025-09-03 PROCEDURE — 80053 COMPREHEN METABOLIC PANEL: CPT

## 2025-09-04 DIAGNOSIS — Z23 ENCOUNTER FOR IMMUNIZATION: ICD-10-CM

## 2025-09-04 DIAGNOSIS — Z00.00 ENCOUNTER FOR GENERAL ADULT MEDICAL EXAMINATION WITHOUT ABNORMAL FINDINGS: ICD-10-CM
